# Patient Record
Sex: MALE | Race: WHITE | NOT HISPANIC OR LATINO | Employment: OTHER | URBAN - METROPOLITAN AREA
[De-identification: names, ages, dates, MRNs, and addresses within clinical notes are randomized per-mention and may not be internally consistent; named-entity substitution may affect disease eponyms.]

---

## 2017-01-03 ENCOUNTER — DOCUMENTATION ONLY (OUTPATIENT)
Dept: FAMILY MEDICINE | Facility: CLINIC | Age: 82
End: 2017-01-03

## 2017-01-04 ENCOUNTER — OFFICE VISIT (OUTPATIENT)
Dept: FAMILY MEDICINE | Facility: CLINIC | Age: 82
End: 2017-01-04
Payer: MEDICARE

## 2017-01-04 ENCOUNTER — TELEPHONE (OUTPATIENT)
Dept: FAMILY MEDICINE | Facility: CLINIC | Age: 82
End: 2017-01-04

## 2017-01-04 ENCOUNTER — LAB VISIT (OUTPATIENT)
Dept: LAB | Facility: HOSPITAL | Age: 82
End: 2017-01-04
Attending: FAMILY MEDICINE
Payer: MEDICARE

## 2017-01-04 VITALS
SYSTOLIC BLOOD PRESSURE: 139 MMHG | HEART RATE: 61 BPM | HEIGHT: 70 IN | BODY MASS INDEX: 28.31 KG/M2 | DIASTOLIC BLOOD PRESSURE: 84 MMHG | TEMPERATURE: 98 F | WEIGHT: 197.75 LBS

## 2017-01-04 DIAGNOSIS — R91.1 PULMONARY NODULE: ICD-10-CM

## 2017-01-04 DIAGNOSIS — E78.5 HYPERLIPIDEMIA, UNSPECIFIED HYPERLIPIDEMIA TYPE: ICD-10-CM

## 2017-01-04 DIAGNOSIS — J06.9 UPPER RESPIRATORY TRACT INFECTION, UNSPECIFIED TYPE: ICD-10-CM

## 2017-01-04 DIAGNOSIS — Z12.5 ENCOUNTER FOR PROSTATE CANCER SCREENING: ICD-10-CM

## 2017-01-04 DIAGNOSIS — E55.9 VITAMIN D DEFICIENCY: ICD-10-CM

## 2017-01-04 DIAGNOSIS — R07.89 ATYPICAL CHEST PAIN: ICD-10-CM

## 2017-01-04 DIAGNOSIS — R00.1 BRADYCARDIA: ICD-10-CM

## 2017-01-04 DIAGNOSIS — L71.9 ROSACEA: ICD-10-CM

## 2017-01-04 DIAGNOSIS — G47.30 SLEEP APNEA, UNSPECIFIED TYPE: Primary | ICD-10-CM

## 2017-01-04 DIAGNOSIS — N40.0 BENIGN NON-NODULAR PROSTATIC HYPERPLASIA WITHOUT LOWER URINARY TRACT SYMPTOMS: ICD-10-CM

## 2017-01-04 DIAGNOSIS — K63.5 COLON POLYPS: ICD-10-CM

## 2017-01-04 DIAGNOSIS — Z00.00 ANNUAL PHYSICAL EXAM: ICD-10-CM

## 2017-01-04 LAB
25(OH)D3+25(OH)D2 SERPL-MCNC: 29 NG/ML
COMPLEXED PSA SERPL-MCNC: 0.63 NG/ML

## 2017-01-04 PROCEDURE — 99214 OFFICE O/P EST MOD 30 MIN: CPT | Mod: S$PBB,,, | Performed by: FAMILY MEDICINE

## 2017-01-04 PROCEDURE — 82306 VITAMIN D 25 HYDROXY: CPT

## 2017-01-04 PROCEDURE — 99999 PR PBB SHADOW E&M-EST. PATIENT-LVL III: CPT | Mod: PBBFAC,,, | Performed by: FAMILY MEDICINE

## 2017-01-04 PROCEDURE — 84153 ASSAY OF PSA TOTAL: CPT

## 2017-01-04 PROCEDURE — 93010 ELECTROCARDIOGRAM REPORT: CPT | Mod: ,,, | Performed by: INTERNAL MEDICINE

## 2017-01-04 PROCEDURE — 93005 ELECTROCARDIOGRAM TRACING: CPT | Mod: PBBFAC,PO | Performed by: FAMILY MEDICINE

## 2017-01-04 PROCEDURE — 36415 COLL VENOUS BLD VENIPUNCTURE: CPT | Mod: PO

## 2017-01-04 RX ORDER — AZITHROMYCIN 250 MG/1
TABLET, FILM COATED ORAL
Qty: 6 TABLET | Refills: 0 | Status: SHIPPED | OUTPATIENT
Start: 2017-01-04 | End: 2017-04-06 | Stop reason: ALTCHOICE

## 2017-01-04 RX ORDER — DEXAMETHASONE SODIUM PHOSPHATE 4 MG/ML
4 INJECTION, SOLUTION INTRA-ARTICULAR; INTRALESIONAL; INTRAMUSCULAR; INTRAVENOUS; SOFT TISSUE
Status: COMPLETED | OUTPATIENT
Start: 2017-01-04 | End: 2017-01-04

## 2017-01-04 RX ADMIN — DEXAMETHASONE SODIUM PHOSPHATE 4 MG: 4 INJECTION, SOLUTION INTRAMUSCULAR; INTRAVENOUS at 02:01

## 2017-01-04 NOTE — PROGRESS NOTES
[facility]  Progress Note    Subjective:       Patient ID: Bryson Kellogg is a 81 y.o. male.    Chief Complaint: Establish Care    HPI81 y.o.male with current medical history of sleep apnea, pulmonary nodule, BPH, colonic polyps, rosacea, vitamin D deficiency, and atypical chest pain is here to establish care.  The patient has been evaluated for chest pain in the past.  Patient was taken to emergency room.  Patient was diagnosed with acid reflux.  Since that time patient has not had similar symptoms.  Patient would like to be established with cardiologist just in case any event would happen in the future.  Patient has recently had a colonoscopy performed by GI. Patient is also complaing of sinus congestion, rhinorrhea, and cough. Symptoms have been present for past 5 days. Patient has been taking OTC medication which have not provided symptom relief.  All other medical conditions are well-controlled with current treatment.  No further complaints at today's visit.    Review of Systems   Constitutional: Negative for activity change and unexpected weight change.   HENT: Positive for congestion, postnasal drip, rhinorrhea and sinus pressure. Negative for hearing loss and trouble swallowing.    Eyes: Negative for discharge and visual disturbance.   Respiratory: Positive for cough. Negative for chest tightness and wheezing.    Cardiovascular: Positive for chest pain. Negative for palpitations.   Gastrointestinal: Negative for blood in stool, constipation, diarrhea and vomiting.   Endocrine: Negative for polydipsia and polyuria.   Genitourinary: Negative for difficulty urinating, hematuria and urgency.   Musculoskeletal: Negative for arthralgias and joint swelling.   Neurological: Negative for weakness and headaches.   Psychiatric/Behavioral: Negative for confusion and dysphoric mood.       Objective:      Vitals:    01/04/17 1309   BP: 139/84   BP Location: Right arm   Patient Position: Sitting   Pulse: 61  "  Temp: 98 °F (36.7 °C)   TempSrc: Oral   Weight: 89.7 kg (197 lb 12 oz)   Height: 5' 10" (1.778 m)     Body mass index is 28.37 kg/(m^2).  Physical Exam   Constitutional: He is oriented to person, place, and time. He appears well-developed and well-nourished.   HENT:   Head: Normocephalic and atraumatic.   Eyes: Conjunctivae and EOM are normal. Pupils are equal, round, and reactive to light.   Neck: Normal range of motion. Neck supple. No JVD present.   Cardiovascular: Normal rate, regular rhythm, normal heart sounds and intact distal pulses.  Exam reveals no gallop and no friction rub.    No murmur heard.  Pulmonary/Chest: Effort normal and breath sounds normal. No respiratory distress. He has no wheezes.   Abdominal: Soft. Bowel sounds are normal. There is no tenderness.   Musculoskeletal: Normal range of motion.   Neurological: He is alert and oriented to person, place, and time. No cranial nerve deficit.   Skin: Skin is warm and dry.   Psychiatric: He has a normal mood and affect. His behavior is normal. Judgment and thought content normal.   Nursing note and vitals reviewed.      Assessment:       1. Sleep apnea, unspecified type    2. Pulmonary nodule    3. Benign non-nodular prostatic hyperplasia without lower urinary tract symptoms    4. Colon polyps    5. Hyperlipidemia, unspecified hyperlipidemia type    6. Rosacea    7. Encounter for prostate cancer screening    8. Vitamin D deficiency    9. Annual physical exam    10. Upper respiratory tract infection, unspecified type    11. Atypical chest pain    12. Bradycardia        Plan:       Sleep apnea, unspecified type        - Well controlled patient no longer on CPAP    Pulmonary nodule        - Follow up CT chest without contrast    Benign non-nodular prostatic hyperplasia without lower urinary tract symptoms        - Well controlled continue current medications    Colon polyps        - Will follow up Gastroenterology     Hyperlipidemia, unspecified " hyperlipidemia type        - Well controlled continue current medications    Rosacea        - Well controlled continue current medications    Encounter for prostate cancer screening  -     PSA, Screening; Future; Expected date: 1/4/17    Vitamin D deficiency  -     Vitamin D; Future; Expected date: 1/4/17    Annual physical exam  -     EKG 12-lead    Upper respiratory tract infection, unspecified type  -     dexamethasone injection 4 mg; Inject 1 mL (4 mg total) into the muscle one time.  -     azithromycin (Z-YUE) 250 MG tablet; Take 2 tablets by mouth on day 1; Take 1 tablet by mouth on days 2-5  Dispense: 6 tablet; Refill: 0    Atypical chest pain  -     Ambulatory referral to Cardiology    Bradycardia  -     Ambulatory referral to Cardiology      Return in about 6 months (around 7/4/2017).  Ric Avina MD  [facility]  1/4/2017 2:12 PM

## 2017-01-04 NOTE — TELEPHONE ENCOUNTER
Left message informing patient that a CT Scan was placed and he needs to call back to schedule it.

## 2017-01-04 NOTE — PROGRESS NOTES
Injection given; RUO;patient tolerates well; Bandaid applied; Patient asked to wait 15minutes in lobby to monitor any reaction.

## 2017-01-04 NOTE — MR AVS SNAPSHOT
[facility]  [address]  [place] [address]  Phone: [phone]  Fax: [phone]                  Bryson Kellogg   2017 1:20 PM   Office Visit    Description:  Male : 1935   Provider:  Ric Avina MD   Department:  [facility]           Reason for Visit     Establish Care           Diagnoses this Visit        Comments    Sleep apnea, unspecified type    -  Primary     Pulmonary nodule         Benign non-nodular prostatic hyperplasia without lower urinary tract symptoms         Colon polyps         Hyperlipidemia, unspecified hyperlipidemia type         Rosacea         Encounter for prostate cancer screening         Vitamin D deficiency         Annual physical exam         Upper respiratory tract infection, unspecified type         Atypical chest pain         Bradycardia                To Do List           Future Appointments        Provider Department Dept Phone    2017 2:30 PM [facility] [phone]    2017 2:00 [facility] [phone]    2017 1:00 PM Ric Avina MD [place] [facility] [phone]      Goals (5 Years of Data)     None      Follow-Up and Disposition     Return in about 6 months (around 2017).       These Medications        Disp Refills Start End    azithromycin (Z-YUE) 250 MG tablet 6 tablet 0 2017     Take 2 tablets by mouth on day 1; Take 1 tablet by mouth on days 2-5    Pharmacy: [address] Ph #: [phone]         [facility]     [facility] Care [facility] -  Assistance  Registered nurses in the [facility] Center provide clinical advisement, health education, appointment booking, and other advisory services.  Call for this free service at [phone].             Medications           Message regarding Medications     Verify the changes and/or additions to your  medication regime listed below are the same as discussed with your clinician today.  If any of these changes or additions are incorrect, please notify your healthcare provider.        START taking these NEW medications        Refills    azithromycin (Z-YUE) 250 MG tablet 0    Sig: Take 2 tablets by mouth on day 1; Take 1 tablet by mouth on days 2-5    Class: Normal      These medications were administered today        Dose Freq    dexamethasone injection 4 mg 4 mg Clinic/HOD 1 time    Sig: Inject 1 mL (4 mg total) into the muscle one time.    Class: Normal    Route: Intramuscular      STOP taking these medications     LOTEMAX 0.5 % ophthalmic suspension     B-complex with vitamin C (Z-BEC OR EQUIV) tablet Take 1 tablet by mouth once daily.           Verify that the below list of medications is an accurate representation of the medications you are currently taking.  If none reported, the list may be blank. If incorrect, please contact your healthcare provider. Carry this list with you in case of emergency.           Current Medications     ascorbic acid (VITAMIN C) 500 MG tablet Take 500 mg by mouth once daily.      aspirin 81 MG Chew Take 81 mg by mouth once daily.    atorvastatin (LIPITOR) 20 MG tablet Take 1 tablet (20 mg total) by mouth once daily.    CALCIUM CARBONATE/VITAMIN D3 (VITAMIN D-3 ORAL) Take by mouth.    cyanocobalamin (VITAMIN B-12) 250 MCG tablet Take 250 mcg by mouth once daily.      MULTIVIT-MIN/FA/LYCOPENE/LUT (CENTRUM SILVER ULTRA MEN'S ORAL) Take 1 tablet by mouth once daily.    mupirocin (BACTROBAN) 2 % ointment     PYRIDOXINE HCL (VITAMIN B-6 ORAL) Take by mouth.    triamcinolone acetonide 0.1% (KENALOG) 0.1 % cream APPLY TOPICALLY daily  AS NEEDED.    azithromycin (Z-YUE) 250 MG tablet Take 2 tablets by mouth on day 1; Take 1 tablet by mouth on days 2-5           Clinical Reference Information           Vital Signs - Last Recorded  Most recent update: 1/4/2017  1:10 PM by EN Ken LPN  "   BP Pulse Temp Ht Wt BMI    139/84 (BP Location: Right arm, Patient Position: Sitting) 61 98 °F (36.7 °C) (Oral) 5' 10" (1.778 m) 89.7 kg (197 lb 12 oz) 28.37 kg/m2      Blood Pressure          Most Recent Value    BP  139/84      Allergies as of 1/4/2017     Sulfa (Sulfonamide Antibiotics)      Immunizations Administered on Date of Encounter - 1/4/2017     None      Orders Placed During Today's Visit      Normal Orders This Visit    Ambulatory referral to Cardiology     EKG 12-lead     Future Labs/Procedures Expected by Expires    PSA, Screening  1/4/2017 3/5/2018    Vitamin D  1/4/2017 3/5/2018      Administrations This Visit     dexamethasone injection 4 mg     Admin Date Action Dose Route Administered By             01/04/2017 Given 4 mg Intramuscular EN Ken LPN                      "

## 2017-01-05 ENCOUNTER — HOSPITAL ENCOUNTER (OUTPATIENT)
Dept: RADIOLOGY | Facility: HOSPITAL | Age: 82
Discharge: HOME | End: 2017-01-05
Attending: FAMILY MEDICINE
Payer: MEDICARE

## 2017-01-05 DIAGNOSIS — R91.1 PULMONARY NODULE: ICD-10-CM

## 2017-01-05 DIAGNOSIS — E55.9 VITAMIN D DEFICIENCY: Primary | ICD-10-CM

## 2017-01-05 PROCEDURE — 71250 CT THORAX DX C-: CPT | Mod: TC

## 2017-01-05 PROCEDURE — 71250 CT THORAX DX C-: CPT | Mod: 26,,, | Performed by: RADIOLOGY

## 2017-01-05 RX ORDER — ERGOCALCIFEROL 1.25 MG/1
50000 CAPSULE ORAL
Qty: 8 CAPSULE | Refills: 0 | Status: SHIPPED | OUTPATIENT
Start: 2017-01-05 | End: 2017-04-06 | Stop reason: ALTCHOICE

## 2017-01-09 ENCOUNTER — TELEPHONE (OUTPATIENT)
Dept: FAMILY MEDICINE | Facility: CLINIC | Age: 82
End: 2017-01-09

## 2017-01-09 NOTE — TELEPHONE ENCOUNTER
----- Message from Ric Avina MD sent at 1/5/2017  2:13 PM CST -----  Please call and inform patient that he has stable pulmonary nodules.  At this time we'll continue to follow.  Patient also found small hiatal hernia.  Follow-up as scheduled.

## 2017-01-09 NOTE — TELEPHONE ENCOUNTER
----- Message from Ric Avina MD sent at 1/6/2017 11:16 AM CST -----  Please call and inform patient that chest CT  Shows benign  Pulmonary nodules  Small hiatal hernias.  Follow-up as scheduled

## 2017-01-09 NOTE — TELEPHONE ENCOUNTER
----- Message from Ric Avina MD sent at 1/5/2017  9:42 AM CST -----  Please call and inform patient that he has vitamin D deficiency.  Have called in a vitamin D 1 pill every 7 days for 2 months.  After this time he can start taking his daily vitamin D supplementation.  While on prescription vitamin D discontinue over-the-counter vitamin D supplementation.

## 2017-01-09 NOTE — TELEPHONE ENCOUNTER
Talked with wife; she has read CT report on [place] and has question about atherscoloris and CAD mentioned in report. Please review for advise

## 2017-04-04 ENCOUNTER — DOCUMENTATION ONLY (OUTPATIENT)
Dept: FAMILY MEDICINE | Facility: CLINIC | Age: 82
End: 2017-04-04

## 2017-04-04 NOTE — PROGRESS NOTES
Pre-Visit Chart Review  For Appointment Scheduled on 04/06/2017      Health Maintenance Due   Topic Date Due    TETANUS VACCINE  06[birth year]29/1953    Zoster Vaccine  06/29/1996    Influenza Vaccine  08/01/2016    Pneumococcal (65+) (2 of 2 - PPSV23) 12/12/2016

## 2017-04-06 ENCOUNTER — OFFICE VISIT (OUTPATIENT)
Dept: FAMILY MEDICINE | Facility: CLINIC | Age: 82
End: 2017-04-06
Payer: MEDICARE

## 2017-04-06 ENCOUNTER — OFFICE VISIT (OUTPATIENT)
Dept: CARDIOLOGY | Facility: CLINIC | Age: 82
End: 2017-04-06
Payer: MEDICARE

## 2017-04-06 VITALS
HEIGHT: 70 IN | WEIGHT: 199.5 LBS | HEART RATE: 62 BPM | RESPIRATION RATE: 16 BRPM | SYSTOLIC BLOOD PRESSURE: 131 MMHG | BODY MASS INDEX: 28.56 KG/M2 | OXYGEN SATURATION: 96 % | DIASTOLIC BLOOD PRESSURE: 73 MMHG

## 2017-04-06 VITALS
WEIGHT: 198.19 LBS | SYSTOLIC BLOOD PRESSURE: 126 MMHG | BODY MASS INDEX: 28.37 KG/M2 | HEART RATE: 57 BPM | HEIGHT: 70 IN | TEMPERATURE: 98 F | DIASTOLIC BLOOD PRESSURE: 79 MMHG

## 2017-04-06 DIAGNOSIS — G47.33 OBSTRUCTIVE SLEEP APNEA SYNDROME: ICD-10-CM

## 2017-04-06 DIAGNOSIS — E78.2 MIXED HYPERLIPIDEMIA: Primary | ICD-10-CM

## 2017-04-06 DIAGNOSIS — R94.31 ABNORMAL EKG: ICD-10-CM

## 2017-04-06 DIAGNOSIS — R94.31 ABNORMAL ELECTROCARDIOGRAM: ICD-10-CM

## 2017-04-06 DIAGNOSIS — Z00.00 ENCOUNTER FOR PREVENTIVE HEALTH EXAMINATION: Primary | ICD-10-CM

## 2017-04-06 PROBLEM — R00.1 BRADYCARDIA: Status: RESOLVED | Noted: 2017-01-04 | Resolved: 2017-04-06

## 2017-04-06 PROCEDURE — 99999 PR PBB SHADOW E&M-EST. PATIENT-LVL III: CPT | Mod: PBBFAC,,, | Performed by: INTERNAL MEDICINE

## 2017-04-06 PROCEDURE — G0438 PPPS, INITIAL VISIT: HCPCS | Mod: ,,, | Performed by: PHYSICIAN ASSISTANT

## 2017-04-06 PROCEDURE — 99213 OFFICE O/P EST LOW 20 MIN: CPT | Mod: PBBFAC,27,PO | Performed by: INTERNAL MEDICINE

## 2017-04-06 PROCEDURE — 99999 PR PBB SHADOW E&M-EST. PATIENT-LVL IV: CPT | Mod: PBBFAC,,, | Performed by: PHYSICIAN ASSISTANT

## 2017-04-06 PROCEDURE — 99205 OFFICE O/P NEW HI 60 MIN: CPT | Mod: S$PBB,,, | Performed by: INTERNAL MEDICINE

## 2017-04-06 RX ORDER — ASPIRIN 81 MG/1
81 TABLET ORAL DAILY
COMMUNITY
End: 2022-12-12

## 2017-04-06 RX ORDER — ATORVASTATIN CALCIUM 20 MG/1
10 TABLET, FILM COATED ORAL DAILY
Qty: 90 TABLET | Refills: 3 | Status: SHIPPED | OUTPATIENT
Start: 2017-04-06 | End: 2018-01-03 | Stop reason: DRUGHIGH

## 2017-04-06 RX ORDER — ATORVASTATIN CALCIUM 20 MG/1
10 TABLET, FILM COATED ORAL DAILY
COMMUNITY
End: 2017-04-06 | Stop reason: SDUPTHER

## 2017-04-06 NOTE — PATIENT INSTRUCTIONS
Counseling and Referral of Other Preventative  (Italic type indicates deductible and co-insurance are waived)    Patient Name: Bryson Kellogg  Today's Date: 4/6/2017      SERVICE LIMITATIONS RECOMMENDATION    Vaccines    · Pneumococcal (once after 65)    · Influenza (annually)    · Hepatitis B (if medium/high risk)    · Prevnar 13      Hepatitis B medium/high risk factors:       - End-stage renal disease       - Hemophiliacs who received Factor VII or         IX concentrates       - Clients of institutions for the mentally             retarded       - Persons who live in the same house as          a HepB carrier       - Homosexual men       - Illicit injectable drug abusers     Pneumococcal: Done, no repeat necessary     Influenza: Done, repeat in one year     Hepatitis B: N/A     Prevnar 13: Done, no repeat necessary    Prostate cancer screening (annually to age 75)     Prostate specific antigen (PSA) Shared decision making with Provider. Sometimes a co-pay may be required if the patient decides to have this test. The USPSTF no longer recommends prostate cancer screening routinely in medicine: every 1 year    Colorectal cancer screening (to age 75)    · Fecal occult blood test (annual)  · Flexible sigmoidoscopy (5y)  · Screening colonoscopy (10y)  · Barium enema   N/A    Diabetes self-management training (no USPSTF recommendations)  Requires referral by treating physician for patient with diabetes or renal disease. 10 hours of initial DSMT sessions of no less than 30 minutes each in a continuous 12-month period. 2 hours of follow-up DSMT in subsequent years.  N/A    Glaucoma screening (no USPSTF recommendation)  Diabetes mellitus, family history   , age 50 or over    American, age 65 or over  Done this year, repeat every year    Medical nutrition therapy for diabetes or renal disease (no recommended schedule)  Requires referral by treating physician for patient with diabetes or renal disease  or kidney transplant within the past 3 years.  Can be provided in same year as diabetes self-management training (DSMT), and CMS recommends medical nutrition therapy take place after DSMT. Up to 3 hours for initial year and 2 hours in subsequent years.  N/A    Cardiovascular screening blood tests (every 5 years)  · Fasting lipid panel  Order as a panel if possible  Done this year, repeat every year    Diabetes screening tests (at least every 3 years, Medicare covers annually or at 6-month intervals for prediabetic patients)  · Fasting blood sugar (FBS) or glucose tolerance test (GTT)  Patient must be diagnosed with one of the following:       - Hypertension       - Dyslipidemia       - Obesity (BMI 30kg/m2)       - Previous elevated impaired FBS or GTT       ... or any two of the following:       - Overweight (BMI 25 but <30)       - Family history of diabetes       - Age 65 or older       - History of gestational diabetes or birth of baby weighing more than 9 pounds  Done this year, repeat every year    Abdominal aortic aneurysm screening (once)  · Sonogram   Limited to patients who meet one of the following criteria:       - Men who are 65-75 years old and have smoked more than 100 cigarette in their lifetime       - Anyone with a family history of abdominal aortic aneurysm       - Anyone recommended for screening by the USPSTF  N/A    HIV screening (annually for increased risk patients)  · HIV-1 and HIV-2 by EIA, or LUÍS, rapid antibody test or oral mucosa transudate  Patients must be at increased risk for HIV infection per USPSTF guidelines or pregnant. Tests covered annually for patient at increased risk or as requested by the patient. Pregnant patients may receive up to 3 tests during pregnancy.  Risks discussed, screening is not recommended    Smoking cessation counseling (up to 8 sessions per year)  Patients must be asymptomatic of tobacco-related conditions to receive as a preventative service.  Non-smoker     Subsequent annual wellness visit  At least 12 months since last AWV  Return in one year     The following information is provided to all patients.  This information is to help you find resources for any of the problems found today that may be affecting your health:                Living healthy guide: www.[facility].[place].[facility]      Understanding Diabetes: www.diabetes.org      Eating healthy: www.cdc.gov/healthyweight      CDC home safety checklist: www.cdc.gov/steadi/patient.html      Agency on Aging: www.goea.[place].[facility]      Alcoholics anonymous (AA): www.aa.org      Physical Activity: www.nellie.nih.gov/hq0qqev      Tobacco use: www.quitwithusla.org

## 2017-04-06 NOTE — PROGRESS NOTES
[facility]    CC: Establish Care    Patient ID: Bryson Kellogg is a 81 y.o. male with a past medical history of HLD, KATHY, BPH, who presents for an initial appointment.  He was kindly referred by Dr. Avina for cardiac evaluation.  Pertinent history/events are as follows:     -Pt has no history of CAD.    -States he was diagnosed with KATHY in the past, but was told he does not need to wear CPAP by the physician who diagnosed him with KATHY.     HPI:   Mr. Kellogg states he's been doing well.  He reports no chest pain, SOB, LE edema, claudication, palpitations, TIA symptoms, or syncope.  Keeps active by going to gym exercising at least 3 days a week.  Also works in the yard.  Taking all medications as prescribed with no issues.  BP today 131/73.      Past Medical History:   Diagnosis Date    Allergy     Bradycardia 1/4/2017    Hx of colonic polyp 11/3/2016    Hyperlipidemia     Personal history of colonic polyps     Colon polyps    Renal stone 11/23/2012    Rosacea     Shingles     Sleep apnea     TIA (transient ischemic attack)      Past Surgical History:   Procedure Laterality Date    CHOLECYSTECTOMY      COLONOSCOPY N/A 11/3/2016    Procedure: COLONOSCOPY;  Surgeon: Alex Cuellar MD;  Location: [facility];  Service: Endoscopy;  Laterality: N/A;    EYE SURGERY Bilateral 2014    cataracts    EYE SURGERY Right 10/2016    tear duct surgery    JOINT REPLACEMENT Left 2013    knee replacemant      left knee     Social History     Social History    Marital status:      Spouse name: Sharyn    Number of children: N/A    Years of education: N/A     Occupational History    Not on file.     Social History Main Topics    Smoking status: Never Smoker    Smokeless tobacco: Not on file    Alcohol use No    Drug use: No    Sexual activity: Yes     Partners: Female     Other Topics Concern    Not on file     Social History Narrative    No narrative on file     Family History   Problem  "Relation Age of Onset    No Known Problems Mother     Heart disease Father     Heart disease Brother     No Known Problems Sister     No Known Problems Daughter     No Known Problems Brother        Review of patient's allergies indicates:   Allergen Reactions    Sulfa (sulfonamide antibiotics) Rash     Childhood allergy       Medication List with Changes/Refills   Current Medications    ASCORBIC ACID (VITAMIN C) 500 MG TABLET    Take 500 mg by mouth once daily.      ASPIRIN (ECOTRIN) 81 MG EC TABLET    Take 81 mg by mouth once daily.    ATORVASTATIN (LIPITOR) 20 MG TABLET    Take 0.5 tablets (10 mg total) by mouth once daily.    CALCIUM CARBONATE/VITAMIN D3 (VITAMIN D-3 ORAL)    Take by mouth.    CYANOCOBALAMIN (VITAMIN B-12) 250 MCG TABLET    Take 250 mcg by mouth once daily.      MULTIVIT-MIN/FA/LYCOPENE/LUT (CENTRUM SILVER ULTRA MEN'S ORAL)    Take 1 tablet by mouth once daily.    MUPIROCIN (BACTROBAN) 2 % OINTMENT        PYRIDOXINE HCL (VITAMIN B-6 ORAL)    Take by mouth.    TRIAMCINOLONE ACETONIDE 0.1% (KENALOG) 0.1 % CREAM    APPLY TOPICALLY daily  AS NEEDED.   Discontinued Medications    ERGOCALCIFEROL (ERGOCALCIFEROL) 50,000 UNIT CAP    Take 1 capsule (50,000 Units total) by mouth every 7 days.       Review of Systems  Constitution: Denies chills, fever, and sweats.  HENT: Denies headaches or blurry vision.  Cardiovascular: Denies chest pain or irregular heart beat.  Respiratory: Denies cough or shortness of breath.  Gastrointestinal: Denies abdominal pain, nausea, or vomiting.  Musculoskeletal: Denies muscle cramps.  Neurological: Denies dizziness or focal weakness.  Psychiatric/Behavioral: Normal mental status.  Hematologic/Lymphatic: Denies bleeding problem or easy bruising/bleeding.  Skin: Denies rash or suspicious lesions    Physical Examination  /73 (BP Location: Right arm, Patient Position: Sitting, BP Method: Automatic)  Pulse 62  Resp 16  Ht 5' 10" (1.778 m)  Wt 90.5 kg (199 lb 8.3 " oz)  SpO2 96%  BMI 28.63 kg/m2    Constitutional: No acute distress, conversant  HEENT: Sclera anicteric, Pupils equal, round and reactive to light, extraocular motions intact, Oropharynx clear  Neck: No JVD, no carotid bruits  Cardiovascular: regular rate and rhythm, no murmur, rubs or gallops, normal S1/S2  Pulmonary: Clear to auscultation bilaterally  Abdominal: Abdomen soft, nontender, nondistended, positive bowel sounds  Extremities: No lower extremity edema,   Pulses:  Carotid pulses are 2+ on the right side, and 2+ on the left side.  Radial pulses are 2+ on the right side, and 2+ on the left side.   Femoral pulses are 2+ on the right side, and 2+ on the left side.  Skin: No ecchymosis, erythema, or ulcers  Psych: Alert and oriented x 3, appropriate affect  Neuro: CNII-XII intact, no focal deficits    Labs:  Most Recent Data  CBC:   Lab Results   Component Value Date    WBC 6.17 06/14/2016    HGB 14.5 06/14/2016    HCT 42.8 06/14/2016     06/14/2016    MCV 96 06/14/2016    RDW 13.8 06/14/2016     BMP:   Lab Results   Component Value Date     06/14/2016    K 4.5 06/14/2016     06/14/2016    CO2 27 06/14/2016    BUN 13 06/14/2016     06/14/2016    CALCIUM 10.3 06/14/2016     LFTS;   Lab Results   Component Value Date    PROT 6.8 06/14/2016    ALBUMIN 3.7 06/14/2016    BILITOT 1.0 06/14/2016    AST 26 06/14/2016    ALKPHOS 102 06/14/2016    ALT 22 06/14/2016     COAGS:   Lab Results   Component Value Date    INR 1.03 11/23/2012     FLP:   Lab Results   Component Value Date    CHOL 187 06/14/2016    HDL 46 06/14/2016    LDLCALC 117.4 06/14/2016    TRIG 118 06/14/2016    CHOLHDL 24.6 06/14/2016       EKG 1/4/2017:  Sinus bradycardia  Otherwise normal ECG    Echo:  CONCLUSIONS     1 - Concentric remodeling.     2 - Normal left ventricular systolic function (EF 60-65%).     3 - Normal left ventricular diastolic function.     4 - Normal right ventricular systolic function .     5 - No  significant change from echo on 11/14/2005..     Assessment/Plan:  Bryson Kellogg is a 81 y.o. male with a past medical history of HLD, KATHY, BPH, who presents for an initial appointment. Currently with no symptoms.  Has 10 year ASCVD risk of 36%.      -Pt to keep log of blood pressure/heart rate and bring in next visit  -Continue current medications  -Echo  -Treadmill Nuclear stress test  -Follow up in 3 months    Total duration of face to face visit time 45 minutes.  Total time spent counseling greater than fifty percent of total visit time.  Counseling included discussion regarding imaging findings, diagnosis, possibilities, treatment options, risks and benefits.  The patient had many questions regarding the options and long-term effects.    Luis E Chavez MD, PhD  Interventional Cardiology

## 2017-04-06 NOTE — PATIENT INSTRUCTIONS
Pt to keep log of blood pressure/heart rate and bring in next visit  Continue current medications  Echo  Nuclear stress test  Follow up in 3 months

## 2017-04-06 NOTE — LETTER
April 6, 2017      Ric Avina MD  [address]  [place] [address]           [facility] [facility] [place] [facility] - Cardiology  [address]  [place] [address]  Phone: [phone]          Patient: Bryson Kellogg   MR Number: [id number]   YOB: 1935   Date of Visit: 4/6/2017       Dear Dr. Ric Avina:    Thank you for referring Bryson Kellogg to me for evaluation. Attached you will find relevant portions of my assessment and plan of care.    If you have questions, please do not hesitate to call me. I look forward to following Bryson Kellogg along with you.    Sincerely,    Luis E Chavez MD    Enclosure  CC:  No Recipients    If you would like to receive this communication electronically, please contact [email or web address] or [phone] to request more information on [facility] access.    For providers and/or their staff who would like to refer a patient to [facility], please contact us through our one-stop-shop provider referral line, [facility], at [phone].    If you feel you have received this communication in error or would no longer like to receive these types of communications, please e-mail [email or web address]

## 2017-04-06 NOTE — PROGRESS NOTES
"Bryson Kellogg presented for an initial Medicare AWV today. The following components were reviewed and updated:    · Medical history  · Family History  · Social history  · Allergies and Current Medications  · Health Risk Assessment  · Health Maintenance  · Care Team    **See Completed Assessments for Annual Wellness visit with in the encounter summary    The following assessments were completed:  · Depression Screening  · Cognitive function Screening  · Timed Get Up Test  · Whisper Test    Vitals:    04/06/17 1011   BP: 126/79   BP Location: Left arm   Patient Position: Sitting   BP Method: Automatic   Pulse: (!) 57   Temp: 97.6 °F (36.4 °C)   TempSrc: Oral   Weight: 89.9 kg (198 lb 3.1 oz)   Height: 5' 10" (1.778 m)     Body mass index is 28.44 kg/(m^2).   ]              Diagnoses and health risks identified today and associated recommendations/orders:  1. Encounter for preventive health examination    Provided Bryson with a 5-10 year written screening schedule and personal prevention plan. Recommendations were developed using the USPSTF age appropriate recommendations. Education, counseling, and referrals were provided as needed.  After Visit Summary printed and given to patient which includes a list of additional screenings\tests needed.    Return in about 3 months (around 7/6/2017).      ELLIE Alejandro  "

## 2017-04-06 NOTE — MR AVS SNAPSHOT
[facility]  [address]  [place] [address]  Phone: [phone]  Fax: [phone]                  Bryson Kellogg   2017 11:00 AM   Office Visit    Description:  Male : 1935   Provider:  ELLIE Hu   Department:  [facility] - [facility] Medicine           Reason for Visit     Medicare AWV Follow Up           Diagnoses this Visit        Comments    Encounter for preventive health examination    -  Primary            To Do List           Future Appointments        Provider Department Dept Phone    2017 2:00 PM Luis E Chavez MD [facility] - Cardiology [phone]    2017 1:00 PM Ric Avina MD [facility] [phone]      Goals (5 Years of Data)     None       These Medications        Disp Refills Start End    atorvastatin (LIPITOR) 20 MG tablet 90 tablet 3 2017     Take 0.5 tablets (10 mg total) by mouth once daily. - Oral    Pharmacy: [facility]  [place] - [address] [place] [address] Ph #: [phone]         [facility]     [facility] On Call Nurse Care Line -  Assistance  Unless otherwise directed by your provider, please contact [facility], our nurse care line that is available for  assistance.     Registered nurses in the [facility] provide: appointment scheduling, clinical advisement, health education, and other advisory services.  Call: [phone] (toll free)               Medications           Message regarding Medications     Verify the changes and/or additions to your medication regime listed below are the same as discussed with your clinician today.  If any of these changes or additions are incorrect, please notify your healthcare provider.        START taking these NEW medications        Refills    atorvastatin (LIPITOR) 20 MG tablet 3    Sig: Take 0.5 tablets (10 mg total) by mouth once daily.    Class: Normal    Route: Oral      STOP taking these medications     azithromycin  "(Z-YUE) 250 MG tablet Take 2 tablets by mouth on day 1; Take 1 tablet by mouth on days 2-5           Verify that the below list of medications is an accurate representation of the medications you are currently taking.  If none reported, the list may be blank. If incorrect, please contact your healthcare provider. Carry this list with you in case of emergency.           Current Medications     ascorbic acid (VITAMIN C) 500 MG tablet Take 500 mg by mouth once daily.      aspirin (ECOTRIN) 81 MG EC tablet Take 81 mg by mouth once daily.    atorvastatin (LIPITOR) 20 MG tablet Take 0.5 tablets (10 mg total) by mouth once daily.    CALCIUM CARBONATE/VITAMIN D3 (VITAMIN D-3 ORAL) Take by mouth.    cyanocobalamin (VITAMIN B-12) 250 MCG tablet Take 250 mcg by mouth once daily.      ergocalciferol (ERGOCALCIFEROL) 50,000 unit Cap Take 1 capsule (50,000 Units total) by mouth every 7 days.    MULTIVIT-MIN/FA/LYCOPENE/LUT (CENTRUM SILVER ULTRA MEN'S ORAL) Take 1 tablet by mouth once daily.    mupirocin (BACTROBAN) 2 % ointment     PYRIDOXINE HCL (VITAMIN B-6 ORAL) Take by mouth.    triamcinolone acetonide 0.1% (KENALOG) 0.1 % cream APPLY TOPICALLY daily  AS NEEDED.           Clinical Reference Information           Your Vitals Were     BP Pulse Temp Height Weight BMI    126/79 (BP Location: Left arm, Patient Position: Sitting, BP Method: Automatic) 57 97.6 °F (36.4 °C) (Oral) 5' 10" (1.778 m) 89.9 kg (198 lb 3.1 oz) 28.44 kg/m2      Blood Pressure          Most Recent Value    BP  126/79      Allergies as of 4/6/2017     Sulfa (Sulfonamide Antibiotics)      Immunizations Administered on Date of Encounter - 4/6/2017     None      Instructions      Counseling and Referral of Other Preventative  (Italic type indicates deductible and co-insurance are waived)    Patient Name: Bryson Kellogg  Today's Date: 4/6/2017      SERVICE LIMITATIONS RECOMMENDATION    Vaccines    · Pneumococcal (once after 65)    · Influenza " (annually)    · Hepatitis B (if medium/high risk)    · Prevnar 13      Hepatitis B medium/high risk factors:       - End-stage renal disease       - Hemophiliacs who received Factor VII or         IX concentrates       - Clients of institutions for the mentally             retarded       - Persons who live in the same house as          a HepB carrier       - Homosexual men       - Illicit injectable drug abusers     Pneumococcal: Done, no repeat necessary     Influenza: Done, repeat in one year     Hepatitis B: N/A     Prevnar 13: Done, no repeat necessary    Prostate cancer screening (annually to age 75)     Prostate specific antigen (PSA) Shared decision making with Provider. Sometimes a co-pay may be required if the patient decides to have this test. The USPSTF no longer recommends prostate cancer screening routinely in medicine: every 1 year    Colorectal cancer screening (to age 75)    · Fecal occult blood test (annual)  · Flexible sigmoidoscopy (5y)  · Screening colonoscopy (10y)  · Barium enema   N/A    Diabetes self-management training (no USPSTF recommendations)  Requires referral by treating physician for patient with diabetes or renal disease. 10 hours of initial DSMT sessions of no less than 30 minutes each in a continuous 12-month period. 2 hours of follow-up DSMT in subsequent years.  N/A    Glaucoma screening (no USPSTF recommendation)  Diabetes mellitus, family history   , age 50 or over    American, age 65 or over  Done this year, repeat every year    Medical nutrition therapy for diabetes or renal disease (no recommended schedule)  Requires referral by treating physician for patient with diabetes or renal disease or kidney transplant within the past 3 years.  Can be provided in same year as diabetes self-management training (DSMT), and CMS recommends medical nutrition therapy take place after DSMT. Up to 3 hours for initial year and 2 hours in subsequent years.  N/A     Cardiovascular screening blood tests (every 5 years)  · Fasting lipid panel  Order as a panel if possible  Done this year, repeat every year    Diabetes screening tests (at least every 3 years, Medicare covers annually or at 6-month intervals for prediabetic patients)  · Fasting blood sugar (FBS) or glucose tolerance test (GTT)  Patient must be diagnosed with one of the following:       - Hypertension       - Dyslipidemia       - Obesity (BMI 30kg/m2)       - Previous elevated impaired FBS or GTT       ... or any two of the following:       - Overweight (BMI 25 but <30)       - Family history of diabetes       - Age 65 or older       - History of gestational diabetes or birth of baby weighing more than 9 pounds  Done this year, repeat every year    Abdominal aortic aneurysm screening (once)  · Sonogram   Limited to patients who meet one of the following criteria:       - Men who are 65-75 years old and have smoked more than 100 cigarette in their lifetime       - Anyone with a family history of abdominal aortic aneurysm       - Anyone recommended for screening by the USPSTF  N/A    HIV screening (annually for increased risk patients)  · HIV-1 and HIV-2 by EIA, or LUÍS, rapid antibody test or oral mucosa transudate  Patients must be at increased risk for HIV infection per USPSTF guidelines or pregnant. Tests covered annually for patient at increased risk or as requested by the patient. Pregnant patients may receive up to 3 tests during pregnancy.  Risks discussed, screening is not recommended    Smoking cessation counseling (up to 8 sessions per year)  Patients must be asymptomatic of tobacco-related conditions to receive as a preventative service.  Non-smoker    Subsequent annual wellness visit  At least 12 months since last AWV  Return in one year     The following information is provided to all patients.  This information is to help you find resources for any of the problems found today that may be affecting your  health:                Living healthy guide: www.[facility].[place].[facility]      Understanding Diabetes: www.diabetes.org      Eating healthy: www.cdc.gov/healthyweight      CDC home safety checklist: www.cdc.gov/steadi/patient.html      Agency on Aging: www.goea.[place].[facility]      Alcoholics anonymous (AA): www.aa.org      Physical Activity: www.nellie.nih.gov/wg0nxtj      Tobacco use: www.quitwithusla.org          Language Assistance Services     ATTENTION: Language assistance services are available, free of charge. Please call [phone].      ATENCIÓN: Si habla español, tiene a mai disposición servicios gratuitos de asistencia lingüística. Llame al [phone].     CHÚ Ý: N?u b?n nói Ti?ng Vi?t, có các d?ch v? h? tr? ngôn ng? mi?n phí dành cho b?n. G?i s? [phone].         [facility] complies with applicable Federal civil rights laws and does not discriminate on the basis of race, color, national origin, age, disability, or sex.

## 2017-05-22 DIAGNOSIS — R94.31 NONSPECIFIC ABNORMAL ELECTROCARDIOGRAM (ECG) (EKG): ICD-10-CM

## 2017-05-22 DIAGNOSIS — R53.83 FATIGUE: ICD-10-CM

## 2017-05-22 DIAGNOSIS — I25.10 UNSPECIFIED CARDIOVASCULAR DISEASE: Primary | ICD-10-CM

## 2017-05-22 DIAGNOSIS — E78.5 HYPERLIPIDEMIA: ICD-10-CM

## 2017-05-22 DIAGNOSIS — I10 UNSPECIFIED ESSENTIAL HYPERTENSION: ICD-10-CM

## 2017-06-30 ENCOUNTER — DOCUMENTATION ONLY (OUTPATIENT)
Dept: FAMILY MEDICINE | Facility: CLINIC | Age: 82
End: 2017-06-30

## 2017-06-30 NOTE — PROGRESS NOTES
Pre-Visit Chart Review  For Appointment Scheduled on 07/05/2017    There are no preventive care reminders to display for this patient.

## 2017-07-05 ENCOUNTER — OFFICE VISIT (OUTPATIENT)
Dept: FAMILY MEDICINE | Facility: CLINIC | Age: 82
End: 2017-07-05
Payer: MEDICARE

## 2017-07-05 VITALS
WEIGHT: 200.19 LBS | HEIGHT: 70 IN | BODY MASS INDEX: 28.66 KG/M2 | HEART RATE: 64 BPM | SYSTOLIC BLOOD PRESSURE: 129 MMHG | TEMPERATURE: 98 F | DIASTOLIC BLOOD PRESSURE: 72 MMHG

## 2017-07-05 DIAGNOSIS — E78.2 MIXED HYPERLIPIDEMIA: Primary | ICD-10-CM

## 2017-07-05 DIAGNOSIS — B35.6 JOCK ITCH: ICD-10-CM

## 2017-07-05 DIAGNOSIS — L71.9 ROSACEA: ICD-10-CM

## 2017-07-05 DIAGNOSIS — R91.1 PULMONARY NODULE: ICD-10-CM

## 2017-07-05 DIAGNOSIS — N40.0 BENIGN NON-NODULAR PROSTATIC HYPERPLASIA WITHOUT LOWER URINARY TRACT SYMPTOMS: ICD-10-CM

## 2017-07-05 PROCEDURE — 1126F AMNT PAIN NOTED NONE PRSNT: CPT | Mod: ,,, | Performed by: FAMILY MEDICINE

## 2017-07-05 PROCEDURE — 1159F MED LIST DOCD IN RCRD: CPT | Mod: ,,, | Performed by: FAMILY MEDICINE

## 2017-07-05 PROCEDURE — 99214 OFFICE O/P EST MOD 30 MIN: CPT | Mod: S$PBB,,, | Performed by: FAMILY MEDICINE

## 2017-07-05 PROCEDURE — 99999 PR PBB SHADOW E&M-EST. PATIENT-LVL III: CPT | Mod: PBBFAC,,, | Performed by: FAMILY MEDICINE

## 2017-07-05 PROCEDURE — 99213 OFFICE O/P EST LOW 20 MIN: CPT | Mod: PBBFAC,PO | Performed by: FAMILY MEDICINE

## 2017-07-05 RX ORDER — FLUCONAZOLE 150 MG/1
150 TABLET ORAL DAILY
Qty: 1 TABLET | Refills: 0 | Status: SHIPPED | OUTPATIENT
Start: 2017-07-05 | End: 2017-07-06

## 2017-07-05 RX ORDER — NYSTATIN 100000 U/G
OINTMENT TOPICAL 2 TIMES DAILY
Qty: 15 G | Refills: 3 | Status: SHIPPED | OUTPATIENT
Start: 2017-07-05 | End: 2018-07-16 | Stop reason: SDUPTHER

## 2017-07-05 NOTE — PROGRESS NOTES
"[facility]  Progress Note    Subjective:       Patient ID: Bryson Kellogg is a 82 y.o. male.    Chief Complaint: jock itch     HPI82 y.o.male is here today complaining of jock itch.  Patient has had red itchy inflamed skin in groin area.  Patient has tried over-the-counter medications which have been moderately effective in controlling his symptoms.  Patient has been dealing with the rash for the past 2 months.  Patient rash today is 3 out of 10 but is still present.  All other medical conditions have been under good control.   No acute complaints at today's visit.  Review of Systems   Constitutional: Negative for chills and fever.   HENT: Negative for congestion.    Eyes: Negative for pain.   Respiratory: Negative for shortness of breath and wheezing.    Cardiovascular: Negative for chest pain, palpitations and leg swelling.   Gastrointestinal: Negative for abdominal pain, nausea and vomiting.   Genitourinary: Negative for difficulty urinating.   Musculoskeletal: Negative for arthralgias, gait problem and myalgias.   Skin: Positive for rash.   Neurological: Negative for seizures.       Objective:      Vitals:    07/05/17 1246   BP: 129/72   BP Location: Right arm   Patient Position: Sitting   BP Method: Automatic   Pulse: 64   Temp: 98.3 °F (36.8 °C)   TempSrc: Oral   Weight: 90.8 kg (200 lb 2.8 oz)   Height: 5' 10" (1.778 m)     Body mass index is 28.72 kg/m².  Physical Exam   Constitutional: He is oriented to person, place, and time. He appears well-developed and well-nourished.   HENT:   Head: Normocephalic and atraumatic.   Eyes: Conjunctivae and EOM are normal. Pupils are equal, round, and reactive to light.   Neck: Normal range of motion. Neck supple. No JVD present.   Cardiovascular: Normal rate, regular rhythm, normal heart sounds and intact distal pulses.  Exam reveals no gallop and no friction rub.    No murmur heard.  Pulmonary/Chest: Effort normal and breath sounds normal. No respiratory " distress. He has no wheezes.   Abdominal: Soft. Bowel sounds are normal. There is no tenderness.   Musculoskeletal: Normal range of motion.   Neurological: He is alert and oriented to person, place, and time. No cranial nerve deficit.   Skin: Skin is warm and dry. Rash noted.   Psychiatric: He has a normal mood and affect. His behavior is normal. Judgment and thought content normal.   Nursing note and vitals reviewed.      Assessment:       1. Mixed hyperlipidemia    2. Benign non-nodular prostatic hyperplasia without lower urinary tract symptoms    3. Rosacea    4. Pulmonary nodule    5. Jock itch        Plan:       Mixed hyperlipidemia        - Well controlled continue current medications    Benign non-nodular prostatic hyperplasia without lower urinary tract symptoms        - Well controlled continue current medications    Rosacea        -  Stable continue to monitor     Pulmonary nodule        -  will  repeat chest CT in 1-2 years     Jock itch  -     nystatin (MYCOSTATIN) ointment; Apply topically 2 (two) times daily.  Dispense: 15 g; Refill: 3  -     fluconazole (DIFLUCAN) 150 MG Tab; Take 1 tablet (150 mg total) by mouth once daily.  Dispense: 1 tablet; Refill: 0      Return in about 6 months (around 1/5/2018).  Ric Avina MD  [facility]  7/5/2017 1:15 PM

## 2017-10-11 ENCOUNTER — LAB VISIT (OUTPATIENT)
Dept: LAB | Facility: HOSPITAL | Age: 82
End: 2017-10-11
Attending: INTERNAL MEDICINE
Payer: MEDICARE

## 2017-10-11 DIAGNOSIS — E78.5 HYPERLIPIDEMIA: ICD-10-CM

## 2017-10-11 DIAGNOSIS — I25.10 CVD (CARDIOVASCULAR DISEASE): Primary | ICD-10-CM

## 2017-10-11 DIAGNOSIS — I25.10 CVD (CARDIOVASCULAR DISEASE): ICD-10-CM

## 2017-10-11 LAB
ALBUMIN SERPL BCP-MCNC: 3.6 G/DL
ALP SERPL-CCNC: 88 U/L
ALT SERPL W/O P-5'-P-CCNC: 28 U/L
ANION GAP SERPL CALC-SCNC: 6 MMOL/L
AST SERPL-CCNC: 27 U/L
BASOPHILS # BLD AUTO: 0.03 K/UL
BASOPHILS NFR BLD: 0.5 %
BILIRUB SERPL-MCNC: 1.1 MG/DL
BUN SERPL-MCNC: 16 MG/DL
CALCIUM SERPL-MCNC: 9.8 MG/DL
CHLORIDE SERPL-SCNC: 105 MMOL/L
CHOLEST SERPL-MCNC: 184 MG/DL
CHOLEST/HDLC SERPL: 3.8 {RATIO}
CO2 SERPL-SCNC: 28 MMOL/L
CREAT SERPL-MCNC: 0.9 MG/DL
DIFFERENTIAL METHOD: ABNORMAL
EOSINOPHIL # BLD AUTO: 0.2 K/UL
EOSINOPHIL NFR BLD: 3.6 %
ERYTHROCYTE [DISTWIDTH] IN BLOOD BY AUTOMATED COUNT: 13.8 %
EST. GFR  (AFRICAN AMERICAN): >60 ML/MIN/1.73 M^2
EST. GFR  (NON AFRICAN AMERICAN): >60 ML/MIN/1.73 M^2
GLUCOSE SERPL-MCNC: 95 MG/DL
HCT VFR BLD AUTO: 41.9 %
HDLC SERPL-MCNC: 49 MG/DL
HDLC SERPL: 26.6 %
HGB BLD-MCNC: 14.2 G/DL
LDLC SERPL CALC-MCNC: 106.4 MG/DL
LYMPHOCYTES # BLD AUTO: 2.2 K/UL
LYMPHOCYTES NFR BLD: 36.9 %
MCH RBC QN AUTO: 32.8 PG
MCHC RBC AUTO-ENTMCNC: 33.9 G/DL
MCV RBC AUTO: 97 FL
MONOCYTES # BLD AUTO: 0.6 K/UL
MONOCYTES NFR BLD: 9.6 %
NEUTROPHILS # BLD AUTO: 3 K/UL
NEUTROPHILS NFR BLD: 49.2 %
NONHDLC SERPL-MCNC: 135 MG/DL
PLATELET # BLD AUTO: 159 K/UL
PMV BLD AUTO: 11.8 FL
POTASSIUM SERPL-SCNC: 4 MMOL/L
PROT SERPL-MCNC: 6.8 G/DL
RBC # BLD AUTO: 4.33 M/UL
SODIUM SERPL-SCNC: 139 MMOL/L
TRIGL SERPL-MCNC: 143 MG/DL
WBC # BLD AUTO: 6.07 K/UL

## 2017-10-11 PROCEDURE — 85025 COMPLETE CBC W/AUTO DIFF WBC: CPT

## 2017-10-11 PROCEDURE — 80053 COMPREHEN METABOLIC PANEL: CPT

## 2017-10-11 PROCEDURE — 36415 COLL VENOUS BLD VENIPUNCTURE: CPT | Mod: PO

## 2017-10-11 PROCEDURE — 80061 LIPID PANEL: CPT

## 2017-10-24 RX ORDER — TRIAMCINOLONE ACETONIDE 1 MG/G
CREAM TOPICAL
Qty: 80 G | Refills: 3 | Status: SHIPPED | OUTPATIENT
Start: 2017-10-24 | End: 2021-09-10

## 2017-11-22 ENCOUNTER — LAB VISIT (OUTPATIENT)
Dept: LAB | Facility: HOSPITAL | Age: 82
End: 2017-11-22
Attending: INTERNAL MEDICINE
Payer: MEDICARE

## 2017-11-22 DIAGNOSIS — R94.31 ABNORMAL ELECTROCARDIOGRAM: ICD-10-CM

## 2017-11-22 DIAGNOSIS — E78.5 HYPERLIPIDEMIA: ICD-10-CM

## 2017-11-22 DIAGNOSIS — I25.10 CVD (CARDIOVASCULAR DISEASE): ICD-10-CM

## 2017-11-22 DIAGNOSIS — E78.5 HYPERLIPIDEMIA: Primary | ICD-10-CM

## 2017-11-22 LAB
ALBUMIN SERPL BCP-MCNC: 3.6 G/DL
ALP SERPL-CCNC: 89 U/L
ALT SERPL W/O P-5'-P-CCNC: 26 U/L
ANION GAP SERPL CALC-SCNC: 6 MMOL/L
AST SERPL-CCNC: 30 U/L
BASOPHILS # BLD AUTO: 0.03 K/UL
BASOPHILS NFR BLD: 0.6 %
BILIRUB SERPL-MCNC: 1.3 MG/DL
BUN SERPL-MCNC: 15 MG/DL
CALCIUM SERPL-MCNC: 10.1 MG/DL
CHLORIDE SERPL-SCNC: 107 MMOL/L
CHOLEST SERPL-MCNC: 116 MG/DL
CHOLEST/HDLC SERPL: 2.6 {RATIO}
CO2 SERPL-SCNC: 28 MMOL/L
CREAT SERPL-MCNC: 1 MG/DL
DIFFERENTIAL METHOD: ABNORMAL
EOSINOPHIL # BLD AUTO: 0.2 K/UL
EOSINOPHIL NFR BLD: 3 %
ERYTHROCYTE [DISTWIDTH] IN BLOOD BY AUTOMATED COUNT: 13.2 %
EST. GFR  (AFRICAN AMERICAN): >60 ML/MIN/1.73 M^2
EST. GFR  (NON AFRICAN AMERICAN): >60 ML/MIN/1.73 M^2
GLUCOSE SERPL-MCNC: 95 MG/DL
HCT VFR BLD AUTO: 40.9 %
HDLC SERPL-MCNC: 45 MG/DL
HDLC SERPL: 38.8 %
HGB BLD-MCNC: 13.8 G/DL
IMM GRANULOCYTES # BLD AUTO: 0.02 K/UL
IMM GRANULOCYTES NFR BLD AUTO: 0.4 %
LDLC SERPL CALC-MCNC: 56.4 MG/DL
LYMPHOCYTES # BLD AUTO: 1.6 K/UL
LYMPHOCYTES NFR BLD: 30 %
MCH RBC QN AUTO: 32.5 PG
MCHC RBC AUTO-ENTMCNC: 33.7 G/DL
MCV RBC AUTO: 97 FL
MONOCYTES # BLD AUTO: 0.5 K/UL
MONOCYTES NFR BLD: 10.1 %
NEUTROPHILS # BLD AUTO: 3 K/UL
NEUTROPHILS NFR BLD: 55.9 %
NONHDLC SERPL-MCNC: 71 MG/DL
NRBC BLD-RTO: 0 /100 WBC
PLATELET # BLD AUTO: 146 K/UL
PMV BLD AUTO: 12.2 FL
POTASSIUM SERPL-SCNC: 4.2 MMOL/L
PROT SERPL-MCNC: 6.8 G/DL
RBC # BLD AUTO: 4.24 M/UL
SODIUM SERPL-SCNC: 141 MMOL/L
TRIGL SERPL-MCNC: 73 MG/DL
WBC # BLD AUTO: 5.34 K/UL

## 2017-11-22 PROCEDURE — 80061 LIPID PANEL: CPT

## 2017-11-22 PROCEDURE — 36415 COLL VENOUS BLD VENIPUNCTURE: CPT | Mod: PO

## 2017-11-22 PROCEDURE — 85025 COMPLETE CBC W/AUTO DIFF WBC: CPT

## 2017-11-22 PROCEDURE — 80053 COMPREHEN METABOLIC PANEL: CPT

## 2017-12-29 ENCOUNTER — DOCUMENTATION ONLY (OUTPATIENT)
Dept: FAMILY MEDICINE | Facility: CLINIC | Age: 82
End: 2017-12-29

## 2017-12-29 NOTE — PROGRESS NOTES
Pre-Visit Chart Review  For Appointment Scheduled on 1/3/18.    Health Maintenance Due   Topic Date Due    Influenza Vaccine  08/01/2017

## 2018-01-03 ENCOUNTER — LAB VISIT (OUTPATIENT)
Dept: LAB | Facility: HOSPITAL | Age: 83
End: 2018-01-03
Attending: FAMILY MEDICINE
Payer: MEDICARE

## 2018-01-03 ENCOUNTER — OFFICE VISIT (OUTPATIENT)
Dept: FAMILY MEDICINE | Facility: CLINIC | Age: 83
End: 2018-01-03
Payer: MEDICARE

## 2018-01-03 VITALS
SYSTOLIC BLOOD PRESSURE: 115 MMHG | HEART RATE: 73 BPM | HEIGHT: 70 IN | BODY MASS INDEX: 26.92 KG/M2 | WEIGHT: 188.06 LBS | TEMPERATURE: 98 F | DIASTOLIC BLOOD PRESSURE: 71 MMHG

## 2018-01-03 DIAGNOSIS — E55.9 VITAMIN D DEFICIENCY: ICD-10-CM

## 2018-01-03 DIAGNOSIS — I49.5 SINUS BRADY-TACHY SYNDROME: ICD-10-CM

## 2018-01-03 DIAGNOSIS — R79.9 ABNORMAL BLOOD CHEMISTRY: ICD-10-CM

## 2018-01-03 DIAGNOSIS — R00.1 SINUS BRADYCARDIA: ICD-10-CM

## 2018-01-03 DIAGNOSIS — R91.1 PULMONARY NODULE: ICD-10-CM

## 2018-01-03 DIAGNOSIS — R79.9 ABNORMAL BLOOD CHEMISTRY: Primary | ICD-10-CM

## 2018-01-03 DIAGNOSIS — R94.31 ABNORMAL EKG: ICD-10-CM

## 2018-01-03 LAB
25(OH)D3+25(OH)D2 SERPL-MCNC: 29 NG/ML
ESTIMATED AVG GLUCOSE: 108 MG/DL
HBA1C MFR BLD HPLC: 5.4 %
TSH SERPL DL<=0.005 MIU/L-ACNC: 1.46 UIU/ML

## 2018-01-03 PROCEDURE — 82306 VITAMIN D 25 HYDROXY: CPT

## 2018-01-03 PROCEDURE — 83036 HEMOGLOBIN GLYCOSYLATED A1C: CPT

## 2018-01-03 PROCEDURE — 84443 ASSAY THYROID STIM HORMONE: CPT

## 2018-01-03 PROCEDURE — 99214 OFFICE O/P EST MOD 30 MIN: CPT | Mod: S$PBB,,, | Performed by: FAMILY MEDICINE

## 2018-01-03 PROCEDURE — 99999 PR PBB SHADOW E&M-EST. PATIENT-LVL III: CPT | Mod: PBBFAC,,, | Performed by: FAMILY MEDICINE

## 2018-01-03 PROCEDURE — 99213 OFFICE O/P EST LOW 20 MIN: CPT | Mod: PBBFAC,PO | Performed by: FAMILY MEDICINE

## 2018-01-03 PROCEDURE — 36415 COLL VENOUS BLD VENIPUNCTURE: CPT | Mod: PO

## 2018-01-03 RX ORDER — ATORVASTATIN CALCIUM 40 MG/1
40 TABLET, FILM COATED ORAL DAILY
COMMUNITY
Start: 2017-12-26 | End: 2019-09-19

## 2018-01-11 ENCOUNTER — TELEPHONE (OUTPATIENT)
Dept: FAMILY MEDICINE | Facility: CLINIC | Age: 83
End: 2018-01-11

## 2018-01-11 NOTE — TELEPHONE ENCOUNTER
----- Message from Yuliet Xiong sent at 1/9/2018  1:14 PM CST -----  Patient brought office medical forms on 1/4/18 for doctor to fill out. He is calling to find out the status and when he can come and pick this forms up. Please call back at [phone] to advise.

## 2018-01-11 NOTE — TELEPHONE ENCOUNTER
Spoke to patient and wife and informed that I did not see any paper work at the  of the clinic. Patient stated that he will bring by another copy. Also informed patient that he can drop them off or he could scan them and sent them to us from his patient portal. Patient informed/verbalized understanding of conversation.

## 2018-01-11 NOTE — TELEPHONE ENCOUNTER
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 Transferred to POD. Please advise. Thanks.

## 2018-01-23 NOTE — PROGRESS NOTES
"[facility]  Progress Note    Subjective:       Patient ID: Bryson Kellogg is a 82 y.o. male.    Chief Complaint: Hyperlipidemia follow up    HPI82 y.o.male is here today for hyperlipidemia follow-up.  All patient's medical condition currently stable.  Patient's any acute complaints at today's visit.  Review of Systems   Constitutional: Negative for activity change, chills, fever and unexpected weight change.   HENT: Negative for congestion, hearing loss, rhinorrhea and trouble swallowing.    Eyes: Negative for pain, discharge and visual disturbance.   Respiratory: Negative for chest tightness, shortness of breath and wheezing.    Cardiovascular: Negative for chest pain, palpitations and leg swelling.   Gastrointestinal: Negative for abdominal pain, blood in stool, constipation, diarrhea, nausea and vomiting.   Endocrine: Negative for polydipsia and polyuria.   Genitourinary: Negative for difficulty urinating, hematuria and urgency.   Musculoskeletal: Negative for arthralgias, gait problem, joint swelling, myalgias and neck pain.   Skin: Negative for rash.   Neurological: Negative for seizures, weakness and headaches.   Psychiatric/Behavioral: Negative for confusion and dysphoric mood.       Objective:      Vitals:    01/03/18 1256   BP: 115/71   BP Location: Left arm   Patient Position: Sitting   BP Method: Large (Automatic)   Pulse: 73   Temp: 98.3 °F (36.8 °C)   TempSrc: Oral   Weight: 85.3 kg (188 lb 0.8 oz)   Height: 5' 10" (1.778 m)     Body mass index is 26.98 kg/m².  Physical Exam   Constitutional: He is oriented to person, place, and time. He appears well-developed and well-nourished. No distress.   HENT:   Head: Normocephalic and atraumatic.   Cardiovascular: Normal rate, regular rhythm, normal heart sounds and intact distal pulses.  Exam reveals no gallop and no friction rub.    No murmur heard.  Pulmonary/Chest: Effort normal and breath sounds normal. No respiratory distress. He has no rales. "   Abdominal: Soft. He exhibits no distension and no mass. There is no rebound and no guarding. No hernia.   Musculoskeletal: Normal range of motion.   Neurological: He is alert and oriented to person, place, and time.   Skin: Skin is warm.   Psychiatric: He has a normal mood and affect. His behavior is normal. Judgment and thought content normal.   Vitals reviewed.      Assessment:       1. Abnormal blood chemistry    2. Vitamin D deficiency    3. Pulmonary nodule    4. Abnormal EKG    5. Sinus jayy-tachy syndrome    6. Sinus bradycardia        Plan:       Abnormal blood chemistry  -     Hemoglobin A1c; Future; Expected date: 01/03/2018    Vitamin D deficiency  -     Vitamin D; Future; Expected date: 01/03/2018    Pulmonary nodule        - Stable continue to monitor     Abnormal EKG        - Follow up with cardiology     Sinus jayy-tachy syndrome        - Follow up with cardiology     Sinus bradycardia  -     TSH; Future; Expected date: 01/03/2018      No Follow-up on file.  Ric Avina MD  [facility]  1/23/2018 7:46 AM

## 2018-01-23 NOTE — PROGRESS NOTES
Patient, Bryson Kellogg (MRN #[id number]), presented with a recent Platelet count less than 150 K/uL consistent with the definition of thrombocytopenia (ICD10 - D69.6).    Platelets   Date Value Ref Range Status   11/22/2017 146 (L) 150 - 350 K/uL Final     The patient's thrombocytopenia was monitored, evaluated, addressed and/or treated. This addendum to the medical record is made on 01/23/2018.

## 2018-03-26 ENCOUNTER — LAB VISIT (OUTPATIENT)
Dept: LAB | Facility: HOSPITAL | Age: 83
End: 2018-03-26
Attending: INTERNAL MEDICINE
Payer: MEDICARE

## 2018-03-26 DIAGNOSIS — R94.31 NONSPECIFIC ABNORMAL ELECTROCARDIOGRAM (ECG) (EKG): ICD-10-CM

## 2018-03-26 DIAGNOSIS — I10 ESSENTIAL HYPERTENSION, MALIGNANT: ICD-10-CM

## 2018-03-26 DIAGNOSIS — R53.83 FATIGUE: ICD-10-CM

## 2018-03-26 DIAGNOSIS — I25.10 CORONARY ATHEROSCLEROSIS OF NATIVE CORONARY ARTERY: Primary | ICD-10-CM

## 2018-03-26 DIAGNOSIS — E78.5 HYPERLIPEMIA: ICD-10-CM

## 2018-03-26 DIAGNOSIS — I25.10 CORONARY ATHEROSCLEROSIS OF NATIVE CORONARY ARTERY: ICD-10-CM

## 2018-03-26 LAB
ALBUMIN SERPL BCP-MCNC: 3.6 G/DL
ALP SERPL-CCNC: 89 U/L
ALT SERPL W/O P-5'-P-CCNC: 24 U/L
ANION GAP SERPL CALC-SCNC: 6 MMOL/L
AST SERPL-CCNC: 22 U/L
BASOPHILS # BLD AUTO: 0.04 K/UL
BASOPHILS NFR BLD: 0.7 %
BILIRUB SERPL-MCNC: 1.2 MG/DL
BUN SERPL-MCNC: 15 MG/DL
CALCIUM SERPL-MCNC: 10.1 MG/DL
CHLORIDE SERPL-SCNC: 107 MMOL/L
CHOLEST SERPL-MCNC: 135 MG/DL
CHOLEST/HDLC SERPL: 2.6 {RATIO}
CO2 SERPL-SCNC: 29 MMOL/L
CREAT SERPL-MCNC: 0.9 MG/DL
DIFFERENTIAL METHOD: ABNORMAL
EOSINOPHIL # BLD AUTO: 0.2 K/UL
EOSINOPHIL NFR BLD: 3.8 %
ERYTHROCYTE [DISTWIDTH] IN BLOOD BY AUTOMATED COUNT: 13.6 %
EST. GFR  (AFRICAN AMERICAN): >60 ML/MIN/1.73 M^2
EST. GFR  (NON AFRICAN AMERICAN): >60 ML/MIN/1.73 M^2
GLUCOSE SERPL-MCNC: 93 MG/DL
HCT VFR BLD AUTO: 42 %
HDLC SERPL-MCNC: 52 MG/DL
HDLC SERPL: 38.5 %
HGB BLD-MCNC: 14.2 G/DL
IMM GRANULOCYTES # BLD AUTO: 0.01 K/UL
IMM GRANULOCYTES NFR BLD AUTO: 0.2 %
LDLC SERPL CALC-MCNC: 68.4 MG/DL
LYMPHOCYTES # BLD AUTO: 2.1 K/UL
LYMPHOCYTES NFR BLD: 36.6 %
MCH RBC QN AUTO: 33.5 PG
MCHC RBC AUTO-ENTMCNC: 33.8 G/DL
MCV RBC AUTO: 99 FL
MONOCYTES # BLD AUTO: 0.5 K/UL
MONOCYTES NFR BLD: 9 %
NEUTROPHILS # BLD AUTO: 2.9 K/UL
NEUTROPHILS NFR BLD: 49.7 %
NONHDLC SERPL-MCNC: 83 MG/DL
NRBC BLD-RTO: 0 /100 WBC
PLATELET # BLD AUTO: 142 K/UL
PMV BLD AUTO: 11.8 FL
POTASSIUM SERPL-SCNC: 4.4 MMOL/L
PROT SERPL-MCNC: 6.7 G/DL
RBC # BLD AUTO: 4.24 M/UL
SODIUM SERPL-SCNC: 142 MMOL/L
TRIGL SERPL-MCNC: 73 MG/DL
WBC # BLD AUTO: 5.8 K/UL

## 2018-03-26 PROCEDURE — 80061 LIPID PANEL: CPT

## 2018-03-26 PROCEDURE — 85025 COMPLETE CBC W/AUTO DIFF WBC: CPT

## 2018-03-26 PROCEDURE — 80053 COMPREHEN METABOLIC PANEL: CPT

## 2018-03-26 PROCEDURE — 36415 COLL VENOUS BLD VENIPUNCTURE: CPT | Mod: PO

## 2018-05-23 ENCOUNTER — TELEPHONE (OUTPATIENT)
Dept: INTERNAL MEDICINE | Facility: CLINIC | Age: 83
End: 2018-05-23

## 2018-05-23 DIAGNOSIS — E55.9 VITAMIN D DEFICIENCY: Primary | ICD-10-CM

## 2018-05-23 DIAGNOSIS — E78.2 MIXED HYPERLIPIDEMIA: ICD-10-CM

## 2018-05-23 NOTE — TELEPHONE ENCOUNTER
----- Message from Sonido Perez sent at 5/23/2018 10:55 AM CDT -----  Contact: Patient [phone]  Type: Orders Request    What orders/ testing are being requested? EPP    Is there a future appointment scheduled for the patient with PCP?: Yes    When?: 8/25/18

## 2018-07-15 ENCOUNTER — PATIENT MESSAGE (OUTPATIENT)
Dept: INTERNAL MEDICINE | Facility: CLINIC | Age: 83
End: 2018-07-15

## 2018-07-15 DIAGNOSIS — B35.6 JOCK ITCH: ICD-10-CM

## 2018-07-17 RX ORDER — NYSTATIN 100000 U/G
OINTMENT TOPICAL 2 TIMES DAILY
Qty: 15 G | Refills: 3 | Status: SHIPPED | OUTPATIENT
Start: 2018-07-17 | End: 2018-08-25 | Stop reason: SDUPTHER

## 2018-07-24 ENCOUNTER — PES CALL (OUTPATIENT)
Dept: ADMINISTRATIVE | Facility: CLINIC | Age: 83
End: 2018-07-24

## 2018-08-01 ENCOUNTER — LAB VISIT (OUTPATIENT)
Dept: LAB | Facility: HOSPITAL | Age: 83
End: 2018-08-01
Attending: INTERNAL MEDICINE
Payer: MEDICARE

## 2018-08-01 DIAGNOSIS — Z00.00 ROUTINE MEDICAL EXAM: ICD-10-CM

## 2018-08-01 DIAGNOSIS — I10 HYPERTENSION, ESSENTIAL: ICD-10-CM

## 2018-08-01 DIAGNOSIS — R94.31 ABNORMAL ELECTROCARDIOGRAM: ICD-10-CM

## 2018-08-01 DIAGNOSIS — I25.10 CVD (CARDIOVASCULAR DISEASE): ICD-10-CM

## 2018-08-01 DIAGNOSIS — R53.83 FATIGUE: ICD-10-CM

## 2018-08-01 DIAGNOSIS — E78.5 HYPERLIPIDEMIA: Primary | ICD-10-CM

## 2018-08-01 LAB
ALBUMIN SERPL BCP-MCNC: 3.8 G/DL
ALP SERPL-CCNC: 93 U/L
ALT SERPL W/O P-5'-P-CCNC: 30 U/L
ANION GAP SERPL CALC-SCNC: 7 MMOL/L
AST SERPL-CCNC: 28 U/L
BASOPHILS # BLD AUTO: 0.04 K/UL
BASOPHILS NFR BLD: 0.7 %
BILIRUB SERPL-MCNC: 1.3 MG/DL
BUN SERPL-MCNC: 14 MG/DL
CALCIUM SERPL-MCNC: 10.4 MG/DL
CHLORIDE SERPL-SCNC: 106 MMOL/L
CHOLEST SERPL-MCNC: 162 MG/DL
CHOLEST/HDLC SERPL: 3.2 {RATIO}
CO2 SERPL-SCNC: 28 MMOL/L
CREAT SERPL-MCNC: 0.9 MG/DL
DIFFERENTIAL METHOD: ABNORMAL
EOSINOPHIL # BLD AUTO: 0.3 K/UL
EOSINOPHIL NFR BLD: 4.6 %
ERYTHROCYTE [DISTWIDTH] IN BLOOD BY AUTOMATED COUNT: 13.4 %
EST. GFR  (AFRICAN AMERICAN): >60 ML/MIN/1.73 M^2
EST. GFR  (NON AFRICAN AMERICAN): >60 ML/MIN/1.73 M^2
GLUCOSE SERPL-MCNC: 105 MG/DL
HCT VFR BLD AUTO: 42.7 %
HDLC SERPL-MCNC: 50 MG/DL
HDLC SERPL: 30.9 %
HGB BLD-MCNC: 14.1 G/DL
IMM GRANULOCYTES # BLD AUTO: 0.02 K/UL
IMM GRANULOCYTES NFR BLD AUTO: 0.4 %
LDLC SERPL CALC-MCNC: 91.8 MG/DL
LYMPHOCYTES # BLD AUTO: 2 K/UL
LYMPHOCYTES NFR BLD: 36 %
MCH RBC QN AUTO: 33.2 PG
MCHC RBC AUTO-ENTMCNC: 33 G/DL
MCV RBC AUTO: 101 FL
MONOCYTES # BLD AUTO: 0.5 K/UL
MONOCYTES NFR BLD: 9.4 %
NEUTROPHILS # BLD AUTO: 2.7 K/UL
NEUTROPHILS NFR BLD: 48.9 %
NONHDLC SERPL-MCNC: 112 MG/DL
NRBC BLD-RTO: 0 /100 WBC
PLATELET # BLD AUTO: 151 K/UL
PMV BLD AUTO: 11.5 FL
POTASSIUM SERPL-SCNC: 4.3 MMOL/L
PROT SERPL-MCNC: 6.8 G/DL
RBC # BLD AUTO: 4.25 M/UL
SODIUM SERPL-SCNC: 141 MMOL/L
TRIGL SERPL-MCNC: 101 MG/DL
WBC # BLD AUTO: 5.61 K/UL

## 2018-08-01 PROCEDURE — 85025 COMPLETE CBC W/AUTO DIFF WBC: CPT

## 2018-08-01 PROCEDURE — 36415 COLL VENOUS BLD VENIPUNCTURE: CPT | Mod: PO

## 2018-08-01 PROCEDURE — 80053 COMPREHEN METABOLIC PANEL: CPT

## 2018-08-01 PROCEDURE — 80061 LIPID PANEL: CPT

## 2018-08-20 ENCOUNTER — LAB VISIT (OUTPATIENT)
Dept: LAB | Facility: HOSPITAL | Age: 83
End: 2018-08-20
Attending: INTERNAL MEDICINE
Payer: MEDICARE

## 2018-08-20 DIAGNOSIS — E55.9 VITAMIN D DEFICIENCY: ICD-10-CM

## 2018-08-20 DIAGNOSIS — E78.5 HYPERLIPIDEMIA: ICD-10-CM

## 2018-08-20 LAB
25(OH)D3+25(OH)D2 SERPL-MCNC: 32 NG/ML
ALBUMIN SERPL BCP-MCNC: 3.6 G/DL
ALP SERPL-CCNC: 97 U/L
ALT SERPL W/O P-5'-P-CCNC: 27 U/L
ANION GAP SERPL CALC-SCNC: 5 MMOL/L
AST SERPL-CCNC: 24 U/L
BILIRUB SERPL-MCNC: 0.9 MG/DL
BUN SERPL-MCNC: 14 MG/DL
CALCIUM SERPL-MCNC: 9.9 MG/DL
CHLORIDE SERPL-SCNC: 108 MMOL/L
CHOLEST SERPL-MCNC: 137 MG/DL
CHOLEST/HDLC SERPL: 2.9 {RATIO}
CO2 SERPL-SCNC: 28 MMOL/L
CREAT SERPL-MCNC: 1 MG/DL
EST. GFR  (AFRICAN AMERICAN): >60 ML/MIN/1.73 M^2
EST. GFR  (NON AFRICAN AMERICAN): >60 ML/MIN/1.73 M^2
GLUCOSE SERPL-MCNC: 103 MG/DL
HDLC SERPL-MCNC: 48 MG/DL
HDLC SERPL: 35 %
LDLC SERPL CALC-MCNC: 76.4 MG/DL
NONHDLC SERPL-MCNC: 89 MG/DL
POTASSIUM SERPL-SCNC: 4.2 MMOL/L
PROT SERPL-MCNC: 6.5 G/DL
SODIUM SERPL-SCNC: 141 MMOL/L
TRIGL SERPL-MCNC: 63 MG/DL

## 2018-08-20 PROCEDURE — 82306 VITAMIN D 25 HYDROXY: CPT

## 2018-08-20 PROCEDURE — 36415 COLL VENOUS BLD VENIPUNCTURE: CPT | Mod: PO

## 2018-08-20 PROCEDURE — 80061 LIPID PANEL: CPT

## 2018-08-20 PROCEDURE — 80053 COMPREHEN METABOLIC PANEL: CPT

## 2018-08-25 ENCOUNTER — OFFICE VISIT (OUTPATIENT)
Dept: INTERNAL MEDICINE | Facility: CLINIC | Age: 83
End: 2018-08-25
Payer: MEDICARE

## 2018-08-25 VITALS
SYSTOLIC BLOOD PRESSURE: 134 MMHG | BODY MASS INDEX: 27.13 KG/M2 | HEIGHT: 71 IN | DIASTOLIC BLOOD PRESSURE: 80 MMHG | WEIGHT: 193.81 LBS | HEART RATE: 60 BPM | OXYGEN SATURATION: 98 %

## 2018-08-25 DIAGNOSIS — E78.2 MIXED HYPERLIPIDEMIA: ICD-10-CM

## 2018-08-25 DIAGNOSIS — E55.9 VITAMIN D DEFICIENCY: ICD-10-CM

## 2018-08-25 DIAGNOSIS — R91.1 PULMONARY NODULE: ICD-10-CM

## 2018-08-25 DIAGNOSIS — G47.33 OBSTRUCTIVE SLEEP APNEA SYNDROME: ICD-10-CM

## 2018-08-25 DIAGNOSIS — Z12.5 SCREENING PSA (PROSTATE SPECIFIC ANTIGEN): ICD-10-CM

## 2018-08-25 DIAGNOSIS — B35.6 JOCK ITCH: Primary | ICD-10-CM

## 2018-08-25 PROCEDURE — 99214 OFFICE O/P EST MOD 30 MIN: CPT | Mod: S$PBB,,, | Performed by: INTERNAL MEDICINE

## 2018-08-25 PROCEDURE — 99999 PR PBB SHADOW E&M-EST. PATIENT-LVL III: CPT | Mod: PBBFAC,,, | Performed by: INTERNAL MEDICINE

## 2018-08-25 PROCEDURE — 99213 OFFICE O/P EST LOW 20 MIN: CPT | Mod: PBBFAC | Performed by: INTERNAL MEDICINE

## 2018-08-25 RX ORDER — NYSTATIN 100000 U/G
OINTMENT TOPICAL 2 TIMES DAILY
Qty: 15 G | Refills: 3 | Status: SHIPPED | OUTPATIENT
Start: 2018-08-25 | End: 2021-09-10

## 2018-08-25 NOTE — PROGRESS NOTES
"He is a 83-year-old gentleman coming in today to follow up ongoing medical   problems.      1. He had a history of "TIA"? back in 1999. He had tingling in his right arm and hand.   He is not currently having any trouble. He is on aspirin. No recent problem.     2. He has a history of colon polyps, which his last colonoscopy was   10/2013. He had one polyp and is due for a new c-scope in 10/2016. He is not having any current bowel complaints, no abdominal pain.  Next c-scope should be discussed in 2021.  He does take miralax and bowels move well.      3. Hyperlipidemia. He is on Lipitor 40mg . Recent cholesterol shows total   cholesterol 137, HDL 40, triglycerides 118, and LDL 76.4, which is stable  from last time. He is tolerating the Lipitor 10 mg well.     4. He has a BPH, which he says has been dong well. He has 1 episode of nacturia  Around 2-3 am. . NO hesitancy or urgency.     5. KATHY: results form 4/09 study. SEVERE obstructive sleep apnea syndrome with 289 respiratory obstructions/RDI 47 with snoring moderate intermittent and hypersomnia with CPAP. He tried Bipap for about a year. He was seeing a Sleep MD in [facility], but could not tolerated CPAP or BiPAP. HE has been exercising and sleeping well. NO day time fatigue. No witnessed sleep apnea or snoring.     6. Pulmonary nodules. He had a CT scan of his lung 2 times this year to review the pulmonary nodules the last one in 4/2016 was unremarkable. no weight loss or resp problems reported. He has never smoked.     SOCIAL HISTORY: He is retired from [employer]. He is . No   alcohol use. No drug use. Lives in [place]. Lives with wife and they are active and travel a lot.  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      REVIEW OF SYSTEMS:   Gen - no fatigue . Eyes - no eye pain or visual changes  ENT - no hoarseness or sore throat  CV - no CP or SOB  Pulm - no cough or wheezing  GI - no N/V/D   no dysuria or incontinence  MS - no joint pain or muscle " "pain  Skin - no rash, or c/o of skin lesions  Neuro - no HA, dizziness  Heme - no abnormal bleeding or bruising  Endo - no polydipsia, or temperature changes  Psych - no anxiety or depression    PHYSICAL EXAMINATION: He is a well-appearing gentleman in no acute   Distress. Walking without any difficulty or without assistance device.   /80 (BP Location: Right arm, Patient Position: Sitting, BP Method: Large (Manual))   Pulse 60   Ht 5' 11" (1.803 m)   Wt 87.9 kg (193 lb 12.6 oz)   SpO2 98%   BMI 27.03 kg/m²  . Ear canals are open. TMs are   clear. Oropharynx is clear. Pupils equal, round, and reactive to light   and accommodation. He is wearing glasses.   NECK: Supple. Thyroid is not enlarged. No carotid bruits. He has no   cervical, axillary, or inguinal lymphadenopathy detected.   CHEST: Clear without wheeze.   CARDIOVASCULAR: S1, S2, regular rate and rhythm without murmur, gallop,   or rub.   ABDOMEN: Soft, nontender. No hepatosplenomegaly. No guarding or rebound   tenderness. Abdominal aorta is not enlarged.   He has normal biceps, triceps, patellar deep tendon reflexes. Normal   muscle strength, upper and lower extremities.    :Her has no supicous sking lesions 1. History of colon polyps. I will schedule c-scope in 2021 Will put order in.   2. History of hyperlipidemia with history of TIA-- I am unsure about the TIA diagnosis. . LDL is doing   Better on the higher dose of lipitor. . He had a brother with death from MI at 51- so I would continue the Lipitor. And 81 Asprin a day.   3. History of rosacea. stable   4 pulmonary nodules-- ct chest reviewed    5. Sleep apnea- off CPAP and BIPAP-- does not want to "fool  with it any more"- says he is sleeping well.        he has had shingles vaccine.& Pneumonia vaccine.Today we discussed the shingrix vaccine and flu shot. Wants PSA next itme           tdap due - we don't have this available today   Answers for HPI/ROS submitted by the patient on 8/19/2018 "   activity change: No  unexpected weight change: No  neck pain: No  hearing loss: No  rhinorrhea: Yes  trouble swallowing: No  eye discharge: No  visual disturbance: No  chest tightness: No  wheezing: No  chest pain: No  palpitations: No  blood in stool: No  constipation: Yes  vomiting: No  diarrhea: No  polydipsia: No  polyuria: No  difficulty urinating: No  urgency: No  hematuria: No  joint swelling: No  arthralgias: No  headaches: No  weakness: No  confusion: No  dysphoric mood: No

## 2019-01-16 ENCOUNTER — PES CALL (OUTPATIENT)
Dept: ADMINISTRATIVE | Facility: CLINIC | Age: 84
End: 2019-01-16

## 2019-01-16 ENCOUNTER — TELEPHONE (OUTPATIENT)
Dept: NEUROLOGY | Facility: CLINIC | Age: 84
End: 2019-01-16

## 2019-02-19 ENCOUNTER — LAB VISIT (OUTPATIENT)
Dept: LAB | Facility: HOSPITAL | Age: 84
End: 2019-02-19
Attending: INTERNAL MEDICINE
Payer: MEDICARE

## 2019-02-19 DIAGNOSIS — E78.2 MIXED HYPERLIPIDEMIA: ICD-10-CM

## 2019-02-19 DIAGNOSIS — G47.33 OBSTRUCTIVE SLEEP APNEA SYNDROME: ICD-10-CM

## 2019-02-19 DIAGNOSIS — R91.1 PULMONARY NODULE: ICD-10-CM

## 2019-02-19 DIAGNOSIS — Z12.5 SCREENING PSA (PROSTATE SPECIFIC ANTIGEN): ICD-10-CM

## 2019-02-19 LAB
ALBUMIN SERPL BCP-MCNC: 3.9 G/DL
ALP SERPL-CCNC: 93 U/L
ALT SERPL W/O P-5'-P-CCNC: 29 U/L
ANION GAP SERPL CALC-SCNC: 6 MMOL/L
AST SERPL-CCNC: 32 U/L
BILIRUB SERPL-MCNC: 1.2 MG/DL
BUN SERPL-MCNC: 13 MG/DL
CALCIUM SERPL-MCNC: 10.5 MG/DL
CHLORIDE SERPL-SCNC: 107 MMOL/L
CO2 SERPL-SCNC: 29 MMOL/L
COMPLEXED PSA SERPL-MCNC: 0.49 NG/ML
CREAT SERPL-MCNC: 1 MG/DL
EST. GFR  (AFRICAN AMERICAN): >60 ML/MIN/1.73 M^2
EST. GFR  (NON AFRICAN AMERICAN): >60 ML/MIN/1.73 M^2
GLUCOSE SERPL-MCNC: 100 MG/DL
POTASSIUM SERPL-SCNC: 4.3 MMOL/L
PROT SERPL-MCNC: 7 G/DL
SODIUM SERPL-SCNC: 142 MMOL/L

## 2019-02-19 PROCEDURE — 80053 COMPREHEN METABOLIC PANEL: CPT

## 2019-02-19 PROCEDURE — 36415 COLL VENOUS BLD VENIPUNCTURE: CPT | Mod: PO

## 2019-02-19 PROCEDURE — 84153 ASSAY OF PSA TOTAL: CPT

## 2019-02-26 ENCOUNTER — OFFICE VISIT (OUTPATIENT)
Dept: INTERNAL MEDICINE | Facility: CLINIC | Age: 84
End: 2019-02-26
Payer: MEDICARE

## 2019-02-26 VITALS
WEIGHT: 185.19 LBS | OXYGEN SATURATION: 98 % | HEART RATE: 57 BPM | DIASTOLIC BLOOD PRESSURE: 82 MMHG | HEIGHT: 70 IN | SYSTOLIC BLOOD PRESSURE: 130 MMHG | BODY MASS INDEX: 26.51 KG/M2

## 2019-02-26 DIAGNOSIS — G47.33 OBSTRUCTIVE SLEEP APNEA SYNDROME: ICD-10-CM

## 2019-02-26 DIAGNOSIS — M17.9 OSTEOARTHRITIS OF KNEE, UNSPECIFIED LATERALITY, UNSPECIFIED OSTEOARTHRITIS TYPE: ICD-10-CM

## 2019-02-26 DIAGNOSIS — E55.9 VITAMIN D DEFICIENCY: ICD-10-CM

## 2019-02-26 DIAGNOSIS — E78.2 MIXED HYPERLIPIDEMIA: Primary | ICD-10-CM

## 2019-02-26 PROCEDURE — 99214 PR OFFICE/OUTPT VISIT, EST, LEVL IV, 30-39 MIN: ICD-10-PCS | Mod: S$PBB,,, | Performed by: INTERNAL MEDICINE

## 2019-02-26 PROCEDURE — 99214 OFFICE O/P EST MOD 30 MIN: CPT | Mod: S$PBB,,, | Performed by: INTERNAL MEDICINE

## 2019-02-26 PROCEDURE — 99999 PR PBB SHADOW E&M-EST. PATIENT-LVL III: CPT | Mod: PBBFAC,,, | Performed by: INTERNAL MEDICINE

## 2019-02-26 PROCEDURE — 99213 OFFICE O/P EST LOW 20 MIN: CPT | Mod: PBBFAC | Performed by: INTERNAL MEDICINE

## 2019-02-26 PROCEDURE — 99999 PR PBB SHADOW E&M-EST. PATIENT-LVL III: ICD-10-PCS | Mod: PBBFAC,,, | Performed by: INTERNAL MEDICINE

## 2019-02-26 NOTE — PROGRESS NOTES
[facility]  CLINIC NOTE    Patient Name: Bryson Kellogg  YOB: 1935    PRESENTING HISTORY     History of Present Illness:  Mr. Bryson Kellogg is a 83 y.o. male here for his annual physical.    1. Hx of TIA: Patient unsure of TIA hx, currently on Lipitor 40 mg and ASA 81 mg. He denies any recent LOC, dizziness or sensory deficits.     2. HLD: He is on Lipitor 40mg . Recent cholesterol shows total cholesterol 137, HDL 48, triglycerides 63, and LDL 76.4, which is stablefrom last time. He is tolerating the Lipitor 40 mg well.     3. KATHY: From 4/09 study. SEVERE obstructive sleep apnea syndrome with 289 respiratory obstructions/RDI 47 with snoring moderate intermittent and hypersomnia with CPAP. He tried Bipap for about a year but did not tolerate it. He denies any difficulties sleeping, day time fatigue or hypersomnolence.    4.BPH:  Patient with BPH, denies frequency, dysuria and urgency. He wakes up 1-2 times per night to urinate.    5. Patient reports flying a couple of times per week. He remains active, he trying to lose weight by cutting down 500 calories per day and walking 10K steps per day.    Review of Systems:  Constitutional: no fever or chills  Eyes: no visual changes  ENT: no nasal congestion or sore throat  Respiratory: no cough or shortness of breath  Cardiovascular: no chest pain or palpitations  Gastrointestinal: no nausea or vomiting, no abdominal pain or change in bowel habits  Genitourinary: no hematuria or dysuria  Musculoskeletal: no arthralgias or myalgias  Neurological: no seizures or tremors  Skin: No rashes    Answers for HPI/ROS submitted by the patient on 2/24/2019   activity change: No  unexpected weight change: No  neck pain: No  hearing loss: No  rhinorrhea: No  trouble swallowing: No  eye discharge: No  visual disturbance: No  chest tightness: No  wheezing: No  chest pain: No  palpitations: No  blood in stool: No  constipation: No  vomiting:  No  diarrhea: No  polydipsia: No  polyuria: No  difficulty urinating: No  urgency: No  hematuria: No  joint swelling: No  arthralgias: No  headaches: No  weakness: No  confusion: No  dysphoric mood: No    PAST HISTORY:     Past Medical History:   Diagnosis Date    Allergy     Bradycardia 1/4/2017    Hx of colonic polyp 11/3/2016    Hyperlipidemia     Personal history of colonic polyps     Colon polyps    Renal stone 11/23/2012    Rosacea     Shingles     Sleep apnea     TIA (transient ischemic attack)        Past Surgical History:   Procedure Laterality Date    CHOLECYSTECTOMY      COLONOSCOPY N/A 11/3/2016    [facility]    COLONOSCOPY N/A 10/30/2013    [facility] (4TH FLR)    CYSTO, RETROGRADE PYELOGRAM,BALLOON DILATION, STENT PLACEMENT Right 11/24/2012    [facility] OR    CYSTOSCOPY Right 11/24/2012    [facility]    EYE SURGERY Bilateral 2014    cataracts    EYE SURGERY Right 10/2016    tear duct surgery    JOINT REPLACEMENT Left 2013    knee replacemant      left knee    URETEROSCOPY Right 11/24/2012    [facility]       Family History   Problem Relation Age of Onset    No Known Problems Mother     Heart disease Father     Heart disease Brother     No Known Problems Sister     No Known Problems Daughter     No Known Problems Brother        Social History     Socioeconomic History    Marital status:      Spouse name: Sharyn    Number of children: None    Years of education: None    Highest education level: None   Social Needs    Financial resource strain: None    Food insecurity - worry: None    Food insecurity - inability: None    Transportation needs - medical: None    Transportation needs - non-medical: None   Occupational History    None   Tobacco Use    Smoking status: Never Smoker    Smokeless  "tobacco: Never Used   Substance and Sexual Activity    Alcohol use: No    Drug use: No    Sexual activity: Yes     Partners: Female   Other Topics Concern    None   Social History Narrative    None       MEDICATIONS & ALLERGIES:     Current Outpatient Medications on File Prior to Visit   Medication Sig    ascorbic acid (VITAMIN C) 500 MG tablet Take 500 mg by mouth once daily.      aspirin (ECOTRIN) 81 MG EC tablet Take 81 mg by mouth once daily.    atorvastatin (LIPITOR) 40 MG tablet Take 40 mg by mouth once daily.    CALCIUM CARBONATE/VITAMIN D3 (VITAMIN D-3 ORAL) Take by mouth.    cyanocobalamin (VITAMIN B-12) 250 MCG tablet Take 250 mcg by mouth once daily.      MULTIVIT-MIN/FA/LYCOPENE/LUT (CENTRUM SILVER ULTRA MEN'S ORAL) Take 1 tablet by mouth once daily.    mupirocin (BACTROBAN) 2 % ointment     nystatin (MYCOSTATIN) ointment Apply topically 2 (two) times daily.    PYRIDOXINE HCL (VITAMIN B-6 ORAL) Take by mouth.    triamcinolone acetonide 0.1% (KENALOG) 0.1 % cream APPLY TOPICALLY DAILY AS NEEDED.     No current facility-administered medications on file prior to visit.        Review of patient's allergies indicates:   Allergen Reactions    Sulfa (sulfonamide antibiotics) Rash     Childhood allergy       OBJECTIVE:   Vital Signs:  Vitals:    02/26/19 0903   BP: 130/82   Pulse: (!) 57   SpO2: 98%   Weight: 84 kg (185 lb 3 oz)   Height: 5' 10" (1.778 m)       No results found for this or any previous visit (from the past 24 hour(s)).      Physical Exam:   General:  Well developed, well nourished, no acute distress  HEENT:  Normocephalic, atraumatic, PERRL, EOMI, clear sclera, throat clear without erythema or exudates  Neck: No carotid bruits. No thyromegaly.   CVS:  RRR, S1 and S2 normal, no murmurs, rubs, gallops  Resp:  Lungs clear to auscultation, no wheezes, rales, rhonchi, cough  GI:  Abdomen soft, non-tender, non-distended, normoactive bowel sounds, no masses  MSK:  No muscle atrophy, " cyanosis, peripheral edema, full range of motion  Skin:  No rashes, ulcers, erythema  Neuro:  CNII-XII grossly intact. No gross motor/sensory deficits  Psych:  Alert and oriented to person, place, and time    ASSESSMENT & PLAN:     Bryson was seen today for annual exam.    Diagnoses and all orders for this visit:    Mixed hyperlipidemia        -     Continue Lipitor 40mg, continue ASA 81mg   -     Lipid panel; Future  -     Comprehensive metabolic panel; Future    Osteoarthritis of knee, unspecified laterality, unspecified osteoarthritis type       -    Continue daily exercise       -    Continue to monitor    Obstructive sleep apnea syndrome       -    Stable, continue to monitor    Vitamin D deficiency       - Currently wnl, continue to monitor    Patient is up to date on all health maintaince and vaccines.   Advised to avoid situations that predispose patient to falls, such as using ladders.      Patient seen and examined with Dr. Nory Garcia, DO  Internal Medicine PGY-1

## 2019-02-26 NOTE — PROGRESS NOTES
"I have personally taken the history and examined this patient and agree with the resident's note as stated above with the following thoughts:  /82 (BP Location: Left arm, Patient Position: Sitting, BP Method: Large (Manual))   Pulse (!) 57   Ht 5' 10" (1.778 m)   Wt 84 kg (185 lb 3 oz)   SpO2 98%   BMI 26.57 kg/m²     Very active at home- still flying couple times a week.  Using Fitbit that daughter gave him for Christmas.  Trying to meet goals.    Discussed importance of low fat diet and exercise  He has lost 8 # since Christmas with more exercise     Answers for HPI/ROS submitted by the patient on 2/24/2019   activity change: No  unexpected weight change: No  neck pain: No  hearing loss: No  rhinorrhea: No  trouble swallowing: No  eye discharge: No  visual disturbance: No  chest tightness: No  wheezing: No  chest pain: No  palpitations: No  blood in stool: No  constipation: No  vomiting: No  diarrhea: No  polydipsia: No  polyuria: No  difficulty urinating: No  urgency: No  hematuria: No  joint swelling: No  arthralgias: No  headaches: No  weakness: No  confusion: No  dysphoric mood: No    "

## 2019-08-06 ENCOUNTER — TELEPHONE (OUTPATIENT)
Dept: INTERNAL MEDICINE | Facility: CLINIC | Age: 84
End: 2019-08-06

## 2019-08-06 NOTE — TELEPHONE ENCOUNTER
----- Message from Amadeo Silva sent at 8/6/2019 12:56 PM CDT -----  Doctor appointment and lab have been scheduled.  Please link lab orders to the lab appointment.  Date of doctor appointment:  9/19  Date of lab appointment:  9/12  Physical or EP: Physical  Comments:

## 2019-09-09 ENCOUNTER — LAB VISIT (OUTPATIENT)
Dept: LAB | Facility: HOSPITAL | Age: 84
End: 2019-09-09
Attending: INTERNAL MEDICINE
Payer: MEDICARE

## 2019-09-09 ENCOUNTER — TELEPHONE (OUTPATIENT)
Dept: INTERNAL MEDICINE | Facility: CLINIC | Age: 84
End: 2019-09-09

## 2019-09-09 DIAGNOSIS — E78.2 MIXED HYPERLIPIDEMIA: ICD-10-CM

## 2019-09-09 LAB
ALBUMIN SERPL BCP-MCNC: 3.8 G/DL (ref 3.5–5.2)
ALP SERPL-CCNC: 90 U/L (ref 55–135)
ALT SERPL W/O P-5'-P-CCNC: 20 U/L (ref 10–44)
ANION GAP SERPL CALC-SCNC: 7 MMOL/L (ref 8–16)
AST SERPL-CCNC: 23 U/L (ref 10–40)
BILIRUB SERPL-MCNC: 1 MG/DL (ref 0.1–1)
BUN SERPL-MCNC: 17 MG/DL (ref 8–23)
CALCIUM SERPL-MCNC: 9.8 MG/DL (ref 8.7–10.5)
CHLORIDE SERPL-SCNC: 106 MMOL/L (ref 95–110)
CHOLEST SERPL-MCNC: 140 MG/DL (ref 120–199)
CHOLEST/HDLC SERPL: 2.6 {RATIO} (ref 2–5)
CO2 SERPL-SCNC: 29 MMOL/L (ref 23–29)
CREAT SERPL-MCNC: 0.8 MG/DL (ref 0.5–1.4)
EST. GFR  (AFRICAN AMERICAN): >60 ML/MIN/1.73 M^2
EST. GFR  (NON AFRICAN AMERICAN): >60 ML/MIN/1.73 M^2
GLUCOSE SERPL-MCNC: 92 MG/DL (ref 70–110)
HDLC SERPL-MCNC: 53 MG/DL (ref 40–75)
HDLC SERPL: 37.9 % (ref 20–50)
LDLC SERPL CALC-MCNC: 71.4 MG/DL (ref 63–159)
NONHDLC SERPL-MCNC: 87 MG/DL
POTASSIUM SERPL-SCNC: 3.9 MMOL/L (ref 3.5–5.1)
PROT SERPL-MCNC: 6.6 G/DL (ref 6–8.4)
SODIUM SERPL-SCNC: 142 MMOL/L (ref 136–145)
TRIGL SERPL-MCNC: 78 MG/DL (ref 30–150)

## 2019-09-09 PROCEDURE — 80053 COMPREHEN METABOLIC PANEL: CPT | Mod: 91

## 2019-09-09 PROCEDURE — 36415 COLL VENOUS BLD VENIPUNCTURE: CPT | Mod: PO

## 2019-09-09 PROCEDURE — 80061 LIPID PANEL: CPT | Mod: 91

## 2019-09-09 NOTE — TELEPHONE ENCOUNTER
----- Message from Emily Asher sent at 9/9/2019 10:36 AM CDT -----  Contact: [phone]/self  Patient is requesting orders for PSA be added to his lab work sent to [facility]. Thanks

## 2019-09-09 NOTE — TELEPHONE ENCOUNTER
He already had one this year-- insurance won't pay for another one -- THe one in Feb 2019 was daisy

## 2019-09-19 ENCOUNTER — IMMUNIZATION (OUTPATIENT)
Dept: INTERNAL MEDICINE | Facility: CLINIC | Age: 84
End: 2019-09-19
Payer: MEDICARE

## 2019-09-19 ENCOUNTER — OFFICE VISIT (OUTPATIENT)
Dept: INTERNAL MEDICINE | Facility: CLINIC | Age: 84
End: 2019-09-19
Payer: MEDICARE

## 2019-09-19 VITALS
BODY MASS INDEX: 26.33 KG/M2 | WEIGHT: 183.88 LBS | HEART RATE: 55 BPM | SYSTOLIC BLOOD PRESSURE: 138 MMHG | DIASTOLIC BLOOD PRESSURE: 76 MMHG | OXYGEN SATURATION: 99 % | HEIGHT: 70 IN

## 2019-09-19 DIAGNOSIS — R91.1 PULMONARY NODULE: ICD-10-CM

## 2019-09-19 DIAGNOSIS — E55.9 VITAMIN D DEFICIENCY: ICD-10-CM

## 2019-09-19 DIAGNOSIS — E78.2 MIXED HYPERLIPIDEMIA: Primary | ICD-10-CM

## 2019-09-19 PROCEDURE — 99999 PR PBB SHADOW E&M-EST. PATIENT-LVL IV: ICD-10-PCS | Mod: PBBFAC,,, | Performed by: INTERNAL MEDICINE

## 2019-09-19 PROCEDURE — 99214 OFFICE O/P EST MOD 30 MIN: CPT | Mod: PBBFAC,25 | Performed by: INTERNAL MEDICINE

## 2019-09-19 PROCEDURE — 90662 IIV NO PRSV INCREASED AG IM: CPT | Mod: PBBFAC

## 2019-09-19 PROCEDURE — 99214 OFFICE O/P EST MOD 30 MIN: CPT | Mod: S$PBB,,, | Performed by: INTERNAL MEDICINE

## 2019-09-19 PROCEDURE — 99999 PR PBB SHADOW E&M-EST. PATIENT-LVL IV: CPT | Mod: PBBFAC,,, | Performed by: INTERNAL MEDICINE

## 2019-09-19 PROCEDURE — 99214 PR OFFICE/OUTPT VISIT, EST, LEVL IV, 30-39 MIN: ICD-10-PCS | Mod: S$PBB,,, | Performed by: INTERNAL MEDICINE

## 2019-09-19 RX ORDER — ATORVASTATIN CALCIUM 20 MG/1
20 TABLET, FILM COATED ORAL DAILY
Qty: 90 TABLET | Refills: 3 | Status: SHIPPED | OUTPATIENT
Start: 2019-09-19 | End: 2020-11-24

## 2019-09-19 NOTE — PROGRESS NOTES
"I have personally taken the history and examined this patient and agree with the resident's note as stated above with the following thoughts:  /76 (BP Location: Left arm, Patient Position: Sitting, BP Method: Large (Manual))   Pulse (!) 55   Ht 5' 10" (1.778 m)   Wt 83.4 kg (183 lb 13.8 oz)   SpO2 99%   BMI 26.38 kg/m²     Discussed getting vaccines up to date.  Discussed importance of low fat diet and exercise     "

## 2019-09-19 NOTE — PROGRESS NOTES
Subjective       History of Present Illness:  Mr. Bryson Kellogg is a 83 y.o. male here for his annual physical.     1. Hx of TIA: Patient unsure of TIA hx, currently on Lipitor 20 mg (was on 40mg, his cardiologist told him to take 20) and ASA 81 mg. He denies any recent LOC, dizziness or sensory deficits.      2. HLD: He is on Lipitor 20mg . Recent cholesterol shows total cholesterol 140, HDL 53, triglycerides 78, and LDL 71, which is stable from last time. He is tolerating the Lipitor 20 mg well.      3. KATHY: From 4/09 study. SEVERE obstructive sleep apnea syndrome with 289 respiratory obstructions/RDI 47 with snoring moderate intermittent and hypersomnia with CPAP. He tried Bipap for about a year but did not tolerate it. He denies any difficulties sleeping, day time fatigue or hypersomnolence. He reports 20 lb weight loss since his sleep study     4.BPH:  Patient with BPH, denies frequency, dysuria and urgency. He wakes up 1-2 times per night to urinate.     5. Patient reports flying a couple of times per week. He states he has a healthy diet and works out 5-6 days per week     He reports occasional cough with clear phlegm (once every couple weeks). He also states morning congestion that improves after blowing his nose      Review of Systems   Constitutional: Negative for fever and weight loss.   HENT: Positive for congestion. Negative for sore throat.    Eyes: Negative for blurred vision and pain.   Respiratory: Positive for cough. Negative for shortness of breath.    Cardiovascular: Negative for chest pain, orthopnea and leg swelling.   Gastrointestinal: Negative for abdominal pain, constipation, diarrhea, nausea and vomiting.   Genitourinary: Negative for dysuria and hematuria.   Neurological: Negative for weakness and headaches.       PAST HISTORY:     Past Medical History:   Diagnosis Date    Allergy     Bradycardia 1/4/2017    Hx of colonic polyp 11/3/2016    Hyperlipidemia     Personal history of  colonic polyps     Colon polyps    Renal stone 11/23/2012    Rosacea     Shingles     Sleep apnea     TIA (transient ischemic attack)        Past Surgical History:   Procedure Laterality Date    CHOLECYSTECTOMY      COLONOSCOPY N/A 11/3/2016    [facility] END[facility]    COLONOSCOPY N/A 10/30/2013    [facility] ([facility]TH F[facility])    CYSTO, RETROGRADE PYELOGRAM,BALLOON DILATION, STENT PLACEMENT Right 11/24/2012    [facility] OR    CYSTOSCOPY Right 11/24/2012    [facility] OR    EYE SURGERY Bilateral 2014    cataracts    EYE SURGERY Right 10/2016    tear duct surgery    JOINT REPLACEMENT Left 2013    knee replacemant      left knee    URETEROSCOPY Right 11/24/2012    Per[facility] by [facility] at [facility] OR       MEDICATIONS & ALLERGIES:     Current Outpatient Medications on File Prior to Visit   Medication Sig    ascorbic acid (VITAMIN C) 500 MG tablet Take 500 mg by mouth once daily.      aspirin (ECOTRIN) 81 MG EC tablet Take 81 mg by mouth once daily.    atorvastatin (LIPITOR) 40 MG tablet Take 40 mg by mouth once daily.    CALCIUM CARBONATE/VITAMIN D3 (VITAMIN D-3 ORAL) Take by mouth.    cyanocobalamin (VITAMIN B-12) 250 MCG tablet Take 250 mcg by mouth once daily.      MULTIVIT-MIN/FA/LYCOPENE/LUT (CENTRUM SILVER ULTRA MEN'S ORAL) Take 1 tablet by mouth once daily.    mupirocin (BACTROBAN) 2 % ointment     nystatin (MYCOSTATIN) ointment Apply topically 2 (two) times daily.    PYRIDOXINE HCL (VITAMIN B-6 ORAL) Take by mouth.    triamcinolone acetonide 0.1% (KENALOG) 0.1 % cream APPLY TOPICALLY DAILY AS NEEDED.     No current facility-administered medications on file prior to visit.        Review of patient's allergies indicates:   Allergen Reactions    Sulfa (sulfonamide antibiotics) Rash     Childhood allergy       OBJECTIVE:     Vital Signs:  Vitals:    09/19/19 1107   BP:  "138/76   BP Location: Left arm   Patient Position: Sitting   BP Method: Large (Manual)   Pulse: (!) 55   SpO2: 99%   Weight: 83.4 kg (183 lb 13.8 oz)   Height: 5' 10" (1.778 m)       Body mass index is 26.38 kg/m².     Physical Exam   Constitutional: He is oriented to person, place, and time and well-developed, well-nourished, and in no distress.   HENT:   Head: Normocephalic and atraumatic.   Eyes: Conjunctivae are normal.   Neck: Normal range of motion.   Cardiovascular: Normal rate, regular rhythm and normal heart sounds.   Pulmonary/Chest: Effort normal and breath sounds normal. No respiratory distress. He has no wheezes.   Abdominal: Soft. He exhibits no distension. There is no tenderness.   Musculoskeletal: Normal range of motion. He exhibits no edema.   Neurological: He is alert and oriented to person, place, and time.   Psychiatric: Affect normal.       Laboratory  Lab Results   Component Value Date    WBC 5.61 08/01/2018    HGB 14.1 08/01/2018    HCT 42.7 08/01/2018     (H) 08/01/2018     08/01/2018     BMP  Lab Results   Component Value Date     09/09/2019     09/09/2019    K 3.9 09/09/2019    K 4.0 09/09/2019     09/09/2019     09/09/2019    CO2 29 09/09/2019    CO2 29 09/09/2019    BUN 17 09/09/2019    BUN 17 09/09/2019    CREATININE 0.8 09/09/2019    CREATININE 0.8 09/09/2019    CALCIUM 9.8 09/09/2019    CALCIUM 9.9 09/09/2019    ANIONGAP 7 (L) 09/09/2019    ANIONGAP 7 (L) 09/09/2019    ESTGFRAFRICA >60.0 09/09/2019    ESTGFRAFRICA >60.0 09/09/2019    EGFRNONAA >60.0 09/09/2019    EGFRNONAA >60.0 09/09/2019     Lab Results   Component Value Date    INR 1.03 11/23/2012     Lab Results   Component Value Date    HGBA1C 5.4 01/03/2018     No results for input(s): POCTGLUCOSE in the last 72 hours.        There are no preventive care reminders to display for this patient.      ASSESSMENT & PLAN:   Mixed hyperlipidemia        -     Continue Lipitor 20mg, continue ASA 81mg "   -     Lipid panel; Future  -     Comprehensive metabolic panel; Future     Osteoarthritis of knee, unspecified laterality, unspecified osteoarthritis type       -    Continue daily exercise       -    Continue to monitor     Obstructive sleep apnea syndrome       -    Stable, continue to monitor     Vitamin D deficiency       - Currently wnl, continue to monitor    Pulmonary nodule   - stable from 1/2017 chest CT. No need to follow up     RTC in 6 months    Discussed with Dr. Cueto  - staff attestation to follow      Chu Hauser MD  Internal Medicine PGY1  [facility]  [address] [place] [address] [id number] [phone]

## 2019-09-19 NOTE — PATIENT INSTRUCTIONS
Please follow up in 6 months for labs and check up. Shingles and flu shot today. No need for repeat chest CT, lung nodules stable

## 2020-05-29 ENCOUNTER — TELEPHONE (OUTPATIENT)
Dept: INTERNAL MEDICINE | Facility: CLINIC | Age: 85
End: 2020-05-29

## 2020-05-29 NOTE — TELEPHONE ENCOUNTER
----- Message from Naina Valente sent at 5/29/2020  2:33 PM CDT -----  Patient Requesting Sooner Appointment.     Reason for sooner appt.:--6 month f/u--    When is the first available appointment?--07/10/20--    Communication Preference:--Sharyn--[phone]--    Additional Information:Pt calling to see if she can be fit in sooner then date listed above. Please call to advise.

## 2020-06-01 ENCOUNTER — TELEPHONE (OUTPATIENT)
Dept: INTERNAL MEDICINE | Facility: CLINIC | Age: 85
End: 2020-06-01

## 2020-06-02 ENCOUNTER — TELEPHONE (OUTPATIENT)
Dept: INTERNAL MEDICINE | Facility: CLINIC | Age: 85
End: 2020-06-02

## 2020-06-02 NOTE — TELEPHONE ENCOUNTER
----- Message from Leonardo Hope sent at 6/2/2020 11:28 AM CDT -----  Contact: Spouse [phone]  Type: Orders Request    What orders/ testing are being requested? Colonoscopy    Is there a future appointment scheduled for the patient with PCP? Yes     When? 6/29/20    Comments: Spouse calling requesting to have Colonoscopy done asap, has concerns, call to discuss, would like to go to a location in [facility]. Spouse [phone]    Please call an advise  Thank you

## 2020-06-12 ENCOUNTER — PATIENT OUTREACH (OUTPATIENT)
Dept: ADMINISTRATIVE | Facility: HOSPITAL | Age: 85
End: 2020-06-12

## 2020-06-12 NOTE — PROGRESS NOTES
Health Maintenance Due   Topic Date Due    Shingles Vaccine (2 of 3) 10/09/2009     Chart review completed.

## 2020-06-22 ENCOUNTER — LAB VISIT (OUTPATIENT)
Dept: LAB | Facility: HOSPITAL | Age: 85
End: 2020-06-22
Attending: INTERNAL MEDICINE
Payer: MEDICARE

## 2020-06-22 DIAGNOSIS — E78.2 MIXED HYPERLIPIDEMIA: ICD-10-CM

## 2020-06-22 LAB
ALBUMIN SERPL BCP-MCNC: 3.6 G/DL (ref 3.5–5.2)
ALP SERPL-CCNC: 114 U/L (ref 55–135)
ALT SERPL W/O P-5'-P-CCNC: 18 U/L (ref 10–44)
ANION GAP SERPL CALC-SCNC: 6 MMOL/L (ref 8–16)
AST SERPL-CCNC: 23 U/L (ref 10–40)
BILIRUB SERPL-MCNC: 0.4 MG/DL (ref 0.1–1)
BUN SERPL-MCNC: 14 MG/DL (ref 8–23)
CALCIUM SERPL-MCNC: 9.9 MG/DL (ref 8.7–10.5)
CHLORIDE SERPL-SCNC: 108 MMOL/L (ref 95–110)
CHOLEST SERPL-MCNC: 137 MG/DL (ref 120–199)
CHOLEST/HDLC SERPL: 2.7 {RATIO} (ref 2–5)
CO2 SERPL-SCNC: 28 MMOL/L (ref 23–29)
CREAT SERPL-MCNC: 0.9 MG/DL (ref 0.5–1.4)
EST. GFR  (AFRICAN AMERICAN): >60 ML/MIN/1.73 M^2
EST. GFR  (NON AFRICAN AMERICAN): >60 ML/MIN/1.73 M^2
GLUCOSE SERPL-MCNC: 101 MG/DL (ref 70–110)
HDLC SERPL-MCNC: 50 MG/DL (ref 40–75)
HDLC SERPL: 36.5 % (ref 20–50)
LDLC SERPL CALC-MCNC: 77 MG/DL (ref 63–159)
NONHDLC SERPL-MCNC: 87 MG/DL
POTASSIUM SERPL-SCNC: 4.5 MMOL/L (ref 3.5–5.1)
PROT SERPL-MCNC: 6.8 G/DL (ref 6–8.4)
SODIUM SERPL-SCNC: 142 MMOL/L (ref 136–145)
TRIGL SERPL-MCNC: 50 MG/DL (ref 30–150)

## 2020-06-22 PROCEDURE — 80061 LIPID PANEL: CPT

## 2020-06-22 PROCEDURE — 36415 COLL VENOUS BLD VENIPUNCTURE: CPT | Mod: PO

## 2020-06-22 PROCEDURE — 80053 COMPREHEN METABOLIC PANEL: CPT

## 2020-06-29 ENCOUNTER — OFFICE VISIT (OUTPATIENT)
Dept: INTERNAL MEDICINE | Facility: CLINIC | Age: 85
End: 2020-06-29
Payer: MEDICARE

## 2020-06-29 VITALS
HEART RATE: 65 BPM | SYSTOLIC BLOOD PRESSURE: 118 MMHG | HEIGHT: 70 IN | OXYGEN SATURATION: 98 % | DIASTOLIC BLOOD PRESSURE: 70 MMHG | BODY MASS INDEX: 25.06 KG/M2 | WEIGHT: 175.06 LBS

## 2020-06-29 DIAGNOSIS — E78.2 MIXED HYPERLIPIDEMIA: ICD-10-CM

## 2020-06-29 DIAGNOSIS — N40.0 BENIGN PROSTATIC HYPERPLASIA WITHOUT LOWER URINARY TRACT SYMPTOMS: ICD-10-CM

## 2020-06-29 DIAGNOSIS — G47.33 OBSTRUCTIVE SLEEP APNEA SYNDROME: Primary | ICD-10-CM

## 2020-06-29 DIAGNOSIS — R91.1 PULMONARY NODULE: ICD-10-CM

## 2020-06-29 PROCEDURE — 99214 PR OFFICE/OUTPT VISIT, EST, LEVL IV, 30-39 MIN: ICD-10-PCS | Mod: S$PBB,,, | Performed by: INTERNAL MEDICINE

## 2020-06-29 PROCEDURE — 99999 PR PBB SHADOW E&M-EST. PATIENT-LVL III: ICD-10-PCS | Mod: PBBFAC,,, | Performed by: INTERNAL MEDICINE

## 2020-06-29 PROCEDURE — 99213 OFFICE O/P EST LOW 20 MIN: CPT | Mod: PBBFAC | Performed by: INTERNAL MEDICINE

## 2020-06-29 PROCEDURE — 99214 OFFICE O/P EST MOD 30 MIN: CPT | Mod: S$PBB,,, | Performed by: INTERNAL MEDICINE

## 2020-06-29 PROCEDURE — 99999 PR PBB SHADOW E&M-EST. PATIENT-LVL III: CPT | Mod: PBBFAC,,, | Performed by: INTERNAL MEDICINE

## 2020-06-29 NOTE — PROGRESS NOTES
"  He is a 85 year-old gentleman coming in today to follow up ongoing medical   problems.      1. He had a history of "TIA"? back in 1999. He had tingling in his right arm and hand.   He is not currently having any trouble. He is on aspirin. No recent problem.     2. He has a history of colon polyps, which his last colonoscopy was   11/2016. . He had one polyp  And it was hyperplastic and he does not need to follow up.  He has been  Using miralax and it has been helping.        3. Hyperlipidemia. He is on Lipitor 40mg . Recent cholesterol shows total   cholesterol 137, HDL 50, triglycerides 50, and LDL 77, which is stable  from last time. He is tolerating the Lipitor 10 mg well.     4. He has a BPH, which he says has been dong well. He has 1 episode of nacturia  Around 2-3 am. . NO hesitancy or urgency.     5. KATHY: results from 4/09 study. SEVERE obstructive sleep apnea syndrome with 289 respiratory obstructions/RDI 47 with snoring moderate intermittent and hypersomnia with CPAP. He tried Bipap for about a year. He was seeing a Sleep MD in [facility], but could not tolerated CPAP or BiPAP. HE has been exercising and sleeping well. NO day time fatigue. No witnessed sleep apnea or snoring.     6. Pulmonary nodules. He had a CT scan of his lung 2 times this year to review the pulmonary nodules the last one in 1/2017 was unremarkable. no weight loss or resp problems reported. He has never smoked.     SOCIAL HISTORY: He is retired from [employer]. He is . No   alcohol use. No drug use. Lives in [place]. Lives with wife and they are active and travel a lot.  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      REVIEW OF SYSTEMS:   Gen - no fatigue .He has lost 18 #  Since last visit.  He is still walking 3-5 miles a day.  He has been eating out  Less with the pandemic.     Eyes - no eye pain or visual changes  ENT - no hoarseness or sore throat  CV - no CP or SOB  Pulm - no cough or wheezing  GI - no N/V/D   no dysuria or " "incontinence  MS - no joint pain or muscle pain  Skin - no rash, or c/o of skin lesions  Neuro - no HA, dizziness  Heme - no abnormal bleeding or bruising  Endo - no polydipsia, or temperature changes  Psych - no anxiety or depression    PHYSICAL EXAMINATION: He is a well-appearing gentleman in no acute   Distress. Walking without any difficulty or without assistance device.       /70 (BP Location: Left arm, Patient Position: Sitting, BP Method: Medium (Manual))   Pulse 65   Ht 5' 10" (1.778 m)   Wt 79.4 kg (175 lb 0.7 oz)   SpO2 98%   BMI 25.12 kg/m²     . Ear canals are open. TMs are   clear. Oropharynx is clear. Pupils equal, round, and reactive to light   and accommodation. He is wearing glasses.   NECK: Supple. Thyroid is not enlarged. No carotid bruits. He has no   cervical, axillary, or inguinal lymphadenopathy detected.   CHEST: Clear without wheeze.   CARDIOVASCULAR: S1, S2, regular rate and rhythm without murmur, gallop,   or rub.   ABDOMEN: Soft, nontender. No hepatosplenomegaly. No guarding or rebound   tenderness. Abdominal aorta is not enlarged.   He has normal biceps, triceps, patellar deep tendon reflexes. Normal   muscle strength, upper and lower extremities.    :Her has no supicous sking lesions      1. History of colon polyps.- last note said he did not need a follow up c-scope-- we will discuss next year.  2. History of hyperlipidemia with history of TIA-- I am unsure about the TIA diagnosis. . LDL is doing   Better on the higher dose of lipitor. . He had a brother with death from MI at 51- so I would continue the Lipitor. And 81 Asprin a day.   3. History of rosacea. stable   4 pulmonary nodules-- ct chest reviewed  -- no need for follow up.   5. Sleep apnea- off CPAP and BIPAP-- does not want to "fool  with it any more"- says he is sleeping well.  Less snoring since weight loss.        he has had shingles vaccine.& Pneumonia vaccine.Today we discussed the shingrix vaccine and flu " shot. Wants PSA next itme

## 2020-08-12 PROBLEM — C82.61: Status: ACTIVE | Noted: 2020-08-12

## 2020-08-12 NOTE — PROGRESS NOTES
[facility] Hematolgy/Oncology  History & Physical    Subjective:      Patient ID:   NAME: Bryson Kellogg : 1935     85 y.o. male    Referring Doc: Osiris Lopez  Other Physicians: Kirk Cueto ([facility]-PCP); Niecy ([facility])        Chief Complaint: cutaneous follicular lymphoma      HPI:  85 y.o. 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 He is here with his wife. He first noticed the spot on his left side about 3 months ago. It slowly enlarged over that time. He is a primary patient of Dr Cueto. He had back surgery about 3-4 months ago with Dr Pemberton in [facility] with laminectomy and he sees them again next week for follow-up. No weight loss or unexplained fevers. Breathing ok, no CP, SOB, HA's or N/V. No night sweats. He is retired [employer] .               ROS:   GEN: normal without any fever, night sweats or weight loss  HEENT: normal with no HA's, sore throat, stiff neck, changes in vision  CV: normal with no CP, SOB, PND, LEMOS or orthopnea  PULM: normal with no SOB, cough, hemoptysis, sputum or pleuritic pain  GI: normal with no abdominal pain, nausea, vomiting, constipation, diarrhea, melanotic stools, BRBPR, or hematemesis  : normal with no hematuria, dysuria  BREAST: normal with no mass, discharge, pain  SKIN: normal with no rash, erythema, bruising, or swelling       Past Medical/Surgical History:  Past Medical History:   Diagnosis Date    Allergy     Bradycardia 2017    Cutaneous follicle center lymphoma involving lymph nodes of head 2020    Hx of colonic polyp 11/3/2016    Hyperlipidemia     Personal history of colonic polyps     Colon polyps    Renal stone 2012    Rosacea     Shingles     Sleep apnea     TIA (transient ischemic attack)      Past Surgical History:    Procedure Laterality Date    CHOLECYSTECTOMY      COLONOSCOPY N/A 11/3/2016    Procedure: COLONOSCOPY;  Surgeon: Alex Cuellar MD;  Location: [facility];  Service: Endoscopy;  Laterality: N/A;    EYE SURGERY Bilateral 2014    cataracts    EYE SURGERY Right 10/2016    tear duct surgery    JOINT REPLACEMENT Left 2013    knee replacemant      left knee         Allergies:  Review of patient's allergies indicates:   Allergen Reactions    Sulfa (sulfonamide antibiotics) Rash     Childhood allergy       Social/Family History:  Social History     Socioeconomic History    Marital status:      Spouse name: Sharyn    Number of children: Not on file    Years of education: Not on file    Highest education level: Not on file   Occupational History    Not on file   Social Needs    Financial resource strain: Not on file    Food insecurity     Worry: Not on file     Inability: Not on file    Transportation needs     Medical: Not on file     Non-medical: Not on file   Tobacco Use    Smoking status: Never Smoker    Smokeless tobacco: Never Used   Substance and Sexual Activity    Alcohol use: No    Drug use: No    Sexual activity: Yes     Partners: Female   Lifestyle    Physical activity     Days per week: Not on file     Minutes per session: Not on file    Stress: Not on file   Relationships    Social connections     Talks on phone: Not on file     Gets together: Not on file     Attends Yazdanism service: Not on file     Active member of club or organization: Not on file     Attends meetings of clubs or organizations: Not on file     Relationship status: Not on file   Other Topics Concern    Not on file   Social History Narrative    Not on file     Family History   Problem Relation Age of Onset    No Known Problems Mother     Heart disease Father     Heart disease Brother     No Known Problems Sister     No Known Problems Daughter     No Known Problems Brother          Medications:    Current  Outpatient Medications:     ascorbic acid (VITAMIN C) 500 MG tablet, Take 500 mg by mouth once daily.  , Disp: , Rfl:     aspirin (ECOTRIN) 81 MG EC tablet, Take 81 mg by mouth once daily., Disp: , Rfl:     atorvastatin (LIPITOR) 20 MG tablet, Take 1 tablet (20 mg total) by mouth once daily., Disp: 90 tablet, Rfl: 3    cyanocobalamin (VITAMIN B-12) 250 MCG tablet, Take 250 mcg by mouth once daily.  , Disp: , Rfl:     MULTIVIT-MIN/FA/LYCOPENE/LUT (CENTRUM SILVER ULTRA MEN'S ORAL), Take 1 tablet by mouth once daily., Disp: , Rfl:     nystatin (MYCOSTATIN) ointment, Apply topically 2 (two) times daily., Disp: 15 g, Rfl: 3    PYRIDOXINE HCL (VITAMIN B-6 ORAL), Take by mouth., Disp: , Rfl:     triamcinolone acetonide 0.1% (KENALOG) 0.1 % cream, APPLY TOPICALLY DAILY AS NEEDED., Disp: 80 g, Rfl: 3      Pathology:  Cancer Staging  No matching staging information was found for the patient.      Left periauricular excisional biopsy 7/28/2020:  Follicular B-cell lymphoma - possibly a primary cutaneous follicular center lymphoma - CD20+    Objective:   Vitals:  Blood pressure (!) 151/62, pulse (!) 56, temperature 97.8 °F (36.6 °C), resp. rate 18, weight 79.6 kg (175 lb 6.4 oz).    Physical Examination:   GEN: no apparent distress, comfortable; AAOx3  HEAD: atraumatic and normocephalic; healing scar on left anterior auricular region  EYES: no pallor, no icterus, PERRLA  ENT: OMM, no pharyngeal erythema, external ears WNL; no nasal discharge; no thrush  NECK: no masses, thyroid normal, trachea midline, no LAD/LN's, supple  CV: RRR with no murmur; normal pulse; normal S1 and S2; no pedal edema  CHEST: Normal respiratory effort; CTAB; normal breath sounds; no wheeze or crackles  ABDOM: nontender and nondistended; soft; normal bowel sounds; no rebound/guarding  MUSC/Skeletal: ROM normal; no crepitus; joints normal; no deformities or arthropathy  EXTREM: no clubbing, cyanosis, inflammation or swelling  SKIN: no rashes,  lesions, ulcers, petechiae or subcutaneous nodules  : no mcgill  NEURO: grossly intact; motor/sensory WNL; AAOx3; no tremors  PSYCH: normal mood, affect and behavior  LYMPH: normal cervical, supraclavicular, axillary and groin LN's      Labs:   Lab Results   Component Value Date    WBC 5.61 08/01/2018    HGB 14.1 08/01/2018    HCT 42.7 08/01/2018     (H) 08/01/2018     08/01/2018    CMP  Sodium   Date Value Ref Range Status   06/22/2020 142 136 - 145 mmol/L Final     Potassium   Date Value Ref Range Status   06/22/2020 4.5 3.5 - 5.1 mmol/L Final     Chloride   Date Value Ref Range Status   06/22/2020 108 95 - 110 mmol/L Final     CO2   Date Value Ref Range Status   06/22/2020 28 23 - 29 mmol/L Final     Glucose   Date Value Ref Range Status   06/22/2020 101 70 - 110 mg/dL Final     BUN, Bld   Date Value Ref Range Status   06/22/2020 14 8 - 23 mg/dL Final     Creatinine   Date Value Ref Range Status   06/22/2020 0.9 0.5 - 1.4 mg/dL Final   11/23/2012 1.2 0.5 - 1.4 mg/dL Final     Calcium   Date Value Ref Range Status   06/22/2020 9.9 8.7 - 10.5 mg/dL Final   11/23/2012 9.4 8.7 - 10.5 mg/dL Final     Total Protein   Date Value Ref Range Status   06/22/2020 6.8 6.0 - 8.4 g/dL Final     Albumin   Date Value Ref Range Status   06/22/2020 3.6 3.5 - 5.2 g/dL Final     Total Bilirubin   Date Value Ref Range Status   06/22/2020 0.4 0.1 - 1.0 mg/dL Final     Comment:     For infants and newborns, interpretation of results should be based  on gestational age, weight and in agreement with clinical  observations.  Premature Infant recommended reference ranges:  Up to 24 hours.............<8.0 mg/dL  Up to 48 hours............<12.0 mg/dL  3-5 days..................<15.0 mg/dL  6-29 days.................<15.0 mg/dL       Alkaline Phosphatase   Date Value Ref Range Status   06/22/2020 114 55 - 135 U/L Final   11/23/2012 85 55 - 135 U/L Final     AST   Date Value Ref Range Status   06/22/2020 23 10 - 40 U/L Final    11/23/2012 22 10 - 40 U/L Final     ALT   Date Value Ref Range Status   06/22/2020 18 10 - 44 U/L Final     Anion Gap   Date Value Ref Range Status   06/22/2020 6 (L) 8 - 16 mmol/L Final   11/23/2012 10 5 - 15 meq/L Final     eGFR if    Date Value Ref Range Status   06/22/2020 >60.0 >60 mL/min/1.73 m^2 Final     eGFR if non    Date Value Ref Range Status   06/22/2020 >60.0 >60 mL/min/1.73 m^2 Final     Comment:     Calculation used to obtain the estimated glomerular filtration  rate (eGFR) is the CKD-EPI equation.            Radiology/Diagnostic Studies:          All lab results and imaging results have been reviewed and discussed with the patient    Assessment:   (1) 85 y.o. 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   - excisional biopsy was performed on 7/28/2020  - CD 20 +l BCL-6 stain is positive; CD30 was negative  - dicussed pathology and went over the latest NCCN guidelines (version 2.2020)  - refer to rad/onc and schedule PET    (2) Hypercholesterolemia    (3) Hx/of TIA in 1999    (4) BPH    (5) KATHY    (6) Pulmonary nodules - no tobacco history; monitored via CT scans with last one in Jan 2017    (7) Hx/of colon polyps    (8) Rosacea    (9) Arthritis and DJD - knee    VISIT DIAGNOSES:              Multiple lung nodules  -     NM PET CT Whole Body; Future; Expected date: 08/13/2020    Cutaneous follicle center lymphoma involving lymph nodes of head  -     Ambulatory referral/consult to Radiation Oncology; Future; Expected date: 08/20/2020  -     NM PET CT Whole Body; Future; Expected date: 08/13/2020            Plan:     PLAN:  1. Schedule total body PET  2. Refer to rad/onc  3. F/u with dermatology on regular basis as he is more likely to develop new spots of this as he ages  4.  "RTC in  3-4 weeks   Fax note to Nory Hayes; Niecy    Pathology Discussion:    I reviewed and discussed the pathology report(s) and radiograph reports (if available) in as simple to understand and/or laymen's terms to the best of my ability. I had an indepth conversation with the patient and went over the patient's individual diagnosis based on the information that was currently available. I discussed the TNM staging process with regard to the patient's particular cancer type, and the calculated stage based on the currently available TNM data and literature. I discussed the available prognostic data with regard to the current staging information and how it relates to the prognosis of their particular neoplastic process.        NCCN Guidelines:    I discussed the available treatment option(s) in accordance with the latest literature from the NCCN Clinical Practice Guidelines for the patient's particular type of cancer disorder. The NCCN Guidelines provide a "document evidence-based (and) consensus-driven management" of the care of oncology patients. The treatment recommendations were made not only in accordance to the NCCN guidelines, but also factored in to account the patient's overall age, condition, performance status and their medical co-morbidities. I went over the risks and benefits of the the treatment options (if any could be made) with regard to their particular cancer type, their cancer stage, their age, and their co-morbidities.     COVID-19 Discussion:    I had long discussion with patient and any applicable family about the COVID-19 coronavirus epidemic and the recommended precautions with regard to cancer and/or hematology patients. I have re-iterated the CDC recommendations for adequate hand washing, use of hand -like products, and coughing into elbow, etc. In addition, especially for our patients who are on chemotherapy and/or our otherwise immunocompromised patients, I have " recommended avoidance of crowds, including movie theaters, restaurants, churches, etc. I have recommended avoidance of any sick or symptomatic family members and/or friends. I have also recommended avoidance of any raw and unwashed food products, and general avoidance of food items that have not been prepared by themselves. The patient has been asked to call us immediately with any symptom developments, issues, questions or other general concerns.       I have explained and the patient understands all of  the current recommendation(s). I have answered all of their questions to the best of my ability and to their complete satisfaction.             Thank you for allowing me to participate in this patient's care. Please call with any questions or concerns.    Electronically signed Mehdi Alvarez MD

## 2020-08-13 ENCOUNTER — OFFICE VISIT (OUTPATIENT)
Dept: HEMATOLOGY/ONCOLOGY | Facility: CLINIC | Age: 85
End: 2020-08-13
Payer: MEDICARE

## 2020-08-13 VITALS
TEMPERATURE: 98 F | RESPIRATION RATE: 18 BRPM | BODY MASS INDEX: 25.17 KG/M2 | WEIGHT: 175.38 LBS | HEART RATE: 56 BPM | SYSTOLIC BLOOD PRESSURE: 151 MMHG | DIASTOLIC BLOOD PRESSURE: 62 MMHG

## 2020-08-13 DIAGNOSIS — C82.61 CUTANEOUS FOLLICLE CENTER LYMPHOMA INVOLVING LYMPH NODES OF HEAD: ICD-10-CM

## 2020-08-13 DIAGNOSIS — R91.8 MULTIPLE LUNG NODULES: Primary | ICD-10-CM

## 2020-08-13 PROCEDURE — 99204 OFFICE O/P NEW MOD 45 MIN: CPT | Mod: S$GLB,,, | Performed by: INTERNAL MEDICINE

## 2020-08-13 PROCEDURE — 99204 PR OFFICE/OUTPT VISIT, NEW, LEVL IV, 45-59 MIN: ICD-10-PCS | Mod: S$GLB,,, | Performed by: INTERNAL MEDICINE

## 2020-08-18 ENCOUNTER — TELEPHONE (OUTPATIENT)
Dept: RADIATION ONCOLOGY | Facility: CLINIC | Age: 85
End: 2020-08-18

## 2020-08-18 ENCOUNTER — SOCIAL WORK (OUTPATIENT)
Dept: HEMATOLOGY/ONCOLOGY | Facility: CLINIC | Age: 85
End: 2020-08-18

## 2020-08-18 ENCOUNTER — OFFICE VISIT (OUTPATIENT)
Dept: RADIATION ONCOLOGY | Facility: CLINIC | Age: 85
End: 2020-08-18
Payer: MEDICARE

## 2020-08-18 VITALS
HEART RATE: 56 BPM | DIASTOLIC BLOOD PRESSURE: 71 MMHG | SYSTOLIC BLOOD PRESSURE: 123 MMHG | WEIGHT: 174.38 LBS | OXYGEN SATURATION: 98 % | BODY MASS INDEX: 25.02 KG/M2 | TEMPERATURE: 98 F

## 2020-08-18 DIAGNOSIS — C82.61 CUTANEOUS FOLLICLE CENTER LYMPHOMA INVOLVING LYMPH NODES OF HEAD: ICD-10-CM

## 2020-08-18 DIAGNOSIS — Z03.818 ENCOUNTER FOR OBSERVATION FOR SUSPECTED EXPOSURE TO OTHER BIOLOGICAL AGENTS RULED OUT: Primary | ICD-10-CM

## 2020-08-18 PROCEDURE — 99204 PR OFFICE/OUTPT VISIT, NEW, LEVL IV, 45-59 MIN: ICD-10-PCS | Mod: S$GLB,,, | Performed by: RADIOLOGY

## 2020-08-18 PROCEDURE — 99204 OFFICE O/P NEW MOD 45 MIN: CPT | Mod: S$GLB,,, | Performed by: RADIOLOGY

## 2020-08-18 RX ORDER — ASPIRIN 325 MG
50 TABLET, DELAYED RELEASE (ENTERIC COATED) ORAL DAILY
COMMUNITY
End: 2022-12-12

## 2020-08-18 NOTE — PROGRESS NOTES
Bryson Kellogg  [id number]  1935 8/18/2020  Mehdi Alvarez Md  [address] [facility]  [address]  [place] [address]    REASON FOR CONSULTATION: Primary cutaneous follicle center lymphoma of the left preauricular space, [id number]    TREATMENT GOAL: primary    HISTORY OF PRESENT ILLNESS:   85M presented with enlarging lesion of the left preauricular space x 3mos.  Sun exposure history includes being an .  Dr. 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  BCL2 and BCL6 staining patterns are confirmatory.  Patient was referred to Dr. Alvarez and is scheduled for PET-CT scan 8/26. He is referred for radiation oncology opinion.     Today patient denies fever, chills, chest pain, shortness of breath, cough or hemoptysis.  He denies appetite, energy or weight loss.  He denies new lumps or bumps.  He denies night sweats.    Review of systems otherwise negative unless indicated in HPI.    Past Medical History:   Diagnosis Date    Allergy     Bradycardia 1/4/2017    Cutaneous follicle center lymphoma involving lymph nodes of head 8/12/2020    Hx of colonic polyp 11/3/2016    Hyperlipidemia     Personal history of colonic polyps     Colon polyps    Renal stone 11/23/2012    Rosacea     Shingles     Sleep apnea     TIA (transient ischemic attack)      Past Surgical History:   Procedure Laterality Date    CHOLECYSTECTOMY      COLONOSCOPY N/A 11/3/2016    Procedure: COLONOSCOPY;  Surgeon: Alex Cuellar MD;  Location: [facility];  Service: Endoscopy;  Laterality: N/A;    EYE SURGERY Bilateral 2014    cataracts    EYE SURGERY Right 10/2016    tear duct surgery    JOINT REPLACEMENT Left 2013    knee replacemant      left knee     Social History     Socioeconomic History    Marital status:      Spouse name: Sharyn    Number of children: Not on file    Years of education: Not on file    Highest education  level: Not on file   Occupational History    Not on file   Social Needs    Financial resource strain: Not on file    Food insecurity     Worry: Not on file     Inability: Not on file    Transportation needs     Medical: Not on file     Non-medical: Not on file   Tobacco Use    Smoking status: Never Smoker    Smokeless tobacco: Never Used   Substance and Sexual Activity    Alcohol use: No    Drug use: No    Sexual activity: Yes     Partners: Female   Lifestyle    Physical activity     Days per week: Not on file     Minutes per session: Not on file    Stress: Not on file   Relationships    Social connections     Talks on phone: Not on file     Gets together: Not on file     Attends Protestant service: Not on file     Active member of club or organization: Not on file     Attends meetings of clubs or organizations: Not on file     Relationship status: Not on file   Other Topics Concern    Not on file   Social History Narrative    Not on file     Family History   Problem Relation Age of Onset    No Known Problems Mother     Heart disease Father     Heart disease Brother     No Known Problems Sister     No Known Problems Daughter     No Known Problems Brother        PRIOR HISTORY OF CHEMOTHERAPY OR RADIOTHERAPY: Please see HPI for patients prior oncologic history.    Medication List with Changes/Refills   Current Medications    ASCORBIC ACID (VITAMIN C) 500 MG TABLET    Take 500 mg by mouth once daily.      ASPIRIN (ECOTRIN) 81 MG EC TABLET    Take 81 mg by mouth once daily.    ATORVASTATIN (LIPITOR) 20 MG TABLET    Take 1 tablet (20 mg total) by mouth once daily.    CO-ENZYME Q-10 50 MG CAPSULE    Take 50 mg by mouth once daily.    CYANOCOBALAMIN (VITAMIN B-12) 250 MCG TABLET    Take 250 mcg by mouth once daily.      MULTIVIT-MIN/FA/LYCOPENE/LUT (CENTRUM SILVER ULTRA MEN'S ORAL)    Take 1 tablet by mouth once daily.    NYSTATIN (MYCOSTATIN) OINTMENT    Apply topically 2 (two) times daily.     PYRIDOXINE HCL (VITAMIN B-6 ORAL)    Take by mouth.    TRIAMCINOLONE ACETONIDE 0.1% (KENALOG) 0.1 % CREAM    APPLY TOPICALLY DAILY AS NEEDED.     Review of patient's allergies indicates:   Allergen Reactions    Sulfa (sulfonamide antibiotics) Rash     Childhood allergy       QUALITY OF LIFE: 100%- Normal, No Complaints, No Evidence of Disease    Vitals:    08/18/20 0813   BP: 123/71   Pulse: (!) 56   Temp: 97.7 °F (36.5 °C)   SpO2: 98%   Weight: 79.1 kg (174 lb 6.4 oz)   PainSc: 0-No pain     Body mass index is 25.02 kg/m².    PHYSICAL EXAM:   GENERAL: alert; in no apparent distress.   HEAD: normocephalic, atraumatic.  EYES: pupils are equal, round, reactive to light and accommodation. Sclera anicteric. Conjunctiva not injected.   NECK: no cervical motion rigidity; supple with no masses.  CHEST: Patient is speaking comfortably on room air with normal work of breathing without using accessory muscles of respiration.  ABDOMEN: soft, nontender, nondistended.   MUSCULOSKELETAL: no tenderness to palpation along the spine or scapulae. Normal range of motion.  NEUROLOGIC: cranial nerves II-XII intact bilaterally. Strength 5/5 in bilateral upper and lower extremities. No sensory deficits appreciated. Normal gait.  LYMPHATIC: no cervical LAD appreciated bilaterally.   EXTREMITIES: no clubbing, cyanosis, edema.  SKIN:  Well-healed 1.5 cm incision at left preauricular space without ulceration, cellulitis, fluctuance, nodularity.    REVIEW OF IMAGING/PATHOLOGY/LABS: Please see HPI. All images reviewed personally by dictating physician.     ASSESSMENT: 85 y.o. 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  PLAN:  Bryson Kellogg presents with a history of significant sun exposure a potentially solar radiation exposure as a now retired  with biopsy-proven primary cutaneous follicle center lymphoma of the left preauricular space.  He has a PET-CT pending to ensure this  is a solitary site of disease and presuming this, I recommend definitive radiotherapy with expected excellent local control at 36 Gy in 20 fractions using superficial electrons with customized cutout to shape the beam and bolus to bring the dose to the skin surface.  I explained that should PET-CT scan demonstrate other sites of disease that radiotherapy will be reserved for palliative/LC fashion and he may be recommended to undergo systemic therapy.  He and his wife expressed understanding.    Today I described in detail the process of superficial radiation treatment planning and delivery with weekly physician visits to monitor for adverse effects such as skin changes and fatigue.  I do expect in-field alopecia which I explained to he and his wife today.  At the end of our discussion the patient would like to proceed.  We will tentatively plan for clinical simulation after completion of PET-CT.    I carefully explained the process of simulation and treatment delivery with weekly physician visits.     We discussed the risks and benefits of the above treatment and have gone over in detail the acute and late toxicities of radiation therapy to the left preauricular space. The patient expressed  understanding and has signed a consent form which is included in the patients chart.     The patient has our contact information and understands that they are free to contact us at any time with questions or concerns regarding radiation therapy.    DISPOSITION: Clinical SIM after PET/CT    I have personally seen and evaluated this patient. Greater than 50% of this time was spent discussing coordination of care and/or counseling.    COVID-19 precautions discussed. Cancer Center policy for COVID-19 testing described.  Patient will be required to wear a mask when in the Cancer Center.    PHYSICIAN: Romero Lieberman Jr, MD    Thank you for the opportunity to meet and consult with Bryson Kellogg.   Please feel free to contact me to  discuss the above recommendation further.

## 2020-08-18 NOTE — PROGRESS NOTES
Met with patient and his wife to complete new patient orientation and the NCCN Distress Screening; patient indicated a rating of 0.  Patient denied needing psychosocial support at this time.  I provided contact information in the event they need supportive services in the future.

## 2020-08-24 ENCOUNTER — HOSPITAL ENCOUNTER (OUTPATIENT)
Dept: RADIOLOGY | Facility: HOSPITAL | Age: 85
Discharge: HOME | End: 2020-08-24
Attending: INTERNAL MEDICINE
Payer: MEDICARE

## 2020-08-24 VITALS — HEIGHT: 70 IN | WEIGHT: 175 LBS | BODY MASS INDEX: 25.05 KG/M2

## 2020-08-24 DIAGNOSIS — R91.8 MULTIPLE LUNG NODULES: ICD-10-CM

## 2020-08-24 DIAGNOSIS — C82.61 CUTANEOUS FOLLICLE CENTER LYMPHOMA INVOLVING LYMPH NODES OF HEAD: ICD-10-CM

## 2020-08-24 LAB — GLUCOSE SERPL-MCNC: 88 MG/DL (ref 70–110)

## 2020-08-24 PROCEDURE — 78816 PET IMAGE W/CT FULL BODY: CPT | Mod: TC,PO,PI

## 2020-08-25 ENCOUNTER — TREATMENT (OUTPATIENT)
Dept: RADIATION ONCOLOGY | Facility: CLINIC | Age: 85
End: 2020-08-25
Payer: MEDICARE

## 2020-08-25 PROCEDURE — 77290 THER RAD SIMULAJ FIELD CPLX: CPT | Mod: S$GLB,,, | Performed by: RADIOLOGY

## 2020-08-25 PROCEDURE — 77290 PR  SET RADN THERAPY FIELD COMPLEX: ICD-10-PCS | Mod: S$GLB,,, | Performed by: RADIOLOGY

## 2020-08-25 PROCEDURE — 77263 THER RADIOLOGY TX PLNG CPLX: CPT | Mod: S$GLB,,, | Performed by: RADIOLOGY

## 2020-08-25 PROCEDURE — 77263 PR  RADIATION THERAPY PLAN COMPLEX: ICD-10-PCS | Mod: S$GLB,,, | Performed by: RADIOLOGY

## 2020-08-26 PROCEDURE — 77334 PR  RADN TREATMENT AID(S) COMPLX: ICD-10-PCS | Mod: S$GLB,,, | Performed by: RADIOLOGY

## 2020-08-26 PROCEDURE — 77334 RADIATION TREATMENT AID(S): CPT | Mod: S$GLB,,, | Performed by: RADIOLOGY

## 2020-08-26 PROCEDURE — 77300 RADIATION THERAPY DOSE PLAN: CPT | Mod: S$GLB,,, | Performed by: RADIOLOGY

## 2020-08-26 PROCEDURE — 77300 PR RADIATION THERAPY,DOSIMETRY PLAN: ICD-10-PCS | Mod: S$GLB,,, | Performed by: RADIOLOGY

## 2020-08-31 PROCEDURE — 77427 PR CHG RADIATION,MANGEMENT,5 TX'S: ICD-10-PCS | Mod: S$GLB,,, | Performed by: RADIOLOGY

## 2020-08-31 PROCEDURE — 77427 RADIATION TX MANAGEMENT X5: CPT | Mod: S$GLB,,, | Performed by: RADIOLOGY

## 2020-09-02 ENCOUNTER — TREATMENT (OUTPATIENT)
Dept: RADIATION ONCOLOGY | Facility: CLINIC | Age: 85
End: 2020-09-02
Payer: MEDICARE

## 2020-09-02 PROCEDURE — G6012 PR RADN TX DELIVERY, 6-10 MEV, >= 3 TX AREAS: ICD-10-PCS | Mod: S$GLB,,, | Performed by: RADIOLOGY

## 2020-09-02 PROCEDURE — G6012 RADIATION TREATMENT DELIVERY: HCPCS | Mod: S$GLB,,, | Performed by: RADIOLOGY

## 2020-09-03 ENCOUNTER — TREATMENT (OUTPATIENT)
Dept: RADIATION ONCOLOGY | Facility: CLINIC | Age: 85
End: 2020-09-03
Payer: MEDICARE

## 2020-09-03 PROCEDURE — G6012 RADIATION TREATMENT DELIVERY: HCPCS | Mod: S$GLB,,, | Performed by: RADIOLOGY

## 2020-09-03 PROCEDURE — G6012 PR RADN TX DELIVERY, 6-10 MEV, >= 3 TX AREAS: ICD-10-PCS | Mod: S$GLB,,, | Performed by: RADIOLOGY

## 2020-09-06 NOTE — PROGRESS NOTES
[facility] Hematology/Oncology  PROGRESS NOTE - 2nd Follow-up Visit      Subjective:       Patient ID:   NAME: Bryson Kellogg : 1935     85 y.o. male    Referring Doc: Osiris Lopez  Other Physicians: Kirk Cueto ([facility]-PCP); Niecy ([facility]); Luisana    Chief Complaint:  cutaneous follicular lymphoma f/u    History of Present Illness:     Patient returns today for a 2nd regularly scheduled follow-up visit.  The patient is here today to go over the results of the recently ordered labs, tests and studies. He is here with his wife. He is doing ok with no new issues. No new issues. Breathing ok. No CP, SOB, HA's or N/V. He saw Dr Lieberman with rad/onc and they have started him on XRT with 22 total. He is in his 2nd week of XRT. He had PEt scan on 2020 with no evidence of a systemic lymphoma process.      Discussed covid19 precautions.             ROS:   GEN: normal without any fever, night sweats or weight loss  HEENT: normal with no HA's, sore throat, stiff neck, changes in vision  CV: normal with no CP, SOB, PND, LEMOS or orthopnea  PULM: normal with no SOB, cough, hemoptysis, sputum or pleuritic pain  GI: normal with no abdominal pain, nausea, vomiting, constipation, diarrhea, melanotic stools, BRBPR, or hematemesis  : normal with no hematuria, dysuria  BREAST: normal with no mass, discharge, pain  SKIN: normal with no rash, erythema, bruising, or swelling    Pain Scale:  0    Allergies:  Review of patient's allergies indicates:   Allergen Reactions    Sulfa (sulfonamide antibiotics) Rash     Childhood allergy       Medications:    Current Outpatient Medications:     ascorbic acid (VITAMIN C) 500 MG tablet, Take 500 mg by mouth once daily.  , Disp: , Rfl:     aspirin (ECOTRIN) 81 MG EC tablet, Take 81 mg by mouth once daily., Disp: , Rfl:     atorvastatin (LIPITOR) 20 MG tablet, Take 1 tablet (20 mg total) by mouth once daily., Disp: 90 tablet, Rfl: 3    co-enzyme Q-10 50 mg capsule, Take 50 mg by  mouth once daily., Disp: , Rfl:     cyanocobalamin (VITAMIN B-12) 250 MCG tablet, Take 250 mcg by mouth once daily.  , Disp: , Rfl:     MULTIVIT-MIN/FA/LYCOPENE/LUT (CENTRUM SILVER ULTRA MEN'S ORAL), Take 1 tablet by mouth once daily., Disp: , Rfl:     nystatin (MYCOSTATIN) ointment, Apply topically 2 (two) times daily., Disp: 15 g, Rfl: 3    PYRIDOXINE HCL (VITAMIN B-6 ORAL), Take by mouth., Disp: , Rfl:     triamcinolone acetonide 0.1% (KENALOG) 0.1 % cream, APPLY TOPICALLY DAILY AS NEEDED., Disp: 80 g, Rfl: 3    PMHx/PSHx Updates:  See patient's last visit with me on 8/13/2020.  See H&P on 8/13/2020        Pathology:  Cancer Staging  No matching staging information was found for the patient.          Objective:     Vitals:  Blood pressure (!) 143/81, pulse (!) 51, temperature 97.3 °F (36.3 °C), resp. rate 18, weight 78.3 kg (172 lb 9.6 oz).    Physical Examination:   GEN: no apparent distress, comfortable; AAOx3  HEAD: atraumatic and normocephalic; healing scar on left anterior auricular region  EYES: no pallor, no icterus, PERRLA  ENT: OMM, no pharyngeal erythema, external ears WNL; no nasal discharge; no thrush  NECK: no masses, thyroid normal, trachea midline, no LAD/LN's, supple  CV: RRR with no murmur; normal pulse; normal S1 and S2; no pedal edema  CHEST: Normal respiratory effort; CTAB; normal breath sounds; no wheeze or crackles  ABDOM: nontender and nondistended; soft; normal bowel sounds; no rebound/guarding  MUSC/Skeletal: ROM normal; no crepitus; joints normal; no deformities or arthropathy  EXTREM: no clubbing, cyanosis, inflammation or swelling  SKIN: no rashes, lesions, ulcers, petechiae or subcutaneous nodules  : no mcgill  NEURO: grossly intact; motor/sensory WNL; AAOx3; no tremors  PSYCH: normal mood, affect and behavior  LYMPH: normal cervical, supraclavicular, axillary and groin LN's            Labs:   CMP  Sodium   Date Value Ref Range Status   06/22/2020 142 136 - 145 mmol/L Final      Potassium   Date Value Ref Range Status   06/22/2020 4.5 3.5 - 5.1 mmol/L Final     Chloride   Date Value Ref Range Status   06/22/2020 108 95 - 110 mmol/L Final     CO2   Date Value Ref Range Status   06/22/2020 28 23 - 29 mmol/L Final     Glucose   Date Value Ref Range Status   06/22/2020 101 70 - 110 mg/dL Final     BUN, Bld   Date Value Ref Range Status   06/22/2020 14 8 - 23 mg/dL Final     Creatinine   Date Value Ref Range Status   06/22/2020 0.9 0.5 - 1.4 mg/dL Final   11/23/2012 1.2 0.5 - 1.4 mg/dL Final     Calcium   Date Value Ref Range Status   06/22/2020 9.9 8.7 - 10.5 mg/dL Final   11/23/2012 9.4 8.7 - 10.5 mg/dL Final     Total Protein   Date Value Ref Range Status   06/22/2020 6.8 6.0 - 8.4 g/dL Final     Albumin   Date Value Ref Range Status   06/22/2020 3.6 3.5 - 5.2 g/dL Final     Total Bilirubin   Date Value Ref Range Status   06/22/2020 0.4 0.1 - 1.0 mg/dL Final     Comment:     For infants and newborns, interpretation of results should be based  on gestational age, weight and in agreement with clinical  observations.  Premature Infant recommended reference ranges:  Up to 24 hours.............<8.0 mg/dL  Up to 48 hours............<12.0 mg/dL  3-5 days..................<15.0 mg/dL  6-29 days.................<15.0 mg/dL       Alkaline Phosphatase   Date Value Ref Range Status   06/22/2020 114 55 - 135 U/L Final   11/23/2012 85 55 - 135 U/L Final     AST   Date Value Ref Range Status   06/22/2020 23 10 - 40 U/L Final   11/23/2012 22 10 - 40 U/L Final     ALT   Date Value Ref Range Status   06/22/2020 18 10 - 44 U/L Final     Anion Gap   Date Value Ref Range Status   06/22/2020 6 (L) 8 - 16 mmol/L Final   11/23/2012 10 5 - 15 meq/L Final     eGFR if    Date Value Ref Range Status   06/22/2020 >60.0 >60 mL/min/1.73 m^2 Final     eGFR if non    Date Value Ref Range Status   06/22/2020 >60.0 >60 mL/min/1.73 m^2 Final     Comment:     Calculation used to obtain the  estimated glomerular filtration  rate (eGFR) is the CKD-EPI equation.                  Radiology/Diagnostic Studies:    Nm Pet Ct Whole Body    Result Date: 8/24/2020  EXAMINATION: NM PET CT WHOLE BODY CLINICAL HISTORY: Cutaneous follicle center lymphoma, lymph nodes of head, face, and neck, initial staging, lung nodules, C 82.61, R 91.8. TECHNIQUE: Following the injection of 12.8 mCi of F-18 labeled FDG into a left antecubital vein, PET CT was performed from the vertex of the skull through the feet with an integrated full ring PET CT scanner with image fusion. The patient's serum glucose at the time of the exam was 88 mg/dL. COMPARISON: Multiple prior exams including chest CT of 01/05/2017. FINDINGS: There is no abnormal focal FDG hypermetabolism in the head, neck, chest, abdomen, pelvis, or the skeletal system to suggest active lymphoproliferative disease.  There is osseous FDG hypermetabolism associated with cervical and lumbar degenerative facet arthropathy. CT images of the brain show scattered areas of nonspecific white matter hypoattenuation, with no intra-axial mass or abnormal extra-axial fluid.  There is generalized prominence of the cortical sulci and ventricles.  No suspicious sclerotic or lytic osseous abnormalities of the calvarium. CT images of the chest show no new suspicious pulmonary nodules or masses, with stable 5 mm oval noncalcified nodule in the left lower lobe image 153, with stable oval 5 mm nodular opacity in the right lower lobe along the major fissure image 128.  There is stable apical fibronodular scarring, with no pleural or pericardial effusion.  No new suspicious pulmonary nodules or masses.  There are aortic and coronary arterial vascular calcifications. CT images of the abdomen and pelvis show cholecystectomy clips, few splenic granulomatous calcifications, nonspecific peripancreatic calcifications, hypoattenuating bilateral renal lesions suggestive of cysts, and scattered  aortoiliac vascular calcifications, with no ascites. CT images of the lower extremities show no enlarged lymph nodes or soft tissue masses, with diffuse arterial vascular calcifications.  There is advanced arthropathy of the left 1st metatarsophalangeal joint.  There is intervertebral disc space narrowing and facet arthropathy in the spine, with bilateral glenohumeral arthropathic changes.  No suspicious sclerotic or lytic osseous abnormalities by CT.     1. No evidence of active lymphoproliferative disease. 2. Stable subcentimeter noncalcified pulmonary nodules dating back to 01/05/2017, compatible with benign etiology. 3. Additional observations as above. Electronically signed by: Balta Ta MD Date:    08/24/2020 Time:    13:22      I have reviewed all available lab results and radiology reports.    Assessment/Plan:   (1) 85 y.o. 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   - excisional biopsy was performed on 7/28/2020  - CD 20 +l BCL-6 stain is positive; CD30 was negative  - dicussed pathology and went over the latest NCCN guidelines (version 2.2020)  - refer to rad/onc and schedule PET    9/8/2020:  - He saw Dr Lieberman with rad/onc and they have started him on XRT with 22 total. He is in his 2nd week of XRT.   - He had PEt scan on 8/24/2020 with no evidence of a systemic lymphoma process.      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 Continue until completion with the XRT  2. rescan with PEt again in 6  "months  3. will need regular dermatologic surveilance  4. discussed consideration for [facility] evaluation at some point in future if ever needed  5. Check up to date labs  6. F/u with derm, PCP  RTC in 4-6 weeks  Fax note to  Nory Lopez; Luisana Pemberton    Discussion:     Pathology Discussion:     I reviewed and discussed the pathology report(s) and radiograph reports (if available) in as simple to understand and/or laymen's terms to the best of my ability. I had an indepth conversation with the patient and went over the patient's individual diagnosis based on the information that was currently available. I discussed the TNM staging process with regard to the patient's particular cancer type, and the calculated stage based on the currently available TNM data and literature. I discussed the available prognostic data with regard to the current staging information and how it relates to the prognosis of their particular neoplastic process.          NCCN Guidelines:     I discussed the available treatment option(s) in accordance with the latest literature from the NCCN Clinical Practice Guidelines for the patient's particular type of cancer disorder. The NCCN Guidelines provide a "document evidence-based (and) consensus-driven management" of the care of oncology patients. The treatment recommendations were made not only in accordance to the NCCN guidelines, but also factored in to account the patient's overall age, condition, performance status and their medical co-morbidities. I went over the risks and benefits of the the treatment options (if any could be made) with regard to their particular cancer type, their cancer stage, their age, and their co-morbidities.      COVID-19 Discussion:     I had long discussion with patient and any applicable family about the COVID-19 coronavirus epidemic and the recommended precautions with regard to cancer and/or hematology patients. I have re-iterated the CDC recommendations " for adequate hand washing, use of hand -like products, and coughing into elbow, etc. In addition, especially for our patients who are on chemotherapy and/or our otherwise immunocompromised patients, I have recommended avoidance of crowds, including movie theaters, restaurants, churches, etc. I have recommended avoidance of any sick or symptomatic family members and/or friends. I have also recommended avoidance of any raw and unwashed food products, and general avoidance of food items that have not been prepared by themselves. The patient has been asked to call us immediately with any symptom developments, issues, questions or other general concerns.         I spent over 25 mins of time with the patient. Reviewed results of the recently ordered labs, tests and studies; made directives with regards to the results. Over half of this time was spent couseling and coordinating care.    I have explained all of the above in detail and the patient understands all of the current recommendation(s). I have answered all of their questions to the best of my ability and to their complete satisfaction.   The patient is to continue with the current management plan.            Electronically signed by Mehdi Alvarez MD          Answers for HPI/ROS submitted by the patient on 9/5/2020   appetite change : No  unexpected weight change: No  mouth sores: No  visual disturbance: No  cough: No  shortness of breath: No  chest pain: No  abdominal pain: No  diarrhea: No  frequency: No  back pain: No  rash: No  headaches: No  adenopathy: No  nervous/ anxious: No

## 2020-09-08 ENCOUNTER — OFFICE VISIT (OUTPATIENT)
Dept: HEMATOLOGY/ONCOLOGY | Facility: CLINIC | Age: 85
End: 2020-09-08
Payer: MEDICARE

## 2020-09-08 VITALS
HEART RATE: 51 BPM | BODY MASS INDEX: 24.77 KG/M2 | WEIGHT: 172.63 LBS | DIASTOLIC BLOOD PRESSURE: 81 MMHG | SYSTOLIC BLOOD PRESSURE: 143 MMHG | RESPIRATION RATE: 18 BRPM | TEMPERATURE: 97 F

## 2020-09-08 DIAGNOSIS — C82.61 CUTANEOUS FOLLICLE CENTER LYMPHOMA INVOLVING LYMPH NODES OF HEAD: Primary | ICD-10-CM

## 2020-09-08 PROCEDURE — 99215 PR OFFICE/OUTPT VISIT, EST, LEVL V, 40-54 MIN: ICD-10-PCS | Mod: S$GLB,,, | Performed by: INTERNAL MEDICINE

## 2020-09-08 PROCEDURE — 99215 OFFICE O/P EST HI 40 MIN: CPT | Mod: S$GLB,,, | Performed by: INTERNAL MEDICINE

## 2020-09-16 ENCOUNTER — TREATMENT (OUTPATIENT)
Dept: RADIATION ONCOLOGY | Facility: CLINIC | Age: 85
End: 2020-09-16
Payer: MEDICARE

## 2020-09-16 PROCEDURE — G6012 RADIATION TREATMENT DELIVERY: HCPCS | Mod: S$GLB,,, | Performed by: RADIOLOGY

## 2020-09-16 PROCEDURE — 77427 PR CHG RADIATION,MANGEMENT,5 TX'S: ICD-10-PCS | Mod: S$GLB,,, | Performed by: RADIOLOGY

## 2020-09-16 PROCEDURE — 77427 RADIATION TX MANAGEMENT X5: CPT | Mod: S$GLB,,, | Performed by: RADIOLOGY

## 2020-09-16 PROCEDURE — G6012 PR RADN TX DELIVERY, 6-10 MEV, >= 3 TX AREAS: ICD-10-PCS | Mod: S$GLB,,, | Performed by: RADIOLOGY

## 2020-09-23 ENCOUNTER — TREATMENT (OUTPATIENT)
Dept: RADIATION ONCOLOGY | Facility: CLINIC | Age: 85
End: 2020-09-23
Payer: MEDICARE

## 2020-09-23 ENCOUNTER — DOCUMENTATION ONLY (OUTPATIENT)
Dept: RADIATION ONCOLOGY | Facility: CLINIC | Age: 85
End: 2020-09-23

## 2020-09-23 PROCEDURE — 77427 PR CHG RADIATION,MANGEMENT,5 TX'S: ICD-10-PCS | Mod: S$GLB,,, | Performed by: RADIOLOGY

## 2020-09-23 PROCEDURE — G6012 RADIATION TREATMENT DELIVERY: HCPCS | Mod: S$GLB,,, | Performed by: RADIOLOGY

## 2020-09-23 PROCEDURE — 77427 RADIATION TX MANAGEMENT X5: CPT | Mod: S$GLB,,, | Performed by: RADIOLOGY

## 2020-09-23 PROCEDURE — G6012 PR RADN TX DELIVERY, 6-10 MEV, >= 3 TX AREAS: ICD-10-PCS | Mod: S$GLB,,, | Performed by: RADIOLOGY

## 2020-09-28 ENCOUNTER — TREATMENT (OUTPATIENT)
Dept: RADIATION ONCOLOGY | Facility: CLINIC | Age: 85
End: 2020-09-28
Payer: MEDICARE

## 2020-09-28 PROCEDURE — G6012 RADIATION TREATMENT DELIVERY: HCPCS | Mod: S$GLB,,, | Performed by: RADIOLOGY

## 2020-09-28 PROCEDURE — G6012 PR RADN TX DELIVERY, 6-10 MEV, >= 3 TX AREAS: ICD-10-PCS | Mod: S$GLB,,, | Performed by: RADIOLOGY

## 2020-09-30 ENCOUNTER — PATIENT MESSAGE (OUTPATIENT)
Dept: HEMATOLOGY/ONCOLOGY | Facility: CLINIC | Age: 85
End: 2020-09-30

## 2020-10-01 ENCOUNTER — PATIENT MESSAGE (OUTPATIENT)
Dept: HEMATOLOGY/ONCOLOGY | Facility: CLINIC | Age: 85
End: 2020-10-01

## 2020-10-09 ENCOUNTER — LAB VISIT (OUTPATIENT)
Dept: LAB | Facility: HOSPITAL | Age: 85
End: 2020-10-09
Attending: INTERNAL MEDICINE
Payer: MEDICARE

## 2020-10-09 ENCOUNTER — TELEPHONE (OUTPATIENT)
Dept: HEMATOLOGY/ONCOLOGY | Facility: CLINIC | Age: 85
End: 2020-10-09

## 2020-10-09 DIAGNOSIS — C82.61 CUTANEOUS FOLLICLE CENTER LYMPHOMA INVOLVING LYMPH NODES OF HEAD: ICD-10-CM

## 2020-10-09 LAB
ALBUMIN SERPL BCP-MCNC: 3.8 G/DL (ref 3.5–5.2)
ALP SERPL-CCNC: 96 U/L (ref 55–135)
ALT SERPL W/O P-5'-P-CCNC: 18 U/L (ref 10–44)
ANION GAP SERPL CALC-SCNC: 8 MMOL/L (ref 8–16)
AST SERPL-CCNC: 23 U/L (ref 10–40)
BASOPHILS # BLD AUTO: 0.04 K/UL (ref 0–0.2)
BASOPHILS NFR BLD: 0.8 % (ref 0–1.9)
BILIRUB SERPL-MCNC: 0.8 MG/DL (ref 0.1–1)
BUN SERPL-MCNC: 16 MG/DL (ref 8–23)
CALCIUM SERPL-MCNC: 9.6 MG/DL (ref 8.7–10.5)
CHLORIDE SERPL-SCNC: 105 MMOL/L (ref 95–110)
CO2 SERPL-SCNC: 26 MMOL/L (ref 23–29)
CREAT SERPL-MCNC: 0.9 MG/DL (ref 0.5–1.4)
DIFFERENTIAL METHOD: ABNORMAL
EOSINOPHIL # BLD AUTO: 0.2 K/UL (ref 0–0.5)
EOSINOPHIL NFR BLD: 3.4 % (ref 0–8)
ERYTHROCYTE [DISTWIDTH] IN BLOOD BY AUTOMATED COUNT: 14.2 % (ref 11.5–14.5)
EST. GFR  (AFRICAN AMERICAN): >60 ML/MIN/1.73 M^2
EST. GFR  (NON AFRICAN AMERICAN): >60 ML/MIN/1.73 M^2
GLUCOSE SERPL-MCNC: 73 MG/DL (ref 70–110)
HCT VFR BLD AUTO: 38.5 % (ref 40–54)
HGB BLD-MCNC: 12.8 G/DL (ref 14–18)
IMM GRANULOCYTES # BLD AUTO: 0.01 K/UL (ref 0–0.04)
IMM GRANULOCYTES NFR BLD AUTO: 0.2 % (ref 0–0.5)
LYMPHOCYTES # BLD AUTO: 1.9 K/UL (ref 1–4.8)
LYMPHOCYTES NFR BLD: 36.2 % (ref 18–48)
MCH RBC QN AUTO: 32.4 PG (ref 27–31)
MCHC RBC AUTO-ENTMCNC: 33.2 G/DL (ref 32–36)
MCV RBC AUTO: 98 FL (ref 82–98)
MONOCYTES # BLD AUTO: 0.6 K/UL (ref 0.3–1)
MONOCYTES NFR BLD: 12.1 % (ref 4–15)
NEUTROPHILS # BLD AUTO: 2.5 K/UL (ref 1.8–7.7)
NEUTROPHILS NFR BLD: 47.3 % (ref 38–73)
NRBC BLD-RTO: 0 /100 WBC
PLATELET # BLD AUTO: 154 K/UL (ref 150–350)
PMV BLD AUTO: 11.5 FL (ref 9.2–12.9)
POTASSIUM SERPL-SCNC: 4 MMOL/L (ref 3.5–5.1)
PROT SERPL-MCNC: 6.7 G/DL (ref 6–8.4)
RBC # BLD AUTO: 3.95 M/UL (ref 4.6–6.2)
SODIUM SERPL-SCNC: 139 MMOL/L (ref 136–145)
WBC # BLD AUTO: 5.28 K/UL (ref 3.9–12.7)

## 2020-10-09 PROCEDURE — 80053 COMPREHEN METABOLIC PANEL: CPT

## 2020-10-09 PROCEDURE — 85025 COMPLETE CBC W/AUTO DIFF WBC: CPT

## 2020-10-09 PROCEDURE — 36415 COLL VENOUS BLD VENIPUNCTURE: CPT | Mod: PO

## 2020-10-09 NOTE — PROGRESS NOTES
[facility] Hematology/Oncology  PROGRESS NOTE -   Follow-up Visit      Subjective:       Patient ID:   NAME: Bryson Kellogg : 1935     85 y.o. male    Referring Doc: Osiris Lopez  Other Physicians: Kirk Cueto ([facility]-PCP); Niecy ([facility] hospital); Luisana    Chief Complaint:  cutaneous follicular lymphoma f/u    History of Present Illness:     Patient returns today for a regularly scheduled follow-up visit.  The patient is here today to go over the results of the recently ordered labs, tests and studies. He is here with his wife. He is doing ok with no new issues. No new issues. Breathing ok. No CP, SOB, HA's or N/V.     He previously saw Dr Lieberman with rad/onc and they completed XRT with 22 total on 2020.  He had PEt scan on 2020 with no evidence of a systemic lymphoma process.      Discussed covid19 precautions.             ROS:   GEN: normal without any fever, night sweats or weight loss  HEENT: normal with no HA's, sore throat, stiff neck, changes in vision  CV: normal with no CP, SOB, PND, LEMOS or orthopnea  PULM: normal with no SOB, cough, hemoptysis, sputum or pleuritic pain  GI: normal with no abdominal pain, nausea, vomiting, constipation, diarrhea, melanotic stools, BRBPR, or hematemesis  : normal with no hematuria, dysuria  BREAST: normal with no mass, discharge, pain  SKIN: normal with no rash, erythema, bruising, or swelling    Pain Scale:  0    Allergies:  Review of patient's allergies indicates:   Allergen Reactions    Sulfa (sulfonamide antibiotics) Rash     Childhood allergy       Medications:    Current Outpatient Medications:     ascorbic acid (VITAMIN C) 500 MG tablet, Take 500 mg by mouth once daily.  , Disp: , Rfl:     aspirin (ECOTRIN) 81 MG EC tablet, Take 81 mg by mouth once daily., Disp: , Rfl:     atorvastatin (LIPITOR) 20 MG tablet, Take 1 tablet (20 mg total) by mouth once daily. (Patient taking differently: Take 10 mg by mouth once daily. ), Disp: 90 tablet, Rfl:  3    co-enzyme Q-10 50 mg capsule, Take 50 mg by mouth once daily., Disp: , Rfl:     cyanocobalamin (VITAMIN B-12) 250 MCG tablet, Take 250 mcg by mouth once daily.  , Disp: , Rfl:     MULTIVIT-MIN/FA/LYCOPENE/LUT (CENTRUM SILVER ULTRA MEN'S ORAL), Take 1 tablet by mouth once daily., Disp: , Rfl:     nystatin (MYCOSTATIN) ointment, Apply topically 2 (two) times daily., Disp: 15 g, Rfl: 3    PYRIDOXINE HCL (VITAMIN B-6 ORAL), Take by mouth., Disp: , Rfl:     triamcinolone acetonide 0.1% (KENALOG) 0.1 % cream, APPLY TOPICALLY DAILY AS NEEDED., Disp: 80 g, Rfl: 3    PMHx/PSHx Updates:  See patient's last visit with me on 9/8/2020.  See H&P on 8/13/2020        Pathology:  Cancer Staging  No matching staging information was found for the patient.          Objective:     Vitals:  Blood pressure 123/78, pulse 61, temperature 98.1 °F (36.7 °C), resp. rate 19, weight 79.4 kg (175 lb 1.6 oz).    Physical Examination:   GEN: no apparent distress, comfortable; AAOx3  HEAD: atraumatic and normocephalic; healing scar on left anterior auricular region  EYES: no pallor, no icterus, PERRLA  ENT: OMM, no pharyngeal erythema, external ears WNL; no nasal discharge; no thrush  NECK: no masses, thyroid normal, trachea midline, no LAD/LN's, supple  CV: RRR with no murmur; normal pulse; normal S1 and S2; no pedal edema  CHEST: Normal respiratory effort; CTAB; normal breath sounds; no wheeze or crackles  ABDOM: nontender and nondistended; soft; normal bowel sounds; no rebound/guarding  MUSC/Skeletal: ROM normal; no crepitus; joints normal; no deformities or arthropathy  EXTREM: no clubbing, cyanosis, inflammation or swelling  SKIN: no rashes, lesions, ulcers, petechiae or subcutaneous nodules  : no mcgill  NEURO: grossly intact; motor/sensory WNL; AAOx3; no tremors  PSYCH: normal mood, affect and behavior  LYMPH: normal cervical, supraclavicular, axillary and groin LN's            Labs:   CMP  Sodium   Date Value Ref Range Status    10/09/2020 139 136 - 145 mmol/L Final     Potassium   Date Value Ref Range Status   10/09/2020 4.0 3.5 - 5.1 mmol/L Final     Chloride   Date Value Ref Range Status   10/09/2020 105 95 - 110 mmol/L Final     CO2   Date Value Ref Range Status   10/09/2020 26 23 - 29 mmol/L Final     Glucose   Date Value Ref Range Status   10/09/2020 73 70 - 110 mg/dL Final     BUN, Bld   Date Value Ref Range Status   10/09/2020 16 8 - 23 mg/dL Final     Creatinine   Date Value Ref Range Status   10/09/2020 0.9 0.5 - 1.4 mg/dL Final   11/23/2012 1.2 0.5 - 1.4 mg/dL Final     Calcium   Date Value Ref Range Status   10/09/2020 9.6 8.7 - 10.5 mg/dL Final   11/23/2012 9.4 8.7 - 10.5 mg/dL Final     Total Protein   Date Value Ref Range Status   10/09/2020 6.7 6.0 - 8.4 g/dL Final     Albumin   Date Value Ref Range Status   10/09/2020 3.8 3.5 - 5.2 g/dL Final     Total Bilirubin   Date Value Ref Range Status   10/09/2020 0.8 0.1 - 1.0 mg/dL Final     Comment:     For infants and newborns, interpretation of results should be based  on gestational age, weight and in agreement with clinical  observations.  Premature Infant recommended reference ranges:  Up to 24 hours.............<8.0 mg/dL  Up to 48 hours............<12.0 mg/dL  3-5 days..................<15.0 mg/dL  6-29 days.................<15.0 mg/dL       Alkaline Phosphatase   Date Value Ref Range Status   10/09/2020 96 55 - 135 U/L Final   11/23/2012 85 55 - 135 U/L Final     AST   Date Value Ref Range Status   10/09/2020 23 10 - 40 U/L Final   11/23/2012 22 10 - 40 U/L Final     ALT   Date Value Ref Range Status   10/09/2020 18 10 - 44 U/L Final     Anion Gap   Date Value Ref Range Status   10/09/2020 8 8 - 16 mmol/L Final   11/23/2012 10 5 - 15 meq/L Final     eGFR if    Date Value Ref Range Status   10/09/2020 >60.0 >60 mL/min/1.73 m^2 Final     eGFR if non    Date Value Ref Range Status   10/09/2020 >60.0 >60 mL/min/1.73 m^2 Final     Comment:      Calculation used to obtain the estimated glomerular filtration  rate (eGFR) is the CKD-EPI equation.                  Radiology/Diagnostic Studies:    Nm Pet Ct Whole Body    Result Date: 8/24/2020  EXAMINATION: NM PET CT WHOLE BODY CLINICAL HISTORY: Cutaneous follicle center lymphoma, lymph nodes of head, face, and neck, initial staging, lung nodules, C 82.61, R 91.8. TECHNIQUE: Following the injection of 12.8 mCi of F-18 labeled FDG into a left antecubital vein, PET CT was performed from the vertex of the skull through the feet with an integrated full ring PET CT scanner with image fusion. The patient's serum glucose at the time of the exam was 88 mg/dL. COMPARISON: Multiple prior exams including chest CT of 01/05/2017. FINDINGS: There is no abnormal focal FDG hypermetabolism in the head, neck, chest, abdomen, pelvis, or the skeletal system to suggest active lymphoproliferative disease.  There is osseous FDG hypermetabolism associated with cervical and lumbar degenerative facet arthropathy. CT images of the brain show scattered areas of nonspecific white matter hypoattenuation, with no intra-axial mass or abnormal extra-axial fluid.  There is generalized prominence of the cortical sulci and ventricles.  No suspicious sclerotic or lytic osseous abnormalities of the calvarium. CT images of the chest show no new suspicious pulmonary nodules or masses, with stable 5 mm oval noncalcified nodule in the left lower lobe image 153, with stable oval 5 mm nodular opacity in the right lower lobe along the major fissure image 128.  There is stable apical fibronodular scarring, with no pleural or pericardial effusion.  No new suspicious pulmonary nodules or masses.  There are aortic and coronary arterial vascular calcifications. CT images of the abdomen and pelvis show cholecystectomy clips, few splenic granulomatous calcifications, nonspecific peripancreatic calcifications, hypoattenuating bilateral renal lesions suggestive of  cysts, and scattered aortoiliac vascular calcifications, with no ascites. CT images of the lower extremities show no enlarged lymph nodes or soft tissue masses, with diffuse arterial vascular calcifications.  There is advanced arthropathy of the left 1st metatarsophalangeal joint.  There is intervertebral disc space narrowing and facet arthropathy in the spine, with bilateral glenohumeral arthropathic changes.  No suspicious sclerotic or lytic osseous abnormalities by CT.     1. No evidence of active lymphoproliferative disease. 2. Stable subcentimeter noncalcified pulmonary nodules dating back to 01/05/2017, compatible with benign etiology. 3. Additional observations as above. Electronically signed by: Balta Ta MD Date:    08/24/2020 Time:    13:22      I have reviewed all available lab results and radiology reports.    Assessment/Plan:   (1) 85 y.o. 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   - excisional biopsy was performed on 7/28/2020  - CD 20 +l BCL-6 stain is positive; CD30 was negative  - dicussed pathology and went over the latest NCCN guidelines (version 2.2020)  - refer to rad/onc and schedule PET    9/8/2020:  - He saw Dr Lieberman with rad/onc and they have started him on XRT with 22 total. He is in his 2nd week of XRT.   - He had PEt scan on 8/24/2020 with no evidence of a systemic lymphoma process.     10/12/2020:  - He previously saw Dr Lieberman with rad/onc and they completed XRT with 22 total on 9/29/2020.    - He had PEt scan on 8/24/2020 with no evidence of a systemic lymphoma process.   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 Follow-up with rad/onc post-XRT  2. rescan with PEt again in 6 months from last (Feb 2021)  3. will need regular dermatologic surveilance  4. discussed consideration for [facility] evaluation at some point in future if ever needed  5. Check up to date labs every 3-6 months  6. F/u with derm, PCP  RTC in 3 months  Fax note to  Nory Lopez; Luisana Pemberton    Discussion:     Pathology Discussion:     I reviewed and discussed the pathology report(s) and radiograph reports (if available) in as simple to understand and/or laymen's terms to the best of my ability. I had an indepth conversation with the patient and went over the patient's individual diagnosis based on the information that was currently available. I discussed the TNM staging process with regard to the patient's particular cancer type, and the calculated stage based on the currently available TNM data and literature. I discussed the available prognostic data with regard to the current staging information and how it relates to the prognosis of their particular neoplastic process.          NCCN Guidelines:     I discussed the available treatment option(s) in accordance with the latest literature from the NCCN Clinical Practice Guidelines for the patient's particular type of cancer disorder. The NCCN Guidelines provide a "document evidence-based (and) consensus-driven management" of the care of oncology patients. The treatment recommendations were made not only in accordance to the NCCN guidelines, but also factored in to account the patient's overall age, condition, performance status and their medical co-morbidities. I went over the risks and " benefits of the the treatment options (if any could be made) with regard to their particular cancer type, their cancer stage, their age, and their co-morbidities.      COVID-19 Discussion:     I had long discussion with patient and any applicable family about the COVID-19 coronavirus epidemic and the recommended precautions with regard to cancer and/or hematology patients. I have re-iterated the CDC recommendations for adequate hand washing, use of hand -like products, and coughing into elbow, etc. In addition, especially for our patients who are on chemotherapy and/or our otherwise immunocompromised patients, I have recommended avoidance of crowds, including movie theaters, restaurants, churches, etc. I have recommended avoidance of any sick or symptomatic family members and/or friends. I have also recommended avoidance of any raw and unwashed food products, and general avoidance of food items that have not been prepared by themselves. The patient has been asked to call us immediately with any symptom developments, issues, questions or other general concerns.         I spent over 25 mins of time with the patient. Reviewed results of the recently ordered labs, tests and studies; made directives with regards to the results. Over half of this time was spent couseling and coordinating care.    I have explained all of the above in detail and the patient understands all of the current recommendation(s). I have answered all of their questions to the best of my ability and to their complete satisfaction.   The patient is to continue with the current management plan.            Electronically signed by Mehdi Alvarez MD               Answers for HPI/ROS submitted by the patient on 10/10/2020   appetite change : No  unexpected weight change: No  mouth sores: No  visual disturbance: No  cough: No  shortness of breath: No  chest pain: No  abdominal pain: No  diarrhea: No  frequency: No  back pain: No  rash:  No  headaches: No  adenopathy: No  nervous/ anxious: No

## 2020-10-12 ENCOUNTER — OFFICE VISIT (OUTPATIENT)
Dept: HEMATOLOGY/ONCOLOGY | Facility: CLINIC | Age: 85
End: 2020-10-12
Payer: MEDICARE

## 2020-10-12 VITALS
RESPIRATION RATE: 19 BRPM | TEMPERATURE: 98 F | SYSTOLIC BLOOD PRESSURE: 123 MMHG | HEART RATE: 61 BPM | WEIGHT: 175.13 LBS | DIASTOLIC BLOOD PRESSURE: 78 MMHG | BODY MASS INDEX: 25.12 KG/M2

## 2020-10-12 DIAGNOSIS — C82.61 CUTANEOUS FOLLICLE CENTER LYMPHOMA INVOLVING LYMPH NODES OF HEAD: Primary | ICD-10-CM

## 2020-10-12 PROCEDURE — 99214 OFFICE O/P EST MOD 30 MIN: CPT | Mod: S$GLB,,, | Performed by: INTERNAL MEDICINE

## 2020-10-12 PROCEDURE — 99214 PR OFFICE/OUTPT VISIT, EST, LEVL IV, 30-39 MIN: ICD-10-PCS | Mod: S$GLB,,, | Performed by: INTERNAL MEDICINE

## 2020-10-19 ENCOUNTER — OFFICE VISIT (OUTPATIENT)
Dept: RADIATION ONCOLOGY | Facility: CLINIC | Age: 85
End: 2020-10-19
Payer: MEDICARE

## 2020-10-19 DIAGNOSIS — C82.61 CUTANEOUS FOLLICLE CENTER LYMPHOMA INVOLVING LYMPH NODES OF HEAD: Primary | ICD-10-CM

## 2020-10-19 NOTE — PROGRESS NOTES
Bryson Kellogg  [id number]  1935  10/19/2020  No referring provider defined for this encounter.    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    The patient location is:  home  Visit type: Virtual visit with audio ONLY  The reason for the audio only service rather than synchronous audio and video virtual visit was related to technical difficulties or patient preference/necessity.    HISTORY OF PRESENT ILLNESS:   85M presented with enlarging lesion of the left preauricular space x 3mos.  Sun exposure history includes being an .  Dr. 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  BCL2 and BCL6 staining patterns are confirmatory.  Patient was referred to Dr. Alvarez and PET-CT scan was (-). He is referred for radiation oncology opinion.    Patient ultimately completed definitive radiotherapy, 36 Gy in 20 fractions ending August 29, 2020.  Treatment was very well tolerated with minimal radiation dermatitis.    INTERVAL HISTORY:   Today patient is without complaints.  He denies fever, chills, appetite or energy changes, weight loss.  He has never exhibited B symptoms.  He continues to follow with Dermatology and with Dr. Alvarez and has a PET-CT scan in February of 2021.    Review of systems otherwise negative unless indicated in HPI/interval history.    Past Medical History:   Diagnosis Date    Allergy     Bradycardia 1/4/2017    Cutaneous follicle center lymphoma involving lymph nodes of head 8/12/2020    Hx of colonic polyp 11/3/2016    Hyperlipidemia     Personal history of colonic polyps     Colon polyps    Renal stone 11/23/2012    Rosacea     Shingles     Sleep apnea     TIA (transient ischemic attack)      Past Surgical History:   Procedure Laterality Date    CHOLECYSTECTOMY      COLONOSCOPY N/A 11/3/2016     Procedure: COLONOSCOPY;  Surgeon: Alex Cuellar MD;  Location: [facility];  Service: Endoscopy;  Laterality: N/A;    EYE SURGERY Bilateral 2014    cataracts    EYE SURGERY Right 10/2016    tear duct surgery    JOINT REPLACEMENT Left 2013    knee replacemant      left knee     Social History     Socioeconomic History    Marital status:      Spouse name: Sharyn    Number of children: Not on file    Years of education: Not on file    Highest education level: Not on file   Occupational History    Not on file   Social Needs    Financial resource strain: Not on file    Food insecurity     Worry: Not on file     Inability: Not on file    Transportation needs     Medical: Not on file     Non-medical: Not on file   Tobacco Use    Smoking status: Never Smoker    Smokeless tobacco: Never Used   Substance and Sexual Activity    Alcohol use: No    Drug use: No    Sexual activity: Yes     Partners: Female   Lifestyle    Physical activity     Days per week: Not on file     Minutes per session: Not on file    Stress: Not on file   Relationships    Social connections     Talks on phone: Not on file     Gets together: Not on file     Attends Yazidism service: Not on file     Active member of club or organization: Not on file     Attends meetings of clubs or organizations: Not on file     Relationship status: Not on file   Other Topics Concern    Not on file   Social History Narrative    Not on file     Family History   Problem Relation Age of Onset    No Known Problems Mother     Heart disease Father     Heart disease Brother     No Known Problems Sister     No Known Problems Daughter     No Known Problems Brother      Medication List with Changes/Refills   Current Medications    ASCORBIC ACID (VITAMIN C) 500 MG TABLET    Take 500 mg by mouth once daily.      ASPIRIN (ECOTRIN) 81 MG EC TABLET    Take 81 mg by mouth once daily.    ATORVASTATIN (LIPITOR) 20 MG TABLET    Take 1 tablet (20 mg  total) by mouth once daily.    CO-ENZYME Q-10 50 MG CAPSULE    Take 50 mg by mouth once daily.    CYANOCOBALAMIN (VITAMIN B-12) 250 MCG TABLET    Take 250 mcg by mouth once daily.      MULTIVIT-MIN/FA/LYCOPENE/LUT (CENTRUM SILVER ULTRA MEN'S ORAL)    Take 1 tablet by mouth once daily.    NYSTATIN (MYCOSTATIN) OINTMENT    Apply topically 2 (two) times daily.    PYRIDOXINE HCL (VITAMIN B-6 ORAL)    Take by mouth.    TRIAMCINOLONE ACETONIDE 0.1% (KENALOG) 0.1 % CREAM    APPLY TOPICALLY DAILY AS NEEDED.     Review of patient's allergies indicates:   Allergen Reactions    Sulfa (sulfonamide antibiotics) Rash     Childhood allergy       QUALITY OF LIFE: Unknown    There were no vitals filed for this visit.  There is no height or weight on file to calculate BMI.    PHYSICAL EXAM:   (telemed visit)    ANCILLARY DATA: none    ASSESSMENT: 85 y.o. 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  PLAN:   Bryson Kellogg did very well with treatment and today reports no ill sequelae.  He has no signs or symptoms of progressive lymphoma.  I have asked him return to clinic in 3 months for clinical exam and proceed with PET-CT scan as directed per Dr. Alvarez.  He will contact us should he have any questions or concerns.  Recommend continue with dermatologic follow-up.    All questions answered and contact information provided. Patient understands free to call us anytime with any questions or concerns regarding radiation therapy.    I have personally seen and evaluated this patient. Greater than 50% of this time was spent discussing coordination of care and/or counseling.    Total time spent with patient: 5min    Each patient to whom he or she provides medical services by telemedicine is:  (1) informed of the relationship between the physician and patient and the respective role of any other health  care provider with respect to management of the patient; and (2) notified that he or she may decline to receive medical services by telemedicine and may withdraw from such care at any time. Patient verbally consented to receive this service via voice-only telephone call.    This service was not originating from a related E/M service provided within the previous 7 days nor will  to an E/M service or procedure within the next 24 hours or my soonest available appointment.  Prevailing standard of care was able to be met in this audio-only visit.      COVID-19 precautions discussed.    PHYSICIAN: Romero Lieberman Jr, MD

## 2020-12-07 ENCOUNTER — LAB VISIT (OUTPATIENT)
Dept: LAB | Facility: HOSPITAL | Age: 85
End: 2020-12-07
Attending: INTERNAL MEDICINE
Payer: MEDICARE

## 2020-12-07 DIAGNOSIS — E78.2 MIXED HYPERLIPIDEMIA: ICD-10-CM

## 2020-12-07 LAB
ALBUMIN SERPL BCP-MCNC: 3.7 G/DL (ref 3.5–5.2)
ALP SERPL-CCNC: 101 U/L (ref 55–135)
ALT SERPL W/O P-5'-P-CCNC: 21 U/L (ref 10–44)
ANION GAP SERPL CALC-SCNC: 9 MMOL/L (ref 8–16)
AST SERPL-CCNC: 24 U/L (ref 10–40)
BILIRUB SERPL-MCNC: 0.8 MG/DL (ref 0.1–1)
BUN SERPL-MCNC: 13 MG/DL (ref 8–23)
CALCIUM SERPL-MCNC: 10.2 MG/DL (ref 8.7–10.5)
CHLORIDE SERPL-SCNC: 107 MMOL/L (ref 95–110)
CHOLEST SERPL-MCNC: 144 MG/DL (ref 120–199)
CHOLEST/HDLC SERPL: 3 {RATIO} (ref 2–5)
CO2 SERPL-SCNC: 25 MMOL/L (ref 23–29)
CREAT SERPL-MCNC: 1 MG/DL (ref 0.5–1.4)
EST. GFR  (AFRICAN AMERICAN): >60 ML/MIN/1.73 M^2
EST. GFR  (NON AFRICAN AMERICAN): >60 ML/MIN/1.73 M^2
GLUCOSE SERPL-MCNC: 95 MG/DL (ref 70–110)
HDLC SERPL-MCNC: 48 MG/DL (ref 40–75)
HDLC SERPL: 33.3 % (ref 20–50)
LDLC SERPL CALC-MCNC: 75.6 MG/DL (ref 63–159)
NONHDLC SERPL-MCNC: 96 MG/DL
POTASSIUM SERPL-SCNC: 5 MMOL/L (ref 3.5–5.1)
PROT SERPL-MCNC: 6.8 G/DL (ref 6–8.4)
SODIUM SERPL-SCNC: 141 MMOL/L (ref 136–145)
TRIGL SERPL-MCNC: 102 MG/DL (ref 30–150)

## 2020-12-07 PROCEDURE — 80061 LIPID PANEL: CPT

## 2020-12-07 PROCEDURE — 80053 COMPREHEN METABOLIC PANEL: CPT

## 2020-12-07 PROCEDURE — 36415 COLL VENOUS BLD VENIPUNCTURE: CPT | Mod: PO

## 2020-12-14 ENCOUNTER — OFFICE VISIT (OUTPATIENT)
Dept: INTERNAL MEDICINE | Facility: CLINIC | Age: 85
End: 2020-12-14
Payer: MEDICARE

## 2020-12-14 VITALS
SYSTOLIC BLOOD PRESSURE: 128 MMHG | HEIGHT: 69 IN | BODY MASS INDEX: 26.71 KG/M2 | HEART RATE: 70 BPM | WEIGHT: 180.31 LBS | DIASTOLIC BLOOD PRESSURE: 76 MMHG | OXYGEN SATURATION: 98 %

## 2020-12-14 DIAGNOSIS — R91.1 PULMONARY NODULE: ICD-10-CM

## 2020-12-14 DIAGNOSIS — Z12.5 SCREENING PSA (PROSTATE SPECIFIC ANTIGEN): ICD-10-CM

## 2020-12-14 DIAGNOSIS — E78.2 MIXED HYPERLIPIDEMIA: Primary | ICD-10-CM

## 2020-12-14 DIAGNOSIS — C82.61 CUTANEOUS FOLLICLE CENTER LYMPHOMA INVOLVING LYMPH NODES OF HEAD: ICD-10-CM

## 2020-12-14 DIAGNOSIS — N40.0 BENIGN PROSTATIC HYPERPLASIA WITHOUT LOWER URINARY TRACT SYMPTOMS: ICD-10-CM

## 2020-12-14 DIAGNOSIS — G47.33 OBSTRUCTIVE SLEEP APNEA SYNDROME: ICD-10-CM

## 2020-12-14 PROCEDURE — 99214 OFFICE O/P EST MOD 30 MIN: CPT | Mod: S$PBB,,, | Performed by: INTERNAL MEDICINE

## 2020-12-14 PROCEDURE — 99214 PR OFFICE/OUTPT VISIT, EST, LEVL IV, 30-39 MIN: ICD-10-PCS | Mod: S$PBB,,, | Performed by: INTERNAL MEDICINE

## 2020-12-14 PROCEDURE — 99999 PR PBB SHADOW E&M-EST. PATIENT-LVL III: CPT | Mod: PBBFAC,,, | Performed by: INTERNAL MEDICINE

## 2020-12-14 PROCEDURE — 99213 OFFICE O/P EST LOW 20 MIN: CPT | Mod: PBBFAC | Performed by: INTERNAL MEDICINE

## 2020-12-14 PROCEDURE — 99999 PR PBB SHADOW E&M-EST. PATIENT-LVL III: ICD-10-PCS | Mod: PBBFAC,,, | Performed by: INTERNAL MEDICINE

## 2020-12-14 NOTE — PROGRESS NOTES
He is a 85 year-old gentleman coming in today to follow up ongoing medical   problems.      1. He had  Lumbar stenosis.  It was causing pain down his right leg.  He did PT and had injections.  Then he had a operation in June in [place], [place].  Surgery was successful and he has not back pain anymore.  He did noth need any PT afterwards.  He walks about 5 times a day.        2. He has a history of colon polyps, which his last colonoscopy was   11/2016. . He had one polyp  And it was hyperplastic and he does not need to follow up.  He has been  Using miralax and it has been helping.          3. Hyperlipidemia. He is on Lipitor 40mg . Recent cholesterol shows total   cholesterol 144, HDL 48, triglycerides 102, and LDL 76, which is stable  from last time. He is tolerating the Lipitor 10 mg well.     4. He has a BPH, which he says has been dong well. He has 1 episode of nacturia  Around 2-3 am. . NO hesitancy or urgency.     5. 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   :Dr. 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  BCL2 and BCL6 staining patterns are confirmatory He previously saw Dr Lieberman with rad/onc and they completed XRT with 22 total on 9/29/2020.  He had PET scan on 8/24/2020 with no evidence of a systemic lymphoma process. The area was on the Left side of face.        6. KATHY: results from 4/09 study. SEVERE obstructive sleep apnea syndrome with 289 respiratory obstructions/RDI 47 with snoring moderate intermittent and hypersomnia with CPAP. He tried Bipap for about a year. He was seeing a Sleep MD in [facility], but could not tolerated CPAP or BiPAP. HE has been exercising and sleeping well. NO day time fatigue. No witnessed sleep apnea or snoring.     7.  Pulmonary nodules. He had a CT scan of his lung 2 times this year to review the pulmonary nodules the last one in 1/2017 was unremarkable. no weight loss or resp  "problems reported. He has never smoked.     SOCIAL HISTORY: He is retired from [employer]. He is . No   alcohol use. No drug use. Lives in [place]. Lives with wife and they are active and travel a lot.  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      REVIEW OF SYSTEMS:   Gen - no fatigue weight  is stable since last visit.  He is still walking 3-5 miles a day.  He has been eating out  Less with the pandemic.     Eyes - no eye pain or visual changes  ENT - no hoarseness or sore throat  CV - no CP or SOB  Pulm - no cough or wheezing  GI - no N/V/D   no dysuria or incontinence  MS - no joint pain or muscle pain  Skin - no rash, or c/o of skin lesions  Neuro - no HA, dizziness  Heme - no abnormal bleeding or bruising  Endo - no polydipsia, or temperature changes  Psych - no anxiety or depression    PHYSICAL EXAMINATION: He is a well-appearing gentleman in no acute   Distress. Walking without any difficulty or without assistance device.       /76 (BP Location: Left arm, Patient Position: Sitting, BP Method: Large (Manual))   Pulse 70   Ht 5' 9" (1.753 m)   Wt 81.8 kg (180 lb 5.4 oz)   SpO2 98%   BMI 26.63 kg/m²        . Ear canals are open. TMs are   clear. Oropharynx is clear. Pupils equal, round, and reactive to light   and accommodation. He is wearing glasses.   NECK: Supple. Thyroid is not enlarged. No carotid bruits. He has no   cervical, axillary, or inguinal lymphadenopathy detected.   CHEST: Clear without wheeze.   CARDIOVASCULAR: S1, S2, regular rate and rhythm without murmur, gallop,   or rub.   ABDOMEN: Soft, nontender. No hepatosplenomegaly. No guarding or rebound   tenderness. Abdominal aorta is not enlarged.   He has normal biceps, triceps, patellar deep tendon reflexes. Normal   muscle strength, upper and lower extremities.    :Her has no supicous sking lesions        1. History of colon polyps.- last note said he did not need a follow up c-scope-- we will discuss next year.  2. History " "of hyperlipidemia with history of TIA-- I am unsure about the TIA diagnosis. . LDL is doing   Better on the higher dose of lipitor. . He had a brother with death from MI at 51- so I would continue the Lipitor. And 81 Asprin a day.   3. History of rosacea. stable   4 pulmonary nodules-- ct chest reviewed  -- no need for follow up. -- Reviewed his recent PET scan   5. Sleep apnea- off CPAP and BIPAP-- does not want to "fool  with it any more"- says he is sleeping well.  Less snoring since weight loss.    6. cutaneous follicular lymphoma-- following with Hem       "

## 2021-01-20 ENCOUNTER — OFFICE VISIT (OUTPATIENT)
Dept: RADIATION ONCOLOGY | Facility: CLINIC | Age: 86
End: 2021-01-20
Payer: MEDICARE

## 2021-01-20 VITALS
SYSTOLIC BLOOD PRESSURE: 111 MMHG | OXYGEN SATURATION: 98 % | WEIGHT: 178.5 LBS | TEMPERATURE: 98 F | BODY MASS INDEX: 26.36 KG/M2 | DIASTOLIC BLOOD PRESSURE: 67 MMHG | HEART RATE: 62 BPM

## 2021-01-20 DIAGNOSIS — C82.61 CUTANEOUS FOLLICLE CENTER LYMPHOMA INVOLVING LYMPH NODES OF HEAD: Primary | ICD-10-CM

## 2021-01-20 PROCEDURE — 99212 PR OFFICE/OUTPT VISIT, EST, LEVL II, 10-19 MIN: ICD-10-PCS | Mod: S$GLB,,, | Performed by: RADIOLOGY

## 2021-01-20 PROCEDURE — 99212 OFFICE O/P EST SF 10 MIN: CPT | Mod: S$GLB,,, | Performed by: RADIOLOGY

## 2021-01-28 ENCOUNTER — PATIENT MESSAGE (OUTPATIENT)
Dept: INTERNAL MEDICINE | Facility: CLINIC | Age: 86
End: 2021-01-28

## 2021-01-28 ENCOUNTER — PATIENT MESSAGE (OUTPATIENT)
Dept: CARDIOLOGY | Facility: CLINIC | Age: 86
End: 2021-01-28

## 2021-01-31 ENCOUNTER — HOSPITAL ENCOUNTER (EMERGENCY)
Facility: HOSPITAL | Age: 86
Discharge: HOME | End: 2021-01-31
Attending: EMERGENCY MEDICINE
Payer: MEDICARE

## 2021-01-31 VITALS
HEIGHT: 69 IN | SYSTOLIC BLOOD PRESSURE: 138 MMHG | HEART RATE: 77 BPM | OXYGEN SATURATION: 99 % | DIASTOLIC BLOOD PRESSURE: 72 MMHG | RESPIRATION RATE: 35 BRPM | TEMPERATURE: 99 F | BODY MASS INDEX: 25.18 KG/M2 | WEIGHT: 170 LBS

## 2021-01-31 DIAGNOSIS — R42 VERTIGO: Primary | ICD-10-CM

## 2021-01-31 LAB
ALBUMIN SERPL BCP-MCNC: 3.8 G/DL (ref 3.5–5.2)
ALP SERPL-CCNC: 95 U/L (ref 55–135)
ALT SERPL W/O P-5'-P-CCNC: 21 U/L (ref 10–44)
ANION GAP SERPL CALC-SCNC: 8 MMOL/L (ref 8–16)
AST SERPL-CCNC: 26 U/L (ref 10–40)
BASOPHILS # BLD AUTO: 0.03 K/UL (ref 0–0.2)
BASOPHILS NFR BLD: 0.5 % (ref 0–1.9)
BILIRUB SERPL-MCNC: 1.1 MG/DL (ref 0.1–1)
BNP SERPL-MCNC: 213 PG/ML (ref 0–99)
BUN SERPL-MCNC: 16 MG/DL (ref 8–23)
CALCIUM SERPL-MCNC: 9.6 MG/DL (ref 8.7–10.5)
CHLORIDE SERPL-SCNC: 104 MMOL/L (ref 95–110)
CO2 SERPL-SCNC: 26 MMOL/L (ref 23–29)
CREAT SERPL-MCNC: 0.9 MG/DL (ref 0.5–1.4)
DIFFERENTIAL METHOD: ABNORMAL
EOSINOPHIL # BLD AUTO: 0.3 K/UL (ref 0–0.5)
EOSINOPHIL NFR BLD: 4.6 % (ref 0–8)
ERYTHROCYTE [DISTWIDTH] IN BLOOD BY AUTOMATED COUNT: 13.2 % (ref 11.5–14.5)
EST. GFR  (AFRICAN AMERICAN): >60 ML/MIN/1.73 M^2
EST. GFR  (NON AFRICAN AMERICAN): >60 ML/MIN/1.73 M^2
GLUCOSE SERPL-MCNC: 166 MG/DL (ref 70–110)
HCT VFR BLD AUTO: 39.4 % (ref 40–54)
HGB BLD-MCNC: 13.3 G/DL (ref 14–18)
IMM GRANULOCYTES # BLD AUTO: 0.01 K/UL (ref 0–0.04)
IMM GRANULOCYTES NFR BLD AUTO: 0.2 % (ref 0–0.5)
INR PPP: 1.1
LYMPHOCYTES # BLD AUTO: 2 K/UL (ref 1–4.8)
LYMPHOCYTES NFR BLD: 35.5 % (ref 18–48)
MAGNESIUM SERPL-MCNC: 2 MG/DL (ref 1.6–2.6)
MCH RBC QN AUTO: 33.6 PG (ref 27–31)
MCHC RBC AUTO-ENTMCNC: 33.8 G/DL (ref 32–36)
MCV RBC AUTO: 100 FL (ref 82–98)
MONOCYTES # BLD AUTO: 0.4 K/UL (ref 0.3–1)
MONOCYTES NFR BLD: 7.6 % (ref 4–15)
NEUTROPHILS # BLD AUTO: 2.9 K/UL (ref 1.8–7.7)
NEUTROPHILS NFR BLD: 51.6 % (ref 38–73)
NRBC BLD-RTO: 0 /100 WBC
PLATELET # BLD AUTO: 153 K/UL (ref 150–350)
PMV BLD AUTO: 11.2 FL (ref 9.2–12.9)
POTASSIUM SERPL-SCNC: 3.3 MMOL/L (ref 3.5–5.1)
PROT SERPL-MCNC: 6.6 G/DL (ref 6–8.4)
PROTHROMBIN TIME: 14.1 SEC (ref 10.6–14.8)
RBC # BLD AUTO: 3.96 M/UL (ref 4.6–6.2)
SARS-COV-2 RDRP RESP QL NAA+PROBE: NEGATIVE
SODIUM SERPL-SCNC: 138 MMOL/L (ref 136–145)
TROPONIN I SERPL DL<=0.01 NG/ML-MCNC: <0.03 NG/ML
WBC # BLD AUTO: 5.63 K/UL (ref 3.9–12.7)

## 2021-01-31 PROCEDURE — 84484 ASSAY OF TROPONIN QUANT: CPT

## 2021-01-31 PROCEDURE — 83735 ASSAY OF MAGNESIUM: CPT

## 2021-01-31 PROCEDURE — 25000003 PHARM REV CODE 250: Performed by: EMERGENCY MEDICINE

## 2021-01-31 PROCEDURE — 99285 EMERGENCY DEPT VISIT HI MDM: CPT | Mod: 25

## 2021-01-31 PROCEDURE — 85025 COMPLETE CBC W/AUTO DIFF WBC: CPT

## 2021-01-31 PROCEDURE — 85610 PROTHROMBIN TIME: CPT

## 2021-01-31 PROCEDURE — 93010 EKG 12-LEAD: ICD-10-PCS | Mod: ,,, | Performed by: INTERNAL MEDICINE

## 2021-01-31 PROCEDURE — 93010 ELECTROCARDIOGRAM REPORT: CPT | Mod: ,,, | Performed by: INTERNAL MEDICINE

## 2021-01-31 PROCEDURE — U0002 COVID-19 LAB TEST NON-CDC: HCPCS

## 2021-01-31 PROCEDURE — 80053 COMPREHEN METABOLIC PANEL: CPT

## 2021-01-31 PROCEDURE — 93005 ELECTROCARDIOGRAM TRACING: CPT | Performed by: INTERNAL MEDICINE

## 2021-01-31 PROCEDURE — 83880 ASSAY OF NATRIURETIC PEPTIDE: CPT

## 2021-01-31 RX ORDER — CEPHALEXIN 500 MG/1
500 CAPSULE ORAL 4 TIMES DAILY
COMMUNITY
End: 2021-05-20

## 2021-01-31 RX ORDER — POTASSIUM CHLORIDE 20 MEQ/1
40 TABLET, EXTENDED RELEASE ORAL ONCE
Status: COMPLETED | OUTPATIENT
Start: 2021-01-31 | End: 2021-01-31

## 2021-01-31 RX ORDER — MECLIZINE HCL 12.5 MG 12.5 MG/1
12.5 TABLET ORAL 3 TIMES DAILY PRN
COMMUNITY
End: 2021-09-10

## 2021-01-31 RX ADMIN — POTASSIUM CHLORIDE 40 MEQ: 20 TABLET, EXTENDED RELEASE ORAL at 05:01

## 2021-02-09 ENCOUNTER — OFFICE VISIT (OUTPATIENT)
Dept: CARDIOLOGY | Facility: CLINIC | Age: 86
End: 2021-02-09
Payer: MEDICARE

## 2021-02-09 ENCOUNTER — PATIENT MESSAGE (OUTPATIENT)
Dept: CARDIOLOGY | Facility: CLINIC | Age: 86
End: 2021-02-09

## 2021-02-09 VITALS
OXYGEN SATURATION: 98 % | BODY MASS INDEX: 26.07 KG/M2 | DIASTOLIC BLOOD PRESSURE: 76 MMHG | HEART RATE: 70 BPM | HEIGHT: 69 IN | SYSTOLIC BLOOD PRESSURE: 120 MMHG | WEIGHT: 176 LBS | RESPIRATION RATE: 18 BRPM

## 2021-02-09 DIAGNOSIS — E78.2 MIXED HYPERLIPIDEMIA: Primary | ICD-10-CM

## 2021-02-09 PROCEDURE — 99499 UNLISTED E&M SERVICE: CPT | Mod: S$GLB,,, | Performed by: INTERNAL MEDICINE

## 2021-02-09 PROCEDURE — 99499 NO LOS: ICD-10-PCS | Mod: S$GLB,,, | Performed by: INTERNAL MEDICINE

## 2021-02-19 ENCOUNTER — HOSPITAL ENCOUNTER (OUTPATIENT)
Dept: RADIOLOGY | Facility: HOSPITAL | Age: 86
Discharge: HOME | End: 2021-02-19
Attending: INTERNAL MEDICINE
Payer: MEDICARE

## 2021-02-19 VITALS — BODY MASS INDEX: 25.18 KG/M2 | HEIGHT: 69 IN | WEIGHT: 170 LBS

## 2021-02-19 DIAGNOSIS — C82.61 CUTANEOUS FOLLICLE CENTER LYMPHOMA INVOLVING LYMPH NODES OF HEAD: ICD-10-CM

## 2021-02-19 LAB — GLUCOSE SERPL-MCNC: 88 MG/DL (ref 70–110)

## 2021-02-19 PROCEDURE — 78816 PET IMAGE W/CT FULL BODY: CPT | Mod: TC,PO,PS

## 2021-02-19 PROCEDURE — 82962 GLUCOSE BLOOD TEST: CPT | Mod: PO

## 2021-02-25 ENCOUNTER — TELEPHONE (OUTPATIENT)
Dept: PAIN MEDICINE | Facility: CLINIC | Age: 86
End: 2021-02-25

## 2021-02-28 ENCOUNTER — PATIENT OUTREACH (OUTPATIENT)
Dept: ADMINISTRATIVE | Facility: OTHER | Age: 86
End: 2021-02-28

## 2021-03-01 ENCOUNTER — OFFICE VISIT (OUTPATIENT)
Dept: HEMATOLOGY/ONCOLOGY | Facility: CLINIC | Age: 86
End: 2021-03-01
Payer: MEDICARE

## 2021-03-01 ENCOUNTER — OFFICE VISIT (OUTPATIENT)
Dept: PAIN MEDICINE | Facility: CLINIC | Age: 86
End: 2021-03-01
Payer: MEDICARE

## 2021-03-01 VITALS
DIASTOLIC BLOOD PRESSURE: 85 MMHG | WEIGHT: 177 LBS | HEART RATE: 58 BPM | BODY MASS INDEX: 26.22 KG/M2 | SYSTOLIC BLOOD PRESSURE: 143 MMHG | HEIGHT: 69 IN

## 2021-03-01 VITALS
RESPIRATION RATE: 18 BRPM | HEART RATE: 58 BPM | BODY MASS INDEX: 26.31 KG/M2 | HEIGHT: 69 IN | WEIGHT: 177.63 LBS | TEMPERATURE: 98 F | DIASTOLIC BLOOD PRESSURE: 83 MMHG | SYSTOLIC BLOOD PRESSURE: 138 MMHG

## 2021-03-01 DIAGNOSIS — M47.896 OTHER SPONDYLOSIS, LUMBAR REGION: ICD-10-CM

## 2021-03-01 DIAGNOSIS — D64.9 ANEMIA, UNSPECIFIED TYPE: ICD-10-CM

## 2021-03-01 DIAGNOSIS — M51.36 DDD (DEGENERATIVE DISC DISEASE), LUMBAR: ICD-10-CM

## 2021-03-01 DIAGNOSIS — C82.61 CUTANEOUS FOLLICLE CENTER LYMPHOMA INVOLVING LYMPH NODES OF HEAD: Primary | ICD-10-CM

## 2021-03-01 DIAGNOSIS — M46.1 SACROILIITIS: Primary | ICD-10-CM

## 2021-03-01 DIAGNOSIS — D53.9 NUTRITIONAL ANEMIA, UNSPECIFIED: ICD-10-CM

## 2021-03-01 DIAGNOSIS — M53.3 SACROILIAC JOINT PAIN: Primary | ICD-10-CM

## 2021-03-01 PROCEDURE — 99214 OFFICE O/P EST MOD 30 MIN: CPT | Mod: S$GLB,,, | Performed by: INTERNAL MEDICINE

## 2021-03-01 PROCEDURE — 99204 PR OFFICE/OUTPT VISIT, NEW, LEVL IV, 45-59 MIN: ICD-10-PCS | Mod: S$PBB,,, | Performed by: ANESTHESIOLOGY

## 2021-03-01 PROCEDURE — 99214 PR OFFICE/OUTPT VISIT, EST, LEVL IV, 30-39 MIN: ICD-10-PCS | Mod: S$GLB,,, | Performed by: INTERNAL MEDICINE

## 2021-03-01 PROCEDURE — 99204 OFFICE O/P NEW MOD 45 MIN: CPT | Mod: S$PBB,,, | Performed by: ANESTHESIOLOGY

## 2021-03-01 PROCEDURE — 99999 PR PBB SHADOW E&M-EST. PATIENT-LVL III: ICD-10-PCS | Mod: PBBFAC,,, | Performed by: ANESTHESIOLOGY

## 2021-03-01 PROCEDURE — 99999 PR PBB SHADOW E&M-EST. PATIENT-LVL III: CPT | Mod: PBBFAC,,, | Performed by: ANESTHESIOLOGY

## 2021-03-01 PROCEDURE — 99213 OFFICE O/P EST LOW 20 MIN: CPT | Mod: PBBFAC,PN | Performed by: ANESTHESIOLOGY

## 2021-03-01 RX ORDER — TRAMADOL HYDROCHLORIDE 50 MG/1
50 TABLET ORAL EVERY 6 HOURS PRN
Qty: 28 TABLET | Refills: 1 | Status: SHIPPED | OUTPATIENT
Start: 2021-03-01 | End: 2021-06-21

## 2021-03-02 ENCOUNTER — LAB VISIT (OUTPATIENT)
Dept: PRIMARY CARE CLINIC | Facility: CLINIC | Age: 86
End: 2021-03-02
Payer: MEDICARE

## 2021-03-02 DIAGNOSIS — Z01.818 PRE-OP TESTING: ICD-10-CM

## 2021-03-02 PROCEDURE — U0003 INFECTIOUS AGENT DETECTION BY NUCLEIC ACID (DNA OR RNA); SEVERE ACUTE RESPIRATORY SYNDROME CORONAVIRUS 2 (SARS-COV-2) (CORONAVIRUS DISEASE [COVID-19]), AMPLIFIED PROBE TECHNIQUE, MAKING USE OF HIGH THROUGHPUT TECHNOLOGIES AS DESCRIBED BY CMS-2020-01-R: HCPCS

## 2021-03-02 PROCEDURE — U0005 INFEC AGEN DETEC AMPLI PROBE: HCPCS

## 2021-03-03 LAB — SARS-COV-2 RNA RESP QL NAA+PROBE: NOT DETECTED

## 2021-03-05 ENCOUNTER — HOSPITAL ENCOUNTER (OUTPATIENT)
Facility: AMBULARY SURGERY CENTER | Age: 86
Discharge: HOME | End: 2021-03-05
Attending: ANESTHESIOLOGY | Admitting: ANESTHESIOLOGY
Payer: MEDICARE

## 2021-03-05 DIAGNOSIS — M53.3 SACROILIAC JOINT PAIN: ICD-10-CM

## 2021-03-05 DIAGNOSIS — M53.3 SACROILIAC JOINT DYSFUNCTION: Primary | ICD-10-CM

## 2021-03-05 PROCEDURE — 27096 PR INJECTION,SACROILIAC JOINT: ICD-10-PCS | Mod: LT,,, | Performed by: ANESTHESIOLOGY

## 2021-03-05 PROCEDURE — 27096 INJECT SACROILIAC JOINT: CPT | Mod: LT,,, | Performed by: ANESTHESIOLOGY

## 2021-03-05 PROCEDURE — 27096 INJECT SACROILIAC JOINT: CPT | Performed by: ANESTHESIOLOGY

## 2021-03-05 RX ORDER — LIDOCAINE HYDROCHLORIDE 10 MG/ML
INJECTION, SOLUTION EPIDURAL; INFILTRATION; INTRACAUDAL; PERINEURAL
Status: DISCONTINUED | OUTPATIENT
Start: 2021-03-05 | End: 2021-03-05 | Stop reason: HOSPADM

## 2021-03-05 RX ORDER — SODIUM CHLORIDE, SODIUM LACTATE, POTASSIUM CHLORIDE, CALCIUM CHLORIDE 600; 310; 30; 20 MG/100ML; MG/100ML; MG/100ML; MG/100ML
INJECTION, SOLUTION INTRAVENOUS ONCE AS NEEDED
Status: DISCONTINUED | OUTPATIENT
Start: 2021-03-05 | End: 2021-03-05 | Stop reason: HOSPADM

## 2021-03-05 RX ORDER — METHYLPREDNISOLONE ACETATE 80 MG/ML
INJECTION, SUSPENSION INTRA-ARTICULAR; INTRALESIONAL; INTRAMUSCULAR; SOFT TISSUE
Status: DISCONTINUED | OUTPATIENT
Start: 2021-03-05 | End: 2021-03-05 | Stop reason: HOSPADM

## 2021-03-05 RX ORDER — BUPIVACAINE HYDROCHLORIDE 2.5 MG/ML
INJECTION, SOLUTION EPIDURAL; INFILTRATION; INTRACAUDAL
Status: DISCONTINUED | OUTPATIENT
Start: 2021-03-05 | End: 2021-03-05 | Stop reason: HOSPADM

## 2021-03-08 VITALS
BODY MASS INDEX: 26.22 KG/M2 | WEIGHT: 177 LBS | HEIGHT: 69 IN | HEART RATE: 56 BPM | TEMPERATURE: 98 F | SYSTOLIC BLOOD PRESSURE: 151 MMHG | OXYGEN SATURATION: 100 % | DIASTOLIC BLOOD PRESSURE: 82 MMHG | RESPIRATION RATE: 20 BRPM

## 2021-03-31 ENCOUNTER — EXTERNAL CHRONIC CARE MANAGEMENT (OUTPATIENT)
Dept: PRIMARY CARE CLINIC | Facility: CLINIC | Age: 86
End: 2021-03-31
Payer: MEDICARE

## 2021-03-31 PROCEDURE — 99490 CHRNC CARE MGMT STAFF 1ST 20: CPT | Mod: PBBFAC | Performed by: INTERNAL MEDICINE

## 2021-03-31 PROCEDURE — 99490 CHRNC CARE MGMT STAFF 1ST 20: CPT | Mod: S$PBB,,, | Performed by: INTERNAL MEDICINE

## 2021-03-31 PROCEDURE — 99490 PR CHRONIC CARE MGMT, 1ST 20 MIN: ICD-10-PCS | Mod: S$PBB,,, | Performed by: INTERNAL MEDICINE

## 2021-04-05 ENCOUNTER — OFFICE VISIT (OUTPATIENT)
Dept: PAIN MEDICINE | Facility: CLINIC | Age: 86
End: 2021-04-05
Payer: MEDICARE

## 2021-04-05 VITALS
DIASTOLIC BLOOD PRESSURE: 78 MMHG | HEIGHT: 69 IN | HEART RATE: 64 BPM | WEIGHT: 177 LBS | BODY MASS INDEX: 26.22 KG/M2 | SYSTOLIC BLOOD PRESSURE: 132 MMHG

## 2021-04-05 DIAGNOSIS — R10.32 LEFT INGUINAL PAIN: ICD-10-CM

## 2021-04-05 DIAGNOSIS — M53.3 SACROILIAC JOINT PAIN: Primary | ICD-10-CM

## 2021-04-05 DIAGNOSIS — M70.62 GREATER TROCHANTERIC BURSITIS OF LEFT HIP: ICD-10-CM

## 2021-04-05 DIAGNOSIS — M51.36 DDD (DEGENERATIVE DISC DISEASE), LUMBAR: ICD-10-CM

## 2021-04-05 PROCEDURE — 99213 OFFICE O/P EST LOW 20 MIN: CPT | Mod: PBBFAC,PN | Performed by: PHYSICIAN ASSISTANT

## 2021-04-05 PROCEDURE — 99214 PR OFFICE/OUTPT VISIT, EST, LEVL IV, 30-39 MIN: ICD-10-PCS | Mod: S$PBB,,, | Performed by: PHYSICIAN ASSISTANT

## 2021-04-05 PROCEDURE — 99999 PR PBB SHADOW E&M-EST. PATIENT-LVL III: CPT | Mod: PBBFAC,,, | Performed by: PHYSICIAN ASSISTANT

## 2021-04-05 PROCEDURE — 99214 OFFICE O/P EST MOD 30 MIN: CPT | Mod: S$PBB,,, | Performed by: PHYSICIAN ASSISTANT

## 2021-04-05 PROCEDURE — 99999 PR PBB SHADOW E&M-EST. PATIENT-LVL III: ICD-10-PCS | Mod: PBBFAC,,, | Performed by: PHYSICIAN ASSISTANT

## 2021-04-05 RX ORDER — AMPICILLIN TRIHYDRATE 500 MG
1000 CAPSULE ORAL DAILY
COMMUNITY
Start: 2021-03-24 | End: 2022-12-12

## 2021-04-05 RX ORDER — MUPIROCIN 20 MG/G
OINTMENT TOPICAL
COMMUNITY
Start: 2021-01-27 | End: 2021-11-19

## 2021-04-13 ENCOUNTER — PES CALL (OUTPATIENT)
Dept: ADMINISTRATIVE | Facility: CLINIC | Age: 86
End: 2021-04-13

## 2021-04-16 ENCOUNTER — OFFICE VISIT (OUTPATIENT)
Dept: FAMILY MEDICINE | Facility: CLINIC | Age: 86
End: 2021-04-16
Payer: MEDICARE

## 2021-04-16 VITALS
HEART RATE: 55 BPM | SYSTOLIC BLOOD PRESSURE: 130 MMHG | BODY MASS INDEX: 26.7 KG/M2 | WEIGHT: 180.75 LBS | DIASTOLIC BLOOD PRESSURE: 76 MMHG | OXYGEN SATURATION: 98 % | TEMPERATURE: 98 F

## 2021-04-16 DIAGNOSIS — C82.61 CUTANEOUS FOLLICLE CENTER LYMPHOMA INVOLVING LYMPH NODES OF HEAD: ICD-10-CM

## 2021-04-16 DIAGNOSIS — R94.120 ABNORMAL HEARING SCREEN: ICD-10-CM

## 2021-04-16 DIAGNOSIS — I70.0 CALCIFICATION OF AORTA: ICD-10-CM

## 2021-04-16 DIAGNOSIS — E78.2 MIXED HYPERLIPIDEMIA: ICD-10-CM

## 2021-04-16 DIAGNOSIS — Z00.00 ENCOUNTER FOR PREVENTIVE HEALTH EXAMINATION: Primary | ICD-10-CM

## 2021-04-16 PROCEDURE — G0439 PR MEDICARE ANNUAL WELLNESS SUBSEQUENT VISIT: ICD-10-PCS | Mod: S$GLB,,, | Performed by: NURSE PRACTITIONER

## 2021-04-16 PROCEDURE — G0439 PPPS, SUBSEQ VISIT: HCPCS | Mod: S$GLB,,, | Performed by: NURSE PRACTITIONER

## 2021-04-30 ENCOUNTER — EXTERNAL CHRONIC CARE MANAGEMENT (OUTPATIENT)
Dept: PRIMARY CARE CLINIC | Facility: CLINIC | Age: 86
End: 2021-04-30
Payer: MEDICARE

## 2021-04-30 PROCEDURE — 99490 PR CHRONIC CARE MGMT, 1ST 20 MIN: ICD-10-PCS | Mod: S$PBB,,, | Performed by: INTERNAL MEDICINE

## 2021-04-30 PROCEDURE — 99490 CHRNC CARE MGMT STAFF 1ST 20: CPT | Mod: PBBFAC | Performed by: INTERNAL MEDICINE

## 2021-04-30 PROCEDURE — 99490 CHRNC CARE MGMT STAFF 1ST 20: CPT | Mod: S$PBB,,, | Performed by: INTERNAL MEDICINE

## 2021-05-20 ENCOUNTER — OFFICE VISIT (OUTPATIENT)
Dept: CARDIOLOGY | Facility: CLINIC | Age: 86
End: 2021-05-20
Payer: MEDICARE

## 2021-05-20 VITALS
WEIGHT: 177 LBS | OXYGEN SATURATION: 98 % | BODY MASS INDEX: 26.22 KG/M2 | RESPIRATION RATE: 16 BRPM | SYSTOLIC BLOOD PRESSURE: 126 MMHG | DIASTOLIC BLOOD PRESSURE: 78 MMHG | HEIGHT: 69 IN | HEART RATE: 57 BPM

## 2021-05-20 DIAGNOSIS — R94.31 ABNORMAL EKG: ICD-10-CM

## 2021-05-20 DIAGNOSIS — E78.2 MIXED HYPERLIPIDEMIA: Primary | ICD-10-CM

## 2021-05-20 PROCEDURE — 99213 OFFICE O/P EST LOW 20 MIN: CPT | Mod: S$GLB,,, | Performed by: NURSE PRACTITIONER

## 2021-05-20 PROCEDURE — 99213 PR OFFICE/OUTPT VISIT, EST, LEVL III, 20-29 MIN: ICD-10-PCS | Mod: S$GLB,,, | Performed by: NURSE PRACTITIONER

## 2021-05-31 ENCOUNTER — EXTERNAL CHRONIC CARE MANAGEMENT (OUTPATIENT)
Dept: PRIMARY CARE CLINIC | Facility: CLINIC | Age: 86
End: 2021-05-31
Payer: MEDICARE

## 2021-05-31 PROCEDURE — 99490 PR CHRONIC CARE MGMT, 1ST 20 MIN: ICD-10-PCS | Mod: S$PBB,,, | Performed by: INTERNAL MEDICINE

## 2021-05-31 PROCEDURE — 99490 CHRNC CARE MGMT STAFF 1ST 20: CPT | Mod: PBBFAC | Performed by: INTERNAL MEDICINE

## 2021-05-31 PROCEDURE — 99490 CHRNC CARE MGMT STAFF 1ST 20: CPT | Mod: S$PBB,,, | Performed by: INTERNAL MEDICINE

## 2021-06-11 ENCOUNTER — PATIENT MESSAGE (OUTPATIENT)
Dept: PAIN MEDICINE | Facility: CLINIC | Age: 86
End: 2021-06-11

## 2021-06-14 ENCOUNTER — LAB VISIT (OUTPATIENT)
Dept: LAB | Facility: HOSPITAL | Age: 86
End: 2021-06-14
Attending: INTERNAL MEDICINE
Payer: MEDICARE

## 2021-06-14 DIAGNOSIS — E78.2 MIXED HYPERLIPIDEMIA: ICD-10-CM

## 2021-06-14 DIAGNOSIS — Z12.5 SCREENING PSA (PROSTATE SPECIFIC ANTIGEN): ICD-10-CM

## 2021-06-14 LAB
ALBUMIN SERPL BCP-MCNC: 3.9 G/DL (ref 3.5–5.2)
ALP SERPL-CCNC: 84 U/L (ref 55–135)
ALT SERPL W/O P-5'-P-CCNC: 20 U/L (ref 10–44)
ANION GAP SERPL CALC-SCNC: 9 MMOL/L (ref 8–16)
AST SERPL-CCNC: 27 U/L (ref 10–40)
BILIRUB SERPL-MCNC: 1.1 MG/DL (ref 0.1–1)
BUN SERPL-MCNC: 16 MG/DL (ref 8–23)
CALCIUM SERPL-MCNC: 10.5 MG/DL (ref 8.7–10.5)
CHLORIDE SERPL-SCNC: 105 MMOL/L (ref 95–110)
CHOLEST SERPL-MCNC: 139 MG/DL (ref 120–199)
CHOLEST/HDLC SERPL: 2.5 {RATIO} (ref 2–5)
CO2 SERPL-SCNC: 27 MMOL/L (ref 23–29)
COMPLEXED PSA SERPL-MCNC: 0.52 NG/ML (ref 0–4)
CREAT SERPL-MCNC: 1 MG/DL (ref 0.5–1.4)
EST. GFR  (AFRICAN AMERICAN): >60 ML/MIN/1.73 M^2
EST. GFR  (NON AFRICAN AMERICAN): >60 ML/MIN/1.73 M^2
GLUCOSE SERPL-MCNC: 91 MG/DL (ref 70–110)
HDLC SERPL-MCNC: 55 MG/DL (ref 40–75)
HDLC SERPL: 39.6 % (ref 20–50)
LDLC SERPL CALC-MCNC: 63.8 MG/DL (ref 63–159)
NONHDLC SERPL-MCNC: 84 MG/DL
POTASSIUM SERPL-SCNC: 4.4 MMOL/L (ref 3.5–5.1)
PROT SERPL-MCNC: 6.8 G/DL (ref 6–8.4)
SODIUM SERPL-SCNC: 141 MMOL/L (ref 136–145)
TRIGL SERPL-MCNC: 101 MG/DL (ref 30–150)

## 2021-06-14 PROCEDURE — 84153 ASSAY OF PSA TOTAL: CPT | Performed by: INTERNAL MEDICINE

## 2021-06-14 PROCEDURE — 80061 LIPID PANEL: CPT | Performed by: INTERNAL MEDICINE

## 2021-06-14 PROCEDURE — 36415 COLL VENOUS BLD VENIPUNCTURE: CPT | Mod: PO | Performed by: INTERNAL MEDICINE

## 2021-06-14 PROCEDURE — 80053 COMPREHEN METABOLIC PANEL: CPT | Performed by: INTERNAL MEDICINE

## 2021-06-21 ENCOUNTER — OFFICE VISIT (OUTPATIENT)
Dept: INTERNAL MEDICINE | Facility: CLINIC | Age: 86
End: 2021-06-21
Payer: MEDICARE

## 2021-06-21 VITALS
HEIGHT: 69 IN | HEART RATE: 63 BPM | SYSTOLIC BLOOD PRESSURE: 120 MMHG | WEIGHT: 180.75 LBS | DIASTOLIC BLOOD PRESSURE: 80 MMHG | BODY MASS INDEX: 26.77 KG/M2 | OXYGEN SATURATION: 98 %

## 2021-06-21 DIAGNOSIS — M17.9 OSTEOARTHRITIS OF KNEE, UNSPECIFIED LATERALITY, UNSPECIFIED OSTEOARTHRITIS TYPE: ICD-10-CM

## 2021-06-21 DIAGNOSIS — R91.1 PULMONARY NODULE: ICD-10-CM

## 2021-06-21 DIAGNOSIS — E78.2 MIXED HYPERLIPIDEMIA: ICD-10-CM

## 2021-06-21 DIAGNOSIS — G47.33 OBSTRUCTIVE SLEEP APNEA SYNDROME: Primary | ICD-10-CM

## 2021-06-21 PROCEDURE — 99214 PR OFFICE/OUTPT VISIT, EST, LEVL IV, 30-39 MIN: ICD-10-PCS | Mod: S$PBB,,, | Performed by: INTERNAL MEDICINE

## 2021-06-21 PROCEDURE — 99214 OFFICE O/P EST MOD 30 MIN: CPT | Mod: S$PBB,,, | Performed by: INTERNAL MEDICINE

## 2021-06-21 PROCEDURE — 99213 OFFICE O/P EST LOW 20 MIN: CPT | Mod: PBBFAC | Performed by: INTERNAL MEDICINE

## 2021-06-21 PROCEDURE — 99999 PR PBB SHADOW E&M-EST. PATIENT-LVL III: CPT | Mod: PBBFAC,,, | Performed by: INTERNAL MEDICINE

## 2021-06-21 PROCEDURE — 99999 PR PBB SHADOW E&M-EST. PATIENT-LVL III: ICD-10-PCS | Mod: PBBFAC,,, | Performed by: INTERNAL MEDICINE

## 2021-06-21 RX ORDER — ATORVASTATIN CALCIUM 20 MG/1
20 TABLET, FILM COATED ORAL DAILY
Qty: 90 TABLET | Refills: 3 | Status: SHIPPED | OUTPATIENT
Start: 2021-06-21 | End: 2022-06-21 | Stop reason: SDUPTHER

## 2021-06-30 ENCOUNTER — EXTERNAL CHRONIC CARE MANAGEMENT (OUTPATIENT)
Dept: PRIMARY CARE CLINIC | Facility: CLINIC | Age: 86
End: 2021-06-30
Payer: MEDICARE

## 2021-06-30 PROCEDURE — 99490 CHRNC CARE MGMT STAFF 1ST 20: CPT | Mod: PBBFAC | Performed by: INTERNAL MEDICINE

## 2021-06-30 PROCEDURE — 99490 CHRNC CARE MGMT STAFF 1ST 20: CPT | Mod: S$PBB,,, | Performed by: INTERNAL MEDICINE

## 2021-06-30 PROCEDURE — 99490 PR CHRONIC CARE MGMT, 1ST 20 MIN: ICD-10-PCS | Mod: S$PBB,,, | Performed by: INTERNAL MEDICINE

## 2021-07-19 ENCOUNTER — OFFICE VISIT (OUTPATIENT)
Dept: RADIATION ONCOLOGY | Facility: CLINIC | Age: 86
End: 2021-07-19
Payer: MEDICARE

## 2021-07-19 VITALS
OXYGEN SATURATION: 98 % | SYSTOLIC BLOOD PRESSURE: 111 MMHG | HEART RATE: 63 BPM | WEIGHT: 182.63 LBS | TEMPERATURE: 98 F | DIASTOLIC BLOOD PRESSURE: 62 MMHG | BODY MASS INDEX: 26.97 KG/M2

## 2021-07-19 DIAGNOSIS — C82.61 CUTANEOUS FOLLICLE CENTER LYMPHOMA INVOLVING LYMPH NODES OF HEAD: Primary | ICD-10-CM

## 2021-07-19 PROCEDURE — 99214 PR OFFICE/OUTPT VISIT, EST, LEVL IV, 30-39 MIN: ICD-10-PCS | Mod: S$GLB,,, | Performed by: RADIOLOGY

## 2021-07-19 PROCEDURE — 99214 OFFICE O/P EST MOD 30 MIN: CPT | Mod: S$GLB,,, | Performed by: RADIOLOGY

## 2021-07-19 RX ORDER — NAPROXEN 500 MG/1
500 TABLET ORAL 2 TIMES DAILY WITH MEALS
COMMUNITY
End: 2021-09-10

## 2021-07-25 PROBLEM — B35.6 JOCK ITCH: Status: RESOLVED | Noted: 2017-07-05 | Resolved: 2021-07-25

## 2021-07-31 ENCOUNTER — EXTERNAL CHRONIC CARE MANAGEMENT (OUTPATIENT)
Dept: PRIMARY CARE CLINIC | Facility: CLINIC | Age: 86
End: 2021-07-31
Payer: MEDICARE

## 2021-07-31 PROCEDURE — 99490 CHRNC CARE MGMT STAFF 1ST 20: CPT | Mod: S$PBB,,, | Performed by: INTERNAL MEDICINE

## 2021-07-31 PROCEDURE — 99490 PR CHRONIC CARE MGMT, 1ST 20 MIN: ICD-10-PCS | Mod: S$PBB,,, | Performed by: INTERNAL MEDICINE

## 2021-07-31 PROCEDURE — 99490 CHRNC CARE MGMT STAFF 1ST 20: CPT | Mod: PBBFAC | Performed by: INTERNAL MEDICINE

## 2021-08-20 ENCOUNTER — TELEPHONE (OUTPATIENT)
Dept: INTERNAL MEDICINE | Facility: CLINIC | Age: 86
End: 2021-08-20

## 2021-08-24 ENCOUNTER — HOSPITAL ENCOUNTER (OUTPATIENT)
Dept: RADIOLOGY | Facility: HOSPITAL | Age: 86
Discharge: HOME | End: 2021-08-24
Attending: INTERNAL MEDICINE
Payer: MEDICARE

## 2021-08-24 VITALS — BODY MASS INDEX: 25.77 KG/M2 | HEIGHT: 70 IN | WEIGHT: 180 LBS

## 2021-08-24 DIAGNOSIS — C82.61 CUTANEOUS FOLLICLE CENTER LYMPHOMA INVOLVING LYMPH NODES OF HEAD: ICD-10-CM

## 2021-08-24 LAB — GLUCOSE SERPL-MCNC: 106 MG/DL (ref 70–110)

## 2021-08-24 PROCEDURE — 78816 PET IMAGE W/CT FULL BODY: CPT | Mod: TC,PO

## 2021-08-31 ENCOUNTER — EXTERNAL CHRONIC CARE MANAGEMENT (OUTPATIENT)
Dept: PRIMARY CARE CLINIC | Facility: CLINIC | Age: 86
End: 2021-08-31
Payer: MEDICARE

## 2021-08-31 PROCEDURE — 99490 PR CHRONIC CARE MGMT, 1ST 20 MIN: ICD-10-PCS | Mod: S$PBB,,, | Performed by: INTERNAL MEDICINE

## 2021-08-31 PROCEDURE — 99490 CHRNC CARE MGMT STAFF 1ST 20: CPT | Mod: PBBFAC | Performed by: INTERNAL MEDICINE

## 2021-08-31 PROCEDURE — 99490 CHRNC CARE MGMT STAFF 1ST 20: CPT | Mod: S$PBB,,, | Performed by: INTERNAL MEDICINE

## 2021-09-01 ENCOUNTER — TELEPHONE (OUTPATIENT)
Dept: HEMATOLOGY/ONCOLOGY | Facility: CLINIC | Age: 86
End: 2021-09-01

## 2021-09-01 DIAGNOSIS — R94.8 ABNORMAL POSITRON EMISSION TOMOGRAPHY (PET) SCAN: Primary | ICD-10-CM

## 2021-09-01 DIAGNOSIS — C82.61 CUTANEOUS FOLLICLE CENTER LYMPHOMA INVOLVING LYMPH NODES OF HEAD: ICD-10-CM

## 2021-09-02 ENCOUNTER — TELEPHONE (OUTPATIENT)
Dept: HEMATOLOGY/ONCOLOGY | Facility: CLINIC | Age: 86
End: 2021-09-02

## 2021-09-02 DIAGNOSIS — R94.8 ABNORMAL POSITRON EMISSION TOMOGRAPHY (PET) SCAN: Primary | ICD-10-CM

## 2021-09-09 ENCOUNTER — HOSPITAL ENCOUNTER (OUTPATIENT)
Dept: RADIOLOGY | Facility: HOSPITAL | Age: 86
Discharge: HOME | End: 2021-09-09
Attending: NURSE PRACTITIONER
Payer: MEDICARE

## 2021-09-09 DIAGNOSIS — R94.8 ABNORMAL POSITRON EMISSION TOMOGRAPHY (PET) SCAN: ICD-10-CM

## 2021-09-09 LAB
CREAT SERPL-MCNC: 1 MG/DL (ref 0.5–1.4)
SAMPLE: NORMAL

## 2021-09-09 PROCEDURE — 70491 CT SOFT TISSUE NECK W/DYE: CPT | Mod: TC,PO

## 2021-09-09 PROCEDURE — 82565 ASSAY OF CREATININE: CPT | Mod: PO

## 2021-09-09 PROCEDURE — 25500020 PHARM REV CODE 255: Mod: PO | Performed by: NURSE PRACTITIONER

## 2021-09-09 RX ADMIN — IOHEXOL 100 ML: 350 INJECTION, SOLUTION INTRAVENOUS at 08:09

## 2021-09-10 ENCOUNTER — TELEPHONE (OUTPATIENT)
Dept: RADIOLOGY | Facility: HOSPITAL | Age: 86
End: 2021-09-10

## 2021-09-10 RX ORDER — NAPROXEN SODIUM 220 MG
220 TABLET ORAL 2 TIMES DAILY WITH MEALS
COMMUNITY
End: 2022-12-12

## 2021-09-15 ENCOUNTER — TELEPHONE (OUTPATIENT)
Dept: HEMATOLOGY/ONCOLOGY | Facility: CLINIC | Age: 86
End: 2021-09-15

## 2021-09-15 ENCOUNTER — HOSPITAL ENCOUNTER (OUTPATIENT)
Dept: RADIOLOGY | Facility: HOSPITAL | Age: 86
Discharge: HOME | End: 2021-09-15
Attending: NURSE PRACTITIONER
Payer: MEDICARE

## 2021-09-15 DIAGNOSIS — R94.8 ABNORMAL POSITRON EMISSION TOMOGRAPHY (PET) SCAN: ICD-10-CM

## 2021-09-15 DIAGNOSIS — R59.1 GENERALIZED ENLARGED LYMPH NODES: ICD-10-CM

## 2021-09-15 PROCEDURE — 76882 US LMTD JT/FCL EVL NVASC XTR: CPT | Mod: TC,LT

## 2021-09-30 ENCOUNTER — OFFICE VISIT (OUTPATIENT)
Dept: HEMATOLOGY/ONCOLOGY | Facility: CLINIC | Age: 86
End: 2021-09-30
Payer: MEDICARE

## 2021-09-30 ENCOUNTER — EXTERNAL CHRONIC CARE MANAGEMENT (OUTPATIENT)
Dept: PRIMARY CARE CLINIC | Facility: CLINIC | Age: 86
End: 2021-09-30
Payer: MEDICARE

## 2021-09-30 ENCOUNTER — PATIENT MESSAGE (OUTPATIENT)
Dept: INTERNAL MEDICINE | Facility: CLINIC | Age: 86
End: 2021-09-30

## 2021-09-30 VITALS
DIASTOLIC BLOOD PRESSURE: 69 MMHG | BODY MASS INDEX: 26.05 KG/M2 | SYSTOLIC BLOOD PRESSURE: 120 MMHG | HEART RATE: 61 BPM | WEIGHT: 182 LBS | RESPIRATION RATE: 18 BRPM | HEIGHT: 70 IN

## 2021-09-30 DIAGNOSIS — C82.61 CUTANEOUS FOLLICLE CENTER LYMPHOMA INVOLVING LYMPH NODES OF HEAD: Primary | ICD-10-CM

## 2021-09-30 DIAGNOSIS — G62.9 NEUROPATHY: Primary | ICD-10-CM

## 2021-09-30 PROCEDURE — 99214 PR OFFICE/OUTPT VISIT, EST, LEVL IV, 30-39 MIN: ICD-10-PCS | Mod: S$GLB,,, | Performed by: INTERNAL MEDICINE

## 2021-09-30 PROCEDURE — 99490 PR CHRONIC CARE MGMT, 1ST 20 MIN: ICD-10-PCS | Mod: S$PBB,,, | Performed by: INTERNAL MEDICINE

## 2021-09-30 PROCEDURE — 99490 CHRNC CARE MGMT STAFF 1ST 20: CPT | Mod: S$PBB,,, | Performed by: INTERNAL MEDICINE

## 2021-09-30 PROCEDURE — 99490 CHRNC CARE MGMT STAFF 1ST 20: CPT | Mod: PBBFAC | Performed by: INTERNAL MEDICINE

## 2021-09-30 PROCEDURE — 99214 OFFICE O/P EST MOD 30 MIN: CPT | Mod: S$GLB,,, | Performed by: INTERNAL MEDICINE

## 2021-10-01 ENCOUNTER — TELEPHONE (OUTPATIENT)
Dept: NEUROLOGY | Facility: CLINIC | Age: 86
End: 2021-10-01

## 2021-10-01 ENCOUNTER — PATIENT MESSAGE (OUTPATIENT)
Dept: INTERNAL MEDICINE | Facility: CLINIC | Age: 86
End: 2021-10-01

## 2021-10-01 ENCOUNTER — PATIENT MESSAGE (OUTPATIENT)
Dept: NEUROLOGY | Facility: CLINIC | Age: 86
End: 2021-10-01

## 2021-10-31 ENCOUNTER — EXTERNAL CHRONIC CARE MANAGEMENT (OUTPATIENT)
Dept: PRIMARY CARE CLINIC | Facility: CLINIC | Age: 86
End: 2021-10-31
Payer: MEDICARE

## 2021-10-31 PROCEDURE — 99490 CHRNC CARE MGMT STAFF 1ST 20: CPT | Mod: S$PBB,,, | Performed by: INTERNAL MEDICINE

## 2021-10-31 PROCEDURE — 99490 PR CHRONIC CARE MGMT, 1ST 20 MIN: ICD-10-PCS | Mod: S$PBB,,, | Performed by: INTERNAL MEDICINE

## 2021-10-31 PROCEDURE — 99490 CHRNC CARE MGMT STAFF 1ST 20: CPT | Mod: PBBFAC | Performed by: INTERNAL MEDICINE

## 2021-11-10 ENCOUNTER — TELEPHONE (OUTPATIENT)
Dept: PAIN MEDICINE | Facility: CLINIC | Age: 86
End: 2021-11-10
Payer: MEDICARE

## 2021-11-11 ENCOUNTER — PATIENT OUTREACH (OUTPATIENT)
Dept: ADMINISTRATIVE | Facility: OTHER | Age: 86
End: 2021-11-11
Payer: MEDICARE

## 2021-11-11 ENCOUNTER — OFFICE VISIT (OUTPATIENT)
Dept: PAIN MEDICINE | Facility: CLINIC | Age: 86
End: 2021-11-11
Payer: MEDICARE

## 2021-11-11 VITALS
WEIGHT: 182 LBS | BODY MASS INDEX: 26.05 KG/M2 | DIASTOLIC BLOOD PRESSURE: 84 MMHG | SYSTOLIC BLOOD PRESSURE: 158 MMHG | HEIGHT: 70 IN | HEART RATE: 57 BPM

## 2021-11-11 DIAGNOSIS — M46.1 SACROILIITIS: Primary | ICD-10-CM

## 2021-11-11 DIAGNOSIS — M51.36 DDD (DEGENERATIVE DISC DISEASE), LUMBAR: ICD-10-CM

## 2021-11-11 DIAGNOSIS — M70.62 GREATER TROCHANTERIC BURSITIS OF LEFT HIP: ICD-10-CM

## 2021-11-11 DIAGNOSIS — M53.3 SACROILIAC JOINT PAIN: Primary | ICD-10-CM

## 2021-11-11 PROCEDURE — 99213 OFFICE O/P EST LOW 20 MIN: CPT | Mod: PBBFAC,PN | Performed by: PHYSICIAN ASSISTANT

## 2021-11-11 PROCEDURE — 99213 PR OFFICE/OUTPT VISIT, EST, LEVL III, 20-29 MIN: ICD-10-PCS | Mod: S$PBB,,, | Performed by: PHYSICIAN ASSISTANT

## 2021-11-11 PROCEDURE — 99213 OFFICE O/P EST LOW 20 MIN: CPT | Mod: S$PBB,,, | Performed by: PHYSICIAN ASSISTANT

## 2021-11-11 PROCEDURE — 99999 PR PBB SHADOW E&M-EST. PATIENT-LVL III: ICD-10-PCS | Mod: PBBFAC,,, | Performed by: PHYSICIAN ASSISTANT

## 2021-11-11 PROCEDURE — 99999 PR PBB SHADOW E&M-EST. PATIENT-LVL III: CPT | Mod: PBBFAC,,, | Performed by: PHYSICIAN ASSISTANT

## 2021-11-12 RX ORDER — TRAMADOL HYDROCHLORIDE 50 MG/1
50 TABLET ORAL EVERY 6 HOURS PRN
Qty: 28 TABLET | Refills: 0 | Status: SHIPPED | OUTPATIENT
Start: 2021-11-12 | End: 2021-11-19

## 2021-11-19 ENCOUNTER — LAB VISIT (OUTPATIENT)
Dept: LAB | Facility: HOSPITAL | Age: 86
End: 2021-11-19
Attending: STUDENT IN AN ORGANIZED HEALTH CARE EDUCATION/TRAINING PROGRAM
Payer: MEDICARE

## 2021-11-19 ENCOUNTER — OFFICE VISIT (OUTPATIENT)
Dept: NEUROLOGY | Facility: CLINIC | Age: 86
End: 2021-11-19
Payer: MEDICARE

## 2021-11-19 VITALS
WEIGHT: 185.31 LBS | SYSTOLIC BLOOD PRESSURE: 131 MMHG | HEIGHT: 70 IN | DIASTOLIC BLOOD PRESSURE: 78 MMHG | HEART RATE: 60 BPM | BODY MASS INDEX: 26.53 KG/M2

## 2021-11-19 DIAGNOSIS — R52 PAIN, UNSPECIFIED: ICD-10-CM

## 2021-11-19 DIAGNOSIS — R26.9 GAIT DIFFICULTY: ICD-10-CM

## 2021-11-19 DIAGNOSIS — M54.9 DORSALGIA, UNSPECIFIED: ICD-10-CM

## 2021-11-19 DIAGNOSIS — G62.9 NEUROPATHY: Primary | ICD-10-CM

## 2021-11-19 DIAGNOSIS — G60.9 HEREDITARY AND IDIOPATHIC NEUROPATHY, UNSPECIFIED: ICD-10-CM

## 2021-11-19 DIAGNOSIS — G62.9 NEUROPATHY: ICD-10-CM

## 2021-11-19 DIAGNOSIS — R29.2 HYPERREFLEXIA: ICD-10-CM

## 2021-11-19 LAB — TSH SERPL DL<=0.005 MIU/L-ACNC: 1.42 UIU/ML (ref 0.4–4)

## 2021-11-19 PROCEDURE — 84165 PROTEIN E-PHORESIS SERUM: CPT | Performed by: STUDENT IN AN ORGANIZED HEALTH CARE EDUCATION/TRAINING PROGRAM

## 2021-11-19 PROCEDURE — 84207 ASSAY OF VITAMIN B-6: CPT | Performed by: STUDENT IN AN ORGANIZED HEALTH CARE EDUCATION/TRAINING PROGRAM

## 2021-11-19 PROCEDURE — 99999 PR PBB SHADOW E&M-EST. PATIENT-LVL V: ICD-10-PCS | Mod: PBBFAC,,, | Performed by: STUDENT IN AN ORGANIZED HEALTH CARE EDUCATION/TRAINING PROGRAM

## 2021-11-19 PROCEDURE — 84446 ASSAY OF VITAMIN E: CPT | Performed by: STUDENT IN AN ORGANIZED HEALTH CARE EDUCATION/TRAINING PROGRAM

## 2021-11-19 PROCEDURE — 84443 ASSAY THYROID STIM HORMONE: CPT | Performed by: STUDENT IN AN ORGANIZED HEALTH CARE EDUCATION/TRAINING PROGRAM

## 2021-11-19 PROCEDURE — 84165 PROTEIN E-PHORESIS SERUM: CPT | Mod: 26,,, | Performed by: PATHOLOGY

## 2021-11-19 PROCEDURE — 99215 OFFICE O/P EST HI 40 MIN: CPT | Mod: PBBFAC | Performed by: STUDENT IN AN ORGANIZED HEALTH CARE EDUCATION/TRAINING PROGRAM

## 2021-11-19 PROCEDURE — 84165 PATHOLOGIST INTERPRETATION SPE: ICD-10-PCS | Mod: 26,,, | Performed by: PATHOLOGY

## 2021-11-19 PROCEDURE — 82525 ASSAY OF COPPER: CPT | Performed by: STUDENT IN AN ORGANIZED HEALTH CARE EDUCATION/TRAINING PROGRAM

## 2021-11-19 PROCEDURE — 99205 PR OFFICE/OUTPT VISIT, NEW, LEVL V, 60-74 MIN: ICD-10-PCS | Mod: S$PBB,,, | Performed by: STUDENT IN AN ORGANIZED HEALTH CARE EDUCATION/TRAINING PROGRAM

## 2021-11-19 PROCEDURE — 99999 PR PBB SHADOW E&M-EST. PATIENT-LVL V: CPT | Mod: PBBFAC,,, | Performed by: STUDENT IN AN ORGANIZED HEALTH CARE EDUCATION/TRAINING PROGRAM

## 2021-11-19 PROCEDURE — 99205 OFFICE O/P NEW HI 60 MIN: CPT | Mod: S$PBB,,, | Performed by: STUDENT IN AN ORGANIZED HEALTH CARE EDUCATION/TRAINING PROGRAM

## 2021-11-22 LAB
ALBUMIN SERPL ELPH-MCNC: 3.87 G/DL (ref 3.35–5.55)
ALPHA1 GLOB SERPL ELPH-MCNC: 0.31 G/DL (ref 0.17–0.41)
ALPHA2 GLOB SERPL ELPH-MCNC: 0.66 G/DL (ref 0.43–0.99)
B-GLOBULIN SERPL ELPH-MCNC: 0.7 G/DL (ref 0.5–1.1)
GAMMA GLOB SERPL ELPH-MCNC: 0.97 G/DL (ref 0.67–1.58)
PATHOLOGIST INTERPRETATION SPE: NORMAL
PROT SERPL-MCNC: 6.5 G/DL (ref 6–8.4)
VIT B12 SERPL-MCNC: 1017 NG/L (ref 180–914)

## 2021-11-23 LAB
A-TOCOPHEROL VIT E SERPL-MCNC: 1618 UG/DL (ref 500–1800)
COPPER SERPL-MCNC: 907 UG/L (ref 665–1480)
PYRIDOXAL SERPL-MCNC: 63 UG/L (ref 5–50)

## 2021-11-26 ENCOUNTER — HOSPITAL ENCOUNTER (EMERGENCY)
Facility: HOSPITAL | Age: 86
Discharge: HOME | End: 2021-11-26
Attending: EMERGENCY MEDICINE
Payer: MEDICARE

## 2021-11-26 VITALS
BODY MASS INDEX: 25.05 KG/M2 | RESPIRATION RATE: 15 BRPM | HEIGHT: 70 IN | WEIGHT: 175 LBS | TEMPERATURE: 98 F | SYSTOLIC BLOOD PRESSURE: 167 MMHG | DIASTOLIC BLOOD PRESSURE: 79 MMHG | HEART RATE: 54 BPM | OXYGEN SATURATION: 97 %

## 2021-11-26 DIAGNOSIS — R42 VERTIGO: Primary | ICD-10-CM

## 2021-11-26 DIAGNOSIS — R42 DIZZINESS: ICD-10-CM

## 2021-11-26 LAB
ALBUMIN SERPL BCP-MCNC: 4 G/DL (ref 3.5–5.2)
ALP SERPL-CCNC: 78 U/L (ref 55–135)
ALT SERPL W/O P-5'-P-CCNC: 25 U/L (ref 10–44)
ANION GAP SERPL CALC-SCNC: 5 MMOL/L (ref 8–16)
AST SERPL-CCNC: 27 U/L (ref 10–40)
BASOPHILS # BLD AUTO: 0.04 K/UL (ref 0–0.2)
BASOPHILS NFR BLD: 0.9 % (ref 0–1.9)
BILIRUB SERPL-MCNC: 1.2 MG/DL (ref 0.1–1)
BILIRUB UR QL STRIP: NEGATIVE
BILIRUB UR QL STRIP: NEGATIVE
BUN SERPL-MCNC: 14 MG/DL (ref 8–23)
CALCIUM SERPL-MCNC: 9.7 MG/DL (ref 8.7–10.5)
CHLORIDE SERPL-SCNC: 105 MMOL/L (ref 95–110)
CK SERPL-CCNC: 102 U/L (ref 20–200)
CLARITY UR: CLEAR
CLARITY UR: CLEAR
CO2 SERPL-SCNC: 25 MMOL/L (ref 23–29)
COLOR UR: YELLOW
COLOR UR: YELLOW
CREAT SERPL-MCNC: 1 MG/DL (ref 0.5–1.4)
DIFFERENTIAL METHOD: ABNORMAL
EOSINOPHIL # BLD AUTO: 0.1 K/UL (ref 0–0.5)
EOSINOPHIL NFR BLD: 2.6 % (ref 0–8)
ERYTHROCYTE [DISTWIDTH] IN BLOOD BY AUTOMATED COUNT: 13.1 % (ref 11.5–14.5)
EST. GFR  (AFRICAN AMERICAN): >60 ML/MIN/1.73 M^2
EST. GFR  (NON AFRICAN AMERICAN): >60 ML/MIN/1.73 M^2
GLUCOSE SERPL-MCNC: 102 MG/DL (ref 70–110)
GLUCOSE UR QL STRIP: NEGATIVE
GLUCOSE UR QL STRIP: NEGATIVE
HCT VFR BLD AUTO: 40 % (ref 40–54)
HGB BLD-MCNC: 13.6 G/DL (ref 14–18)
HGB UR QL STRIP: NEGATIVE
HGB UR QL STRIP: NEGATIVE
IMM GRANULOCYTES # BLD AUTO: 0.01 K/UL (ref 0–0.04)
IMM GRANULOCYTES NFR BLD AUTO: 0.2 % (ref 0–0.5)
KETONES UR QL STRIP: NEGATIVE
KETONES UR QL STRIP: NEGATIVE
LEUKOCYTE ESTERASE UR QL STRIP: NEGATIVE
LEUKOCYTE ESTERASE UR QL STRIP: NEGATIVE
LYMPHOCYTES # BLD AUTO: 1.3 K/UL (ref 1–4.8)
LYMPHOCYTES NFR BLD: 28.6 % (ref 18–48)
MAGNESIUM SERPL-MCNC: 2.1 MG/DL (ref 1.6–2.6)
MCH RBC QN AUTO: 33.7 PG (ref 27–31)
MCHC RBC AUTO-ENTMCNC: 34 G/DL (ref 32–36)
MCV RBC AUTO: 99 FL (ref 82–98)
MONOCYTES # BLD AUTO: 0.6 K/UL (ref 0.3–1)
MONOCYTES NFR BLD: 12.7 % (ref 4–15)
NEUTROPHILS # BLD AUTO: 2.5 K/UL (ref 1.8–7.7)
NEUTROPHILS NFR BLD: 55 % (ref 38–73)
NITRITE UR QL STRIP: NEGATIVE
NITRITE UR QL STRIP: NEGATIVE
NRBC BLD-RTO: 0 /100 WBC
PH UR STRIP: 7 [PH] (ref 5–8)
PH UR STRIP: 7 [PH] (ref 5–8)
PLATELET # BLD AUTO: 137 K/UL (ref 150–450)
PMV BLD AUTO: 11.3 FL (ref 9.2–12.9)
POTASSIUM SERPL-SCNC: 4.3 MMOL/L (ref 3.5–5.1)
PROT SERPL-MCNC: 6.9 G/DL (ref 6–8.4)
PROT UR QL STRIP: NEGATIVE
PROT UR QL STRIP: NEGATIVE
RBC # BLD AUTO: 4.04 M/UL (ref 4.6–6.2)
SODIUM SERPL-SCNC: 135 MMOL/L (ref 136–145)
SP GR UR STRIP: 1.01 (ref 1–1.03)
SP GR UR STRIP: 1.01 (ref 1–1.03)
TROPONIN I SERPL DL<=0.01 NG/ML-MCNC: <0.03 NG/ML
URN SPEC COLLECT METH UR: NORMAL
URN SPEC COLLECT METH UR: NORMAL
UROBILINOGEN UR STRIP-ACNC: NEGATIVE EU/DL
UROBILINOGEN UR STRIP-ACNC: NEGATIVE EU/DL
WBC # BLD AUTO: 4.55 K/UL (ref 3.9–12.7)

## 2021-11-26 PROCEDURE — 85025 COMPLETE CBC W/AUTO DIFF WBC: CPT | Performed by: EMERGENCY MEDICINE

## 2021-11-26 PROCEDURE — 93010 ELECTROCARDIOGRAM REPORT: CPT | Mod: ,,, | Performed by: INTERNAL MEDICINE

## 2021-11-26 PROCEDURE — 80053 COMPREHEN METABOLIC PANEL: CPT | Performed by: EMERGENCY MEDICINE

## 2021-11-26 PROCEDURE — 36415 COLL VENOUS BLD VENIPUNCTURE: CPT | Performed by: EMERGENCY MEDICINE

## 2021-11-26 PROCEDURE — 93005 ELECTROCARDIOGRAM TRACING: CPT | Performed by: INTERNAL MEDICINE

## 2021-11-26 PROCEDURE — 82550 ASSAY OF CK (CPK): CPT | Performed by: EMERGENCY MEDICINE

## 2021-11-26 PROCEDURE — 81003 URINALYSIS AUTO W/O SCOPE: CPT | Performed by: EMERGENCY MEDICINE

## 2021-11-26 PROCEDURE — 83735 ASSAY OF MAGNESIUM: CPT | Performed by: EMERGENCY MEDICINE

## 2021-11-26 PROCEDURE — 93010 EKG 12-LEAD: ICD-10-PCS | Mod: ,,, | Performed by: INTERNAL MEDICINE

## 2021-11-26 PROCEDURE — 99284 EMERGENCY DEPT VISIT MOD MDM: CPT | Mod: 25

## 2021-11-26 PROCEDURE — 84484 ASSAY OF TROPONIN QUANT: CPT | Performed by: EMERGENCY MEDICINE

## 2021-11-26 RX ORDER — TRAMADOL HYDROCHLORIDE 50 MG/1
50 TABLET ORAL EVERY 6 HOURS PRN
COMMUNITY
End: 2022-12-12

## 2021-11-26 RX ORDER — MECLIZINE HYDROCHLORIDE 25 MG/1
25 TABLET ORAL 3 TIMES DAILY PRN
Qty: 20 TABLET | Refills: 0 | Status: SHIPPED | OUTPATIENT
Start: 2021-11-26 | End: 2021-11-26 | Stop reason: SDUPTHER

## 2021-11-26 RX ORDER — MECLIZINE HYDROCHLORIDE 25 MG/1
25 TABLET ORAL 3 TIMES DAILY PRN
Qty: 20 TABLET | Refills: 0 | Status: SHIPPED | OUTPATIENT
Start: 2021-11-26 | End: 2021-12-21

## 2021-11-30 ENCOUNTER — HOSPITAL ENCOUNTER (OUTPATIENT)
Facility: AMBULARY SURGERY CENTER | Age: 86
Discharge: HOME | End: 2021-11-30
Attending: ANESTHESIOLOGY | Admitting: ANESTHESIOLOGY
Payer: MEDICARE

## 2021-11-30 ENCOUNTER — EXTERNAL CHRONIC CARE MANAGEMENT (OUTPATIENT)
Dept: PRIMARY CARE CLINIC | Facility: CLINIC | Age: 86
End: 2021-11-30
Payer: MEDICARE

## 2021-11-30 DIAGNOSIS — M53.3 SACROILIAC JOINT PAIN: Primary | ICD-10-CM

## 2021-11-30 PROCEDURE — 77002 NEEDLE LOCALIZATION BY XRAY: CPT | Performed by: ANESTHESIOLOGY

## 2021-11-30 PROCEDURE — 99490 CHRNC CARE MGMT STAFF 1ST 20: CPT | Mod: S$PBB,,, | Performed by: INTERNAL MEDICINE

## 2021-11-30 PROCEDURE — 27096 INJECT SACROILIAC JOINT: CPT | Performed by: ANESTHESIOLOGY

## 2021-11-30 PROCEDURE — 99490 PR CHRONIC CARE MGMT, 1ST 20 MIN: ICD-10-PCS | Mod: S$PBB,,, | Performed by: INTERNAL MEDICINE

## 2021-11-30 PROCEDURE — 99490 CHRNC CARE MGMT STAFF 1ST 20: CPT | Mod: PBBFAC | Performed by: INTERNAL MEDICINE

## 2021-11-30 PROCEDURE — 27096 PR INJECTION,SACROILIAC JOINT: ICD-10-PCS | Mod: LT,,, | Performed by: ANESTHESIOLOGY

## 2021-11-30 PROCEDURE — 20610 DRAIN/INJ JOINT/BURSA W/O US: CPT | Mod: 59,LT,, | Performed by: ANESTHESIOLOGY

## 2021-11-30 PROCEDURE — 20610 DRAIN/INJ JOINT/BURSA W/O US: CPT | Performed by: ANESTHESIOLOGY

## 2021-11-30 PROCEDURE — 27096 INJECT SACROILIAC JOINT: CPT | Mod: LT,,, | Performed by: ANESTHESIOLOGY

## 2021-11-30 PROCEDURE — 20610 PR DRAIN/INJECT LARGE JOINT/BURSA: ICD-10-PCS | Mod: 59,LT,, | Performed by: ANESTHESIOLOGY

## 2021-11-30 RX ORDER — LIDOCAINE HYDROCHLORIDE 10 MG/ML
INJECTION, SOLUTION EPIDURAL; INFILTRATION; INTRACAUDAL; PERINEURAL
Status: DISCONTINUED | OUTPATIENT
Start: 2021-11-30 | End: 2021-11-30 | Stop reason: HOSPADM

## 2021-11-30 RX ORDER — BUPIVACAINE HYDROCHLORIDE 2.5 MG/ML
INJECTION, SOLUTION EPIDURAL; INFILTRATION; INTRACAUDAL
Status: DISCONTINUED | OUTPATIENT
Start: 2021-11-30 | End: 2021-11-30 | Stop reason: HOSPADM

## 2021-11-30 RX ORDER — METHYLPREDNISOLONE ACETATE 80 MG/ML
INJECTION, SUSPENSION INTRA-ARTICULAR; INTRALESIONAL; INTRAMUSCULAR; SOFT TISSUE
Status: DISCONTINUED | OUTPATIENT
Start: 2021-11-30 | End: 2021-11-30 | Stop reason: HOSPADM

## 2021-11-30 RX ORDER — SODIUM CHLORIDE, SODIUM LACTATE, POTASSIUM CHLORIDE, CALCIUM CHLORIDE 600; 310; 30; 20 MG/100ML; MG/100ML; MG/100ML; MG/100ML
INJECTION, SOLUTION INTRAVENOUS ONCE AS NEEDED
Status: DISCONTINUED | OUTPATIENT
Start: 2021-11-30 | End: 2021-11-30 | Stop reason: HOSPADM

## 2021-12-01 VITALS
DIASTOLIC BLOOD PRESSURE: 85 MMHG | OXYGEN SATURATION: 99 % | HEIGHT: 70 IN | TEMPERATURE: 98 F | HEART RATE: 58 BPM | BODY MASS INDEX: 26.05 KG/M2 | WEIGHT: 182 LBS | SYSTOLIC BLOOD PRESSURE: 154 MMHG | RESPIRATION RATE: 17 BRPM

## 2021-12-03 ENCOUNTER — HOSPITAL ENCOUNTER (OUTPATIENT)
Dept: RADIOLOGY | Facility: HOSPITAL | Age: 86
Discharge: HOME | End: 2021-12-03
Attending: STUDENT IN AN ORGANIZED HEALTH CARE EDUCATION/TRAINING PROGRAM
Payer: MEDICARE

## 2021-12-03 DIAGNOSIS — G62.9 NEUROPATHY: ICD-10-CM

## 2021-12-03 DIAGNOSIS — R29.2 HYPERREFLEXIA: ICD-10-CM

## 2021-12-03 DIAGNOSIS — M54.9 DORSALGIA, UNSPECIFIED: ICD-10-CM

## 2021-12-03 PROCEDURE — 72146 MRI CHEST SPINE W/O DYE: CPT | Mod: TC,PO

## 2021-12-03 PROCEDURE — 72148 MRI LUMBAR SPINE W/O DYE: CPT | Mod: TC,PO

## 2021-12-06 ENCOUNTER — OFFICE VISIT (OUTPATIENT)
Dept: CARDIOLOGY | Facility: CLINIC | Age: 86
End: 2021-12-06
Payer: MEDICARE

## 2021-12-06 VITALS
RESPIRATION RATE: 16 BRPM | HEART RATE: 72 BPM | WEIGHT: 179 LBS | SYSTOLIC BLOOD PRESSURE: 142 MMHG | HEIGHT: 70 IN | DIASTOLIC BLOOD PRESSURE: 80 MMHG | OXYGEN SATURATION: 94 % | BODY MASS INDEX: 25.62 KG/M2

## 2021-12-06 DIAGNOSIS — R94.31 ABNORMAL EKG: ICD-10-CM

## 2021-12-06 DIAGNOSIS — E78.2 MIXED HYPERLIPIDEMIA: ICD-10-CM

## 2021-12-06 DIAGNOSIS — R42 VERTIGO: Primary | ICD-10-CM

## 2021-12-06 PROCEDURE — 93000 ELECTROCARDIOGRAM COMPLETE: CPT | Mod: S$GLB,,, | Performed by: INTERNAL MEDICINE

## 2021-12-06 PROCEDURE — 93000 EKG 12-LEAD: ICD-10-PCS | Mod: S$GLB,,, | Performed by: INTERNAL MEDICINE

## 2021-12-06 PROCEDURE — 99215 OFFICE O/P EST HI 40 MIN: CPT | Mod: S$GLB,,, | Performed by: INTERNAL MEDICINE

## 2021-12-06 PROCEDURE — 99215 PR OFFICE/OUTPT VISIT, EST, LEVL V, 40-54 MIN: ICD-10-PCS | Mod: S$GLB,,, | Performed by: INTERNAL MEDICINE

## 2021-12-06 RX ORDER — MECLIZINE HYDROCHLORIDE 25 MG/1
25 TABLET ORAL 3 TIMES DAILY PRN
Qty: 45 TABLET | Refills: 0 | Status: SHIPPED | OUTPATIENT
Start: 2021-12-06 | End: 2021-12-21 | Stop reason: SDUPTHER

## 2021-12-14 ENCOUNTER — LAB VISIT (OUTPATIENT)
Dept: LAB | Facility: HOSPITAL | Age: 86
End: 2021-12-14
Attending: INTERNAL MEDICINE
Payer: MEDICARE

## 2021-12-14 DIAGNOSIS — E78.2 MIXED HYPERLIPIDEMIA: ICD-10-CM

## 2021-12-14 LAB
ALBUMIN SERPL BCP-MCNC: 3.8 G/DL (ref 3.5–5.2)
ALP SERPL-CCNC: 92 U/L (ref 55–135)
ALT SERPL W/O P-5'-P-CCNC: 22 U/L (ref 10–44)
ANION GAP SERPL CALC-SCNC: 6 MMOL/L (ref 8–16)
AST SERPL-CCNC: 21 U/L (ref 10–40)
BASOPHILS # BLD AUTO: 0.03 K/UL (ref 0–0.2)
BASOPHILS NFR BLD: 0.5 % (ref 0–1.9)
BILIRUB SERPL-MCNC: 1 MG/DL (ref 0.1–1)
BUN SERPL-MCNC: 17 MG/DL (ref 8–23)
CALCIUM SERPL-MCNC: 10 MG/DL (ref 8.7–10.5)
CHLORIDE SERPL-SCNC: 103 MMOL/L (ref 95–110)
CO2 SERPL-SCNC: 25 MMOL/L (ref 23–29)
CREAT SERPL-MCNC: 0.8 MG/DL (ref 0.5–1.4)
DIFFERENTIAL METHOD: ABNORMAL
EOSINOPHIL # BLD AUTO: 0.2 K/UL (ref 0–0.5)
EOSINOPHIL NFR BLD: 3.9 % (ref 0–8)
ERYTHROCYTE [DISTWIDTH] IN BLOOD BY AUTOMATED COUNT: 13.2 % (ref 11.5–14.5)
EST. GFR  (AFRICAN AMERICAN): >60 ML/MIN/1.73 M^2
EST. GFR  (NON AFRICAN AMERICAN): >60 ML/MIN/1.73 M^2
GLUCOSE SERPL-MCNC: 83 MG/DL (ref 70–110)
HCT VFR BLD AUTO: 40.4 % (ref 40–54)
HGB BLD-MCNC: 13.6 G/DL (ref 14–18)
IMM GRANULOCYTES # BLD AUTO: 0.03 K/UL (ref 0–0.04)
IMM GRANULOCYTES NFR BLD AUTO: 0.5 % (ref 0–0.5)
LYMPHOCYTES # BLD AUTO: 1.7 K/UL (ref 1–4.8)
LYMPHOCYTES NFR BLD: 30.8 % (ref 18–48)
MCH RBC QN AUTO: 33.8 PG (ref 27–31)
MCHC RBC AUTO-ENTMCNC: 33.7 G/DL (ref 32–36)
MCV RBC AUTO: 101 FL (ref 82–98)
MONOCYTES # BLD AUTO: 0.5 K/UL (ref 0.3–1)
MONOCYTES NFR BLD: 8.4 % (ref 4–15)
NEUTROPHILS # BLD AUTO: 3.1 K/UL (ref 1.8–7.7)
NEUTROPHILS NFR BLD: 55.9 % (ref 38–73)
NRBC BLD-RTO: 0 /100 WBC
PLATELET # BLD AUTO: 153 K/UL (ref 150–450)
PMV BLD AUTO: 11.3 FL (ref 9.2–12.9)
POTASSIUM SERPL-SCNC: 3.8 MMOL/L (ref 3.5–5.1)
PROT SERPL-MCNC: 6.7 G/DL (ref 6–8.4)
RBC # BLD AUTO: 4.02 M/UL (ref 4.6–6.2)
SODIUM SERPL-SCNC: 134 MMOL/L (ref 136–145)
WBC # BLD AUTO: 5.62 K/UL (ref 3.9–12.7)

## 2021-12-14 PROCEDURE — 80053 COMPREHEN METABOLIC PANEL: CPT | Performed by: INTERNAL MEDICINE

## 2021-12-14 PROCEDURE — 36415 COLL VENOUS BLD VENIPUNCTURE: CPT | Mod: PO | Performed by: INTERNAL MEDICINE

## 2021-12-14 PROCEDURE — 85025 COMPLETE CBC W/AUTO DIFF WBC: CPT | Performed by: INTERNAL MEDICINE

## 2021-12-21 ENCOUNTER — OFFICE VISIT (OUTPATIENT)
Dept: INTERNAL MEDICINE | Facility: CLINIC | Age: 86
End: 2021-12-21
Payer: MEDICARE

## 2021-12-21 VITALS
HEART RATE: 66 BPM | DIASTOLIC BLOOD PRESSURE: 79 MMHG | BODY MASS INDEX: 26.22 KG/M2 | WEIGHT: 183.19 LBS | HEIGHT: 70 IN | SYSTOLIC BLOOD PRESSURE: 128 MMHG | OXYGEN SATURATION: 99 %

## 2021-12-21 DIAGNOSIS — M53.3 SACROILIAC JOINT PAIN: ICD-10-CM

## 2021-12-21 DIAGNOSIS — N40.0 BENIGN PROSTATIC HYPERPLASIA WITHOUT LOWER URINARY TRACT SYMPTOMS: ICD-10-CM

## 2021-12-21 DIAGNOSIS — E78.2 MIXED HYPERLIPIDEMIA: Primary | ICD-10-CM

## 2021-12-21 DIAGNOSIS — C82.61 CUTANEOUS FOLLICLE CENTER LYMPHOMA INVOLVING LYMPH NODES OF HEAD: ICD-10-CM

## 2021-12-21 DIAGNOSIS — M17.9 OSTEOARTHRITIS OF KNEE, UNSPECIFIED LATERALITY, UNSPECIFIED OSTEOARTHRITIS TYPE: ICD-10-CM

## 2021-12-21 DIAGNOSIS — G47.33 OBSTRUCTIVE SLEEP APNEA SYNDROME: ICD-10-CM

## 2021-12-21 PROCEDURE — 99214 OFFICE O/P EST MOD 30 MIN: CPT | Mod: S$PBB,,, | Performed by: INTERNAL MEDICINE

## 2021-12-21 PROCEDURE — 99999 PR PBB SHADOW E&M-EST. PATIENT-LVL III: ICD-10-PCS | Mod: PBBFAC,,, | Performed by: INTERNAL MEDICINE

## 2021-12-21 PROCEDURE — 99999 PR PBB SHADOW E&M-EST. PATIENT-LVL III: CPT | Mod: PBBFAC,,, | Performed by: INTERNAL MEDICINE

## 2021-12-21 PROCEDURE — 99213 OFFICE O/P EST LOW 20 MIN: CPT | Mod: PBBFAC | Performed by: INTERNAL MEDICINE

## 2021-12-21 PROCEDURE — 99214 PR OFFICE/OUTPT VISIT, EST, LEVL IV, 30-39 MIN: ICD-10-PCS | Mod: S$PBB,,, | Performed by: INTERNAL MEDICINE

## 2021-12-21 RX ORDER — MECLIZINE HYDROCHLORIDE 25 MG/1
25 TABLET ORAL 3 TIMES DAILY PRN
Qty: 45 TABLET | Refills: 3 | Status: SHIPPED | OUTPATIENT
Start: 2021-12-21 | End: 2022-06-21

## 2021-12-30 ENCOUNTER — OFFICE VISIT (OUTPATIENT)
Dept: PAIN MEDICINE | Facility: CLINIC | Age: 86
End: 2021-12-30
Payer: MEDICARE

## 2021-12-30 VITALS
DIASTOLIC BLOOD PRESSURE: 78 MMHG | SYSTOLIC BLOOD PRESSURE: 127 MMHG | HEART RATE: 57 BPM | BODY MASS INDEX: 26.2 KG/M2 | WEIGHT: 183 LBS | HEIGHT: 70 IN

## 2021-12-30 DIAGNOSIS — M51.36 DDD (DEGENERATIVE DISC DISEASE), LUMBAR: ICD-10-CM

## 2021-12-30 DIAGNOSIS — M70.62 GREATER TROCHANTERIC BURSITIS OF LEFT HIP: ICD-10-CM

## 2021-12-30 DIAGNOSIS — M46.1 SACROILIITIS: Primary | ICD-10-CM

## 2021-12-30 PROCEDURE — 99213 PR OFFICE/OUTPT VISIT, EST, LEVL III, 20-29 MIN: ICD-10-PCS | Mod: S$PBB,,, | Performed by: PHYSICIAN ASSISTANT

## 2021-12-30 PROCEDURE — 99999 PR PBB SHADOW E&M-EST. PATIENT-LVL III: ICD-10-PCS | Mod: PBBFAC,,, | Performed by: PHYSICIAN ASSISTANT

## 2021-12-30 PROCEDURE — 99213 OFFICE O/P EST LOW 20 MIN: CPT | Mod: PBBFAC,PN | Performed by: PHYSICIAN ASSISTANT

## 2021-12-30 PROCEDURE — 99999 PR PBB SHADOW E&M-EST. PATIENT-LVL III: CPT | Mod: PBBFAC,,, | Performed by: PHYSICIAN ASSISTANT

## 2021-12-30 PROCEDURE — 99213 OFFICE O/P EST LOW 20 MIN: CPT | Mod: S$PBB,,, | Performed by: PHYSICIAN ASSISTANT

## 2021-12-31 ENCOUNTER — EXTERNAL CHRONIC CARE MANAGEMENT (OUTPATIENT)
Dept: PRIMARY CARE CLINIC | Facility: CLINIC | Age: 86
End: 2021-12-31
Payer: MEDICARE

## 2021-12-31 PROCEDURE — 99490 CHRNC CARE MGMT STAFF 1ST 20: CPT | Mod: S$PBB,,, | Performed by: INTERNAL MEDICINE

## 2021-12-31 PROCEDURE — 99490 PR CHRONIC CARE MGMT, 1ST 20 MIN: ICD-10-PCS | Mod: S$PBB,,, | Performed by: INTERNAL MEDICINE

## 2021-12-31 PROCEDURE — 99490 CHRNC CARE MGMT STAFF 1ST 20: CPT | Mod: PBBFAC | Performed by: INTERNAL MEDICINE

## 2022-02-14 ENCOUNTER — HOSPITAL ENCOUNTER (OUTPATIENT)
Dept: RADIOLOGY | Facility: HOSPITAL | Age: 87
Discharge: HOME | End: 2022-02-14
Attending: INTERNAL MEDICINE
Payer: MEDICARE

## 2022-02-14 VITALS — HEIGHT: 70 IN | BODY MASS INDEX: 25.05 KG/M2 | WEIGHT: 175 LBS

## 2022-02-14 DIAGNOSIS — C82.61 CUTANEOUS FOLLICLE CENTER LYMPHOMA INVOLVING LYMPH NODES OF HEAD: ICD-10-CM

## 2022-02-14 LAB — GLUCOSE SERPL-MCNC: 97 MG/DL (ref 70–110)

## 2022-02-14 PROCEDURE — 78816 PET IMAGE W/CT FULL BODY: CPT | Mod: TC,PO

## 2022-02-22 DIAGNOSIS — D84.9 IMMUNOSUPPRESSED STATUS: ICD-10-CM

## 2022-02-28 ENCOUNTER — EXTERNAL CHRONIC CARE MANAGEMENT (OUTPATIENT)
Dept: PRIMARY CARE CLINIC | Facility: CLINIC | Age: 87
End: 2022-02-28
Payer: MEDICARE

## 2022-02-28 PROCEDURE — 99490 CHRNC CARE MGMT STAFF 1ST 20: CPT | Mod: S$PBB,,, | Performed by: INTERNAL MEDICINE

## 2022-02-28 PROCEDURE — 99490 PR CHRONIC CARE MGMT, 1ST 20 MIN: ICD-10-PCS | Mod: S$PBB,,, | Performed by: INTERNAL MEDICINE

## 2022-02-28 PROCEDURE — 99490 CHRNC CARE MGMT STAFF 1ST 20: CPT | Mod: PBBFAC | Performed by: INTERNAL MEDICINE

## 2022-03-28 NOTE — PROGRESS NOTES
Attending Statement:. I saw and evaluated the patient. I discussed the patient's case with the Resident.     Patient was seen via face-to-face    Darlyn Ramirez DO       [facility] Hematology/Oncology  PROGRESS NOTE -   Follow-up Visit      Subjective:       Patient ID:   NAME: Bryson Kellogg : 1935     86 y.o. male    Referring Doc: Osiris Lopez  Other Physicians: Kirk Cueto ([facility]-PCP); Niecy ([facility]); Luisana    Chief Complaint:  cutaneous follicular lymphoma f/u    History of Present Illness:     Patient returns today for a regularly scheduled follow-up visit.  The patient is here today to go over the results of the recently ordered labs, tests and studies. He is here with his wife. He is doing ok with no new issues. No new issues. Breathing ok. No CP, SOB, HA's or N/V.     He previously saw Dr Lieberman with rad/onc and they completed XRT with 22 total on 2020.  He had PEt scan on 2020 with no evidence of a systemic lymphoma process.     He saw Dr Floyd in Dec 2021; he has some back injections with Dr Varela in 2021    He saw saw Dr Lopez with dermatology in 2022 and had clear report.        He had recent PET on 2022          Discussed covid19 precautions. He had his vaccinations            ROS:   GEN: normal without any fever, night sweats or weight loss  HEENT: normal with no HA's, sore throat, stiff neck, changes in vision  CV: normal with no CP, SOB, PND, LEMOS or orthopnea  PULM: normal with no SOB, cough, hemoptysis, sputum or pleuritic pain  GI: normal with no abdominal pain, nausea, vomiting, constipation, diarrhea, melanotic stools, BRBPR, or hematemesis  : normal with no hematuria, dysuria  BREAST: normal with no mass, discharge, pain  SKIN: normal with no rash, erythema, bruising, or swelling    Pain Scale:  0    Allergies:  Review of patient's allergies indicates:   Allergen Reactions    Sulfa (sulfonamide antibiotics) Rash     Childhood allergy       Medications:    Current Outpatient Medications:     ascorbic acid, vitamin C, (VITAMIN C) 500 MG tablet, Take 500 mg by mouth once daily., Disp: , Rfl:     aspirin (ECOTRIN) 81 MG  "EC tablet, Take 81 mg by mouth once daily., Disp: , Rfl:     atorvastatin (LIPITOR) 20 MG tablet, Take 1 tablet (20 mg total) by mouth once daily., Disp: 90 tablet, Rfl: 3    co-enzyme Q-10 50 mg capsule, Take 50 mg by mouth once daily., Disp: , Rfl:     cyanocobalamin (VITAMIN B-12) 250 MCG tablet, Take 250 mcg by mouth once daily., Disp: , Rfl:     naproxen sodium (ANAPROX) 220 MG tablet, Take 220 mg by mouth 2 (two) times daily with meals., Disp: , Rfl:     PYRIDOXINE HCL (VITAMIN B-6 ORAL), Take 1 tablet by mouth once daily., Disp: , Rfl:     traMADoL (ULTRAM) 50 mg tablet, Take 50 mg by mouth every 6 (six) hours as needed for Pain., Disp: , Rfl:     vit C/E/Zn/coppr/lutein/zeaxan (PRESERVISION AREDS-2 ORAL), Take 1 tablet by mouth 2 (two) times a day., Disp: , Rfl:     VITAMIN D3 25 mcg (1,000 unit) capsule, Take 1,000 Units by mouth once daily., Disp: , Rfl:     meclizine (ANTIVERT) 25 mg tablet, Take 1 tablet (25 mg total) by mouth 3 (three) times daily as needed (Dizziness)., Disp: 45 tablet, Rfl: 3    PMHx/PSHx Updates:  See patient's last visit with me on 9/30/2021  See H&P on 8/13/2020        Pathology:  Cancer Staging  No matching staging information was found for the patient.          Objective:     Vitals:  Blood pressure (!) 147/84, pulse 60, temperature 98 °F (36.7 °C), resp. rate 18, height 5' 10" (1.778 m), weight 85.7 kg (189 lb).    Physical Examination:   GEN: no apparent distress, comfortable; AAOx3  HEAD: atraumatic and normocephalic; healed scar on left anterior auricular region  EYES: no pallor, no icterus, PERRLA  ENT: OMM, no pharyngeal erythema, external ears WNL; no nasal discharge; no thrush  NECK: no masses, thyroid normal, trachea midline, no LAD/LN's, supple  CV: RRR with no murmur; normal pulse; normal S1 and S2; no pedal edema  CHEST: Normal respiratory effort; CTAB; normal breath sounds; no wheeze or crackles  ABDOM: nontender and nondistended; soft; normal bowel sounds; " no rebound/guarding  MUSC/Skeletal: ROM normal; no crepitus; joints normal; no deformities or arthropathy  EXTREM: no clubbing, cyanosis, inflammation or swelling  SKIN: no rashes, lesions, ulcers, petechiae or subcutaneous nodules  : no mcgill  NEURO: grossly intact; motor/sensory WNL; AAOx3; no tremors  PSYCH: normal mood, affect and behavior  LYMPH: normal cervical, supraclavicular, axillary and groin LN's            Labs:   CMP  Sodium   Date Value Ref Range Status   12/14/2021 134 (L) 136 - 145 mmol/L Final     Potassium   Date Value Ref Range Status   12/14/2021 3.8 3.5 - 5.1 mmol/L Final     Chloride   Date Value Ref Range Status   12/14/2021 103 95 - 110 mmol/L Final     CO2   Date Value Ref Range Status   12/14/2021 25 23 - 29 mmol/L Final     Glucose   Date Value Ref Range Status   12/14/2021 83 70 - 110 mg/dL Final     BUN   Date Value Ref Range Status   12/14/2021 17 8 - 23 mg/dL Final     Creatinine   Date Value Ref Range Status   12/14/2021 0.8 0.5 - 1.4 mg/dL Final   11/23/2012 1.2 0.5 - 1.4 mg/dL Final     Calcium   Date Value Ref Range Status   12/14/2021 10.0 8.7 - 10.5 mg/dL Final   11/23/2012 9.4 8.7 - 10.5 mg/dL Final     Total Protein   Date Value Ref Range Status   12/14/2021 6.7 6.0 - 8.4 g/dL Final     Albumin   Date Value Ref Range Status   12/14/2021 3.8 3.5 - 5.2 g/dL Final     Total Bilirubin   Date Value Ref Range Status   12/14/2021 1.0 0.1 - 1.0 mg/dL Final     Comment:     For infants and newborns, interpretation of results should be based  on gestational age, weight and in agreement with clinical  observations.    Premature Infant recommended reference ranges:  Up to 24 hours.............<8.0 mg/dL  Up to 48 hours............<12.0 mg/dL  3-5 days..................<15.0 mg/dL  6-29 days.................<15.0 mg/dL       Alkaline Phosphatase   Date Value Ref Range Status   12/14/2021 92 55 - 135 U/L Final   11/23/2012 85 55 - 135 U/L Final     AST   Date Value Ref Range Status    12/14/2021 21 10 - 40 U/L Final   11/23/2012 22 10 - 40 U/L Final     ALT   Date Value Ref Range Status   12/14/2021 22 10 - 44 U/L Final     Anion Gap   Date Value Ref Range Status   12/14/2021 6 (L) 8 - 16 mmol/L Final   11/23/2012 10 5 - 15 meq/L Final     eGFR if    Date Value Ref Range Status   12/14/2021 >60.0 >60 mL/min/1.73 m^2 Final     eGFR if non    Date Value Ref Range Status   12/14/2021 >60.0 >60 mL/min/1.73 m^2 Final     Comment:     Calculation used to obtain the estimated glomerular filtration  rate (eGFR) is the CKD-EPI equation.            Lab Results   Component Value Date    WBC 5.62 12/14/2021    HGB 13.6 (L) 12/14/2021    HCT 40.4 12/14/2021     (H) 12/14/2021     12/14/2021             Radiology/Diagnostic Studies:      PET 2/14/2022:    IMPRESSION:  No PET/CT evidence of FDG avid disease.        US axilla 9/15/2021:    FINDINGS: Sonographic assessment targeted to the left axilla was performed in planning for possible biopsy. Recently reported lymph node (PET/CT August 24, 2021) is identified, measuring 2.2 x 1.0 cm. Margins are sharp, and fatty hilum is preserved.      CT Soft Neck  9/9/2021:    IMPRESSION: No enlarged cervical chain lymph nodes or cervical soft tissue mass        PET  8/24/2021:    Impression:     1. Nonspecific FDG uptake in left supraclavicular soft tissues, new, without abnormal CT correlate.  If further imaging evaluation is needed, soft tissue neck CT with IV contrast can be considered.  2. 11 mm soft tissue mass in left axilla which is moderately FDG avid and has surrounding inflammatory fat stranding.  Recurrent lymphoma is a consideration.  Reactive lymph node uptake due to infectious or inflammatory etiology also conceivable.  3. No other evidence of lymphoproliferative disorder.  4. Unchanged pulmonary nodules        PET  2/19/2021:  IMPRESSION: No evidence of active lymphoproliferative disease.          Nm Pet Ct  Whole Body    Result Date: 8/24/2020  EXAMINATION: NM PET CT WHOLE BODY CLINICAL HISTORY: Cutaneous follicle center lymphoma, lymph nodes of head, face, and neck, initial staging, lung nodules, C 82.61, R 91.8. TECHNIQUE: Following the injection of 12.8 mCi of F-18 labeled FDG into a left antecubital vein, PET CT was performed from the vertex of the skull through the feet with an integrated full ring PET CT scanner with image fusion. The patient's serum glucose at the time of the exam was 88 mg/dL. COMPARISON: Multiple prior exams including chest CT of 01/05/2017. FINDINGS: There is no abnormal focal FDG hypermetabolism in the head, neck, chest, abdomen, pelvis, or the skeletal system to suggest active lymphoproliferative disease.  There is osseous FDG hypermetabolism associated with cervical and lumbar degenerative facet arthropathy. CT images of the brain show scattered areas of nonspecific white matter hypoattenuation, with no intra-axial mass or abnormal extra-axial fluid.  There is generalized prominence of the cortical sulci and ventricles.  No suspicious sclerotic or lytic osseous abnormalities of the calvarium. CT images of the chest show no new suspicious pulmonary nodules or masses, with stable 5 mm oval noncalcified nodule in the left lower lobe image 153, with stable oval 5 mm nodular opacity in the right lower lobe along the major fissure image 128.  There is stable apical fibronodular scarring, with no pleural or pericardial effusion.  No new suspicious pulmonary nodules or masses.  There are aortic and coronary arterial vascular calcifications. CT images of the abdomen and pelvis show cholecystectomy clips, few splenic granulomatous calcifications, nonspecific peripancreatic calcifications, hypoattenuating bilateral renal lesions suggestive of cysts, and scattered aortoiliac vascular calcifications, with no ascites. CT images of the lower extremities show no enlarged lymph nodes or soft tissue masses,  with diffuse arterial vascular calcifications.  There is advanced arthropathy of the left 1st metatarsophalangeal joint.  There is intervertebral disc space narrowing and facet arthropathy in the spine, with bilateral glenohumeral arthropathic changes.  No suspicious sclerotic or lytic osseous abnormalities by CT.     1. No evidence of active lymphoproliferative disease. 2. Stable subcentimeter noncalcified pulmonary nodules dating back to 01/05/2017, compatible with benign etiology. 3. Additional observations as above. Electronically signed by: Balta Ta MD Date:    08/24/2020 Time:    13:22      I have reviewed all available lab results and radiology reports.    Assessment/Plan:   (1) 86 y.o. 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   - excisional biopsy was performed on 7/28/2020  - CD 20 +l BCL-6 stain is positive; CD30 was negative  - dicussed pathology and went over the latest NCCN guidelines (version 2.2020)  - refer to rad/onc and schedule PET    9/8/2020:  - He saw Dr Lieberman with rad/onc and they have started him on XRT with 22 total. He is in his 2nd week of XRT.   - He had PEt scan on 8/24/2020 with no evidence of a systemic lymphoma process.     10/12/2020:  - He previously saw Dr Lieberman with rad/onc and they completed XRT with 22 total on 9/29/2020.    - He had PEt scan on 8/24/2020 with no evidence of a systemic lymphoma process.   - he will need f/u with derm and rad/onc   - discussed consideration for [facility] evaluation at some point in future if ever needed but he wants to hold off on that right now    3/1/2021:  - He saw saw Dr Lopez recently on Jan 18th 2021 with dermatology and had clear report.  - He had recent PET on 2/19/2021 which was negative for any recurrent lymphoma  - He went to ER in jan  2021 with bout of vertigo.    9/30/2021:  - recent evaluation with derm which was good  - recent PET, CT neck and US axilla with borderline LN in axilla; however, patient had covid vaccination day before he had PET which could be the etiology  - CT Neck was negative  - US really did not elucidate any LN to biopsy  - discussed with patient and his wife and the are in agreement with just getting repeat PET in FEb 2022    3/29/2022:  - he saw Dr lopez recently with derm  - recent PEt on 2/14/2022 negative     (2) Hypercholesterolemia     (3) Hx/of TIA in 1999     (4) BPH     (5) KATHY     (6) Pulmonary nodules - no tobacco history; monitored via CT scans with last one in Jan 2017     (7) Hx/of colon polyps     (8) Rosacea     (9) Arthritis and DJD - knee    (10) Mild anemia     VISIT DIAGNOSES:      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 Follow-up with rad/onc as directed by them (July 2022)  2. rescan with PEt again in Aug 2022  3. Continue with regular dermatologic surveillance (July 2022)  4. discussed consideration for [facility] evaluation at some point in future if ever needed  5. Check up to date labs every 3-6 months  6. F/u with derm, PCP    RTC in 6 months  Fax note to  Nory Lopez; Niecy/Cathy Varela    Discussion:     Pathology Discussion:     I reviewed and discussed the pathology report(s) and radiograph reports (if available) in as simple to understand and/or laymen's terms to the best of my ability. I had an indepth conversation with the patient and went over the patient's individual diagnosis based on the information that was currently available. I discussed the TNM staging process with regard to the patient's particular cancer type, and the calculated stage based on the currently available TNM data and literature. I discussed the available prognostic data with regard to the current staging information and how it relates to the prognosis of their  "particular neoplastic process.          NCCN Guidelines:     I discussed the available treatment option(s) in accordance with the latest literature from the NCCN Clinical Practice Guidelines for the patient's particular type of cancer disorder. The NCCN Guidelines provide a "document evidence-based (and) consensus-driven management" of the care of oncology patients. The treatment recommendations were made not only in accordance to the NCCN guidelines, but also factored in to account the patient's overall age, condition, performance status and their medical co-morbidities. I went over the risks and benefits of the the treatment options (if any could be made) with regard to their particular cancer type, their cancer stage, their age, and their co-morbidities.      COVID-19 Discussion:     I had long discussion with patient and any applicable family about the COVID-19 coronavirus epidemic and the recommended precautions with regard to cancer and/or hematology patients. I have re-iterated the CDC recommendations for adequate hand washing, use of hand -like products, and coughing into elbow, etc. In addition, especially for our patients who are on chemotherapy and/or our otherwise immunocompromised patients, I have recommended avoidance of crowds, including movie theaters, restaurants, churches, etc. I have recommended avoidance of any sick or symptomatic family members and/or friends. I have also recommended avoidance of any raw and unwashed food products, and general avoidance of food items that have not been prepared by themselves. The patient has been asked to call us immediately with any symptom developments, issues, questions or other general concerns.         I spent over 25 mins of time with the patient. Reviewed results of the recently ordered labs, tests and studies; made directives with regards to the results. Over half of this time was spent couseling and coordinating care.    I have explained all of " the above in detail and the patient understands all of the current recommendation(s). I have answered all of their questions to the best of my ability and to their complete satisfaction.   The patient is to continue with the current management plan.            Electronically signed by Mehdi Alvarez MD                   Answers for HPI/ROS submitted by the patient on 3/26/2022  appetite change : No  unexpected weight change: No  mouth sores: No  visual disturbance: No  cough: No  shortness of breath: No  chest pain: No  abdominal pain: No  diarrhea: No  frequency: No  back pain: No  rash: No  headaches: No  adenopathy: No  nervous/ anxious: No

## 2022-03-29 ENCOUNTER — OFFICE VISIT (OUTPATIENT)
Dept: HEMATOLOGY/ONCOLOGY | Facility: CLINIC | Age: 87
End: 2022-03-29
Payer: MEDICARE

## 2022-03-29 VITALS
HEIGHT: 70 IN | WEIGHT: 189 LBS | TEMPERATURE: 98 F | BODY MASS INDEX: 27.06 KG/M2 | RESPIRATION RATE: 18 BRPM | DIASTOLIC BLOOD PRESSURE: 84 MMHG | HEART RATE: 60 BPM | SYSTOLIC BLOOD PRESSURE: 147 MMHG

## 2022-03-29 DIAGNOSIS — C82.61 CUTANEOUS FOLLICLE CENTER LYMPHOMA INVOLVING LYMPH NODES OF HEAD: Primary | ICD-10-CM

## 2022-03-29 PROCEDURE — 99214 PR OFFICE/OUTPT VISIT, EST, LEVL IV, 30-39 MIN: ICD-10-PCS | Mod: S$GLB,,, | Performed by: INTERNAL MEDICINE

## 2022-03-29 PROCEDURE — 99214 OFFICE O/P EST MOD 30 MIN: CPT | Mod: S$GLB,,, | Performed by: INTERNAL MEDICINE

## 2022-03-31 ENCOUNTER — EXTERNAL CHRONIC CARE MANAGEMENT (OUTPATIENT)
Dept: PRIMARY CARE CLINIC | Facility: CLINIC | Age: 87
End: 2022-03-31
Payer: MEDICARE

## 2022-03-31 PROCEDURE — 99490 CHRNC CARE MGMT STAFF 1ST 20: CPT | Mod: S$PBB,,, | Performed by: INTERNAL MEDICINE

## 2022-03-31 PROCEDURE — 99490 CHRNC CARE MGMT STAFF 1ST 20: CPT | Mod: PBBFAC | Performed by: INTERNAL MEDICINE

## 2022-03-31 PROCEDURE — 99490 PR CHRONIC CARE MGMT, 1ST 20 MIN: ICD-10-PCS | Mod: S$PBB,,, | Performed by: INTERNAL MEDICINE

## 2022-04-14 ENCOUNTER — TELEPHONE (OUTPATIENT)
Dept: ADMINISTRATIVE | Facility: CLINIC | Age: 87
End: 2022-04-14
Payer: MEDICARE

## 2022-04-18 ENCOUNTER — TELEPHONE (OUTPATIENT)
Dept: ADMINISTRATIVE | Facility: CLINIC | Age: 87
End: 2022-04-18
Payer: MEDICARE

## 2022-04-30 ENCOUNTER — EXTERNAL CHRONIC CARE MANAGEMENT (OUTPATIENT)
Dept: PRIMARY CARE CLINIC | Facility: CLINIC | Age: 87
End: 2022-04-30
Payer: MEDICARE

## 2022-04-30 PROCEDURE — 99490 CHRNC CARE MGMT STAFF 1ST 20: CPT | Mod: S$PBB,,, | Performed by: INTERNAL MEDICINE

## 2022-04-30 PROCEDURE — 99490 CHRNC CARE MGMT STAFF 1ST 20: CPT | Mod: PBBFAC | Performed by: INTERNAL MEDICINE

## 2022-04-30 PROCEDURE — 99490 PR CHRONIC CARE MGMT, 1ST 20 MIN: ICD-10-PCS | Mod: S$PBB,,, | Performed by: INTERNAL MEDICINE

## 2022-05-03 ENCOUNTER — OFFICE VISIT (OUTPATIENT)
Dept: FAMILY MEDICINE | Facility: CLINIC | Age: 87
End: 2022-05-03
Payer: MEDICARE

## 2022-05-03 VITALS
WEIGHT: 188.25 LBS | TEMPERATURE: 98 F | OXYGEN SATURATION: 97 % | DIASTOLIC BLOOD PRESSURE: 72 MMHG | SYSTOLIC BLOOD PRESSURE: 134 MMHG | HEART RATE: 54 BPM | BODY MASS INDEX: 26.95 KG/M2 | HEIGHT: 70 IN

## 2022-05-03 DIAGNOSIS — I70.0 CALCIFICATION OF AORTA: ICD-10-CM

## 2022-05-03 DIAGNOSIS — Z00.00 ENCOUNTER FOR PREVENTIVE HEALTH EXAMINATION: Primary | ICD-10-CM

## 2022-05-03 DIAGNOSIS — M46.1 SACROILIITIS: ICD-10-CM

## 2022-05-03 DIAGNOSIS — C82.61 CUTANEOUS FOLLICLE CENTER LYMPHOMA INVOLVING LYMPH NODES OF HEAD: ICD-10-CM

## 2022-05-03 PROCEDURE — 99999 PR PBB SHADOW E&M-EST. PATIENT-LVL V: CPT | Mod: PBBFAC,,, | Performed by: NURSE PRACTITIONER

## 2022-05-03 PROCEDURE — 99215 OFFICE O/P EST HI 40 MIN: CPT | Mod: PBBFAC,PO | Performed by: NURSE PRACTITIONER

## 2022-05-03 PROCEDURE — 99999 PR PBB SHADOW E&M-EST. PATIENT-LVL V: ICD-10-PCS | Mod: PBBFAC,,, | Performed by: NURSE PRACTITIONER

## 2022-05-03 PROCEDURE — G0439 PR MEDICARE ANNUAL WELLNESS SUBSEQUENT VISIT: ICD-10-PCS | Mod: ,,, | Performed by: NURSE PRACTITIONER

## 2022-05-03 PROCEDURE — G0439 PPPS, SUBSEQ VISIT: HCPCS | Mod: ,,, | Performed by: NURSE PRACTITIONER

## 2022-05-03 RX ORDER — LORATADINE 10 MG/1
10 TABLET ORAL
COMMUNITY
Start: 2022-04-21 | End: 2022-12-12

## 2022-05-03 NOTE — PATIENT INSTRUCTIONS
Counseling and Referral of Other Preventative  (Italic type indicates deductible and co-insurance are waived)    Patient Name: Bryson Kellogg  Today's Date: 5/3/2022    Health Maintenance       Date Due Completion Date    Pneumococcal Vaccines (Age 65+) (3 - PPSV23 or PCV20) 12/18/2018 10/12/2015    Colonoscopy 11/03/2021 11/3/2016    COVID-19 Vaccine (4 - Booster for Moderna series) 12/23/2021 8/23/2021    Lipid Panel 06/14/2026 6/14/2021    TETANUS VACCINE 08/01/2026 8/1/2016        No orders of the defined types were placed in this encounter.    The following information is provided to all patients.  This information is to help you find resources for any of the problems found today that may be affecting your health:                Living healthy guide: www.[facility].[place].gov      Understanding Diabetes: www.diabetes.org      Eating healthy: www.cdc.gov/healthyweight      [facility] home safety checklist: www.cdc.gov/steadi/patient.html      Agency on Aging: www.goea.[place].[facility]      Alcoholics anonymous (AA): www.aa.org      Physical Activity: www.nellie.nih.gov/sc6ussc      Tobacco use: www.quitwithusla.org

## 2022-05-03 NOTE — PROGRESS NOTES
"  Bryson Kellogg presented for a  Medicare AWV and comprehensive Health Risk Assessment today. The following components were reviewed and updated:    · Medical history  · Family History  · Social history  · Allergies and Current Medications  · Health Risk Assessment  · Health Maintenance  · Care Team         ** See Completed Assessments for Annual Wellness Visit within the encounter summary.**         The following assessments were completed:  · Living Situation  · CAGE  · Depression Screening  · Timed Get Up and Go  · Whisper Test  · Cognitive Function Screening  · Nutrition Screening  · ADL Screening  · PAQ Screening            Vitals:    05/03/22 1058   BP: 134/72   Pulse: (!) 54   Temp: 97.8 °F (36.6 °C)   SpO2: 97%   Weight: 85.4 kg (188 lb 4.4 oz)   Height: 5' 10" (1.778 m)     Body mass index is 27.01 kg/m².  Physical Exam  Constitutional:       Appearance: He is well-developed.   HENT:      Head: Normocephalic and atraumatic.      Right Ear: Hearing normal.      Left Ear: Hearing normal.      Nose: Nose normal.   Eyes:      General: Lids are normal.      Conjunctiva/sclera: Conjunctivae normal.      Pupils: Pupils are equal, round, and reactive to light.   Cardiovascular:      Rate and Rhythm: Normal rate.   Pulmonary:      Effort: Pulmonary effort is normal.   Abdominal:      Palpations: Abdomen is soft.   Musculoskeletal:         General: Normal range of motion.      Cervical back: Normal range of motion and neck supple.   Skin:     General: Skin is warm and dry.   Neurological:      Mental Status: He is alert and oriented to person, place, and time.               Diagnoses and health risks identified today and associated recommendations/orders:    1. Encounter for preventive health examination  Discussed health maintenance guidelines appropriate for age.          2. 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 Alvarez       3. " Calcification of aorta  Stable, continue to monitor  Followed by pcp     4. Mixed hyperlipidemia  Controlled, continue current medication regimen  Patient taking statin  Followed by pcp        5. Abnormal hearing screen  Declined ENT at this time    6. Sacroiliitis  Stable, continue care and monitoring per pain management           Provided Bryson with a 5-10 year written screening schedule and personal prevention plan. Recommendations were developed using the USPSTF age appropriate recommendations. Education, counseling, and referrals were provided as needed. After Visit Summary printed and given to patient which includes a list of additional screenings\tests needed.    Follow up for One year for Annual Wellness Visit.    Brook Haynes, NP  I offered to discuss advanced care planning, including how to pick a person who would make decisions for you if you were unable to make them for yourself, called a health care power of , and what kind of decisions you might make such as use of life sustaining treatments such as ventilators and tube feeding when faced with a life limiting illness recorded on a living will that they will need to know. (How you want to be cared for as you near the end of your natural life)     X  Patient has advanced directives on file, which we reviewed, and they do not wish to make changes.

## 2022-05-11 ENCOUNTER — IMMUNIZATION (OUTPATIENT)
Dept: PRIMARY CARE CLINIC | Facility: CLINIC | Age: 87
End: 2022-05-11
Payer: MEDICARE

## 2022-05-11 DIAGNOSIS — Z23 NEED FOR VACCINATION: Primary | ICD-10-CM

## 2022-05-11 PROCEDURE — 91306 COVID-19, MRNA, LNP-S, PF, 100 MCG/0.25 ML DOSE VACCINE (MODERNA BOOSTER): ICD-10-PCS | Mod: S$GLB,,, | Performed by: FAMILY MEDICINE

## 2022-05-11 PROCEDURE — 0064A COVID-19, MRNA, LNP-S, PF, 100 MCG/0.25 ML DOSE VACCINE (MODERNA BOOSTER): ICD-10-PCS | Mod: S$GLB,,, | Performed by: FAMILY MEDICINE

## 2022-05-11 PROCEDURE — 91306 COVID-19, MRNA, LNP-S, PF, 100 MCG/0.25 ML DOSE VACCINE (MODERNA BOOSTER): CPT | Mod: S$GLB,,, | Performed by: FAMILY MEDICINE

## 2022-05-11 PROCEDURE — 0064A COVID-19, MRNA, LNP-S, PF, 100 MCG/0.25 ML DOSE VACCINE (MODERNA BOOSTER): CPT | Mod: S$GLB,,, | Performed by: FAMILY MEDICINE

## 2022-05-31 ENCOUNTER — EXTERNAL CHRONIC CARE MANAGEMENT (OUTPATIENT)
Dept: PRIMARY CARE CLINIC | Facility: CLINIC | Age: 87
End: 2022-05-31
Payer: MEDICARE

## 2022-05-31 PROCEDURE — 99490 CHRNC CARE MGMT STAFF 1ST 20: CPT | Mod: PBBFAC | Performed by: INTERNAL MEDICINE

## 2022-05-31 PROCEDURE — 99490 PR CHRONIC CARE MGMT, 1ST 20 MIN: ICD-10-PCS | Mod: S$PBB,,, | Performed by: INTERNAL MEDICINE

## 2022-05-31 PROCEDURE — 99490 CHRNC CARE MGMT STAFF 1ST 20: CPT | Mod: S$PBB,,, | Performed by: INTERNAL MEDICINE

## 2022-06-10 ENCOUNTER — TELEPHONE (OUTPATIENT)
Dept: INTERNAL MEDICINE | Facility: CLINIC | Age: 87
End: 2022-06-10
Payer: MEDICARE

## 2022-06-10 NOTE — TELEPHONE ENCOUNTER
----- Message from Jerri José sent at 6/10/2022 12:20 PM CDT -----  Contact: [phone] Sharyn(pt wife)  Caller is requesting to schedule their Lab appointment prior to annual appointment.  Order is not listed in EPIC.  Please enter order and contact patient to schedule.      Preferred Date and Time of Labs: 06/14/22 morning    Date of Annual Physical Appointment: 06/21/22    Where would they like the lab performed? [facility]    Would the patient rather a call back or a response via [facility]? Call back

## 2022-06-13 DIAGNOSIS — E78.2 MIXED HYPERLIPIDEMIA: ICD-10-CM

## 2022-06-13 DIAGNOSIS — R94.31 ABNORMAL ELECTROCARDIOGRAM (ECG) (EKG): ICD-10-CM

## 2022-06-13 DIAGNOSIS — R42 VERTIGO: Primary | ICD-10-CM

## 2022-06-14 ENCOUNTER — LAB VISIT (OUTPATIENT)
Dept: LAB | Facility: HOSPITAL | Age: 87
End: 2022-06-14
Attending: INTERNAL MEDICINE
Payer: MEDICARE

## 2022-06-14 DIAGNOSIS — C82.61 CUTANEOUS FOLLICLE CENTER LYMPHOMA INVOLVING LYMPH NODES OF HEAD: ICD-10-CM

## 2022-06-14 LAB
ALBUMIN SERPL BCP-MCNC: 3.6 G/DL (ref 3.5–5.2)
ALP SERPL-CCNC: 81 U/L (ref 55–135)
ALT SERPL W/O P-5'-P-CCNC: 20 U/L (ref 10–44)
ANION GAP SERPL CALC-SCNC: 10 MMOL/L (ref 8–16)
AST SERPL-CCNC: 26 U/L (ref 10–40)
BASOPHILS # BLD AUTO: 0.03 K/UL (ref 0–0.2)
BASOPHILS NFR BLD: 0.7 % (ref 0–1.9)
BILIRUB SERPL-MCNC: 1 MG/DL (ref 0.1–1)
BUN SERPL-MCNC: 24 MG/DL (ref 8–23)
CALCIUM SERPL-MCNC: 10.1 MG/DL (ref 8.7–10.5)
CHLORIDE SERPL-SCNC: 105 MMOL/L (ref 95–110)
CO2 SERPL-SCNC: 24 MMOL/L (ref 23–29)
CREAT SERPL-MCNC: 1 MG/DL (ref 0.5–1.4)
DIFFERENTIAL METHOD: ABNORMAL
EOSINOPHIL # BLD AUTO: 0.2 K/UL (ref 0–0.5)
EOSINOPHIL NFR BLD: 4.9 % (ref 0–8)
ERYTHROCYTE [DISTWIDTH] IN BLOOD BY AUTOMATED COUNT: 13.8 % (ref 11.5–14.5)
EST. GFR  (AFRICAN AMERICAN): >60 ML/MIN/1.73 M^2
EST. GFR  (NON AFRICAN AMERICAN): >60 ML/MIN/1.73 M^2
GLUCOSE SERPL-MCNC: 85 MG/DL (ref 70–110)
HCT VFR BLD AUTO: 39.9 % (ref 40–54)
HGB BLD-MCNC: 13.2 G/DL (ref 14–18)
IMM GRANULOCYTES # BLD AUTO: 0.02 K/UL (ref 0–0.04)
IMM GRANULOCYTES NFR BLD AUTO: 0.4 % (ref 0–0.5)
LYMPHOCYTES # BLD AUTO: 1.4 K/UL (ref 1–4.8)
LYMPHOCYTES NFR BLD: 31.9 % (ref 18–48)
MCH RBC QN AUTO: 33.3 PG (ref 27–31)
MCHC RBC AUTO-ENTMCNC: 33.1 G/DL (ref 32–36)
MCV RBC AUTO: 101 FL (ref 82–98)
MONOCYTES # BLD AUTO: 0.5 K/UL (ref 0.3–1)
MONOCYTES NFR BLD: 11.3 % (ref 4–15)
NEUTROPHILS # BLD AUTO: 2.3 K/UL (ref 1.8–7.7)
NEUTROPHILS NFR BLD: 50.8 % (ref 38–73)
NRBC BLD-RTO: 0 /100 WBC
PLATELET # BLD AUTO: 149 K/UL (ref 150–450)
PMV BLD AUTO: 11.6 FL (ref 9.2–12.9)
POTASSIUM SERPL-SCNC: 4.6 MMOL/L (ref 3.5–5.1)
PROT SERPL-MCNC: 6.5 G/DL (ref 6–8.4)
RBC # BLD AUTO: 3.96 M/UL (ref 4.6–6.2)
SODIUM SERPL-SCNC: 139 MMOL/L (ref 136–145)
WBC # BLD AUTO: 4.51 K/UL (ref 3.9–12.7)

## 2022-06-14 PROCEDURE — 36415 COLL VENOUS BLD VENIPUNCTURE: CPT | Mod: PO | Performed by: INTERNAL MEDICINE

## 2022-06-14 PROCEDURE — 80053 COMPREHEN METABOLIC PANEL: CPT | Performed by: INTERNAL MEDICINE

## 2022-06-14 PROCEDURE — 85025 COMPLETE CBC W/AUTO DIFF WBC: CPT | Performed by: INTERNAL MEDICINE

## 2022-06-15 ENCOUNTER — TELEPHONE (OUTPATIENT)
Dept: INTERNAL MEDICINE | Facility: CLINIC | Age: 87
End: 2022-06-15
Payer: MEDICARE

## 2022-06-15 NOTE — TELEPHONE ENCOUNTER
----- Message from Naina Valente sent at 6/15/2022 11:14 AM CDT -----  Contact: PT spouse Pt [phone]  Pt spouse calling to speak with the nurse because pt did his labs  yesterday 6/14 but it has a different provider attach to it? She would like to make sure that's okay and to see why the other test that he usually dose is not on there? Please call to advise

## 2022-06-15 NOTE — TELEPHONE ENCOUNTER
Notified pt wife that orders were from another provider but it is still viewable in my chart for Dr. Cueto to see. Advised the pt wife to contact Dr. Alvarez's office to make sure labs aren't needed prior to visit in September since lab orders were used from him.    Aleah WEINER

## 2022-06-17 ENCOUNTER — TELEPHONE (OUTPATIENT)
Dept: CARDIOLOGY | Facility: CLINIC | Age: 87
End: 2022-06-17
Payer: MEDICARE

## 2022-06-17 NOTE — TELEPHONE ENCOUNTER
Informed pt he was cancelled due to testing not done. Pt understood but did want another appt after testing.

## 2022-06-17 NOTE — TELEPHONE ENCOUNTER
geraldinem canceling his   6/20 appt with number asking pt to call back to r/s and we can also give him the number to schedule his test that got post poned

## 2022-06-17 NOTE — TELEPHONE ENCOUNTER
----- Message from Alice Johnson sent at 6/17/2022 11:53 AM CDT -----  Contact: pt  Type: Needs Medical Advice         Who Called: pt wife Sharyn Albert Call Back Number:[phone]  Additional Information: Requesting a call back regarding pt wife is asking for office to call her back about why his appt was cancelled and when he can be seen.   Please Advise- Thank you

## 2022-06-20 ENCOUNTER — PATIENT MESSAGE (OUTPATIENT)
Dept: CARDIOLOGY | Facility: CLINIC | Age: 87
End: 2022-06-20
Payer: MEDICARE

## 2022-06-21 ENCOUNTER — OFFICE VISIT (OUTPATIENT)
Dept: INTERNAL MEDICINE | Facility: CLINIC | Age: 87
End: 2022-06-21
Payer: MEDICARE

## 2022-06-21 VITALS
OXYGEN SATURATION: 98 % | HEIGHT: 70 IN | SYSTOLIC BLOOD PRESSURE: 130 MMHG | HEART RATE: 70 BPM | BODY MASS INDEX: 27.15 KG/M2 | DIASTOLIC BLOOD PRESSURE: 70 MMHG | WEIGHT: 189.63 LBS

## 2022-06-21 DIAGNOSIS — N40.0 BENIGN PROSTATIC HYPERPLASIA WITHOUT LOWER URINARY TRACT SYMPTOMS: Primary | ICD-10-CM

## 2022-06-21 DIAGNOSIS — C82.61 CUTANEOUS FOLLICLE CENTER LYMPHOMA INVOLVING LYMPH NODES OF HEAD: ICD-10-CM

## 2022-06-21 DIAGNOSIS — R91.1 PULMONARY NODULE: ICD-10-CM

## 2022-06-21 DIAGNOSIS — Z12.5 SCREENING PSA (PROSTATE SPECIFIC ANTIGEN): ICD-10-CM

## 2022-06-21 DIAGNOSIS — G47.33 OBSTRUCTIVE SLEEP APNEA SYNDROME: ICD-10-CM

## 2022-06-21 DIAGNOSIS — E78.2 MIXED HYPERLIPIDEMIA: ICD-10-CM

## 2022-06-21 PROCEDURE — 99999 PR PBB SHADOW E&M-EST. PATIENT-LVL III: CPT | Mod: PBBFAC,,, | Performed by: INTERNAL MEDICINE

## 2022-06-21 PROCEDURE — 99213 OFFICE O/P EST LOW 20 MIN: CPT | Mod: PBBFAC | Performed by: INTERNAL MEDICINE

## 2022-06-21 PROCEDURE — 99214 PR OFFICE/OUTPT VISIT, EST, LEVL IV, 30-39 MIN: ICD-10-PCS | Mod: S$PBB,,, | Performed by: INTERNAL MEDICINE

## 2022-06-21 PROCEDURE — 99214 OFFICE O/P EST MOD 30 MIN: CPT | Mod: S$PBB,,, | Performed by: INTERNAL MEDICINE

## 2022-06-21 PROCEDURE — 99999 PR PBB SHADOW E&M-EST. PATIENT-LVL III: ICD-10-PCS | Mod: PBBFAC,,, | Performed by: INTERNAL MEDICINE

## 2022-06-21 RX ORDER — ATORVASTATIN CALCIUM 20 MG/1
20 TABLET, FILM COATED ORAL DAILY
Qty: 90 TABLET | Refills: 3 | Status: SHIPPED | OUTPATIENT
Start: 2022-06-21 | End: 2022-12-12 | Stop reason: SDUPTHER

## 2022-06-21 NOTE — PROGRESS NOTES
He is a 86 year-old gentleman coming in today to follow up ongoing medical   problems.      1. He had  Lumbar stenosis.  It was causing pain down his right leg.  He did PT and had injections ( last 2-3 weeks ago)   Then he had a operation in April or June 2020 in [place], [place].  Surgery was successful and he has not back pain anymore.  He is avoiding lifting heavy weights.   He is was doing PT   trying to strengthen his right leg.  He walks about 3 miles a day.            2. He has a history of colon polyps, which his last colonoscopy was   11/2016. . He had one polyp  And it was hyperplastic and he does not need to follow up.  He has been  Using miralax and it has been helping.          3. Hyperlipidemia. He is on Lipitor 40mg . Recent cholesterol shows total   cholesterol 139, HDL 55 , triglycerides 101, and LDL 64, which is stable  from last time. He is tolerating the Lipitor 10 mg well.     4. He has a BPH, which he says has been dong well. He has 1 episode of nacturia  Around 2-3 am. . NO hesitancy or urgency.     5. cutaneous follicular lymphoma dx in August of 2020.   :Dr. 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  BCL2 and BCL6 staining patterns are confirmatory He previously saw Dr Lieberman with rad/onc and they completed XRT with 22 total on 9/29/2020.  He had PET scan on 8/24/2020 with no evidence of a systemic lymphoma process. The area was on the Left side of face.  He saw Derm again in Jan 2022  and saw Oncology in March.             6. KATHY: results from 4/09 study. SEVERE obstructive sleep apnea syndrome with 289 respiratory obstructions/RDI 47 with snoring moderate intermittent and hypersomnia with CPAP. He tried Bipap for about a year. He was seeing a Sleep MD in [facility], but could not tolerated CPAP or BiPAP. HE has been exercising and sleeping well. NO day time fatigue. No witnessed sleep apnea or  "snoring.     7.  Pulmonary nodules. He had a CT scan of his lung 2 times this year to review the pulmonary nodules the last one in 1/2017 was unremarkable. no weight loss or resp problems reported. He has never smoked.     SOCIAL HISTORY: He is retired from [employer]. He is . No   alcohol use. No drug use. Lives in [place]. Lives with wife and they are active and travel a lot.  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      REVIEW OF SYSTEMS:   Gen - no fatigue weight  is stable since last visit.  He is still walking 3 miles a day (wants to get up to 5 miles a day   He has been eating out  Less with the pandemic.     Eyes - no eye pain or visual changes  ENT - no hoarseness or sore throat  CV - no CP or SOB  Pulm - no cough or wheezing  GI - no N/V/D   no dysuria or incontinence  MS - no joint pain or muscle pain  Skin - no rash, or c/o of skin lesions  Neuro - no HA, dizziness  Heme - no abnormal bleeding or bruising  Endo - no polydipsia, or temperature changes  Psych - no anxiety or depression    PHYSICAL EXAMINATION: He is a well-appearing gentleman in no acute   Distress. Walking without any difficulty or without assistance device.    /79 (BP Location: Left arm, Patient Position: Sitting, BP Method: Large (Manual))   Pulse 66   Ht 5' 10" (1.778 m)   Wt 83.1 kg (183 lb 3.2 oz)   SpO2 99%   BMI 26.29 kg/m²      Ear canals are open. TMs are   clear. Oropharynx is clear. Pupils equal, round, and reactive to light   and accommodation. He is wearing glasses.   NECK: Supple. Thyroid is not enlarged. No carotid bruits. He has no   cervical, axillary, or inguinal lymphadenopathy detected.   CHEST: Clear without wheeze.   CARDIOVASCULAR: S1, S2, regular rate and rhythm without murmur, gallop,   or rub.   ABDOMEN: Soft, nontender. No hepatosplenomegaly. No guarding or rebound   tenderness. Abdominal aorta is not enlarged.   He has normal biceps, triceps, patellar deep tendon reflexes. Normal   muscle " "strength, upper and lower extremities.    :Her has no supicous sking lesions  He is wearing a fitbit on his left wrist.          1. History of colon polyps.- last note said he did not need a follow up c-scope-- we will discuss next year.  2. History of hyperlipidemia with history of TIA-- I am unsure about the TIA diagnosis. Did not get a lipid panel this visit.   3. History of rosacea. stable   4 pulmonary nodules-- ct chest reviewed  -- no need for follow up. -- Reviewed his recent PET scan   5. Sleep apnea- off CPAP and BIPAP-- does not want to "fool  with it any more"- says he is sleeping well.  Less snoring since weight loss.    6. cutaneous follicular lymphoma-- following with Hem and derm.      7. Stumbles walking across the floor-- working with pt-- discussed fall prevention or using a cane or walker.  Might benefit from some PT.   Discussed. MRI results-- Multilevel degenerative disc and facet changes associated with levoscoliosis. There is degenerative anterolisthesis at L4-5 and L5-S1. There is resultant multilevel central canal and foraminal narrowing. Of particular note is severe left foraminal narrowing at L4-5 with the protruded disc margin impinging the left L4 dorsal root ganglion. Correlate with corresponding left L4 radicular symptoms.     Right side is weaker, but this may be from previous damage pre- surgery.  Reviewed Neurology;s note.    8. Asked for PSA next time-- discussed.         "

## 2022-06-24 ENCOUNTER — TELEPHONE (OUTPATIENT)
Dept: CARDIOLOGY | Facility: HOSPITAL | Age: 87
End: 2022-06-24

## 2022-06-24 NOTE — TELEPHONE ENCOUNTER
Patient advised, test will be at [facility] ([address]   Will need to register on the first floor at the main entrance.   Patient advised that arrival time is 6:40am.  Patient advised that he may be here about 3.5-4 hours, and may want to bring something to occupy their time, as there will be periods of waiting.    Patient advised, may take his medications prior to testing if you need to.  Advised if he needs to eat to take his medications, please keep it light, like toast and juice.    Patient advised to avoid all caffeine 12 hours prior to testing.  This includes decaf tea and coffee.    Wear comfortable clothing.   No lotions, oils, or powders to the upper chest area. May wear deodorant.    No metal jewelry, buttons, or zippers to the upper body.  Patient verbalizes understanding of instructions.

## 2022-06-27 ENCOUNTER — HOSPITAL ENCOUNTER (OUTPATIENT)
Dept: RADIOLOGY | Facility: HOSPITAL | Age: 87
Discharge: HOME | End: 2022-06-27
Attending: INTERNAL MEDICINE
Payer: MEDICARE

## 2022-06-27 ENCOUNTER — HOSPITAL ENCOUNTER (OUTPATIENT)
Dept: CARDIOLOGY | Facility: HOSPITAL | Age: 87
Discharge: HOME | End: 2022-06-27
Attending: INTERNAL MEDICINE
Payer: MEDICARE

## 2022-06-27 VITALS — HEIGHT: 70 IN | WEIGHT: 189 LBS | BODY MASS INDEX: 27.06 KG/M2

## 2022-06-27 DIAGNOSIS — R42 VERTIGO: ICD-10-CM

## 2022-06-27 DIAGNOSIS — E78.2 MIXED HYPERLIPIDEMIA: ICD-10-CM

## 2022-06-27 DIAGNOSIS — R94.31 ABNORMAL EKG: ICD-10-CM

## 2022-06-27 PROCEDURE — 93306 TTE W/DOPPLER COMPLETE: CPT | Mod: 26,,, | Performed by: INTERNAL MEDICINE

## 2022-06-27 PROCEDURE — 78452 STRESS TEST WITH MYOCARDIAL PERFUSION (CUPID ONLY): ICD-10-PCS | Mod: 26,,, | Performed by: INTERNAL MEDICINE

## 2022-06-27 PROCEDURE — 93306 ECHO (CUPID ONLY): ICD-10-PCS | Mod: 26,,, | Performed by: INTERNAL MEDICINE

## 2022-06-27 PROCEDURE — 93018 STRESS TEST WITH MYOCARDIAL PERFUSION (CUPID ONLY): ICD-10-PCS | Mod: ,,, | Performed by: INTERNAL MEDICINE

## 2022-06-27 PROCEDURE — 93018 CV STRESS TEST I&R ONLY: CPT | Mod: ,,, | Performed by: INTERNAL MEDICINE

## 2022-06-27 PROCEDURE — 78452 HT MUSCLE IMAGE SPECT MULT: CPT | Mod: 26,,, | Performed by: INTERNAL MEDICINE

## 2022-06-27 PROCEDURE — 93306 TTE W/DOPPLER COMPLETE: CPT

## 2022-06-27 PROCEDURE — 93016 CV STRESS TEST SUPVJ ONLY: CPT | Mod: ,,, | Performed by: INTERNAL MEDICINE

## 2022-06-27 PROCEDURE — 93016 STRESS TEST WITH MYOCARDIAL PERFUSION (CUPID ONLY): ICD-10-PCS | Mod: ,,, | Performed by: INTERNAL MEDICINE

## 2022-06-27 PROCEDURE — 93017 CV STRESS TEST TRACING ONLY: CPT

## 2022-06-27 PROCEDURE — A9502 TC99M TETROFOSMIN: HCPCS

## 2022-06-27 RX ORDER — REGADENOSON 0.08 MG/ML
0.4 INJECTION, SOLUTION INTRAVENOUS ONCE
Status: DISCONTINUED | OUTPATIENT
Start: 2022-06-27 | End: 2022-06-28 | Stop reason: HOSPADM

## 2022-06-28 ENCOUNTER — TELEPHONE (OUTPATIENT)
Dept: INTERNAL MEDICINE | Facility: CLINIC | Age: 87
End: 2022-06-28
Payer: MEDICARE

## 2022-06-28 NOTE — TELEPHONE ENCOUNTER
----- Message from Nidia Vazquez sent at 6/28/2022 10:13 AM CDT -----  Contact: [phone]  Darnell with Physical therapy is calling they will be sending a fax over for the pt to an AOS and they are requesting the dr to sign the paper and send back and if the dr agrees to write the order stating that the pt needs the ASO please advise and give return call     Fax [phone]

## 2022-06-30 ENCOUNTER — EXTERNAL CHRONIC CARE MANAGEMENT (OUTPATIENT)
Dept: PRIMARY CARE CLINIC | Facility: CLINIC | Age: 87
End: 2022-06-30
Payer: MEDICARE

## 2022-06-30 PROCEDURE — 99490 CHRNC CARE MGMT STAFF 1ST 20: CPT | Mod: PBBFAC | Performed by: INTERNAL MEDICINE

## 2022-06-30 PROCEDURE — 99490 PR CHRONIC CARE MGMT, 1ST 20 MIN: ICD-10-PCS | Mod: S$PBB,,, | Performed by: INTERNAL MEDICINE

## 2022-06-30 PROCEDURE — 99490 CHRNC CARE MGMT STAFF 1ST 20: CPT | Mod: S$PBB,,, | Performed by: INTERNAL MEDICINE

## 2022-07-01 LAB
CV STRESS BASE HR: 52 BPM
DIASTOLIC BLOOD PRESSURE: 80 MMHG
EJECTION FRACTION- HIGH: 59 %
END DIASTOLIC INDEX-HIGH: 155 ML/M2
END DIASTOLIC INDEX-LOW: 91 ML/M2
END SYSTOLIC INDEX-HIGH: 78 ML/M2
END SYSTOLIC INDEX-LOW: 40 ML/M2
NUC STRESS DIASTOLIC VOLUME INDEX: 61
NUC STRESS EJECTION FRACTION: 81 %
NUC STRESS SYSTOLIC VOLUME INDEX: 12
OHS CV CPX 1 MINUTE RECOVERY HEART RATE: 114 BPM
OHS CV CPX 85 PERCENT MAX PREDICTED HEART RATE MALE: 115
OHS CV CPX ESTIMATED METS: 5
OHS CV CPX MAX PREDICTED HEART RATE: 135
OHS CV CPX PATIENT IS FEMALE: 0
OHS CV CPX PATIENT IS MALE: 1
OHS CV CPX PEAK DIASTOLIC BLOOD PRESSURE: 92 MMHG
OHS CV CPX PEAK HEAR RATE: 121 BPM
OHS CV CPX PEAK RATE PRESSURE PRODUCT: NORMAL
OHS CV CPX PEAK SYSTOLIC BLOOD PRESSURE: 152 MMHG
OHS CV CPX PERCENT MAX PREDICTED HEART RATE ACHIEVED: 90
OHS CV CPX RATE PRESSURE PRODUCT PRESENTING: 6864
RETIRED EF AND QEF - SEE NOTES: 47 %
STRESS ECHO POST EXERCISE DUR MIN: 4 MINUTES
STRESS ECHO POST EXERCISE DUR SEC: 9 SECONDS
STRESS ST DEPRESSION: 0.7 MM
SYSTOLIC BLOOD PRESSURE: 132 MMHG

## 2022-07-07 ENCOUNTER — OFFICE VISIT (OUTPATIENT)
Dept: CARDIOLOGY | Facility: CLINIC | Age: 87
End: 2022-07-07
Payer: MEDICARE

## 2022-07-07 VITALS
HEIGHT: 70 IN | SYSTOLIC BLOOD PRESSURE: 132 MMHG | HEART RATE: 57 BPM | RESPIRATION RATE: 16 BRPM | WEIGHT: 187 LBS | OXYGEN SATURATION: 98 % | BODY MASS INDEX: 26.77 KG/M2 | DIASTOLIC BLOOD PRESSURE: 78 MMHG

## 2022-07-07 DIAGNOSIS — R94.39 ABNORMAL FINDING ON CARDIOVASCULAR STRESS TEST: Primary | ICD-10-CM

## 2022-07-07 DIAGNOSIS — E78.2 MIXED HYPERLIPIDEMIA: ICD-10-CM

## 2022-07-07 DIAGNOSIS — R94.31 ABNORMAL ELECTROCARDIOGRAM (ECG) (EKG): ICD-10-CM

## 2022-07-07 DIAGNOSIS — R03.0 PRE-HYPERTENSION: ICD-10-CM

## 2022-07-07 LAB
AV INDEX (PROSTH): 0.84
AV MEAN GRADIENT: 3 MMHG
AV PEAK GRADIENT: 7 MMHG
AV VALVE AREA: 2.89 CM2
AV VELOCITY RATIO: 0.85
BSA FOR ECHO PROCEDURE: 2.06 M2
CV ECHO LV RWT: 0.82 CM
DOP CALC AO PEAK VEL: 1.3 M/S
DOP CALC AO VTI: 34.1 CM
DOP CALC LVOT AREA: 3.5 CM2
DOP CALC LVOT DIAMETER: 2.1 CM
DOP CALC LVOT PEAK VEL: 1.1 M/S
DOP CALC LVOT STROKE VOLUME: 98.66 CM3
DOP CALCLVOT PEAK VEL VTI: 28.5 CM
E WAVE DECELERATION TIME: 248 MSEC
ECHO LV POSTERIOR WALL: 1.21 CM (ref 0.6–1.1)
EJECTION FRACTION: 65 %
FRACTIONAL SHORTENING: 34 % (ref 28–44)
INTERVENTRICULAR SEPTUM: 1.21 CM (ref 0.6–1.1)
IVRT: 106 MSEC
LEFT ATRIUM SIZE: 3.8 CM
LEFT INTERNAL DIMENSION IN SYSTOLE: 1.95 CM (ref 2.1–4)
LEFT VENTRICLE DIASTOLIC VOLUME INDEX: 16.47 ML/M2
LEFT VENTRICLE DIASTOLIC VOLUME: 33.6 ML
LEFT VENTRICLE MASS INDEX: 53 G/M2
LEFT VENTRICLE SYSTOLIC VOLUME INDEX: 5.8 ML/M2
LEFT VENTRICLE SYSTOLIC VOLUME: 11.9 ML
LEFT VENTRICULAR INTERNAL DIMENSION IN DIASTOLE: 2.95 CM (ref 3.5–6)
LEFT VENTRICULAR MASS: 108.08 G
LVOT MG: 2 MMHG
LVOT MV: 0.69 CM/S
MV STENOSIS PRESSURE HALF TIME: 53 MS
MV VALVE AREA P 1/2 METHOD: 4.15 CM2
PISA TR MAX VEL: 2.3 M/S
RIGHT VENTRICULAR END-DIASTOLIC DIMENSION: 3.02 CM
TDI LATERAL: 0.07 M/S
TDI SEPTAL: 0.05 M/S
TDI: 0.06 M/S
TR MAX PG: 21 MMHG

## 2022-07-07 PROCEDURE — 99214 OFFICE O/P EST MOD 30 MIN: CPT | Mod: S$GLB,,, | Performed by: INTERNAL MEDICINE

## 2022-07-07 PROCEDURE — 99214 PR OFFICE/OUTPT VISIT, EST, LEVL IV, 30-39 MIN: ICD-10-PCS | Mod: S$GLB,,, | Performed by: INTERNAL MEDICINE

## 2022-07-07 NOTE — PROGRESS NOTES
Subjective:    Patient ID:  Brsyon Kellogg is a 87 y.o. male     Chief Complaint   Patient presents with    Results       HPI:  Mr Bryson Kellogg is a 87 y.o. male here for follow-up.  Patient recently had a stress test and echocardiogram.  Patient is doing well his breathing is good denies any shortness of breath or difficulty in breathing denies any chest pain or tightness or heaviness denies any dizziness or lightheadedness or loss of consciousness falls or head injury.  Patient has been taking all his medications regularly.      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 8/12/2020    Hx of colonic polyp 11/3/2016    Hyperlipidemia     Personal history of colonic polyps     Colon polyps    Renal stone 11/23/2012    Rosacea     Shingles     Sleep apnea     TIA (transient ischemic attack)      Past Surgical History:   Procedure Laterality Date    BACK SURGERY  05/2020    Lum/Kyle    CHOLECYSTECTOMY      COLONOSCOPY N/A 11/3/2016    Procedure: COLONOSCOPY;  Surgeon: Alex Cuellar MD;  Location: [facility];  Service: Endoscopy;  Laterality: N/A;    EYE SURGERY Bilateral 2014    cataracts    EYE SURGERY Right 10/2016    tear duct surgery    INJECTION OF ANESTHETIC AGENT AROUND LATERAL BRANCH NERVES OF SACROILIAC JOINT Left 3/5/2021    Procedure: left SI joint injection;  Surgeon: Pedro Varela MD;  Location: [facility] OR;  Service: Pain Management;  Laterality: Left;  left SI joint injection     INJECTION OF JOINT Left 11/30/2021    Procedure: Injection, Joint Sacroiliac and GTB;  Surgeon: Pedro Varela MD;  Location: [facility] OR;  Service: Pain Management;  Laterality: Left;    INJECTION OF JOINT Left 11/30/2021    Procedure: INJECTION, JOINT, SHOULDER, HIP, OR KNEE;  Surgeon: Pedro  DAINTA Varela MD;  Location[facility];  Service: Pain Management;  Laterality: Left;  GTB injestion    JOINT REPLACEMENT Left 2013    knee replacemant      left knee     Social History     Tobacco Use    Smoking status: Never Smoker    Smokeless tobacco: Never Used   Substance Use Topics    Alcohol use: No    Drug use: No     Family History   Problem Relation Age of Onset    No Known Problems Mother     Heart disease Father     Heart disease Brother     No Known Problems Sister     No Known Problems Daughter     No Known Problems Brother         Review of Systems:   Constitution: Negative for diaphoresis and fever.   HEENT: Negative for nosebleeds.    Cardiovascular: Negative for chest pain       No dyspnea on exertion       No leg swelling        No palpitations  Respiratory: Negative for shortness of breath and wheezing.    Hematologic/Lymphatic: Negative for bleeding problem. Does not bruise/bleed easily.   Skin: Negative for color change and rash.   Musculoskeletal: Negative for falls and myalgias.   Gastrointestinal: Negative for hematemesis and hematochezia.   Genitourinary: Negative for hematuria.   Neurological: Negative for dizziness and light-headedness.   Psychiatric/Behavioral: Negative for altered mental status and memory loss.          Objective:        Vitals:    07/07/22 0855   BP: 132/78   Pulse: (!) 57   Resp: 16       Lab Results   Component Value Date    WBC 4.51 06/14/2022    HGB 13.2 (L) 06/14/2022    HCT 39.9 (L) 06/14/2022     (L) 06/14/2022    CHOL 139 06/14/2021    TRIG 101 06/14/2021    HDL 55 06/14/2021    ALT 20 06/14/2022    AST 26 06/14/2022     06/14/2022    K 4.6 06/14/2022     06/14/2022    CREATININE 1.0 06/14/2022    BUN 24 (H) 06/14/2022    CO2 24 06/14/2022    TSH 1.420 11/19/2021    PSA 0.52 06/14/2021    INR 1.1 01/31/2021    HGBA1C 5.4 01/03/2018        ECHOCARDIOGRAM RESULTS  Results for orders placed during the hospital encounter of  06/27/22    Echo    Interpretation Summary  · The left ventricle is normal in size with concentric remodeling and normal systolic function.  · The estimated ejection fraction is 65%.  · Normal left ventricular diastolic function.  · Normal right ventricular size with normal right ventricular systolic function.        CURRENT/PREVIOUS VISIT EKG  Results for orders placed or performed in visit on 12/06/21   IN OFFICE EKG 12-LEAD (to [place])    Collection Time: 12/06/21  2:24 PM    Narrative    Test Reason : R94.31,    Vent. Rate : 057 BPM     Atrial Rate : 057 BPM     P-R Int : 172 ms          QRS Dur : 102 ms      QT Int : 422 ms       P-R-T Axes : 035 047 -01 degrees     QTc Int : 410 ms    Sinus bradycardia  Possible Anterior infarct ,age undetermined  T wave abnormality, consider inferior ischemia  Abnormal ECG  When compared with ECG of 26-NOV-2021 10:43,  T wave inversion now evident in Inferior leads  Confirmed by Dedrick Floyd MD ([id number]) on 1/15/2022 8:53:56 PM    Referred By:             Confirmed By:Dedrick Floyd MD     No valid procedures specified.   Results for orders placed during the hospital encounter of 06/27/22    Nuclear Stress - Cardiology Interpreted    Interpretation Summary    Equivocal myocardial perfusion scan.    There is a mild intensity, small sized, equivocal perfusion abnormality that is fixed in the inferobasilar wall(s). This finding is equivocal due to soft tissue shadow.    There are no other significant perfusion abnormalities.    There is a trivial to mild intensity fixed perfusion abnormality in the inferobasilar wall of the left ventricle, secondary to soft tissue attenuation.    The gated perfusion images showed an ejection fraction of 81% post stress. Normal ejection fraction is greater than 47%.    There is normal wall motion at rest and post stress.    LV cavity size is normal at rest and normal at stress.    The EKG portion of this study is negative for ischemia.     The patient reported no chest pain during the stress test.    The test was stopped because the patient experienced arrhythmia.    There were no arrhythmias during stress.      Physical Exam:  CONSTITUTIONAL: No fever, no chills  HEENT: Normocephalic, atraumatic,pupils reactive to light                 NECK:  No JVD no carotid bruit  CVS: S1S2+, RRR, faint systolic murmurs,   LUNGS: Clear  ABDOMEN: Soft, NT, BS+  EXTREMITIES: No cyanosis, edema  : No mcgill catheter  NEURO: AAO X 3  PSY: Normal affect      Medication List with Changes/Refills   Current Medications    ASCORBIC ACID, VITAMIN C, (VITAMIN C) 500 MG TABLET    Take 500 mg by mouth once daily.    ASPIRIN (ECOTRIN) 81 MG EC TABLET    Take 81 mg by mouth once daily.    ATORVASTATIN (LIPITOR) 20 MG TABLET    Take 1 tablet (20 mg total) by mouth once daily.    CO-ENZYME Q-10 50 MG CAPSULE    Take 50 mg by mouth once daily.    CYANOCOBALAMIN (VITAMIN B-12) 250 MCG TABLET    Take 250 mcg by mouth once daily.    LORATADINE (CLARITIN) 10 MG TABLET    Take 10 mg by mouth.    NAPROXEN SODIUM (ANAPROX) 220 MG TABLET    Take 220 mg by mouth 2 (two) times daily with meals.    PYRIDOXINE HCL (VITAMIN B-6 ORAL)    Take 1 tablet by mouth once daily.    TRAMADOL (ULTRAM) 50 MG TABLET    Take 50 mg by mouth every 6 (six) hours as needed for Pain.    VIT C/E/ZN/COPPR/LUTEIN/ZEAXAN (PRESERVISION AREDS-2 ORAL)    Take 1 tablet by mouth 2 (two) times a day.    VITAMIN D3 25 MCG (1,000 UNIT) CAPSULE    Take 1,000 Units by mouth once daily.             Assessment:       1. Abnormal finding on cardiovascular stress test    2. Mixed hyperlipidemia    3. Abnormal electrocardiogram (ECG) (EKG)    4. Pre-hypertension         Plan:   1. Discussed with patient in regards with his stress echo results.  He is got a small inferior basal wall fixed perfusion defect.  Has normal left ventricle systolic function and there were no diagnostic EKG changes during the stress test and did not  have any chest pain during the stress test.  2 discussed with patient the options which include cardiac catheterization coronary angiography versus exercise stress echo was is medical management.  Patient had a coronary angiography in the 90s when he went for a stress test and was found to have a perfusion defect at that time.  And he ended up having patent coronaries.  3. Patient's blood pressure is well controlled is 132/78 and has been fluctuating little bit.  Consideration to start a small dose of amlodipine 2.5 mg p.o. q.day.  4. Patient is currently on Lipitor 20 mg p.o. q.day continue the same and his total cholesterol is 139 HDL is 55 LDL is 63 and triglycerides of 101 continue the same dosage.  5. Patient to continue current management and if he should have any chest discomfort discussed with patient that he would need a coronary angiography.  And patient to seek medical attention and to come to the emergency room immediately.  6. I will see him back in the office in 6 months time.  7. His echocardiogram shows normal LV function normal RV function with an ejection fraction of 65% and no significant valvular dysfunction.        Problem List Items Addressed This Visit        Cardiac/Vascular    Hyperlipidemia      Other Visit Diagnoses     Abnormal finding on cardiovascular stress test    -  Primary    Abnormal electrocardiogram (ECG) (EKG)        Pre-hypertension              No follow-ups on file.

## 2022-07-26 ENCOUNTER — OFFICE VISIT (OUTPATIENT)
Dept: RADIATION ONCOLOGY | Facility: CLINIC | Age: 87
End: 2022-07-26
Payer: MEDICARE

## 2022-07-26 VITALS
HEART RATE: 74 BPM | OXYGEN SATURATION: 98 % | BODY MASS INDEX: 26.54 KG/M2 | RESPIRATION RATE: 16 BRPM | WEIGHT: 185 LBS | SYSTOLIC BLOOD PRESSURE: 113 MMHG | DIASTOLIC BLOOD PRESSURE: 70 MMHG

## 2022-07-26 DIAGNOSIS — C82.61 CUTANEOUS FOLLICLE CENTER LYMPHOMA INVOLVING LYMPH NODES OF HEAD: Primary | ICD-10-CM

## 2022-07-26 PROCEDURE — 99214 OFFICE O/P EST MOD 30 MIN: CPT | Mod: S$GLB,,, | Performed by: RADIOLOGY

## 2022-07-26 PROCEDURE — 99214 PR OFFICE/OUTPT VISIT, EST, LEVL IV, 30-39 MIN: ICD-10-PCS | Mod: S$GLB,,, | Performed by: RADIOLOGY

## 2022-07-26 NOTE — PROGRESS NOTES
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     HISTORY OF PRESENT ILLNESS:   87 y.o. male who presented with enlarging lesion of the left preauricular space x 3mos.  Sun exposure history includes being an .  Dr. 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  BCL2 and BCL6 staining patterns are confirmatory.  Patient was referred to Dr. Alvarez and PET-CT scan was (-). He is referred for radiation oncology opinion.     Patient ultimately completed definitive radiotherapy, 36 Gy in 20 fractions ending August 29, 2020.  Treatment was very well tolerated with minimal radiation dermatitis.    INTERVAL HISTORY:     Patient presents today without complaints.  He denies fever, chills, chest pain, shortness of breath, cough or hemoptysis.  He denies appetite or energy changes, weight loss, night sweats.  He denies new lumps or bumps.  He continues to see Rakesh Lopez and Kim regularly.    REVIEW OF SYSTEMS:  A complete review of systems was performed and the patient denies any acute concerns/changes other than per HPI    Past Medical History:   Diagnosis Date    Allergy     Bradycardia 1/4/2017    Cutaneous follicle center lymphoma involving lymph nodes of head 8/12/2020    Hx of colonic polyp 11/3/2016    Hyperlipidemia     Personal history of colonic polyps     Colon polyps    Renal stone 11/23/2012    Rosacea     Shingles     Sleep apnea     TIA (transient ischemic attack)      Past Surgical History:   Procedure Laterality Date    BACK SURGERY  05/2020    Lum/Kyle    CHOLECYSTECTOMY      COLONOSCOPY N/A 11/3/2016    Procedure: COLONOSCOPY;  Surgeon: Alex Cuellar MD;  Location: [facility];  Service: Endoscopy;  Laterality: N/A;    EYE SURGERY Bilateral 2014     cataracts    EYE SURGERY Right 10/2016    tear duct surgery    INJECTION OF ANESTHETIC AGENT AROUND LATERAL BRANCH NERVES OF SACROILIAC JOINT Left 3/5/2021    Procedure: left SI joint injection;  Surgeon: Pedro Varela MD;  Location: [facility] OR;  Service: Pain Management;  Laterality: Left;  left SI joint injection     INJECTION OF JOINT Left 11/30/2021    Procedure: Injection, Joint Sacroiliac and GTB;  Surgeon: Pedro Varela MD;  Location: [facility] OR;  Service: Pain Management;  Laterality: Left;    INJECTION OF JOINT Left 11/30/2021    Procedure: INJECTION, JOINT, SHOULDER, HIP, OR KNEE;  Surgeon: Pedro Varela MD;  Location: [facility] OR;  Service: Pain Management;  Laterality: Left;  GTB injestion    JOINT REPLACEMENT Left 2013    knee replacemant      left knee     Social History     Socioeconomic History    Marital status:      Spouse name: Sharyn   Tobacco Use    Smoking status: Never Smoker    Smokeless tobacco: Never Used   Substance and Sexual Activity    Alcohol use: No    Drug use: No    Sexual activity: Yes     Partners: Female     Social Determinants of Health     Financial Resource Strain: Low Risk     Difficulty of Paying Living Expenses: Not hard at all   Food Insecurity: No Food Insecurity    Worried About Running Out of Food in the Last Year: Never true    Ran Out of Food in the Last Year: Never true   Transportation Needs: No Transportation Needs    Lack of Transportation (Medical): No    Lack of Transportation (Non-Medical): No   Physical Activity: Sufficiently Active    Days of Exercise per Week: 7 days    Minutes of Exercise per Session: 30 min   Stress: No Stress Concern Present    Feeling of Stress : Not at all   Social Connections: Socially Integrated    Frequency of Communication with Friends and Family: More than three times a week    Frequency of Social Gatherings with Friends and Family: More than three times a week    Attends Quaker Services: More than 4 times per  year    Active Member of Clubs or Organizations: Yes    Attends Club or Organization Meetings: More than 4 times per year    Marital Status:    Housing Stability: Low Risk     Unable to Pay for Housing in the Last Year: No    Number of Places Lived in the Last Year: 1    Unstable Housing in the Last Year: No     Family History   Problem Relation Age of Onset    No Known Problems Mother     Heart disease Father     Heart disease Brother     No Known Problems Sister     No Known Problems Daughter     No Known Problems Brother      Medication List with Changes/Refills   Current Medications    ASCORBIC ACID, VITAMIN C, (VITAMIN C) 500 MG TABLET    Take 500 mg by mouth once daily.    ASPIRIN (ECOTRIN) 81 MG EC TABLET    Take 81 mg by mouth once daily.    ATORVASTATIN (LIPITOR) 20 MG TABLET    Take 1 tablet (20 mg total) by mouth once daily.    AZITHROMYCIN (Z-YUE) 250 MG TABLET    Take 250 mg by mouth.    CO-ENZYME Q-10 50 MG CAPSULE    Take 50 mg by mouth once daily.    CYANOCOBALAMIN (VITAMIN B-12) 250 MCG TABLET    Take 250 mcg by mouth once daily.    FLUTICASONE PROPIONATE (FLONASE) 50 MCG/ACTUATION NASAL SPRAY    1 spray by Nasal route.    LORATADINE (CLARITIN) 10 MG TABLET    Take 10 mg by mouth.    MECLIZINE (ANTIVERT) 25 MG TABLET        METHYLPREDNISOLONE (MEDROL DOSEPACK) 4 MG TABLET    Take by mouth.    NAPROXEN SODIUM (ANAPROX) 220 MG TABLET    Take 220 mg by mouth 2 (two) times daily with meals.    PYRIDOXINE HCL (VITAMIN B-6 ORAL)    Take 1 tablet by mouth once daily.    TRAMADOL (ULTRAM) 50 MG TABLET    Take 50 mg by mouth every 6 (six) hours as needed for Pain.    VIT A/VIT C/VIT E/ZINC/COPPER (PRESERVISION AREDS ORAL)    Take 1 tablet by mouth 2 (two) times daily.    VIT C/E/ZN/COPPR/LUTEIN/ZEAXAN (PRESERVISION AREDS-2 ORAL)    Take 1 tablet by mouth 2 (two) times a day.    VITAMIN D3 25 MCG (1,000 UNIT) CAPSULE    Take 1,000 Units by mouth once daily.     Review of patient's allergies  indicates:   Allergen Reactions    Sulfa (sulfonamide antibiotics) Rash     Childhood allergy       QUALITY OF LIFE: 100%- Normal, No Complaints, No Evidence of Disease    Vitals:    07/26/22 1439   BP: 113/70   Pulse: 74   Resp: 16   SpO2: 98%   Weight: 83.9 kg (185 lb)   PainSc: 0-No pain     Body mass index is 26.54 kg/m².    PHYSICAL EXAM:  GENERAL: alert; in no apparent distress.   HEAD: normocephalic, atraumatic.  EYES: pupils are equal, round, reactive to light and accommodation. Sclera anicteric. Conjunctiva not injected.   NOSE/THROAT: no nasal erythema or rhinorrhea. Oropharynx pink, without erythema, ulcerations or thrush.   NECK: no cervical motion rigidity; supple with no masses.  CHEST: clear to auscultation bilaterally; no wheezes, crackles or rubs. Patient is speaking comfortably on room air with normal work of breathing without using accessory muscles of respiration.  CARDIOVASCULAR: regular rate and rhythm; no murmurs, rubs or gallops.  ABDOMEN: soft, nontender, nondistended. Bowel sounds present.   MUSCULOSKELETAL: no tenderness to palpation along the spine or scapulae. Normal range of motion.  NEUROLOGIC: cranial nerves II-XII intact bilaterally. Strength 5/5 in bilateral upper and lower extremities. No sensory deficits appreciated. Reflexes globally intact. No cerebellar signs. Normal gait.  LYMPHATIC: no cervical, supraclavicular or axillary adenopathy appreciated bilaterally.   EXTREMITIES: no clubbing, cyanosis, edema.  SKIN: no erythema, rashes, ulcerations noted.     ANCILLARY DATA:   2/14/22 PET  IMPRESSION: No PET/CT evidence of FDG avid disease.    ASSESSMENT: Bryson Kellogg is a 87 y.o. 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    PLAN:   Bryson Kellogg presents without complaints.  On review of systems, physical exam and February PET-CT scan there  is no evidence of disease.      I recommended continue follow with Dermatology and with Dr. Alvarez and agree with PET-CT surveillance at this interval.      RTC prn per patient subjective findings or noted changes by MedOnc and/or Derm exams.    All questions answered and contact information provided. Patient understands free to call us anytime with any questions or concerns regarding radiation therapy.    I have personally seen and evaluated this patient. Greater than 50% of this time was spent discussing coordination of care and/or counseling.    PHYSICIAN: Devan Hurd III, MD

## 2022-07-31 ENCOUNTER — EXTERNAL CHRONIC CARE MANAGEMENT (OUTPATIENT)
Dept: PRIMARY CARE CLINIC | Facility: CLINIC | Age: 87
End: 2022-07-31
Payer: MEDICARE

## 2022-07-31 PROCEDURE — 99490 PR CHRONIC CARE MGMT, 1ST 20 MIN: ICD-10-PCS | Mod: S$PBB,,, | Performed by: INTERNAL MEDICINE

## 2022-07-31 PROCEDURE — 99490 CHRNC CARE MGMT STAFF 1ST 20: CPT | Mod: PBBFAC | Performed by: INTERNAL MEDICINE

## 2022-07-31 PROCEDURE — 99490 CHRNC CARE MGMT STAFF 1ST 20: CPT | Mod: S$PBB,,, | Performed by: INTERNAL MEDICINE

## 2022-08-22 ENCOUNTER — HOSPITAL ENCOUNTER (OUTPATIENT)
Dept: RADIOLOGY | Facility: HOSPITAL | Age: 87
Discharge: HOME | End: 2022-08-22
Attending: INTERNAL MEDICINE
Payer: MEDICARE

## 2022-08-22 VITALS — HEIGHT: 70 IN | WEIGHT: 175 LBS | BODY MASS INDEX: 25.05 KG/M2

## 2022-08-22 DIAGNOSIS — C82.61 CUTANEOUS FOLLICLE CENTER LYMPHOMA INVOLVING LYMPH NODES OF HEAD: ICD-10-CM

## 2022-08-22 LAB — GLUCOSE SERPL-MCNC: 103 MG/DL (ref 70–110)

## 2022-08-22 PROCEDURE — 78816 PET IMAGE W/CT FULL BODY: CPT | Mod: TC,PO,PS

## 2022-08-31 ENCOUNTER — EXTERNAL CHRONIC CARE MANAGEMENT (OUTPATIENT)
Dept: PRIMARY CARE CLINIC | Facility: CLINIC | Age: 87
End: 2022-08-31
Payer: MEDICARE

## 2022-08-31 PROCEDURE — 99490 PR CHRONIC CARE MGMT, 1ST 20 MIN: ICD-10-PCS | Mod: S$PBB,,, | Performed by: INTERNAL MEDICINE

## 2022-08-31 PROCEDURE — 99490 CHRNC CARE MGMT STAFF 1ST 20: CPT | Mod: PBBFAC | Performed by: INTERNAL MEDICINE

## 2022-08-31 PROCEDURE — 99490 CHRNC CARE MGMT STAFF 1ST 20: CPT | Mod: S$PBB,,, | Performed by: INTERNAL MEDICINE

## 2022-09-05 NOTE — PROGRESS NOTES
[facility] Hematology/Oncology  PROGRESS NOTE -   Follow-up Visit      Subjective:       Patient ID:   NAME: Bryson Kellogg : 1935     87 y.o. male    Referring Doc: Osiris Lopez  Other Physicians: Kirk Cueto ([facility]-PCP); Niecy ([facility]); Luisana    Chief Complaint:  cutaneous follicular lymphoma f/u    History of Present Illness:     Patient returns today for a regularly scheduled follow-up visit.  The patient is here today to go over the results of the recently ordered labs, tests and studies. He is here with his wife.     He is doing ok with no new issues. No new issues. Breathing ok. No CP, SOB, HA's or N/V.     He previously saw Dr Lieberman with rad/onc and they completed XRT with 22 total on 2020.        He saw Dr Floyd in 2022; He saw saw Dr Lopez with dermatology on 2022 with clear report      He had recent PET on 2022          Discussed covid19 precautions. He had his vaccinations            ROS:   GEN: normal without any fever, night sweats or weight loss  HEENT: normal with no HA's, sore throat, stiff neck, changes in vision  CV: normal with no CP, SOB, PND, LEMOS or orthopnea  PULM: normal with no SOB, cough, hemoptysis, sputum or pleuritic pain  GI: normal with no abdominal pain, nausea, vomiting, constipation, diarrhea, melanotic stools, BRBPR, or hematemesis  : normal with no hematuria, dysuria  BREAST: normal with no mass, discharge, pain  SKIN: normal with no rash, erythema, bruising, or swelling    Pain Scale:  0    Allergies:  Review of patient's allergies indicates:   Allergen Reactions    Sulfa (sulfonamide antibiotics) Rash     Childhood allergy       Medications:    Current Outpatient Medications:     ascorbic acid, vitamin C, (VITAMIN C) 500 MG tablet, Take 500 mg by mouth once daily., Disp: , Rfl:     aspirin (ECOTRIN) 81 MG EC tablet, Take 81 mg by mouth once daily., Disp: , Rfl:     atorvastatin (LIPITOR) 20 MG tablet, Take 1 tablet (20 mg total) by mouth  "once daily., Disp: 90 tablet, Rfl: 3    azithromycin (Z-YUE) 250 MG tablet, Take 250 mg by mouth., Disp: , Rfl:     co-enzyme Q-10 50 mg capsule, Take 50 mg by mouth once daily., Disp: , Rfl:     cyanocobalamin (VITAMIN B-12) 250 MCG tablet, Take 250 mcg by mouth once daily., Disp: , Rfl:     fluticasone propionate (FLONASE) 50 mcg/actuation nasal spray, 1 spray by Nasal route., Disp: , Rfl:     loratadine (CLARITIN) 10 mg tablet, Take 10 mg by mouth., Disp: , Rfl:     meclizine (ANTIVERT) 25 mg tablet, , Disp: , Rfl:     methylPREDNISolone (MEDROL DOSEPACK) 4 mg tablet, Take by mouth., Disp: , Rfl:     naproxen sodium (ANAPROX) 220 MG tablet, Take 220 mg by mouth 2 (two) times daily with meals., Disp: , Rfl:     PYRIDOXINE HCL (VITAMIN B-6 ORAL), Take 1 tablet by mouth once daily., Disp: , Rfl:     traMADoL (ULTRAM) 50 mg tablet, Take 50 mg by mouth every 6 (six) hours as needed for Pain., Disp: , Rfl:     vit A/vit C/vit E/zinc/copper (PRESERVISION AREDS ORAL), Take 1 tablet by mouth 2 (two) times daily., Disp: , Rfl:     vit C/E/Zn/coppr/lutein/zeaxan (PRESERVISION AREDS-2 ORAL), Take 1 tablet by mouth 2 (two) times a day., Disp: , Rfl:     VITAMIN D3 25 mcg (1,000 unit) capsule, Take 1,000 Units by mouth once daily., Disp: , Rfl:     PMHx/PSHx Updates:  See patient's last visit with me on 3/29/2022  See H&P on 8/13/2020        Pathology:   Cancer Staging   No matching staging information was found for the patient.          Objective:     Vitals:  Blood pressure 132/79, pulse 62, temperature 98.6 °F (37 °C), resp. rate 18, height 5' 10" (1.778 m), weight 85.5 kg (188 lb 9.6 oz).    Physical Examination:   GEN: no apparent distress, comfortable; AAOx3  HEAD: atraumatic and normocephalic; healed scar on left anterior auricular region  EYES: no pallor, no icterus, PERRLA  ENT: OMM, no pharyngeal erythema, external ears WNL; no nasal discharge; no thrush  NECK: no masses, thyroid normal, trachea midline, no LAD/LN's, " supple  CV: RRR with no murmur; normal pulse; normal S1 and S2; no pedal edema  CHEST: Normal respiratory effort; CTAB; normal breath sounds; no wheeze or crackles  ABDOM: nontender and nondistended; soft; normal bowel sounds; no rebound/guarding  MUSC/Skeletal: ROM normal; no crepitus; joints normal; no deformities or arthropathy  EXTREM: no clubbing, cyanosis, inflammation or swelling  SKIN: no rashes, lesions, ulcers, petechiae or subcutaneous nodules  : no mcgill  NEURO: grossly intact; motor/sensory WNL; AAOx3; no tremors  PSYCH: normal mood, affect and behavior  LYMPH: normal cervical, supraclavicular, axillary and groin LN's            Labs:   CMP  Sodium   Date Value Ref Range Status   06/14/2022 139 136 - 145 mmol/L Final     Potassium   Date Value Ref Range Status   06/14/2022 4.6 3.5 - 5.1 mmol/L Final     Chloride   Date Value Ref Range Status   06/14/2022 105 95 - 110 mmol/L Final     CO2   Date Value Ref Range Status   06/14/2022 24 23 - 29 mmol/L Final     Glucose   Date Value Ref Range Status   06/14/2022 85 70 - 110 mg/dL Final     BUN   Date Value Ref Range Status   06/14/2022 24 (H) 8 - 23 mg/dL Final     Creatinine   Date Value Ref Range Status   06/14/2022 1.0 0.5 - 1.4 mg/dL Final   11/23/2012 1.2 0.5 - 1.4 mg/dL Final     Calcium   Date Value Ref Range Status   06/14/2022 10.1 8.7 - 10.5 mg/dL Final   11/23/2012 9.4 8.7 - 10.5 mg/dL Final     Total Protein   Date Value Ref Range Status   06/14/2022 6.5 6.0 - 8.4 g/dL Final     Albumin   Date Value Ref Range Status   06/14/2022 3.6 3.5 - 5.2 g/dL Final     Total Bilirubin   Date Value Ref Range Status   06/14/2022 1.0 0.1 - 1.0 mg/dL Final     Comment:     For infants and newborns, interpretation of results should be based  on gestational age, weight and in agreement with clinical  observations.    Premature Infant recommended reference ranges:  Up to 24 hours.............<8.0 mg/dL  Up to 48 hours............<12.0 mg/dL  3-5  days..................<15.0 mg/dL  6-29 days.................<15.0 mg/dL       Alkaline Phosphatase   Date Value Ref Range Status   06/14/2022 81 55 - 135 U/L Final   11/23/2012 85 55 - 135 U/L Final     AST   Date Value Ref Range Status   06/14/2022 26 10 - 40 U/L Final   11/23/2012 22 10 - 40 U/L Final     ALT   Date Value Ref Range Status   06/14/2022 20 10 - 44 U/L Final     Anion Gap   Date Value Ref Range Status   06/14/2022 10 8 - 16 mmol/L Final   11/23/2012 10 5 - 15 meq/L Final     eGFR if    Date Value Ref Range Status   06/14/2022 >60.0 >60 mL/min/1.73 m^2 Final     eGFR if non    Date Value Ref Range Status   06/14/2022 >60.0 >60 mL/min/1.73 m^2 Final     Comment:     Calculation used to obtain the estimated glomerular filtration  rate (eGFR) is the CKD-EPI equation.            Lab Results   Component Value Date    WBC 4.51 06/14/2022    HGB 13.2 (L) 06/14/2022    HCT 39.9 (L) 06/14/2022     (H) 06/14/2022     (L) 06/14/2022             Radiology/Diagnostic Studies:    PET 8/22/2022:  IMPRESSION:  No evidence of recurrent or metastatic disease        PET 2/14/2022:    IMPRESSION:  No PET/CT evidence of FDG avid disease.        US axilla 9/15/2021:    FINDINGS: Sonographic assessment targeted to the left axilla was performed in planning for possible biopsy. Recently reported lymph node (PET/CT August 24, 2021) is identified, measuring 2.2 x 1.0 cm. Margins are sharp, and fatty hilum is preserved.      CT Soft Neck  9/9/2021:    IMPRESSION: No enlarged cervical chain lymph nodes or cervical soft tissue mass        PET  8/24/2021:    Impression:     1. Nonspecific FDG uptake in left supraclavicular soft tissues, new, without abnormal CT correlate.  If further imaging evaluation is needed, soft tissue neck CT with IV contrast can be considered.  2. 11 mm soft tissue mass in left axilla which is moderately FDG avid and has surrounding inflammatory fat stranding.   Recurrent lymphoma is a consideration.  Reactive lymph node uptake due to infectious or inflammatory etiology also conceivable.  3. No other evidence of lymphoproliferative disorder.  4. Unchanged pulmonary nodules        PET  2/19/2021:  IMPRESSION: No evidence of active lymphoproliferative disease.          Nm Pet Ct Whole Body    Result Date: 8/24/2020  EXAMINATION: NM PET CT WHOLE BODY CLINICAL HISTORY: Cutaneous follicle center lymphoma, lymph nodes of head, face, and neck, initial staging, lung nodules, C 82.61, R 91.8. TECHNIQUE: Following the injection of 12.8 mCi of F-18 labeled FDG into a left antecubital vein, PET CT was performed from the vertex of the skull through the feet with an integrated full ring PET CT scanner with image fusion. The patient's serum glucose at the time of the exam was 88 mg/dL. COMPARISON: Multiple prior exams including chest CT of 01/05/2017. FINDINGS: There is no abnormal focal FDG hypermetabolism in the head, neck, chest, abdomen, pelvis, or the skeletal system to suggest active lymphoproliferative disease.  There is osseous FDG hypermetabolism associated with cervical and lumbar degenerative facet arthropathy. CT images of the brain show scattered areas of nonspecific white matter hypoattenuation, with no intra-axial mass or abnormal extra-axial fluid.  There is generalized prominence of the cortical sulci and ventricles.  No suspicious sclerotic or lytic osseous abnormalities of the calvarium. CT images of the chest show no new suspicious pulmonary nodules or masses, with stable 5 mm oval noncalcified nodule in the left lower lobe image 153, with stable oval 5 mm nodular opacity in the right lower lobe along the major fissure image 128.  There is stable apical fibronodular scarring, with no pleural or pericardial effusion.  No new suspicious pulmonary nodules or masses.  There are aortic and coronary arterial vascular calcifications. CT images of the abdomen and pelvis show  cholecystectomy clips, few splenic granulomatous calcifications, nonspecific peripancreatic calcifications, hypoattenuating bilateral renal lesions suggestive of cysts, and scattered aortoiliac vascular calcifications, with no ascites. CT images of the lower extremities show no enlarged lymph nodes or soft tissue masses, with diffuse arterial vascular calcifications.  There is advanced arthropathy of the left 1st metatarsophalangeal joint.  There is intervertebral disc space narrowing and facet arthropathy in the spine, with bilateral glenohumeral arthropathic changes.  No suspicious sclerotic or lytic osseous abnormalities by CT.     1. No evidence of active lymphoproliferative disease. 2. Stable subcentimeter noncalcified pulmonary nodules dating back to 01/05/2017, compatible with benign etiology. 3. Additional observations as above. Electronically signed by: Balta Ta MD Date:    08/24/2020 Time:    13:22      I have reviewed all available lab results and radiology reports.    Assessment/Plan:   (1) 87 y.o. 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   - excisional biopsy was performed on 7/28/2020  - CD 20 +l BCL-6 stain is positive; CD30 was negative  - dicussed pathology and went over the latest NCCN guidelines (version 2.2020)  - refer to rad/onc and schedule PET    9/8/2020:  - He saw Dr Lieberman with rad/onc and they have started him on XRT with 22 total. He is in his 2nd week of XRT.   - He had PEt scan on 8/24/2020 with no evidence of a systemic lymphoma process.     10/12/2020:  - He previously saw Dr Lieberman with rad/onc and they completed XRT with 22 total on 9/29/2020.    - He had PEt scan on 8/24/2020 with no evidence of a systemic lymphoma process.   - he will need f/u with derm and rad/onc   - discussed  consideration for [facility] evaluation at some point in future if ever needed but he wants to hold off on that right now    3/1/2021:  - He saw saw Dr Lopez recently on Jan 18th 2021 with dermatology and had clear report.  - He had recent PET on 2/19/2021 which was negative for any recurrent lymphoma  - He went to ER in jan 2021 with bout of vertigo.    9/30/2021:  - recent evaluation with derm which was good  - recent PET, CT neck and US axilla with borderline LN in axilla; however, patient had covid vaccination day before he had PET which could be the etiology  - CT Neck was negative  - US really did not elucidate any LN to biopsy  - discussed with patient and his wife and the are in agreement with just getting repeat PET in FEb 2022    3/29/2022:  - he saw Dr lopez recently with derm  - recent PEt on 2/14/2022 negative    9/6/2022:  - he saw Dr Lopez recently in July 2022 with clear report  - he had recent negative PET on 8/22/2022     (2) Hypercholesterolemia     (3) Hx/of TIA in 1999     (4) BPH     (5) KATHY     (6) Pulmonary nodules - no tobacco history; monitored via CT scans with last one in Jan 2017     (7) Hx/of colon polyps     (8) Rosacea     (9) Arthritis and DJD - knee    (10) Mild anemia     VISIT DIAGNOSES:      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 Follow-up with rad/onc, derm etc as directed by them   2. Rescan with PEt again in Feb 2023  3. Continue with regular dermatologic surveillance    4. discussed consideration for [facility] evaluation at some point in future if ever needed  5. Check up to date labs every 3-6 months  6. F/u with derm, PCP    RTC in 6 months  Fax note to  Kristian Lopez/Cathy Varela    Discussion:     Pathology Discussion:     I reviewed and discussed the pathology report(s) and radiograph reports (if available) in as simple to understand and/or laymen's terms to the best of my ability. I had an indepth conversation  "with the patient and went over the patient's individual diagnosis based on the information that was currently available. I discussed the TNM staging process with regard to the patient's particular cancer type, and the calculated stage based on the currently available TNM data and literature. I discussed the available prognostic data with regard to the current staging information and how it relates to the prognosis of their particular neoplastic process.          NCCN Guidelines:     I discussed the available treatment option(s) in accordance with the latest literature from the NCCN Clinical Practice Guidelines for the patient's particular type of cancer disorder. The NCCN Guidelines provide a "document evidence-based (and) consensus-driven management" of the care of oncology patients. The treatment recommendations were made not only in accordance to the NCCN guidelines, but also factored in to account the patient's overall age, condition, performance status and their medical co-morbidities. I went over the risks and benefits of the the treatment options (if any could be made) with regard to their particular cancer type, their cancer stage, their age, and their co-morbidities.      COVID-19 Discussion:     I had long discussion with patient and any applicable family about the COVID-19 coronavirus epidemic and the recommended precautions with regard to cancer and/or hematology patients. I have re-iterated the CDC recommendations for adequate hand washing, use of hand -like products, and coughing into elbow, etc. In addition, especially for our patients who are on chemotherapy and/or our otherwise immunocompromised patients, I have recommended avoidance of crowds, including movie theaters, restaurants, churches, etc. I have recommended avoidance of any sick or symptomatic family members and/or friends. I have also recommended avoidance of any raw and unwashed food products, and general avoidance of food items " that have not been prepared by themselves. The patient has been asked to call us immediately with any symptom developments, issues, questions or other general concerns.         I spent over 25 mins of time with the patient. Reviewed results of the recently ordered labs, tests and studies; made directives with regards to the results. Over half of this time was spent couseling and coordinating care.    I have explained all of the above in detail and the patient understands all of the current recommendation(s). I have answered all of their questions to the best of my ability and to their complete satisfaction.   The patient is to continue with the current management plan.            Electronically signed by Mehdi Alvarez MD                 Answers submitted by the patient for this visit:  Review of Systems Questionnaire (Submitted on 9/3/2022)  appetite change : No  unexpected weight change: No  mouth sores: No  visual disturbance: No  cough: No  shortness of breath: No  chest pain: No  abdominal pain: No  diarrhea: No  frequency: No  back pain: No  rash: No  headaches: No  adenopathy: No  nervous/ anxious: No

## 2022-09-06 ENCOUNTER — OFFICE VISIT (OUTPATIENT)
Dept: HEMATOLOGY/ONCOLOGY | Facility: CLINIC | Age: 87
End: 2022-09-06
Payer: MEDICARE

## 2022-09-06 VITALS
HEART RATE: 62 BPM | RESPIRATION RATE: 18 BRPM | HEIGHT: 70 IN | SYSTOLIC BLOOD PRESSURE: 132 MMHG | WEIGHT: 188.63 LBS | BODY MASS INDEX: 27 KG/M2 | DIASTOLIC BLOOD PRESSURE: 79 MMHG | TEMPERATURE: 99 F

## 2022-09-06 DIAGNOSIS — C82.61 CUTANEOUS FOLLICLE CENTER LYMPHOMA INVOLVING LYMPH NODES OF HEAD: Primary | ICD-10-CM

## 2022-09-06 PROCEDURE — 99214 PR OFFICE/OUTPT VISIT, EST, LEVL IV, 30-39 MIN: ICD-10-PCS | Mod: S$GLB,,, | Performed by: INTERNAL MEDICINE

## 2022-09-06 PROCEDURE — 99214 OFFICE O/P EST MOD 30 MIN: CPT | Mod: S$GLB,,, | Performed by: INTERNAL MEDICINE

## 2022-09-27 ENCOUNTER — TELEPHONE (OUTPATIENT)
Dept: CARDIOLOGY | Facility: CLINIC | Age: 87
End: 2022-09-27
Payer: MEDICARE

## 2022-09-27 NOTE — TELEPHONE ENCOUNTER
She is aware we are unable to schedule the schedule is on hold. She is requesting to have the letter sent back to them. We do have them on the wait list for Dr. Dedrick Floyd.

## 2022-09-27 NOTE — TELEPHONE ENCOUNTER
----- Message from Cristofer Jackson sent at 9/27/2022 10:23 AM CDT -----  Contact: PT [phone]  Type: Needs Medical Advice  Who Called:  pt  Best Call Back Number: [phone]  Additional Information: pt is calling the office to find out why he has not received a call back from them to schedule an appt. Please call back and advise.  PER THE PT PLEASE CALL BACK TODAY ASAP

## 2022-09-30 ENCOUNTER — EXTERNAL CHRONIC CARE MANAGEMENT (OUTPATIENT)
Dept: PRIMARY CARE CLINIC | Facility: CLINIC | Age: 87
End: 2022-09-30
Payer: MEDICARE

## 2022-09-30 PROCEDURE — 99490 CHRNC CARE MGMT STAFF 1ST 20: CPT | Mod: PBBFAC | Performed by: INTERNAL MEDICINE

## 2022-09-30 PROCEDURE — 99490 CHRNC CARE MGMT STAFF 1ST 20: CPT | Mod: S$PBB,,, | Performed by: INTERNAL MEDICINE

## 2022-09-30 PROCEDURE — 99490 PR CHRONIC CARE MGMT, 1ST 20 MIN: ICD-10-PCS | Mod: S$PBB,,, | Performed by: INTERNAL MEDICINE

## 2022-10-13 ENCOUNTER — TELEPHONE (OUTPATIENT)
Dept: CARDIOLOGY | Facility: CLINIC | Age: 87
End: 2022-10-13
Payer: MEDICARE

## 2022-10-13 NOTE — TELEPHONE ENCOUNTER
----- Message from Trixie Lemos sent at 10/13/2022 11:16 AM CDT -----  Type: Appointment Request      Name of Caller:Wife     When is the first available appointment?NA      Would the patient rather a call back or a response via [facility]? Call    Best Call Back Number:[phone] (home)     Additional Information: Patient needs to schedule 6 mth follow up. Wife and  prefer to be seen together

## 2022-10-13 NOTE — TELEPHONE ENCOUNTER
Spoke wife. Told her we are still waiting for December schedule. They are on the waiting list. I will call back when I get the schedule

## 2022-10-31 ENCOUNTER — EXTERNAL CHRONIC CARE MANAGEMENT (OUTPATIENT)
Dept: PRIMARY CARE CLINIC | Facility: CLINIC | Age: 87
End: 2022-10-31
Payer: MEDICARE

## 2022-10-31 PROCEDURE — 99490 CHRNC CARE MGMT STAFF 1ST 20: CPT | Mod: PBBFAC | Performed by: INTERNAL MEDICINE

## 2022-10-31 PROCEDURE — 99490 CHRNC CARE MGMT STAFF 1ST 20: CPT | Mod: S$PBB,,, | Performed by: INTERNAL MEDICINE

## 2022-10-31 PROCEDURE — 99490 PR CHRONIC CARE MGMT, 1ST 20 MIN: ICD-10-PCS | Mod: S$PBB,,, | Performed by: INTERNAL MEDICINE

## 2022-11-15 ENCOUNTER — TELEPHONE (OUTPATIENT)
Dept: CARDIOLOGY | Facility: CLINIC | Age: 87
End: 2022-11-15
Payer: MEDICARE

## 2022-11-15 NOTE — TELEPHONE ENCOUNTER
Spoke to the Wife. Told him we didn't have any more appointment in December for Dr Dedrick Floyd. They were offered Lizette Rocha and the new doctor they refused both. They are still on the waiting list to see Dr Dedrick floyd as they requested.

## 2022-11-15 NOTE — TELEPHONE ENCOUNTER
----- Message from Destinee Estes sent at 11/15/2022 10:46 AM CST -----  Contact: pt  Schedule    Pt calling because received a letter and was told he be called back to schedule in Nov or Dec and not call back, please call pt at [phone]      Please note his wife and him see the same dr and both wants around the same time. Sharyn Kellogg.    Thanks

## 2022-11-30 ENCOUNTER — EXTERNAL CHRONIC CARE MANAGEMENT (OUTPATIENT)
Dept: PRIMARY CARE CLINIC | Facility: CLINIC | Age: 87
End: 2022-11-30
Payer: MEDICARE

## 2022-11-30 PROCEDURE — 99490 CHRNC CARE MGMT STAFF 1ST 20: CPT | Mod: PBBFAC | Performed by: INTERNAL MEDICINE

## 2022-11-30 PROCEDURE — 99490 CHRNC CARE MGMT STAFF 1ST 20: CPT | Mod: S$PBB,,, | Performed by: INTERNAL MEDICINE

## 2022-11-30 PROCEDURE — 99490 PR CHRONIC CARE MGMT, 1ST 20 MIN: ICD-10-PCS | Mod: S$PBB,,, | Performed by: INTERNAL MEDICINE

## 2022-12-05 ENCOUNTER — LAB VISIT (OUTPATIENT)
Dept: LAB | Facility: HOSPITAL | Age: 87
End: 2022-12-05
Attending: INTERNAL MEDICINE
Payer: MEDICARE

## 2022-12-05 DIAGNOSIS — E78.2 MIXED HYPERLIPIDEMIA: ICD-10-CM

## 2022-12-05 DIAGNOSIS — Z12.5 SCREENING PSA (PROSTATE SPECIFIC ANTIGEN): ICD-10-CM

## 2022-12-05 LAB
CHOLEST SERPL-MCNC: 141 MG/DL (ref 120–199)
CHOLEST/HDLC SERPL: 2.9 {RATIO} (ref 2–5)
COMPLEXED PSA SERPL-MCNC: 0.5 NG/ML (ref 0–4)
HDLC SERPL-MCNC: 48 MG/DL (ref 40–75)
HDLC SERPL: 34 % (ref 20–50)
LDLC SERPL CALC-MCNC: 78.2 MG/DL (ref 63–159)
NONHDLC SERPL-MCNC: 93 MG/DL
TRIGL SERPL-MCNC: 74 MG/DL (ref 30–150)

## 2022-12-05 PROCEDURE — 36415 COLL VENOUS BLD VENIPUNCTURE: CPT | Mod: PO | Performed by: INTERNAL MEDICINE

## 2022-12-05 PROCEDURE — 84153 ASSAY OF PSA TOTAL: CPT | Performed by: INTERNAL MEDICINE

## 2022-12-05 PROCEDURE — 80061 LIPID PANEL: CPT | Performed by: INTERNAL MEDICINE

## 2022-12-12 ENCOUNTER — OFFICE VISIT (OUTPATIENT)
Dept: INTERNAL MEDICINE | Facility: CLINIC | Age: 87
End: 2022-12-12
Payer: MEDICARE

## 2022-12-12 ENCOUNTER — IMMUNIZATION (OUTPATIENT)
Dept: INTERNAL MEDICINE | Facility: CLINIC | Age: 87
End: 2022-12-12
Payer: MEDICARE

## 2022-12-12 ENCOUNTER — TELEPHONE (OUTPATIENT)
Dept: INTERNAL MEDICINE | Facility: CLINIC | Age: 87
End: 2022-12-12

## 2022-12-12 VITALS
HEART RATE: 66 BPM | BODY MASS INDEX: 26.99 KG/M2 | SYSTOLIC BLOOD PRESSURE: 122 MMHG | WEIGHT: 188.5 LBS | HEIGHT: 70 IN | OXYGEN SATURATION: 98 % | DIASTOLIC BLOOD PRESSURE: 88 MMHG

## 2022-12-12 DIAGNOSIS — E78.2 MIXED HYPERLIPIDEMIA: ICD-10-CM

## 2022-12-12 DIAGNOSIS — G47.33 OBSTRUCTIVE SLEEP APNEA SYNDROME: ICD-10-CM

## 2022-12-12 DIAGNOSIS — R91.1 PULMONARY NODULE: ICD-10-CM

## 2022-12-12 DIAGNOSIS — C82.61 CUTANEOUS FOLLICLE CENTER LYMPHOMA INVOLVING LYMPH NODES OF HEAD: Primary | ICD-10-CM

## 2022-12-12 DIAGNOSIS — Z23 NEED FOR VACCINATION: Primary | ICD-10-CM

## 2022-12-12 PROCEDURE — 0124A COVID-19, MRNA, LNP-S, BIVALENT BOOSTER, PF, 30 MCG/0.3 ML DOSE: CPT | Mod: PBBFAC,CV19

## 2022-12-12 PROCEDURE — 99213 OFFICE O/P EST LOW 20 MIN: CPT | Mod: PBBFAC | Performed by: INTERNAL MEDICINE

## 2022-12-12 PROCEDURE — 99999 PR PBB SHADOW E&M-EST. PATIENT-LVL III: ICD-10-PCS | Mod: PBBFAC,,, | Performed by: INTERNAL MEDICINE

## 2022-12-12 PROCEDURE — 99213 PR OFFICE/OUTPT VISIT, EST, LEVL III, 20-29 MIN: ICD-10-PCS | Mod: S$PBB,,, | Performed by: INTERNAL MEDICINE

## 2022-12-12 PROCEDURE — 99213 OFFICE O/P EST LOW 20 MIN: CPT | Mod: S$PBB,,, | Performed by: INTERNAL MEDICINE

## 2022-12-12 PROCEDURE — 99999 PR PBB SHADOW E&M-EST. PATIENT-LVL III: CPT | Mod: PBBFAC,,, | Performed by: INTERNAL MEDICINE

## 2022-12-12 PROCEDURE — 91312 COVID-19, MRNA, LNP-S, BIVALENT BOOSTER, PF, 30 MCG/0.3 ML DOSE: CPT | Mod: PBBFAC

## 2022-12-12 RX ORDER — ATORVASTATIN CALCIUM 20 MG/1
20 TABLET, FILM COATED ORAL DAILY
Qty: 90 TABLET | Refills: 3 | Status: SHIPPED | OUTPATIENT
Start: 2022-12-12 | End: 2023-08-09 | Stop reason: SDUPTHER

## 2022-12-12 NOTE — PROGRESS NOTES
He is a 87  year-old gentleman coming in today to follow up ongoing medical   problems.      1. He had  Lumbar stenosis.  It was causing pain down his right leg.  He is doing some PT. Last visit he complained about a shuffling gait and the PT has been helping.     He had a operation in April or June 2020 in [place], [place] for lumbar spinal stenosis and that has taken care of the pain.  .  S He is avoiding lifting heavy weights.   He is   doing PT   trying to strengthen his right leg.  He walks about 3 miles a day.            2. He has a history of colon polyps, which his last colonoscopy was   11/2016. . He had one polyp  And it was hyperplastic and he does not need to follow up.  He has been  Using miralax and it has been helping.          3. Hyperlipidemia. He is on Lipitor 40mg . Recent cholesterol shows total   cholesterol 141 , HDL 48 , triglycerides 74, and LDL 78, which is stable  from last time. He is tolerating the Lipitor 20 mg well.     4. He has a BPH, which he says has been dong well. He has 1 episode of nacturia  Around 2-3 am. . NO hesitancy or urgency.     5. 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   :Dr. 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  BCL2 and BCL6 staining patterns are confirmatory He previously saw Dr Lieberman with rad/onc and they completed XRT with 22 total on 9/29/2020.  He had PET scan on 8/24/2020 with no evidence of a systemic lymphoma process. The area was on the Left side of face.  He saw Derm again in Sept 2022  and saw Oncology in 9/2022           6. KATHY: results from 4/09 study. SEVERE obstructive sleep apnea syndrome with 289 respiratory obstructions/RDI 47 with snoring moderate intermittent and hypersomnia with CPAP. He tried Bipap for about a year. He was seeing a Sleep MD in [facility], but could not tolerated CPAP or BiPAP. HE has been exercising and sleeping well. NO day  "time fatigue. No witnessed sleep apnea or snoring.          SOCIAL HISTORY: He is retired from [employer]. He is . No   alcohol use. No drug use. Lives in [place]. Lives with wife and they are active and travel a lot.  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      REVIEW OF SYSTEMS:   Gen - no fatigue weight  is up 5#  last visit.  He is still walking 3 miles a day         Eyes - no eye pain or visual changes  ENT - no hoarseness or sore throat  CV - no CP or SOB  Pulm - no cough or wheezing  GI - no N/V/D   no dysuria or incontinence  MS - no joint pain or muscle pain  Skin - no rash, or c/o of skin lesions  Neuro - no HA, dizziness  Heme - no abnormal bleeding or bruising  Endo - no polydipsia, or temperature changes  Psych - no anxiety or depression    PHYSICAL EXAMINATION: He is a well-appearing gentleman in no acute   Distress. Walking without any difficulty or without assistance device.      /88 (BP Location: Right arm, Patient Position: Sitting, BP Method: Medium (Manual))   Pulse 66   Ht 5' 10" (1.778 m)   Wt 85.5 kg (188 lb 7.9 oz)   SpO2 98%   BMI 27.05 kg/m²      Ear canals are open. TMs are   clear. Oropharynx is clear. Pupils equal, round, and reactive to light   and accommodation. He is wearing glasses.   NECK: Supple. Thyroid is not enlarged. No carotid bruits. He has no   cervical, axillary, or inguinal lymphadenopathy detected.   CHEST: Clear without wheeze.   CARDIOVASCULAR: S1, S2, regular rate and rhythm without murmur, gallop,   or rub.   ABDOMEN: Soft, nontender. No hepatosplenomegaly. No guarding or rebound   tenderness. Abdominal aorta is not enlarged.   He has normal biceps, triceps, patellar deep tendon reflexes. Normal   muscle strength, upper and lower extremities.    :Her has no supicous sking lesions  He is wearing a fitbit on his left wrist.    Walks with mild limb favoring his left leg.          1. History of colon polyps.- last note said he did not need a follow " up c-scope--we discussed today    2. History of hyperlipidemia - better controled.    3. History of rosacea. stable   4 pulmonary nodules-- ct chest reviewed  -- no need for follow up. -- Reviewed his recent PET scan   5. Sleep apnea- off CPAP and BIPAP-- says he is sleeping well.  Less snoring since weight loss.    6. cutaneous follicular lymphoma-- following with Hem and derm.

## 2022-12-31 ENCOUNTER — EXTERNAL CHRONIC CARE MANAGEMENT (OUTPATIENT)
Dept: PRIMARY CARE CLINIC | Facility: CLINIC | Age: 87
End: 2022-12-31
Payer: MEDICARE

## 2022-12-31 PROCEDURE — 99490 PR CHRONIC CARE MGMT, 1ST 20 MIN: ICD-10-PCS | Mod: S$PBB,,, | Performed by: INTERNAL MEDICINE

## 2022-12-31 PROCEDURE — 99490 CHRNC CARE MGMT STAFF 1ST 20: CPT | Mod: PBBFAC | Performed by: INTERNAL MEDICINE

## 2022-12-31 PROCEDURE — 99490 CHRNC CARE MGMT STAFF 1ST 20: CPT | Mod: S$PBB,,, | Performed by: INTERNAL MEDICINE

## 2023-01-12 ENCOUNTER — OFFICE VISIT (OUTPATIENT)
Dept: CARDIOLOGY | Facility: CLINIC | Age: 88
End: 2023-01-12
Payer: MEDICARE

## 2023-01-12 VITALS
SYSTOLIC BLOOD PRESSURE: 120 MMHG | DIASTOLIC BLOOD PRESSURE: 80 MMHG | BODY MASS INDEX: 27.2 KG/M2 | HEART RATE: 63 BPM | HEIGHT: 70 IN | WEIGHT: 190 LBS | OXYGEN SATURATION: 98 % | RESPIRATION RATE: 16 BRPM

## 2023-01-12 DIAGNOSIS — R03.0 PRE-HYPERTENSION: ICD-10-CM

## 2023-01-12 DIAGNOSIS — E78.2 MIXED HYPERLIPIDEMIA: ICD-10-CM

## 2023-01-12 DIAGNOSIS — R94.31 ABNORMAL ELECTROCARDIOGRAM (ECG) (EKG): ICD-10-CM

## 2023-01-12 DIAGNOSIS — R94.39 ABNORMAL FINDING ON CARDIOVASCULAR STRESS TEST: ICD-10-CM

## 2023-01-12 DIAGNOSIS — R42 VERTIGO: ICD-10-CM

## 2023-01-12 DIAGNOSIS — I70.0 CALCIFICATION OF AORTA: Primary | ICD-10-CM

## 2023-01-12 PROCEDURE — 93010 ELECTROCARDIOGRAM REPORT: CPT | Mod: S$PBB,,, | Performed by: INTERNAL MEDICINE

## 2023-01-12 PROCEDURE — 99214 PR OFFICE/OUTPT VISIT, EST, LEVL IV, 30-39 MIN: ICD-10-PCS | Mod: S$PBB,,, | Performed by: INTERNAL MEDICINE

## 2023-01-12 PROCEDURE — 99213 OFFICE O/P EST LOW 20 MIN: CPT | Mod: PBBFAC,PN | Performed by: INTERNAL MEDICINE

## 2023-01-12 PROCEDURE — 99999 PR PBB SHADOW E&M-EST. PATIENT-LVL III: CPT | Mod: PBBFAC,,, | Performed by: INTERNAL MEDICINE

## 2023-01-12 PROCEDURE — 93005 ELECTROCARDIOGRAM TRACING: CPT | Mod: PBBFAC,PN | Performed by: INTERNAL MEDICINE

## 2023-01-12 PROCEDURE — 99999 PR PBB SHADOW E&M-EST. PATIENT-LVL III: ICD-10-PCS | Mod: PBBFAC,,, | Performed by: INTERNAL MEDICINE

## 2023-01-12 PROCEDURE — 99214 OFFICE O/P EST MOD 30 MIN: CPT | Mod: S$PBB,,, | Performed by: INTERNAL MEDICINE

## 2023-01-12 PROCEDURE — 93010 EKG 12-LEAD: ICD-10-PCS | Mod: S$PBB,,, | Performed by: INTERNAL MEDICINE

## 2023-01-12 NOTE — PROGRESS NOTES
Subjective:    Patient ID:  Bryson Kellogg is a 87 y.o. male     Chief Complaint   Patient presents with    Follow-up       HPI:  Mr Bryson Kellogg is a 87 y.o. male is here for follow-up.    Patient has been doing well no specific complaints at the present time.  Denies any chest pain or tightness heaviness denies any dizziness or lightheadedness denies any loss of consciousness falls or head injury.  He has been taking his medications regularly.        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    Hx of colonic polyp 11/3/2016    Hyperlipidemia     Personal history of colonic polyps     Colon polyps    Renal stone 11/23/2012    Rosacea     Shingles     Sleep apnea     TIA (transient ischemic attack)      Past Surgical History:   Procedure Laterality Date    BACK SURGERY  05/2020    Lum/Kyle    CHOLECYSTECTOMY      COLONOSCOPY N/A 11/3/2016    Procedure: COLONOSCOPY;  Surgeon: Alex Cuellar MD;  Location: [facility];  Service: Endoscopy;  Laterality: N/A;    EYE SURGERY Bilateral 2014    cataracts    EYE SURGERY Right 10/2016    tear duct surgery    INJECTION OF ANESTHETIC AGENT AROUND LATERAL BRANCH NERVES OF SACROILIAC JOINT Left 3/5/2021    Procedure: left SI joint injection;  Surgeon: Pedro Varela MD;  Location: [facility] OR;  Service: Pain Management;  Laterality: Left;  left SI joint injection     INJECTION OF JOINT Left 11/30/2021    Procedure: Injection, Joint Sacroiliac and GTB;  Surgeon: Pedro Varela MD;  Location: [facility] OR;  Service: Pain Management;  Laterality: Left;    INJECTION OF JOINT Left 11/30/2021    Procedure: INJECTION, JOINT, SHOULDER, HIP, OR KNEE;  Surgeon: Pedro Varela MD;  Location: [facility] OR;  Service: Pain Management;  Laterality: Left;  GTB injestion    JOINT  REPLACEMENT Left 2013    knee replacemant      left knee     Social History     Tobacco Use    Smoking status: Never    Smokeless tobacco: Never   Substance Use Topics    Alcohol use: No    Drug use: No     Family History   Problem Relation Age of Onset    No Known Problems Mother     Heart disease Father     Heart disease Brother     No Known Problems Sister     No Known Problems Daughter     No Known Problems Brother         Review of Systems:   Constitution: Negative for diaphoresis and fever.   HEENT: Negative for nosebleeds.    Cardiovascular: Negative for chest pain       No dyspnea on exertion       No leg swelling        No palpitations  Respiratory: Negative for shortness of breath and wheezing.    Hematologic/Lymphatic: Negative for bleeding problem. Does not bruise/bleed easily.   Skin: Negative for color change and rash.   Musculoskeletal: Negative for falls and myalgias.   Gastrointestinal: Negative for hematemesis and hematochezia.   Genitourinary: Negative for hematuria.   Neurological: Negative for dizziness and light-headedness.   Psychiatric/Behavioral: Negative for altered mental status and memory loss.          Objective:        Vitals:    01/12/23 1001   BP: 120/80   Pulse: 63   Resp: 16       Lab Results   Component Value Date    WBC 4.51 06/14/2022    HGB 13.2 (L) 06/14/2022    HCT 39.9 (L) 06/14/2022     (L) 06/14/2022    CHOL 141 12/05/2022    TRIG 74 12/05/2022    HDL 48 12/05/2022    ALT 20 06/14/2022    AST 26 06/14/2022     06/14/2022    K 4.6 06/14/2022     06/14/2022    CREATININE 1.0 06/14/2022    BUN 24 (H) 06/14/2022    CO2 24 06/14/2022    TSH 1.420 11/19/2021    PSA 0.50 12/05/2022    INR 1.1 01/31/2021    HGBA1C 5.4 01/03/2018        ECHOCARDIOGRAM RESULTS  Results for orders placed during the hospital encounter of 06/27/22    Echo    Interpretation Summary  · The left ventricle is normal in size with concentric remodeling and normal systolic function.  · The  estimated ejection fraction is 65%.  · Normal left ventricular diastolic function.  · Normal right ventricular size with normal right ventricular systolic function.        CURRENT/PREVIOUS VISIT EKG  Results for orders placed or performed in visit on 12/06/21   IN OFFICE EKG 12-LEAD (to Philipp)    Collection Time: 12/06/21  2:24 PM    Narrative    Test Reason : R94.31,    Vent. Rate : 057 BPM     Atrial Rate : 057 BPM     P-R Int : 172 ms          QRS Dur : 102 ms      QT Int : 422 ms       P-R-T Axes : 035 047 -01 degrees     QTc Int : 410 ms    Sinus bradycardia  Possible Anterior infarct ,age undetermined  T wave abnormality, consider inferior ischemia  Abnormal ECG  When compared with ECG of 26-NOV-2021 10:43,  T wave inversion now evident in Inferior leads  Confirmed by Dedrick Floyd MD ([id number]) on 1/15/2022 8:53:56 PM    Referred By:             Confirmed By:Dedrick Floyd MD     No valid procedures specified.   Results for orders placed during the hospital encounter of 06/27/22    Nuclear Stress - Cardiology Interpreted    Interpretation Summary    Equivocal myocardial perfusion scan.    There is a mild intensity, small sized, equivocal perfusion abnormality that is fixed in the inferobasilar wall(s). This finding is equivocal due to soft tissue shadow.    There are no other significant perfusion abnormalities.    There is a trivial to mild intensity fixed perfusion abnormality in the inferobasilar wall of the left ventricle, secondary to soft tissue attenuation.    The gated perfusion images showed an ejection fraction of 81% post stress. Normal ejection fraction is greater than 47%.    There is normal wall motion at rest and post stress.    LV cavity size is normal at rest and normal at stress.    The EKG portion of this study is negative for ischemia.    The patient reported no chest pain during the stress test.    The test was stopped because the patient experienced arrhythmia.    There were no arrhythmias  during stress.      Physical Exam:  CONSTITUTIONAL: No fever, no chills  HEENT: Normocephalic, atraumatic,pupils reactive to light                 NECK:  No JVD no carotid bruit  CVS: S1S2+, RRR, systolic murmurs,   LUNGS: Clear  ABDOMEN: Soft, NT, BS+  EXTREMITIES: No cyanosis, edema  : No mcgill catheter  NEURO: AAO X 3  PSY: Normal affect      Medication List with Changes/Refills   Current Medications    ASCORBIC ACID, VITAMIN C, (VITAMIN C) 500 MG TABLET    Take 500 mg by mouth once daily.    ATORVASTATIN (LIPITOR) 20 MG TABLET    Take 1 tablet (20 mg total) by mouth once daily.    VIT A/VIT C/VIT E/ZINC/COPPER (PRESERVISION AREDS ORAL)    Take 1 tablet by mouth 2 (two) times daily.             Assessment:       1. Calcification of aorta    2. Mixed hyperlipidemia    3. Pre-hypertension    4. Vertigo    5. Abnormal finding on cardiovascular stress test    6. Abnormal electrocardiogram (ECG) (EKG)         Plan:   1. Calcification of the aorta   Patient is doing well.  He is on atorvastatin 20 mg on a daily basis continue the same.  2. Mixed hyperlipidemia   Patient is on atorvastatin 20 mg p.o. q.day he also started taking Co Q10 100 mg p.o. q.day continue the same.  Patient is doing well his primary physician is checking his cholesterol and liver function test.    On his last blood work his total cholesterol is 141 HDL is 48 LDL is 78 and triglycerides are 74.  3. Pre hypertension   Patient's blood pressure is very well controlled is 120/80 is currently not on any antihypertensives.    4. EKG  Reviewed his EKG independently patient is in sinus bradycardia with heart rate of 58 beats per minute and poor R-wave progression in the anterior precordial leads no acute ST T-wave changes essentially within normal limits.    5. Patient to continue his current management and is doing well and I will see him back in the office in 6 months.        Problem List Items Addressed This Visit          Cardiac/Vascular     Hyperlipidemia    Calcification of aorta - Primary    Relevant Orders    IN OFFICE EKG 12-LEAD (to [facility])     Other Visit Diagnoses       Pre-hypertension        Vertigo        Abnormal finding on cardiovascular stress test        Abnormal electrocardiogram (ECG) (EKG)                No follow-ups on file.

## 2023-01-31 ENCOUNTER — EXTERNAL CHRONIC CARE MANAGEMENT (OUTPATIENT)
Dept: PRIMARY CARE CLINIC | Facility: CLINIC | Age: 88
End: 2023-01-31
Payer: MEDICARE

## 2023-01-31 PROCEDURE — 99490 CHRNC CARE MGMT STAFF 1ST 20: CPT | Mod: PBBFAC | Performed by: INTERNAL MEDICINE

## 2023-01-31 PROCEDURE — 99490 CHRNC CARE MGMT STAFF 1ST 20: CPT | Mod: S$PBB,,, | Performed by: INTERNAL MEDICINE

## 2023-01-31 PROCEDURE — 99490 PR CHRONIC CARE MGMT, 1ST 20 MIN: ICD-10-PCS | Mod: S$PBB,,, | Performed by: INTERNAL MEDICINE

## 2023-02-13 ENCOUNTER — TELEPHONE (OUTPATIENT)
Dept: FAMILY MEDICINE | Facility: CLINIC | Age: 88
End: 2023-02-13
Payer: MEDICARE

## 2023-02-13 NOTE — TELEPHONE ENCOUNTER
Called and spoke to pt's wife about setting up AWV  Appt set up for 5/10/2023 @11:00 w/Ms Candida at [facility]

## 2023-02-23 ENCOUNTER — HOSPITAL ENCOUNTER (OUTPATIENT)
Dept: RADIOLOGY | Facility: HOSPITAL | Age: 88
Discharge: HOME | End: 2023-02-23
Attending: INTERNAL MEDICINE
Payer: MEDICARE

## 2023-02-23 VITALS — HEIGHT: 70 IN | BODY MASS INDEX: 25.77 KG/M2 | WEIGHT: 180 LBS

## 2023-02-23 DIAGNOSIS — C82.61 CUTANEOUS FOLLICLE CENTER LYMPHOMA INVOLVING LYMPH NODES OF HEAD: ICD-10-CM

## 2023-02-23 LAB — GLUCOSE SERPL-MCNC: 107 MG/DL (ref 70–110)

## 2023-02-23 PROCEDURE — 78816 PET IMAGE W/CT FULL BODY: CPT | Mod: TC,PS,PO

## 2023-02-23 PROCEDURE — A9552 F18 FDG: HCPCS | Mod: PO

## 2023-02-28 ENCOUNTER — EXTERNAL CHRONIC CARE MANAGEMENT (OUTPATIENT)
Dept: PRIMARY CARE CLINIC | Facility: CLINIC | Age: 88
End: 2023-02-28
Payer: MEDICARE

## 2023-02-28 PROCEDURE — 99490 CHRNC CARE MGMT STAFF 1ST 20: CPT | Mod: S$PBB,,, | Performed by: INTERNAL MEDICINE

## 2023-02-28 PROCEDURE — 99490 CHRNC CARE MGMT STAFF 1ST 20: CPT | Mod: PBBFAC | Performed by: INTERNAL MEDICINE

## 2023-02-28 PROCEDURE — 99490 PR CHRONIC CARE MGMT, 1ST 20 MIN: ICD-10-PCS | Mod: S$PBB,,, | Performed by: INTERNAL MEDICINE

## 2023-03-01 ENCOUNTER — TELEPHONE (OUTPATIENT)
Dept: HEMATOLOGY/ONCOLOGY | Facility: CLINIC | Age: 88
End: 2023-03-01

## 2023-03-01 NOTE — TELEPHONE ENCOUNTER
Called and spoke with patient regarding blood work needed appointment on 3/6/23.  Pt verbalized understanding

## 2023-03-02 ENCOUNTER — LAB VISIT (OUTPATIENT)
Dept: LAB | Facility: HOSPITAL | Age: 88
End: 2023-03-02
Payer: MEDICARE

## 2023-03-02 DIAGNOSIS — C82.61 CUTANEOUS FOLLICLE CENTER LYMPHOMA INVOLVING LYMPH NODES OF HEAD: ICD-10-CM

## 2023-03-02 LAB
BASOPHILS # BLD AUTO: 0.04 K/UL (ref 0–0.2)
BASOPHILS NFR BLD: 0.7 % (ref 0–1.9)
DIFFERENTIAL METHOD: ABNORMAL
EOSINOPHIL # BLD AUTO: 0.1 K/UL (ref 0–0.5)
EOSINOPHIL NFR BLD: 2.3 % (ref 0–8)
ERYTHROCYTE [DISTWIDTH] IN BLOOD BY AUTOMATED COUNT: 13.4 % (ref 11.5–14.5)
HCT VFR BLD AUTO: 38.7 % (ref 40–54)
HGB BLD-MCNC: 13.2 G/DL (ref 14–18)
IMM GRANULOCYTES # BLD AUTO: 0.01 K/UL (ref 0–0.04)
IMM GRANULOCYTES NFR BLD AUTO: 0.2 % (ref 0–0.5)
LYMPHOCYTES # BLD AUTO: 1.8 K/UL (ref 1–4.8)
LYMPHOCYTES NFR BLD: 31.7 % (ref 18–48)
MCH RBC QN AUTO: 34.3 PG (ref 27–31)
MCHC RBC AUTO-ENTMCNC: 34.1 G/DL (ref 32–36)
MCV RBC AUTO: 101 FL (ref 82–98)
MONOCYTES # BLD AUTO: 0.6 K/UL (ref 0.3–1)
MONOCYTES NFR BLD: 10.1 % (ref 4–15)
NEUTROPHILS # BLD AUTO: 3.1 K/UL (ref 1.8–7.7)
NEUTROPHILS NFR BLD: 55 % (ref 38–73)
NRBC BLD-RTO: 0 /100 WBC
PLATELET # BLD AUTO: 155 K/UL (ref 150–450)
PMV BLD AUTO: 11.6 FL (ref 9.2–12.9)
RBC # BLD AUTO: 3.85 M/UL (ref 4.6–6.2)
WBC # BLD AUTO: 5.64 K/UL (ref 3.9–12.7)

## 2023-03-02 PROCEDURE — 80053 COMPREHEN METABOLIC PANEL: CPT | Performed by: INTERNAL MEDICINE

## 2023-03-02 PROCEDURE — 36415 COLL VENOUS BLD VENIPUNCTURE: CPT | Mod: PO | Performed by: INTERNAL MEDICINE

## 2023-03-02 PROCEDURE — 85025 COMPLETE CBC W/AUTO DIFF WBC: CPT | Performed by: INTERNAL MEDICINE

## 2023-03-03 LAB
ALBUMIN SERPL BCP-MCNC: 4 G/DL (ref 3.5–5.2)
ALP SERPL-CCNC: 98 U/L (ref 55–135)
ALT SERPL W/O P-5'-P-CCNC: 20 U/L (ref 10–44)
ANION GAP SERPL CALC-SCNC: 7 MMOL/L (ref 8–16)
AST SERPL-CCNC: 24 U/L (ref 10–40)
BILIRUB SERPL-MCNC: 0.8 MG/DL (ref 0.1–1)
BUN SERPL-MCNC: 19 MG/DL (ref 8–23)
CALCIUM SERPL-MCNC: 10.5 MG/DL (ref 8.7–10.5)
CHLORIDE SERPL-SCNC: 105 MMOL/L (ref 95–110)
CO2 SERPL-SCNC: 25 MMOL/L (ref 23–29)
CREAT SERPL-MCNC: 1 MG/DL (ref 0.5–1.4)
EST. GFR  (NO RACE VARIABLE): >60 ML/MIN/1.73 M^2
GLUCOSE SERPL-MCNC: 84 MG/DL (ref 70–110)
POTASSIUM SERPL-SCNC: 4.1 MMOL/L (ref 3.5–5.1)
PROT SERPL-MCNC: 7 G/DL (ref 6–8.4)
SODIUM SERPL-SCNC: 137 MMOL/L (ref 136–145)

## 2023-03-03 NOTE — PROGRESS NOTES
[facility] Hematology/Oncology  PROGRESS NOTE -   Follow-up Visit      Subjective:       Patient ID:   NAME: Bryson Kellogg : 1935     87 y.o. male    Referring Doc: Osiris Lopez  Other Physicians: Kirk Cueto ([facility]-PCP); Niecy ([facility]); Luisana    Chief Complaint:  cutaneous follicular lymphoma f/u    History of Present Illness:     Patient returns today for a regularly scheduled follow-up visit.  The patient is here today to go over the results of the recently ordered labs, tests and studies. He is here with his wife.     He is doing ok with no new issues. No new issues. Breathing ok. No CP, SOB, HA's or N/V.     He had some skin lesions removed by Dr Lopez with dermatology; he saw her last on 2023    He previously was followed Dr Lieberman with rad/onc and they completed XRT with 22 total on 2020.        He last saw Dr EN Floyd in 2023    He had PEt scan on 2023 with no evidence of cancer          Discussed covid19 precautions. He had his vaccinations            ROS:   GEN: normal without any fever, night sweats or weight loss  HEENT: normal with no HA's, sore throat, stiff neck, changes in vision  CV: normal with no CP, SOB, PND, LEMOS or orthopnea  PULM: normal with no SOB, cough, hemoptysis, sputum or pleuritic pain  GI: normal with no abdominal pain, nausea, vomiting, constipation, diarrhea, melanotic stools, BRBPR, or hematemesis  : normal with no hematuria, dysuria  BREAST: normal with no mass, discharge, pain  SKIN: normal with no rash, erythema, bruising, or swelling    Pain Scale:  0    Allergies:  Review of patient's allergies indicates:   Allergen Reactions    Sulfa (sulfonamide antibiotics) Rash     Childhood allergy       Medications:    Current Outpatient Medications:     ascorbic acid, vitamin C, (VITAMIN C) 500 MG tablet, Take 500 mg by mouth once daily., Disp: , Rfl:     atorvastatin (LIPITOR) 20 MG tablet, Take 1 tablet (20 mg total) by mouth once daily., Disp:  90 tablet, Rfl: 3    fluticasone propionate (FLONASE) 50 mcg/actuation nasal spray, 1 spray by Nasal route., Disp: , Rfl:     vit A/vit C/vit E/zinc/copper (PRESERVISION AREDS ORAL), Take 1 tablet by mouth 2 (two) times daily., Disp: , Rfl:     PMHx/PSHx Updates:  See patient's last visit with me on 9/6/2022  See H&P on 8/13/2020        Pathology:   Cancer Staging   No matching staging information was found for the patient.    Skin biopsy: 2/27/2023:    DIAGNOSIS:   03/01/2023 WPL/jr     1.  RIGHT ADJACENT TO LATERAL CANTHUS, SHAVE:   - ACTINIC KERATOSIS.   - NODULAR SOLAR ELASTOSIS.     2.  LEFT CHEST, PUNCH:   - EPIDERMAL CYST, INFUNDIBULAR TYPE.     3.  LEFT MIDDLE BACK, PUNCH:   - EPIDERMAL CYST, INFUNDIBULAR TYPE.      Objective:     Vitals:  There were no vitals taken for this visit.    Physical Examination:   GEN: no apparent distress, comfortable; AAOx3  HEAD: atraumatic and normocephalic; healed scar on left anterior auricular region  EYES: no pallor, no icterus, PERRLA  ENT: OMM, no pharyngeal erythema, external ears WNL; no nasal discharge; no thrush  NECK: no masses, thyroid normal, trachea midline, no LAD/LN's, supple  CV: RRR with no murmur; normal pulse; normal S1 and S2; no pedal edema  CHEST: Normal respiratory effort; CTAB; normal breath sounds; no wheeze or crackles  ABDOM: nontender and nondistended; soft; normal bowel sounds; no rebound/guarding  MUSC/Skeletal: ROM normal; no crepitus; joints normal; no deformities or arthropathy  EXTREM: no clubbing, cyanosis, inflammation or swelling  SKIN: no rashes, lesions, ulcers, petechiae or subcutaneous nodules; recent skin biopsies  : no mcgill  NEURO: grossly intact; motor/sensory WNL; AAOx3; no tremors  PSYCH: normal mood, affect and behavior  LYMPH: normal cervical, supraclavicular, axillary and groin LN's            Labs:   CMP  Sodium   Date Value Ref Range Status   03/02/2023 137 136 - 145 mmol/L Final     Potassium   Date Value Ref Range Status    03/02/2023 4.1 3.5 - 5.1 mmol/L Final     Chloride   Date Value Ref Range Status   03/02/2023 105 95 - 110 mmol/L Final     CO2   Date Value Ref Range Status   03/02/2023 25 23 - 29 mmol/L Final     Glucose   Date Value Ref Range Status   03/02/2023 84 70 - 110 mg/dL Final     BUN   Date Value Ref Range Status   03/02/2023 19 8 - 23 mg/dL Final     Creatinine   Date Value Ref Range Status   03/02/2023 1.0 0.5 - 1.4 mg/dL Final   11/23/2012 1.2 0.5 - 1.4 mg/dL Final     Calcium   Date Value Ref Range Status   03/02/2023 10.5 8.7 - 10.5 mg/dL Final   11/23/2012 9.4 8.7 - 10.5 mg/dL Final     Total Protein   Date Value Ref Range Status   03/02/2023 7.0 6.0 - 8.4 g/dL Final     Albumin   Date Value Ref Range Status   03/02/2023 4.0 3.5 - 5.2 g/dL Final     Total Bilirubin   Date Value Ref Range Status   03/02/2023 0.8 0.1 - 1.0 mg/dL Final     Comment:     For infants and newborns, interpretation of results should be based  on gestational age, weight and in agreement with clinical  observations.    Premature Infant recommended reference ranges:  Up to 24 hours.............<8.0 mg/dL  Up to 48 hours............<12.0 mg/dL  3-5 days..................<15.0 mg/dL  6-29 days.................<15.0 mg/dL       Alkaline Phosphatase   Date Value Ref Range Status   03/02/2023 98 55 - 135 U/L Final   11/23/2012 85 55 - 135 U/L Final     AST   Date Value Ref Range Status   03/02/2023 24 10 - 40 U/L Final   11/23/2012 22 10 - 40 U/L Final     ALT   Date Value Ref Range Status   03/02/2023 20 10 - 44 U/L Final     Anion Gap   Date Value Ref Range Status   03/02/2023 7 (L) 8 - 16 mmol/L Final   11/23/2012 10 5 - 15 meq/L Final     eGFR if    Date Value Ref Range Status   06/14/2022 >60.0 >60 mL/min/1.73 m^2 Final     eGFR if non    Date Value Ref Range Status   06/14/2022 >60.0 >60 mL/min/1.73 m^2 Final     Comment:     Calculation used to obtain the estimated glomerular filtration  rate (eGFR) is the  CKD-EPI equation.            Lab Results   Component Value Date    WBC 5.64 03/02/2023    HGB 13.2 (L) 03/02/2023    HCT 38.7 (L) 03/02/2023     (H) 03/02/2023     03/02/2023             Radiology/Diagnostic Studies:    PET 2/23/2023:    IMPRESSION:  1. No evidence of recurrent FDG avid lymphoproliferative disease.  2. Additional observations as described.         PET 8/22/2022:  IMPRESSION:  No evidence of recurrent or metastatic disease        PET 2/14/2022:    IMPRESSION:  No PET/CT evidence of FDG avid disease.        US axilla 9/15/2021:    FINDINGS: Sonographic assessment targeted to the left axilla was performed in planning for possible biopsy. Recently reported lymph node (PET/CT August 24, 2021) is identified, measuring 2.2 x 1.0 cm. Margins are sharp, and fatty hilum is preserved.      CT Soft Neck  9/9/2021:    IMPRESSION: No enlarged cervical chain lymph nodes or cervical soft tissue mass        PET  8/24/2021:    Impression:     1. Nonspecific FDG uptake in left supraclavicular soft tissues, new, without abnormal CT correlate.  If further imaging evaluation is needed, soft tissue neck CT with IV contrast can be considered.  2. 11 mm soft tissue mass in left axilla which is moderately FDG avid and has surrounding inflammatory fat stranding.  Recurrent lymphoma is a consideration.  Reactive lymph node uptake due to infectious or inflammatory etiology also conceivable.  3. No other evidence of lymphoproliferative disorder.  4. Unchanged pulmonary nodules        PET  2/19/2021:  IMPRESSION: No evidence of active lymphoproliferative disease.          Nm Pet Ct Whole Body    Result Date: 8/24/2020  EXAMINATION: NM PET CT WHOLE BODY CLINICAL HISTORY: Cutaneous follicle center lymphoma, lymph nodes of head, face, and neck, initial staging, lung nodules, C 82.61, R 91.8. TECHNIQUE: Following the injection of 12.8 mCi of F-18 labeled FDG into a left antecubital vein, PET CT was performed from the  vertex of the skull through the feet with an integrated full ring PET CT scanner with image fusion. The patient's serum glucose at the time of the exam was 88 mg/dL. COMPARISON: Multiple prior exams including chest CT of 01/05/2017. FINDINGS: There is no abnormal focal FDG hypermetabolism in the head, neck, chest, abdomen, pelvis, or the skeletal system to suggest active lymphoproliferative disease.  There is osseous FDG hypermetabolism associated with cervical and lumbar degenerative facet arthropathy. CT images of the brain show scattered areas of nonspecific white matter hypoattenuation, with no intra-axial mass or abnormal extra-axial fluid.  There is generalized prominence of the cortical sulci and ventricles.  No suspicious sclerotic or lytic osseous abnormalities of the calvarium. CT images of the chest show no new suspicious pulmonary nodules or masses, with stable 5 mm oval noncalcified nodule in the left lower lobe image 153, with stable oval 5 mm nodular opacity in the right lower lobe along the major fissure image 128.  There is stable apical fibronodular scarring, with no pleural or pericardial effusion.  No new suspicious pulmonary nodules or masses.  There are aortic and coronary arterial vascular calcifications. CT images of the abdomen and pelvis show cholecystectomy clips, few splenic granulomatous calcifications, nonspecific peripancreatic calcifications, hypoattenuating bilateral renal lesions suggestive of cysts, and scattered aortoiliac vascular calcifications, with no ascites. CT images of the lower extremities show no enlarged lymph nodes or soft tissue masses, with diffuse arterial vascular calcifications.  There is advanced arthropathy of the left 1st metatarsophalangeal joint.  There is intervertebral disc space narrowing and facet arthropathy in the spine, with bilateral glenohumeral arthropathic changes.  No suspicious sclerotic or lytic osseous abnormalities by CT.     1. No evidence of  active lymphoproliferative disease. 2. Stable subcentimeter noncalcified pulmonary nodules dating back to 01/05/2017, compatible with benign etiology. 3. Additional observations as above. Electronically signed by: Balta Ta MD Date:    08/24/2020 Time:    13:22      I have reviewed all available lab results and radiology reports.    Assessment/Plan:   (1) 87 y.o. 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   - excisional biopsy was performed on 7/28/2020  - CD 20 +l BCL-6 stain is positive; CD30 was negative  - dicussed pathology and went over the latest NCCN guidelines (version 2.2020)  - refer to rad/onc and schedule PET    9/8/2020:  - He saw Dr Lieberman with rad/onc and they have started him on XRT with 22 total. He is in his 2nd week of XRT.   - He had PEt scan on 8/24/2020 with no evidence of a systemic lymphoma process.     10/12/2020:  - He previously saw Dr Lieberman with rad/onc and they completed XRT with 22 total on 9/29/2020.    - He had PEt scan on 8/24/2020 with no evidence of a systemic lymphoma process.   - he will need f/u with derm and rad/onc   - discussed consideration for [facility] evaluation at some point in future if ever needed but he wants to hold off on that right now    3/1/2021:  - He saw saw Dr Lopez recently on Jan 18th 2021 with dermatology and had clear report.  - He had recent PET on 2/19/2021 which was negative for any recurrent lymphoma  - He went to ER in jan 2021 with bout of vertigo.    9/30/2021:  - recent evaluation with derm which was good  - recent PET, CT neck and US axilla with borderline LN in axilla; however, patient had covid vaccination day before he had PET which could be the etiology  - CT Neck was negative  - US really did not elucidate any LN to biopsy  - discussed with  patient and his wife and the are in agreement with just getting repeat PET in FEb 2022    3/29/2022:  - he saw Dr lopez recently with derm  - recent PEt on 2/14/2022 negative    9/6/2022:  - he saw Dr Lopez recently in July 2022 with clear report  - he had recent negative PET on 8/22/2022    3/6/2023:  - recent biopsies with Dr Lopez which were negative for cancer  - latest PEt on 2/23/2023 negative     (2) Hypercholesterolemia     (3) Hx/of TIA in 1999     (4) BPH     (5) KATHY     (6) Pulmonary nodules - no tobacco history; monitored via CT scans with last one in Jan 2017     (7) Hx/of colon polyps     (8) Rosacea     (9) Arthritis and DJD - knee    (10) Mild anemia     VISIT DIAGNOSES:      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 Follow-up with rad/onc, derm etc as directed by them   2. Rescan with PEt again in Feb 2024  3. Continue with regular dermatologic surveillance    4. discussed consideration for [facility] evaluation at some point in future if ever needed  5. Check up to date labs every 3-6 months  6. F/u with derm, PCP    RTC in 6 months  Fax note to  Kristian Lopez/Cathy Varela    Discussion:     Pathology Discussion:     I reviewed and discussed the pathology report(s) and radiograph reports (if available) in as simple to understand and/or laymen's terms to the best of my ability. I had an indepth conversation with the patient and went over the patient's individual diagnosis based on the information that was currently available. I discussed the TNM staging process with regard to the patient's particular cancer type, and the calculated stage based on the currently available TNM data and literature. I discussed the available prognostic data with regard to the current staging information and how it relates to the prognosis of their particular neoplastic process.          NCCN Guidelines:     I discussed the available treatment option(s) in  "accordance with the latest literature from the NCCN Clinical Practice Guidelines for the patient's particular type of cancer disorder. The NCCN Guidelines provide a "document evidence-based (and) consensus-driven management" of the care of oncology patients. The treatment recommendations were made not only in accordance to the NCCN guidelines, but also factored in to account the patient's overall age, condition, performance status and their medical co-morbidities. I went over the risks and benefits of the the treatment options (if any could be made) with regard to their particular cancer type, their cancer stage, their age, and their co-morbidities.      COVID-19 Discussion:     I had long discussion with patient and any applicable family about the COVID-19 coronavirus epidemic and the recommended precautions with regard to cancer and/or hematology patients. I have re-iterated the CDC recommendations for adequate hand washing, use of hand -like products, and coughing into elbow, etc. In addition, especially for our patients who are on chemotherapy and/or our otherwise immunocompromised patients, I have recommended avoidance of crowds, including movie theaters, restaurants, churches, etc. I have recommended avoidance of any sick or symptomatic family members and/or friends. I have also recommended avoidance of any raw and unwashed food products, and general avoidance of food items that have not been prepared by themselves. The patient has been asked to call us immediately with any symptom developments, issues, questions or other general concerns.         I spent over 25 mins of time with the patient. Reviewed results of the recently ordered labs, tests and studies; made directives with regards to the results. Over half of this time was spent couseling and coordinating care.    I have explained all of the above in detail and the patient understands all of the current recommendation(s). I have answered all of " their questions to the best of my ability and to their complete satisfaction.   The patient is to continue with the current management plan.            Electronically signed by Mehdi Alvarez MD                  Answers submitted by the patient for this visit:  Review of Systems Questionnaire (Submitted on 2/28/2023)  appetite change : No  unexpected weight change: No  mouth sores: No  visual disturbance: No  cough: No  shortness of breath: No  chest pain: No  abdominal pain: No  diarrhea: No  frequency: No  back pain: No  rash: No  headaches: No  adenopathy: No  nervous/ anxious: No

## 2023-03-06 ENCOUNTER — OFFICE VISIT (OUTPATIENT)
Dept: HEMATOLOGY/ONCOLOGY | Facility: CLINIC | Age: 88
End: 2023-03-06
Payer: MEDICARE

## 2023-03-06 VITALS
HEIGHT: 70 IN | SYSTOLIC BLOOD PRESSURE: 158 MMHG | HEART RATE: 60 BPM | RESPIRATION RATE: 16 BRPM | DIASTOLIC BLOOD PRESSURE: 80 MMHG | WEIGHT: 188.88 LBS | BODY MASS INDEX: 27.04 KG/M2 | TEMPERATURE: 98 F

## 2023-03-06 DIAGNOSIS — C82.61 CUTANEOUS FOLLICLE CENTER LYMPHOMA INVOLVING LYMPH NODES OF HEAD: Primary | ICD-10-CM

## 2023-03-06 PROCEDURE — 99214 PR OFFICE/OUTPT VISIT, EST, LEVL IV, 30-39 MIN: ICD-10-PCS | Mod: S$GLB,,, | Performed by: INTERNAL MEDICINE

## 2023-03-06 PROCEDURE — 99214 OFFICE O/P EST MOD 30 MIN: CPT | Mod: S$GLB,,, | Performed by: INTERNAL MEDICINE

## 2023-03-31 ENCOUNTER — EXTERNAL CHRONIC CARE MANAGEMENT (OUTPATIENT)
Dept: PRIMARY CARE CLINIC | Facility: CLINIC | Age: 88
End: 2023-03-31
Payer: MEDICARE

## 2023-03-31 PROCEDURE — 99490 CHRNC CARE MGMT STAFF 1ST 20: CPT | Mod: PBBFAC | Performed by: INTERNAL MEDICINE

## 2023-03-31 PROCEDURE — 99490 PR CHRONIC CARE MGMT, 1ST 20 MIN: ICD-10-PCS | Mod: S$PBB,,, | Performed by: INTERNAL MEDICINE

## 2023-03-31 PROCEDURE — 99490 CHRNC CARE MGMT STAFF 1ST 20: CPT | Mod: S$PBB,,, | Performed by: INTERNAL MEDICINE

## 2023-04-30 ENCOUNTER — EXTERNAL CHRONIC CARE MANAGEMENT (OUTPATIENT)
Dept: PRIMARY CARE CLINIC | Facility: CLINIC | Age: 88
End: 2023-04-30
Payer: MEDICARE

## 2023-04-30 PROCEDURE — 99490 CHRNC CARE MGMT STAFF 1ST 20: CPT | Mod: PBBFAC | Performed by: INTERNAL MEDICINE

## 2023-04-30 PROCEDURE — 99490 CHRNC CARE MGMT STAFF 1ST 20: CPT | Mod: S$PBB,,, | Performed by: INTERNAL MEDICINE

## 2023-04-30 PROCEDURE — 99490 PR CHRONIC CARE MGMT, 1ST 20 MIN: ICD-10-PCS | Mod: S$PBB,,, | Performed by: INTERNAL MEDICINE

## 2023-05-16 ENCOUNTER — TELEPHONE (OUTPATIENT)
Dept: ADMINISTRATIVE | Facility: CLINIC | Age: 88
End: 2023-05-16
Payer: MEDICARE

## 2023-05-17 ENCOUNTER — PATIENT OUTREACH (OUTPATIENT)
Dept: ADMINISTRATIVE | Facility: HOSPITAL | Age: 88
End: 2023-05-17
Payer: MEDICARE

## 2023-05-17 ENCOUNTER — OFFICE VISIT (OUTPATIENT)
Dept: FAMILY MEDICINE | Facility: CLINIC | Age: 88
End: 2023-05-17
Payer: MEDICARE

## 2023-05-17 VITALS
OXYGEN SATURATION: 97 % | WEIGHT: 189.13 LBS | DIASTOLIC BLOOD PRESSURE: 78 MMHG | BODY MASS INDEX: 27.08 KG/M2 | TEMPERATURE: 98 F | SYSTOLIC BLOOD PRESSURE: 118 MMHG | HEART RATE: 54 BPM | HEIGHT: 70 IN | RESPIRATION RATE: 12 BRPM

## 2023-05-17 DIAGNOSIS — E78.2 MIXED HYPERLIPIDEMIA: ICD-10-CM

## 2023-05-17 DIAGNOSIS — M46.1 SACROILIITIS: ICD-10-CM

## 2023-05-17 DIAGNOSIS — E66.3 OVERWEIGHT (BMI 25.0-29.9): ICD-10-CM

## 2023-05-17 DIAGNOSIS — Z00.00 ENCOUNTER FOR PREVENTIVE HEALTH EXAMINATION: Primary | ICD-10-CM

## 2023-05-17 DIAGNOSIS — I70.0 CALCIFICATION OF AORTA: ICD-10-CM

## 2023-05-17 DIAGNOSIS — R91.1 PULMONARY NODULE: ICD-10-CM

## 2023-05-17 DIAGNOSIS — Z85.72 HISTORY OF LYMPHOMA: ICD-10-CM

## 2023-05-17 PROCEDURE — G0439 PPPS, SUBSEQ VISIT: HCPCS | Mod: ,,, | Performed by: FAMILY MEDICINE

## 2023-05-17 PROCEDURE — G0439 PR MEDICARE ANNUAL WELLNESS SUBSEQUENT VISIT: ICD-10-PCS | Mod: ,,, | Performed by: FAMILY MEDICINE

## 2023-05-17 NOTE — PROGRESS NOTES
"  Bryson Kellogg presented for a  Medicare AWV and comprehensive Health Risk Assessment today. The following components were reviewed and updated:    Medical history  Family History  Social history  Allergies and Current Medications  Health Risk Assessment  Health Maintenance  Care Team         ** See Completed Assessments for Annual Wellness Visit within the encounter summary.**         The following assessments were completed:  Living Situation  CAGE  Depression Screening  Timed Get Up and Go  Whisper Test  Cognitive Function Screening  Nutrition Screening  ADL Screening  PAQ Screening          Vitals:    05/17/23 0943   BP: 118/78   BP Location: Right arm   Patient Position: Sitting   BP Method: Medium (Manual)   Pulse: (!) 54   Resp: 12   Temp: 97.8 °F (36.6 °C)   TempSrc: Oral   SpO2: 97%   Weight: 85.8 kg (189 lb 2.5 oz)   Height: 5' 10" (1.778 m)     Body mass index is 27.14 kg/m².  Physical Exam  Vitals reviewed.   Constitutional:       Appearance: Normal appearance.   HENT:      Head: Normocephalic and atraumatic.   Eyes:      Pupils: Pupils are equal, round, and reactive to light.   Pulmonary:      Effort: Pulmonary effort is normal. No respiratory distress.   Skin:     General: Skin is warm and dry.   Neurological:      General: No focal deficit present.      Mental Status: He is alert and oriented to person, place, and time.   Psychiatric:         Mood and Affect: Mood normal.         Behavior: Behavior normal.       The ASCVD Risk score (Snyder DK, et al., 2019) failed to calculate for the following reasons:    The 2019 ASCVD risk score is only valid for ages 40 to 79        Diagnoses and health risks identified today and associated recommendations/orders:    1. Encounter for preventive health examination    2. Overweight (BMI 25.0-29.9)  Body mass index is 27.14 kg/m².  Continue healthy diet and regular exercise as tolerated.  Continue medications as prescribed.  Follow up with PCP, Kirk Cueto MD "     3. Pulmonary nodule  Stable  Continue medications as prescribed.  Follow up with PCP and pulmonology as needed    4. Calcification of aorta  Stable  Continue medications as prescribed.  Follow up with PCP and cardiology, Dr Floyd    5. Mixed hyperlipidemia  Stable  Continue medications as prescribed.  Follow up with PCP and cardio    6. Sacroiliitis  Stable  Continue medications as prescribed.  Follow up with PCP     7. History of lymphoma  Stable  Continue medications as prescribed.  Follow up with PCP and hem/onc, Dr Wilson      Provided Bryson with a 5-10 year written screening schedule and personal prevention plan. Recommendations were developed using the USPSTF age appropriate recommendations. Education, counseling, and referrals were provided as needed. After Visit Summary printed and given to patient which includes a list of additional screenings\tests needed.    Follow up if symptoms worsen or fail to improve, for 1 year for AWV, scheduled appt.    Violet Tirado NP        Future Appointments       Date Provider Specialty Appt Notes    6/9/2023  Lab     6/16/2023 Kirk Cueto Jr., MD Internal Medicine 6 mnth f/u              Review for Opioid Screening: Pt does not have Rx for Opioids/addictive substances  Review for Substance Use Disorders: Pt does not have Rx for Opioids/addictive substances     I offered to discuss advanced care planning, including how to pick a person who would make decisions for you if you were unable to make them for yourself, called a health care power of , and what kind of decisions you might make such as use of life sustaining treatments such as ventilators and tube feeding when faced with a life limiting illness recorded on a living will that they will need to know. (How you want to be cared for as you near the end of your natural life)     X  Patient has advanced directives written and agrees to provide copies to the institution.

## 2023-05-17 NOTE — PATIENT INSTRUCTIONS
Counseling and Referral of Other Preventative  (Italic type indicates deductible and co-insurance are waived)    Patient Name: Bryson Kellogg  Today's Date: 5/17/2023    Health Maintenance       Date Due Completion Date    Colonoscopy 11/03/2021 11/3/2016    TETANUS VACCINE 08/01/2026 8/1/2016    Lipid Panel 12/05/2027 12/5/2022        No orders of the defined types were placed in this encounter.      The following information is provided to all patients.  This information is to help you find resources for any of the problems found today that may be affecting your health:                Living healthy guide: www.[facility].[place]      Understanding Diabetes: www.diabetes.org      Eating healthy: www.cdc.gov/healthyweight      CDC home safety checklist: www.cdc.gov/steadi/patient.html      Agency on Aging: www.goea.[place].[facility]      Alcoholics anonymous (AA): www.aa.org      Physical Activity: www.nellie.nih.gov/yv8iifa      Tobacco use: www.quitwithusla.org

## 2023-05-31 ENCOUNTER — TELEPHONE (OUTPATIENT)
Dept: UROLOGY | Facility: CLINIC | Age: 88
End: 2023-05-31
Payer: MEDICARE

## 2023-05-31 ENCOUNTER — EXTERNAL CHRONIC CARE MANAGEMENT (OUTPATIENT)
Dept: PRIMARY CARE CLINIC | Facility: CLINIC | Age: 88
End: 2023-05-31
Payer: MEDICARE

## 2023-05-31 PROCEDURE — 99490 CHRNC CARE MGMT STAFF 1ST 20: CPT | Mod: PBBFAC | Performed by: INTERNAL MEDICINE

## 2023-05-31 PROCEDURE — 99490 PR CHRONIC CARE MGMT, 1ST 20 MIN: ICD-10-PCS | Mod: S$PBB,,, | Performed by: INTERNAL MEDICINE

## 2023-05-31 PROCEDURE — 99490 CHRNC CARE MGMT STAFF 1ST 20: CPT | Mod: S$PBB,,, | Performed by: INTERNAL MEDICINE

## 2023-05-31 NOTE — TELEPHONE ENCOUNTER
Upon chart review provider recommended sooner appointment with np to initiate evaluation for kidney pain. Called and spoke with patient offered appointment with np on 6/1. Patient accepted appointment.

## 2023-06-01 ENCOUNTER — OFFICE VISIT (OUTPATIENT)
Dept: UROLOGY | Facility: CLINIC | Age: 88
End: 2023-06-01
Payer: MEDICARE

## 2023-06-01 ENCOUNTER — HOSPITAL ENCOUNTER (OUTPATIENT)
Dept: RADIOLOGY | Facility: HOSPITAL | Age: 88
Discharge: HOME | End: 2023-06-01
Attending: NURSE PRACTITIONER
Payer: MEDICARE

## 2023-06-01 DIAGNOSIS — R10.31 RIGHT LOWER QUADRANT ABDOMINAL PAIN: ICD-10-CM

## 2023-06-01 DIAGNOSIS — R10.9 FLANK PAIN: ICD-10-CM

## 2023-06-01 DIAGNOSIS — N23 KIDNEY PAIN: Primary | ICD-10-CM

## 2023-06-01 LAB
BILIRUBIN, UA POC OHS: NEGATIVE
BLOOD, UA POC OHS: NEGATIVE
CLARITY, UA POC OHS: CLEAR
COLOR, UA POC OHS: YELLOW
GLUCOSE, UA POC OHS: NEGATIVE
KETONES, UA POC OHS: NEGATIVE
LEUKOCYTES, UA POC OHS: NEGATIVE
NITRITE, UA POC OHS: NEGATIVE
PH, UA POC OHS: 7
PROTEIN, UA POC OHS: NEGATIVE
SPECIFIC GRAVITY, UA POC OHS: 1.02
UROBILINOGEN, UA POC OHS: 1

## 2023-06-01 PROCEDURE — 99999 PR PBB SHADOW E&M-EST. PATIENT-LVL III: CPT | Mod: PBBFAC,,, | Performed by: NURSE PRACTITIONER

## 2023-06-01 PROCEDURE — 74176 CT ABD & PELVIS W/O CONTRAST: CPT | Mod: 26,,, | Performed by: RADIOLOGY

## 2023-06-01 PROCEDURE — 81003 URINALYSIS AUTO W/O SCOPE: CPT | Mod: PBBFAC,PO | Performed by: NURSE PRACTITIONER

## 2023-06-01 PROCEDURE — 74176 CT ABD & PELVIS W/O CONTRAST: CPT | Mod: TC

## 2023-06-01 PROCEDURE — 74176 CT RENAL STONE STUDY ABD PELVIS WO: ICD-10-PCS | Mod: 26,,, | Performed by: RADIOLOGY

## 2023-06-01 PROCEDURE — 99204 OFFICE O/P NEW MOD 45 MIN: CPT | Mod: S$PBB,,, | Performed by: NURSE PRACTITIONER

## 2023-06-01 PROCEDURE — 99999 PR PBB SHADOW E&M-EST. PATIENT-LVL III: ICD-10-PCS | Mod: PBBFAC,,, | Performed by: NURSE PRACTITIONER

## 2023-06-01 PROCEDURE — 99204 PR OFFICE/OUTPT VISIT, NEW, LEVL IV, 45-59 MIN: ICD-10-PCS | Mod: S$PBB,,, | Performed by: NURSE PRACTITIONER

## 2023-06-01 PROCEDURE — 99213 OFFICE O/P EST LOW 20 MIN: CPT | Mod: PBBFAC,PO | Performed by: NURSE PRACTITIONER

## 2023-06-01 NOTE — PROGRESS NOTES
[facility] Note  Staff: RUBEN Naik    PCP: MD Nory    Chief Complaint: Kidney pain    Subjective:        HPI: Bryson Kellogg is a 87 y.o. male NEW PT presents today in office with recent onset of right flank and right sided lower abdominal pains x one week at this time.  He is with his wife during ov today.    UA in office today showed normal findings.  Pt states during ov, the pain is localized in right lower abdominal area, it comes and goes every 2-3 days.  Pain worsens with movement and change of positions.    Pt also recently has been undergoing physical therapy on same side where pain is located to his right knee/leg.      The pt was last evaluated by [facility] in 2013 for hx of urinary calculi.  Last BM was last night.    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    Hx of colonic polyp 11/03/2016    Hyperlipidemia     Personal history of colonic polyps     Colon polyps    Renal stone 11/23/2012    Rosacea     Shingles     Sleep apnea     Squamous cell carcinoma of skin     TIA (transient ischemic attack)      PSHx:  Past Surgical History:   Procedure Laterality Date    BACK SURGERY  05/2020    Lum/Kyle    CHOLECYSTECTOMY      COLONOSCOPY N/A 11/3/2016    Procedure: COLONOSCOPY;  Surgeon: Alex Cuellar MD;  Location: [facility];  Service: Endoscopy;  Laterality: N/A;    EYE SURGERY Bilateral 2014    cataracts    EYE SURGERY Right 10/2016    tear duct surgery    INJECTION OF ANESTHETIC AGENT AROUND LATERAL BRANCH NERVES OF SACROILIAC JOINT Left 3/5/2021    Procedure: left SI joint injection;  Surgeon: Pedro Varela MD;  Location: [facility] OR;  Service: Pain Management;  Laterality: Left;  left SI joint injection     INJECTION OF JOINT Left 11/30/2021     Procedure: Injection, Joint Sacroiliac and GTB;  Surgeon: Pedro Varela MD;  Location: [facility] OR;  Service: Pain Management;  Laterality: Left;    INJECTION OF JOINT Left 11/30/2021    Procedure: INJECTION, JOINT, SHOULDER, HIP, OR KNEE;  Surgeon: Pedro Varela MD;  Location: [facility] OR;  Service: Pain Management;  Laterality: Left;  GTB injestion    JOINT REPLACEMENT Left 2013    knee replacemant      left knee     Allergies:  Sulfa (sulfonamide antibiotics)    Medications: reviewed   Objective:   There were no vitals filed for this visit.    General:WDWN in NAD  Eyes: PERRLA, normal conjunctiva  Respiratory: no increased work on breathing, clear to auscultation  Cardiovascular: regular rate and rhythm. No obvious extremity edema.  GI: palpation of masses. No tenderness. No hepatosplenomegaly to palpation.  Musculoskeletal: normal range of motion of bilateral upper extremities. Normal muscle strength and tone.  Skin: no obvious rashes or lesions. No tightening of skin noted.  Neurologic: CN grossly normal. Normal sensation.   Psychiatric: awake, alert and oriented x 3. Mood and affect normal. Cooperative.    Assessment:       1. Kidney pain    2. Flank pain          Plan:   Right side pain vs. Muscle strain:    CT Renal Stone Study to be performed today to rule out  issues at this time.    F/u we will contact pt after we receive imaging results for next poc.  Pt and family verbalized understanding at this time.    [facility]: Active    Nelli Smith, PATP-C

## 2023-06-30 ENCOUNTER — EXTERNAL CHRONIC CARE MANAGEMENT (OUTPATIENT)
Dept: PRIMARY CARE CLINIC | Facility: CLINIC | Age: 88
End: 2023-06-30
Payer: MEDICARE

## 2023-06-30 PROCEDURE — 99490 CHRNC CARE MGMT STAFF 1ST 20: CPT | Mod: S$PBB,,, | Performed by: INTERNAL MEDICINE

## 2023-06-30 PROCEDURE — 99490 CHRNC CARE MGMT STAFF 1ST 20: CPT | Mod: PBBFAC | Performed by: INTERNAL MEDICINE

## 2023-06-30 PROCEDURE — 99490 PR CHRONIC CARE MGMT, 1ST 20 MIN: ICD-10-PCS | Mod: S$PBB,,, | Performed by: INTERNAL MEDICINE

## 2023-07-31 ENCOUNTER — HOSPITAL ENCOUNTER (EMERGENCY)
Facility: HOSPITAL | Age: 88
Discharge: HOME | End: 2023-07-31
Attending: EMERGENCY MEDICINE
Payer: MEDICARE

## 2023-07-31 ENCOUNTER — EXTERNAL CHRONIC CARE MANAGEMENT (OUTPATIENT)
Dept: PRIMARY CARE CLINIC | Facility: CLINIC | Age: 88
End: 2023-07-31
Payer: MEDICARE

## 2023-07-31 VITALS
HEIGHT: 70 IN | WEIGHT: 180 LBS | HEART RATE: 54 BPM | DIASTOLIC BLOOD PRESSURE: 86 MMHG | SYSTOLIC BLOOD PRESSURE: 174 MMHG | TEMPERATURE: 98 F | OXYGEN SATURATION: 97 % | BODY MASS INDEX: 25.77 KG/M2 | RESPIRATION RATE: 22 BRPM

## 2023-07-31 DIAGNOSIS — I10 HYPERTENSION, UNSPECIFIED TYPE: ICD-10-CM

## 2023-07-31 DIAGNOSIS — R42 DIZZINESS: Primary | ICD-10-CM

## 2023-07-31 LAB
ALBUMIN SERPL BCP-MCNC: 4 G/DL (ref 3.5–5.2)
ALP SERPL-CCNC: 100 U/L (ref 55–135)
ALT SERPL W/O P-5'-P-CCNC: 23 U/L (ref 10–44)
ANION GAP SERPL CALC-SCNC: 10 MMOL/L (ref 8–16)
AST SERPL-CCNC: 23 U/L (ref 10–40)
BASOPHILS # BLD AUTO: 0.02 K/UL (ref 0–0.2)
BASOPHILS NFR BLD: 0.5 % (ref 0–1.9)
BILIRUB SERPL-MCNC: 0.9 MG/DL (ref 0.1–1)
BILIRUB UR QL STRIP: NEGATIVE
BNP SERPL-MCNC: 41 PG/ML (ref 0–99)
BUN SERPL-MCNC: 18 MG/DL (ref 8–23)
CALCIUM SERPL-MCNC: 10.2 MG/DL (ref 8.7–10.5)
CHLORIDE SERPL-SCNC: 104 MMOL/L (ref 95–110)
CLARITY UR: CLEAR
CO2 SERPL-SCNC: 25 MMOL/L (ref 23–29)
COLOR UR: COLORLESS
CREAT SERPL-MCNC: 1 MG/DL (ref 0.5–1.4)
DIFFERENTIAL METHOD: ABNORMAL
EOSINOPHIL # BLD AUTO: 0.1 K/UL (ref 0–0.5)
EOSINOPHIL NFR BLD: 3 % (ref 0–8)
ERYTHROCYTE [DISTWIDTH] IN BLOOD BY AUTOMATED COUNT: 13.1 % (ref 11.5–14.5)
EST. GFR  (NO RACE VARIABLE): >60 ML/MIN/1.73 M^2
GLUCOSE SERPL-MCNC: 113 MG/DL (ref 70–110)
GLUCOSE UR QL STRIP: NEGATIVE
HCT VFR BLD AUTO: 40.3 % (ref 40–54)
HGB BLD-MCNC: 13.9 G/DL (ref 14–18)
HGB UR QL STRIP: NEGATIVE
IMM GRANULOCYTES # BLD AUTO: 0.02 K/UL (ref 0–0.04)
IMM GRANULOCYTES NFR BLD AUTO: 0.5 % (ref 0–0.5)
KETONES UR QL STRIP: NEGATIVE
LEUKOCYTE ESTERASE UR QL STRIP: NEGATIVE
LYMPHOCYTES # BLD AUTO: 1 K/UL (ref 1–4.8)
LYMPHOCYTES NFR BLD: 27.8 % (ref 18–48)
MAGNESIUM SERPL-MCNC: 2.1 MG/DL (ref 1.6–2.6)
MCH RBC QN AUTO: 34.3 PG (ref 27–31)
MCHC RBC AUTO-ENTMCNC: 34.5 G/DL (ref 32–36)
MCV RBC AUTO: 100 FL (ref 82–98)
MONOCYTES # BLD AUTO: 0.4 K/UL (ref 0.3–1)
MONOCYTES NFR BLD: 10 % (ref 4–15)
NEUTROPHILS # BLD AUTO: 2.2 K/UL (ref 1.8–7.7)
NEUTROPHILS NFR BLD: 58.2 % (ref 38–73)
NITRITE UR QL STRIP: NEGATIVE
NRBC BLD-RTO: 0 /100 WBC
PH UR STRIP: 8 [PH] (ref 5–8)
PLATELET # BLD AUTO: 138 K/UL (ref 150–450)
PMV BLD AUTO: 11.2 FL (ref 9.2–12.9)
POTASSIUM SERPL-SCNC: 4.1 MMOL/L (ref 3.5–5.1)
PROT SERPL-MCNC: 7.1 G/DL (ref 6–8.4)
PROT UR QL STRIP: NEGATIVE
RBC # BLD AUTO: 4.05 M/UL (ref 4.6–6.2)
SODIUM SERPL-SCNC: 139 MMOL/L (ref 136–145)
SP GR UR STRIP: 1.01 (ref 1–1.03)
TROPONIN I SERPL HS-MCNC: 5.5 PG/ML (ref 0–14.9)
URN SPEC COLLECT METH UR: ABNORMAL
UROBILINOGEN UR STRIP-ACNC: NEGATIVE EU/DL
WBC # BLD AUTO: 3.71 K/UL (ref 3.9–12.7)

## 2023-07-31 PROCEDURE — 99490 CHRNC CARE MGMT STAFF 1ST 20: CPT | Mod: S$PBB,,, | Performed by: INTERNAL MEDICINE

## 2023-07-31 PROCEDURE — 25000003 PHARM REV CODE 250: Performed by: EMERGENCY MEDICINE

## 2023-07-31 PROCEDURE — 93005 ELECTROCARDIOGRAM TRACING: CPT | Performed by: INTERNAL MEDICINE

## 2023-07-31 PROCEDURE — 85025 COMPLETE CBC W/AUTO DIFF WBC: CPT | Performed by: EMERGENCY MEDICINE

## 2023-07-31 PROCEDURE — 99490 CHRNC CARE MGMT STAFF 1ST 20: CPT | Mod: PBBFAC | Performed by: INTERNAL MEDICINE

## 2023-07-31 PROCEDURE — 93010 ELECTROCARDIOGRAM REPORT: CPT | Mod: ,,, | Performed by: INTERNAL MEDICINE

## 2023-07-31 PROCEDURE — 80053 COMPREHEN METABOLIC PANEL: CPT | Performed by: EMERGENCY MEDICINE

## 2023-07-31 PROCEDURE — 93010 EKG 12-LEAD: ICD-10-PCS | Mod: ,,, | Performed by: INTERNAL MEDICINE

## 2023-07-31 PROCEDURE — 99285 EMERGENCY DEPT VISIT HI MDM: CPT | Mod: 25

## 2023-07-31 PROCEDURE — 83880 ASSAY OF NATRIURETIC PEPTIDE: CPT | Performed by: EMERGENCY MEDICINE

## 2023-07-31 PROCEDURE — 99490 PR CHRONIC CARE MGMT, 1ST 20 MIN: ICD-10-PCS | Mod: S$PBB,,, | Performed by: INTERNAL MEDICINE

## 2023-07-31 PROCEDURE — 81003 URINALYSIS AUTO W/O SCOPE: CPT | Performed by: EMERGENCY MEDICINE

## 2023-07-31 PROCEDURE — 83735 ASSAY OF MAGNESIUM: CPT | Performed by: EMERGENCY MEDICINE

## 2023-07-31 PROCEDURE — 84484 ASSAY OF TROPONIN QUANT: CPT | Performed by: EMERGENCY MEDICINE

## 2023-07-31 RX ORDER — BUTALBITAL, ACETAMINOPHEN AND CAFFEINE 50; 325; 40 MG/1; MG/1; MG/1
1 TABLET ORAL ONCE
Status: COMPLETED | OUTPATIENT
Start: 2023-07-31 | End: 2023-07-31

## 2023-07-31 RX ORDER — LISINOPRIL 10 MG/1
10 TABLET ORAL DAILY
Qty: 30 TABLET | Refills: 0 | Status: SHIPPED | OUTPATIENT
Start: 2023-07-31 | End: 2023-08-09 | Stop reason: SDUPTHER

## 2023-07-31 RX ORDER — BUTALBITAL, ACETAMINOPHEN AND CAFFEINE 50; 325; 40 MG/1; MG/1; MG/1
1 TABLET ORAL EVERY 6 HOURS PRN
Qty: 20 TABLET | Refills: 0 | Status: SHIPPED | OUTPATIENT
Start: 2023-07-31

## 2023-07-31 RX ADMIN — BUTALBITAL, ACETAMINOPHEN, AND CAFFEINE 1 TABLET: 50; 325; 40 TABLET, COATED ORAL at 09:07

## 2023-07-31 NOTE — ED PROVIDER NOTES
Encounter Date: 7/31/2023       History     Chief Complaint   Patient presents with    Dizziness     X 1 day. Worse with fast moves    Hypertension     Patient presents complaining of dizziness and lightheadedness.  Patient having high blood pressure.  At the worst symptoms are mild-to-moderate.  Nothing makes it better or worse.  He denies any one-sided numbness tingling weakness.  No difficulty walking.  No chest pain or shortness a breath.      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    Hx of colonic polyp 11/03/2016    Hyperlipidemia     Personal history of colonic polyps     Colon polyps    Renal stone 11/23/2012    Rosacea     Shingles     Sleep apnea     Squamous cell carcinoma of skin     TIA (transient ischemic attack)      Past Surgical History:   Procedure Laterality Date    BACK SURGERY  05/2020    Lum/Kyle    CHOLECYSTECTOMY      COLONOSCOPY N/A 11/3/2016    Procedure: COLONOSCOPY;  Surgeon: Alex Cuellar MD;  Location: [facility];  Service: Endoscopy;  Laterality: N/A;    EYE SURGERY Bilateral 2014    cataracts    EYE SURGERY Right 10/2016    tear duct surgery    INJECTION OF ANESTHETIC AGENT AROUND LATERAL BRANCH NERVES OF SACROILIAC JOINT Left 3/5/2021    Procedure: left SI joint injection;  Surgeon: Pedro Varela MD;  Location: [facility] OR;  Service: Pain Management;  Laterality: Left;  left SI joint injection     INJECTION OF JOINT Left 11/30/2021    Procedure: Injection, Joint Sacroiliac and GTB;  Surgeon: Pedro Varela MD;  Location: [facility] OR;  Service: Pain Management;  Laterality: Left;    INJECTION OF JOINT Left 11/30/2021    Procedure: INJECTION, JOINT, SHOULDER, HIP, OR KNEE;  Surgeon: Pedro Varela MD;  Location: [facility] OR;  Service: Pain Management;  Laterality:  Left;  GTB injestion    JOINT REPLACEMENT Left 2013    knee replacemant      left knee     Family History   Problem Relation Age of Onset    No Known Problems Mother     Heart disease Father     Heart disease Brother     No Known Problems Sister     No Known Problems Daughter     No Known Problems Brother      Social History     Tobacco Use    Smoking status: Never    Smokeless tobacco: Never   Substance Use Topics    Alcohol use: No    Drug use: No     Review of Systems   All other systems reviewed and are negative.      Physical Exam     Initial Vitals [07/31/23 0715]   BP Pulse Resp Temp SpO2   (!) 182/90 61 16 97.6 °F (36.4 °C) 98 %      MAP       --         Physical Exam    Nursing note and vitals reviewed.  Constitutional: He appears well-developed and well-nourished. He is not diaphoretic. No distress.   HENT:   Head: Normocephalic and atraumatic.   Mouth/Throat: Oropharynx is clear and moist.   Eyes: EOM are normal.   Neck: Neck supple.   Normal range of motion.  Cardiovascular:  Normal rate, regular rhythm, normal heart sounds and intact distal pulses.           Pulmonary/Chest: Breath sounds normal. No respiratory distress.   Abdominal: Abdomen is soft.   Musculoskeletal:         General: Normal range of motion.      Cervical back: Normal range of motion and neck supple.     Neurological: He is alert and oriented to person, place, and time. He has normal strength. No cranial nerve deficit.   Vision-normal  Neglect-normal  Aphasia - normal  Pronator drift - normal  Cerebellum - normal   Skin: Skin is warm and dry.   Psychiatric: He has a normal mood and affect. His behavior is normal. Judgment and thought content normal.         ED Course   Procedures  Labs Reviewed   CBC W/ AUTO DIFFERENTIAL - Abnormal; Notable for the following components:       Result Value    WBC 3.71 (*)     RBC 4.05 (*)     Hemoglobin 13.9 (*)      (*)     MCH 34.3 (*)     Platelets 138 (*)     All other components within  normal limits   COMPREHENSIVE METABOLIC PANEL - Abnormal; Notable for the following components:    Glucose 113 (*)     All other components within normal limits   MAGNESIUM   TROPONIN I HIGH SENSITIVITY   B-TYPE NATRIURETIC PEPTIDE   TROPONIN I HIGH SENSITIVITY   URINALYSIS, REFLEX TO URINE CULTURE        ECG Results              EKG 12-lead (In process)  Result time 07/31/23 08:52:59      In process by Interface, Lab In [facility] (07/31/23 08:52:59)                   Narrative:    Test Reason : R07.9,    Vent. Rate : 054 BPM     Atrial Rate : 054 BPM     P-R Int : 198 ms          QRS Dur : 100 ms      QT Int : 426 ms       P-R-T Axes : 052 016 052 degrees     QTc Int : 403 ms    Sinus bradycardia  Otherwise normal ECG  When compared with ECG of 12-JAN-2023 09:53,  No significant change was found    Referred By: [email or web address]   SELF           Confirmed By:                                   Imaging Results              CT Head Without Contrast (Final result)  Result time 07/31/23 08:33:14      Final result by Tico Dean MD (07/31/23 08:33:14)                   Narrative:    CMS MANDATED QUALITY DATA - CT RADIATION - 436    All CT scans at this facility utilize dose modulation, iterative reconstruction, and/or weight based dosing when appropriate to reduce radiation dose to as low as reasonably achievable.        Reason: Dizziness, persistent/recurrent, cardiac or vascular cause suspected dizziness    TECHNIQUE: Head CT without IV contrast.    COMPARISON: None    FINDINGS:    Gray-white differentiation is maintained without hemorrhage, midline shift, or mass effect. Periventricular and subcortical white matter low-attenuation bilaterally suggest chronic small vessel ischemic changes. Diffuse cerebral and cerebellar atrophy is evident.    The ventricles and cisterns are maintained.    Calvarium is intact. Visualized sinuses are clear.    IMPRESSION:    1. No acute intracranial abnormality.  2. Chronic and involutional  changes of the brain.        Electronically signed by:  Tico Dean DO  7/31/2023 8:33 AM i[employer] Workstation: [id number]                                     X-Ray Chest AP Portable (Final result)  Result time 07/31/23 08:24:54      Final result by Tico Dean MD (07/31/23 08:24:54)                   Narrative:    Reason: Chest Pain    FINDINGS:    Portable chest with comparison chest x-ray January 31, 2021 show normal cardiomediastinal silhouette.  Lungs are clear. Pulmonary vasculature is normal. No acute osseous abnormality.    IMPRESSION:    No acute cardiopulmonary abnormality.    Electronically signed by:  Tico Dean DO  7/31/2023 8:24 AM [employer] Workstation: [id number]                                     Medications   butalbital-acetaminophen-caffeine -40 mg per tablet 1 tablet (1 tablet Oral Given 7/31/23 0918)     Medical Decision Making:   Initial Assessment:   No apparent distress  Differential Diagnosis:   Considerations include but are not limited to hypertensive emergency, acute kidney injury, dehydration, electrolyte abnormalities, end-organ damage  Clinical Tests:   Lab Tests: Reviewed and Ordered  Radiological Study: Ordered and Reviewed  Medical Tests: Reviewed and Ordered  ED Management:  MDM    Patient presents for emergent evaluation of acute dizziness that poses a threat to life and/or bodily function.    In the ED patient found to have acute high blood pressure, dizziness.    I ordered labs and personally reviewed them.  Labs significant for no acute abnormality.  I ordered X-rays and personally reviewed them and reviewed the radiologist interpretation.  Xray significant for no acute cardiopulmonary process.    I ordered EKG and personally reviewed it.  EKG significant for regular rate and rhythm without ST elevation.    I ordered CT scan and personally reviewed it and reviewed the radiologist interpretation.  CT significant for no acute intracranial process.      Discharge  MDM    Patient was managed in the ED with oral Fioricet.    The response to treatment was improved.  Patient with hypertension in the emergency department with symptoms of headache and dizziness.  No evidence of any end-organ damage with normal neurological exam.  We will start patient on low-dose lisinopril and have follow up with cardiologist next week.  Patient was discharged in stable condition.  Detailed return precautions discussed.                          Clinical Impression:   Final diagnoses:  [R42] Dizziness (Primary)  [I10] Hypertension, unspecified type        ED Disposition Condition    Discharge Stable          ED Prescriptions       Medication Sig Dispense Start Date End Date Auth. Provider    lisinopriL 10 MG tablet Take 1 tablet (10 mg total) by mouth once daily. 30 tablet 7/31/2023 7/30/2024 Behzad Wilhelm MD    butalbital-acetaminophen-caffeine -40 mg (FIORICET, ESGIC) -40 mg per tablet Take 1 tablet by mouth every 6 (six) hours as needed for Pain or Headaches. 20 tablet 7/31/2023 -- Behzad Wilhelm MD          Follow-up Information       Follow up With Specialties Details Why Contact Info    Kirk Cueto Jr., MD Internal Medicine Schedule an appointment as soon as possible for a visit in 2 days  [address]  [place] [address]  [phone]               Behzad Wilhelm MD  07/31/23 9211

## 2023-08-09 ENCOUNTER — TELEPHONE (OUTPATIENT)
Dept: CARDIOLOGY | Facility: CLINIC | Age: 88
End: 2023-08-09
Payer: MEDICARE

## 2023-08-09 ENCOUNTER — OFFICE VISIT (OUTPATIENT)
Dept: CARDIOLOGY | Facility: CLINIC | Age: 88
End: 2023-08-09
Payer: MEDICARE

## 2023-08-09 VITALS
BODY MASS INDEX: 26.48 KG/M2 | OXYGEN SATURATION: 99 % | HEIGHT: 70 IN | SYSTOLIC BLOOD PRESSURE: 114 MMHG | WEIGHT: 185 LBS | DIASTOLIC BLOOD PRESSURE: 72 MMHG | HEART RATE: 66 BPM

## 2023-08-09 DIAGNOSIS — R42 DIZZINESS: ICD-10-CM

## 2023-08-09 DIAGNOSIS — E78.2 MIXED HYPERLIPIDEMIA: ICD-10-CM

## 2023-08-09 DIAGNOSIS — I95.1 ORTHOSTATIC HYPOTENSION: ICD-10-CM

## 2023-08-09 DIAGNOSIS — I10 PRIMARY HYPERTENSION: ICD-10-CM

## 2023-08-09 DIAGNOSIS — R42 VERTIGO: Primary | ICD-10-CM

## 2023-08-09 PROCEDURE — 99214 OFFICE O/P EST MOD 30 MIN: CPT | Mod: S$PBB,,, | Performed by: NURSE PRACTITIONER

## 2023-08-09 PROCEDURE — 99999 PR PBB SHADOW E&M-EST. PATIENT-LVL IV: CPT | Mod: PBBFAC,,, | Performed by: NURSE PRACTITIONER

## 2023-08-09 PROCEDURE — 99999 PR PBB SHADOW E&M-EST. PATIENT-LVL IV: ICD-10-PCS | Mod: PBBFAC,,, | Performed by: NURSE PRACTITIONER

## 2023-08-09 PROCEDURE — 99214 PR OFFICE/OUTPT VISIT, EST, LEVL IV, 30-39 MIN: ICD-10-PCS | Mod: S$PBB,,, | Performed by: NURSE PRACTITIONER

## 2023-08-09 PROCEDURE — 99214 OFFICE O/P EST MOD 30 MIN: CPT | Mod: PBBFAC,PN | Performed by: NURSE PRACTITIONER

## 2023-08-09 RX ORDER — NEOMYCIN/POLYMYXIN B/PRAMOXINE 3.5-10K-1
1 CREAM (GRAM) TOPICAL DAILY
COMMUNITY

## 2023-08-09 RX ORDER — CYANOCOBALAMIN (VITAMIN B-12) 1000MCG/15
15 LIQUID (ML) ORAL DAILY
COMMUNITY

## 2023-08-09 RX ORDER — ASPIRIN 81 MG/1
81 TABLET ORAL DAILY
COMMUNITY

## 2023-08-09 RX ORDER — PYRIDOXINE HCL (VITAMIN B6) 100 MG
50 TABLET ORAL DAILY
COMMUNITY

## 2023-08-09 RX ORDER — LISINOPRIL 10 MG/1
5 TABLET ORAL DAILY
Qty: 30 TABLET | Refills: 0 | Status: SHIPPED | OUTPATIENT
Start: 2023-08-09 | End: 2023-10-24 | Stop reason: SDUPTHER

## 2023-08-09 RX ORDER — MECLIZINE HYDROCHLORIDE 25 MG/1
25 TABLET ORAL 3 TIMES DAILY PRN
COMMUNITY
End: 2023-10-20

## 2023-08-09 RX ORDER — ATORVASTATIN CALCIUM 20 MG/1
20 TABLET, FILM COATED ORAL DAILY
Qty: 90 TABLET | Refills: 3 | Status: SHIPPED | OUTPATIENT
Start: 2023-08-09 | End: 2023-11-21 | Stop reason: SDUPTHER

## 2023-08-09 RX ORDER — ACETAMINOPHEN 160 MG/5ML
200 SUSPENSION, ORAL (FINAL DOSE FORM) ORAL DAILY
COMMUNITY

## 2023-08-09 NOTE — TELEPHONE ENCOUNTER
----- Message from Vijay Holm sent at 8/9/2023  9:17 AM CDT -----  Regarding: pharmacy  Contact: Pharmacy  Type:  Pharmacy Calling to Clarify an RX    Name of Caller:  [facility] - [place], [place] - [address] [address]  Phone: [phone] Fax: [phone]  Pharmacy Name:  Prescription Name:lisinopriL 10 MG tablet  What do they need to clarify?:dosage  Best Call Back Number:  Additional Information:

## 2023-08-09 NOTE — PROGRESS NOTES
Subjective:    Patient ID:  Bryson Kellogg is a 88 y.o. male patient here for evaluation Follow-up      History of Present Illness:     Mr. Kellogg has been having dizziness and lightheadedness with a history of vertigo. He went to ER and was sent home after a negative CT.He was placed on lisinopril because of high blood pressure at that time. He denies Chest pain or SOB. He is able to work outside with no problems. Today In office he is orthostatic lying 136/78, sitting 127/81,and standing 113/71.  EKG at hospital SB HR 56.      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;  Service: Endoscopy;  Laterality: N/A;    EYE SURGERY Bilateral 2014    cataracts    EYE SURGERY Right 10/2016    tear duct surgery    INJECTION OF ANESTHETIC AGENT AROUND LATERAL BRANCH NERVES OF SACROILIAC JOINT Left 3/5/2021    Procedure: left SI joint injection;  Surgeon: Pedro Varela MD;  Location: [facility] OR;  Service: Pain Management;  Laterality: Left;  left SI joint injection     INJECTION OF JOINT Left 11/30/2021    Procedure: Injection, Joint Sacroiliac and GTB;  Surgeon: Pedro Varela MD;  Location: [facility] OR;  Service: Pain Management;  Laterality: Left;    INJECTION OF JOINT Left 11/30/2021    Procedure: INJECTION, JOINT, SHOULDER, HIP, OR KNEE;   Surgeon: Pedro Varela MD;  Location: [facility] OR;  Service: Pain Management;  Laterality: Left;  GTB injestion    JOINT REPLACEMENT Left 2013    knee replacemant      left knee     Social History     Tobacco Use    Smoking status: Never    Smokeless tobacco: Never   Substance Use Topics    Alcohol use: No    Drug use: No        Review of Systems:    As noted in HPI                 Objective        Vitals:    08/09/23 0824   BP: 114/72   Pulse: 66       LIPIDS - LAST 2   Lab Results   Component Value Date    CHOL 141 12/05/2022    CHOL 139 06/14/2021    HDL 48 12/05/2022    HDL 55 06/14/2021    LDLCALC 78.2 12/05/2022    LDLCALC 63.8 06/14/2021    TRIG 74 12/05/2022    TRIG 101 06/14/2021    CHOLHDL 34.0 12/05/2022    CHOLHDL 39.6 06/14/2021       CBC - LAST 2  Lab Results   Component Value Date    WBC 3.71 (L) 07/31/2023    WBC 5.64 03/02/2023    RBC 4.05 (L) 07/31/2023    RBC 3.85 (L) 03/02/2023    HGB 13.9 (L) 07/31/2023    HGB 13.2 (L) 03/02/2023    HCT 40.3 07/31/2023    HCT 38.7 (L) 03/02/2023     (H) 07/31/2023     (H) 03/02/2023    MCH 34.3 (H) 07/31/2023    MCH 34.3 (H) 03/02/2023    MCHC 34.5 07/31/2023    MCHC 34.1 03/02/2023    RDW 13.1 07/31/2023    RDW 13.4 03/02/2023     (L) 07/31/2023     03/02/2023    MPV 11.2 07/31/2023    MPV 11.6 03/02/2023    GRAN 2.2 07/31/2023    GRAN 58.2 07/31/2023    LYMPH 1.0 07/31/2023    LYMPH 27.8 07/31/2023    MONO 0.4 07/31/2023    MONO 10.0 07/31/2023    BASO 0.02 07/31/2023    BASO 0.04 03/02/2023    NRBC 0 07/31/2023    NRBC 0 03/02/2023       CHEMISTRY & LIVER FUNCTION - LAST 2  Lab Results   Component Value Date     07/31/2023     03/02/2023    K 4.1 07/31/2023    K 4.1 03/02/2023     07/31/2023     03/02/2023    CO2 25 07/31/2023    CO2 25 03/02/2023    ANIONGAP 10 07/31/2023    ANIONGAP 7 (L) 03/02/2023    BUN 18 07/31/2023    BUN 19 03/02/2023    CREATININE 1.0 07/31/2023    CREATININE 1.0 03/02/2023     (H)  07/31/2023    GLU 84 03/02/2023    CALCIUM 10.2 07/31/2023    CALCIUM 10.5 03/02/2023    MG 2.1 07/31/2023    MG 2.1 11/26/2021    ALBUMIN 4.0 07/31/2023    ALBUMIN 4.0 03/02/2023    PROT 7.1 07/31/2023    PROT 7.0 03/02/2023    ALKPHOS 100 07/31/2023    ALKPHOS 98 03/02/2023    ALT 23 07/31/2023    ALT 20 03/02/2023    AST 23 07/31/2023    AST 24 03/02/2023    BILITOT 0.9 07/31/2023    BILITOT 0.8 03/02/2023        CARDIAC PROFILE - LAST 2  Lab Results   Component Value Date    BNP 41 07/31/2023     (H) 01/31/2021     11/26/2021    TROPONINI <0.030 11/26/2021    TROPONINI <0.030 01/31/2021    TROPONINIHS 5.5 07/31/2023        COAGULATION - LAST 2  Lab Results   Component Value Date    LABPT 14.1 01/31/2021    INR 1.1 01/31/2021    INR 1.03 11/23/2012    APTT 25.9 11/23/2012       ENDOCRINE & PSA - LAST 2  Lab Results   Component Value Date    HGBA1C 5.4 01/03/2018    TSH 1.420 11/19/2021    TSH 1.459 01/03/2018    PSA 0.50 12/05/2022    PSA 0.52 06/14/2021        ECHOCARDIOGRAM RESULTS  Results for orders placed during the hospital encounter of 06/27/22    Echo    Interpretation Summary  · The left ventricle is normal in size with concentric remodeling and normal systolic function.  · The estimated ejection fraction is 65%.  · Normal left ventricular diastolic function.  · Normal right ventricular size with normal right ventricular systolic function.      CURRENT/PREVIOUS VISIT EKG  Results for orders placed or performed during the hospital encounter of 07/31/23   EKG 12-lead    Collection Time: 07/31/23  7:48 AM    Narrative    Test Reason : R07.9,    Vent. Rate : 054 BPM     Atrial Rate : 054 BPM     P-R Int : 198 ms          QRS Dur : 100 ms      QT Int : 426 ms       P-R-T Axes : 052 016 052 degrees     QTc Int : 403 ms    Sinus bradycardia  Otherwise normal ECG    Confirmed by Zofia RAHMAN, Jordyn Montana ([id number]) on 8/5/2023 12:52:07 PM    Referred By: MANISH   SELF           Confirmed By:Jordyn     Zofia RAHMAN     No valid procedures specified.   Results for orders placed during the hospital encounter of 06/27/22    Nuclear Stress - Cardiology Interpreted    Interpretation Summary    Equivocal myocardial perfusion scan.    There is a mild intensity, small sized, equivocal perfusion abnormality that is fixed in the inferobasilar wall(s). This finding is equivocal due to soft tissue shadow.    There are no other significant perfusion abnormalities.    There is a trivial to mild intensity fixed perfusion abnormality in the inferobasilar wall of the left ventricle, secondary to soft tissue attenuation.    The gated perfusion images showed an ejection fraction of 81% post stress. Normal ejection fraction is greater than 47%.    There is normal wall motion at rest and post stress.    LV cavity size is normal at rest and normal at stress.    The EKG portion of this study is negative for ischemia.    The patient reported no chest pain during the stress test.    The test was stopped because the patient experienced arrhythmia.    There were no arrhythmias during stress.    PHYSICAL EXAM  CONSTITUTIONAL: Well built, well nourished in no apparent distress  NECK: no carotid bruit, no JVD  LUNGS: CTA  CHEST WALL: no tenderness  HEART: regular rate and rhythm, S1, S2 normal, no murmur, click, rub or gallop   ABDOMEN: soft, non-tender; bowel sounds normal; no masses,  no organomegaly  EXTREMITIES: Extremities normal, no edema, no calf tenderness noted  NEURO: AAO X 3    I HAVE REVIEWED :    The vital signs, nurses notes, and all the pertinent radiology and labs.        Current Outpatient Medications   Medication Instructions    ascorbic acid (vitamin C) (VITAMIN C) 500 mg, Oral, Daily,      aspirin (ECOTRIN) 81 mg, Oral, Daily    atorvastatin (LIPITOR) 20 mg, Oral, Daily    butalbital-acetaminophen-caffeine -40 mg (FIORICET, ESGIC) -40 mg per tablet 1 tablet, Oral, Every 6 hours PRN    coenzyme Q10 200 mg, Oral,  Daily    cyanocobalamin, vitamin B-12, 1,000 mcg/15 mL Liqd Oral    fluticasone propionate (FLONASE) 50 mcg/actuation nasal spray 1 spray, Nasal    lisinopriL 5 mg, Oral, Daily    meclizine (ANTIVERT) 25 mg, Oral, 3 times daily PRN    metronidazole 1% (METROGEL) 1 % Gel Topical (Top), Daily, Apply thin film to entire face for rosacea    omega-3/dha/epa/ala/vitamin D3 (OMEGA-3-DHA-EPA-ALA-VIT D3) 50 mg (25 mg- 25 mg)-100 unit Chew Oral    pyridoxine (vitamin B6) (B-6) 50 mg, Oral, Daily    vit A/vit C/vit E/zinc/copper (PRESERVISION AREDS ORAL) 1 tablet, Oral, 2 times daily          Assessment & Plan     Primary hypertension  Decrease lisinopril to 5 mg at bedtime  Keep log  Low salt diet    Orthostatic hypotension  Wear support stockings   Stay hydrated    Hyperlipidemia  Continue Lipitor 20 mg daily    Dizziness  Vertigo  ENT  ZIO   Follow up 3 months time          No follow-ups on file.

## 2023-08-28 ENCOUNTER — OFFICE VISIT (OUTPATIENT)
Dept: INTERNAL MEDICINE | Facility: CLINIC | Age: 88
End: 2023-08-28
Payer: MEDICARE

## 2023-08-28 ENCOUNTER — LAB VISIT (OUTPATIENT)
Dept: LAB | Facility: HOSPITAL | Age: 88
End: 2023-08-28
Attending: INTERNAL MEDICINE
Payer: MEDICARE

## 2023-08-28 ENCOUNTER — PATIENT MESSAGE (OUTPATIENT)
Dept: INTERNAL MEDICINE | Facility: CLINIC | Age: 88
End: 2023-08-28

## 2023-08-28 ENCOUNTER — IMMUNIZATION (OUTPATIENT)
Dept: INTERNAL MEDICINE | Facility: CLINIC | Age: 88
End: 2023-08-28
Payer: MEDICARE

## 2023-08-28 VITALS
DIASTOLIC BLOOD PRESSURE: 68 MMHG | HEIGHT: 70 IN | SYSTOLIC BLOOD PRESSURE: 116 MMHG | WEIGHT: 188.5 LBS | OXYGEN SATURATION: 97 % | BODY MASS INDEX: 26.99 KG/M2 | HEART RATE: 71 BPM

## 2023-08-28 DIAGNOSIS — I95.1 ORTHOSTATIC HYPOTENSION: ICD-10-CM

## 2023-08-28 DIAGNOSIS — I10 PRIMARY HYPERTENSION: ICD-10-CM

## 2023-08-28 DIAGNOSIS — K63.5 POLYP OF COLON, UNSPECIFIED PART OF COLON, UNSPECIFIED TYPE: ICD-10-CM

## 2023-08-28 DIAGNOSIS — I70.0 CALCIFICATION OF AORTA: ICD-10-CM

## 2023-08-28 DIAGNOSIS — G47.33 OBSTRUCTIVE SLEEP APNEA SYNDROME: ICD-10-CM

## 2023-08-28 DIAGNOSIS — Z85.72 HISTORY OF LYMPHOMA: ICD-10-CM

## 2023-08-28 DIAGNOSIS — E78.2 MIXED HYPERLIPIDEMIA: Primary | ICD-10-CM

## 2023-08-28 DIAGNOSIS — Z23 NEED FOR VACCINATION: Primary | ICD-10-CM

## 2023-08-28 LAB
ALBUMIN SERPL BCP-MCNC: 3.7 G/DL (ref 3.5–5.2)
ALP SERPL-CCNC: 96 U/L (ref 55–135)
ALT SERPL W/O P-5'-P-CCNC: 22 U/L (ref 10–44)
ANION GAP SERPL CALC-SCNC: 7 MMOL/L (ref 8–16)
AST SERPL-CCNC: 24 U/L (ref 10–40)
BASOPHILS # BLD AUTO: 0.03 K/UL (ref 0–0.2)
BASOPHILS NFR BLD: 0.6 % (ref 0–1.9)
BILIRUB SERPL-MCNC: 1.1 MG/DL (ref 0.1–1)
BUN SERPL-MCNC: 14 MG/DL (ref 8–23)
CALCIUM SERPL-MCNC: 9.8 MG/DL (ref 8.7–10.5)
CHLORIDE SERPL-SCNC: 105 MMOL/L (ref 95–110)
CO2 SERPL-SCNC: 26 MMOL/L (ref 23–29)
CREAT SERPL-MCNC: 1 MG/DL (ref 0.5–1.4)
DIFFERENTIAL METHOD: ABNORMAL
EOSINOPHIL # BLD AUTO: 0.1 K/UL (ref 0–0.5)
EOSINOPHIL NFR BLD: 2 % (ref 0–8)
ERYTHROCYTE [DISTWIDTH] IN BLOOD BY AUTOMATED COUNT: 13.5 % (ref 11.5–14.5)
EST. GFR  (NO RACE VARIABLE): >60 ML/MIN/1.73 M^2
GLUCOSE SERPL-MCNC: 117 MG/DL (ref 70–110)
HCT VFR BLD AUTO: 37.7 % (ref 40–54)
HGB BLD-MCNC: 12.6 G/DL (ref 14–18)
IMM GRANULOCYTES # BLD AUTO: 0.01 K/UL (ref 0–0.04)
IMM GRANULOCYTES NFR BLD AUTO: 0.2 % (ref 0–0.5)
LYMPHOCYTES # BLD AUTO: 1.5 K/UL (ref 1–4.8)
LYMPHOCYTES NFR BLD: 29.8 % (ref 18–48)
MCH RBC QN AUTO: 34.1 PG (ref 27–31)
MCHC RBC AUTO-ENTMCNC: 33.4 G/DL (ref 32–36)
MCV RBC AUTO: 102 FL (ref 82–98)
MONOCYTES # BLD AUTO: 0.5 K/UL (ref 0.3–1)
MONOCYTES NFR BLD: 9.7 % (ref 4–15)
NEUTROPHILS # BLD AUTO: 2.9 K/UL (ref 1.8–7.7)
NEUTROPHILS NFR BLD: 57.7 % (ref 38–73)
NRBC BLD-RTO: 0 /100 WBC
PLATELET # BLD AUTO: 135 K/UL (ref 150–450)
PMV BLD AUTO: 11.4 FL (ref 9.2–12.9)
POTASSIUM SERPL-SCNC: 3.9 MMOL/L (ref 3.5–5.1)
PROT SERPL-MCNC: 6.7 G/DL (ref 6–8.4)
RBC # BLD AUTO: 3.69 M/UL (ref 4.6–6.2)
SODIUM SERPL-SCNC: 138 MMOL/L (ref 136–145)
WBC # BLD AUTO: 5.06 K/UL (ref 3.9–12.7)

## 2023-08-28 PROCEDURE — 99999 PR PBB SHADOW E&M-EST. PATIENT-LVL IV: CPT | Mod: PBBFAC,,, | Performed by: INTERNAL MEDICINE

## 2023-08-28 PROCEDURE — 99999PBSHW COVID-19, MRNA, LNP-S, BIVALENT BOOSTER, PF, 30 MCG/0.3 ML DOSE: ICD-10-PCS | Mod: PBBFAC,,,

## 2023-08-28 PROCEDURE — 85025 COMPLETE CBC W/AUTO DIFF WBC: CPT | Performed by: INTERNAL MEDICINE

## 2023-08-28 PROCEDURE — 99214 OFFICE O/P EST MOD 30 MIN: CPT | Mod: PBBFAC,25 | Performed by: INTERNAL MEDICINE

## 2023-08-28 PROCEDURE — 99214 PR OFFICE/OUTPT VISIT, EST, LEVL IV, 30-39 MIN: ICD-10-PCS | Mod: S$PBB,,, | Performed by: INTERNAL MEDICINE

## 2023-08-28 PROCEDURE — 99999 PR PBB SHADOW E&M-EST. PATIENT-LVL IV: ICD-10-PCS | Mod: PBBFAC,,, | Performed by: INTERNAL MEDICINE

## 2023-08-28 PROCEDURE — 99214 OFFICE O/P EST MOD 30 MIN: CPT | Mod: S$PBB,,, | Performed by: INTERNAL MEDICINE

## 2023-08-28 PROCEDURE — 91312 COVID-19, MRNA, LNP-S, BIVALENT BOOSTER, PF, 30 MCG/0.3 ML DOSE: CPT | Mod: PBBFAC

## 2023-08-28 PROCEDURE — 80053 COMPREHEN METABOLIC PANEL: CPT | Performed by: INTERNAL MEDICINE

## 2023-08-28 PROCEDURE — 0124A COVID-19, MRNA, LNP-S, BIVALENT BOOSTER, PF, 30 MCG/0.3 ML DOSE: CPT | Mod: PBBFAC,CV19

## 2023-08-28 PROCEDURE — 99999PBSHW COVID-19, MRNA, LNP-S, BIVALENT BOOSTER, PF, 30 MCG/0.3 ML DOSE: Mod: PBBFAC,,,

## 2023-08-28 PROCEDURE — 36415 COLL VENOUS BLD VENIPUNCTURE: CPT | Performed by: INTERNAL MEDICINE

## 2023-08-28 NOTE — PROGRESS NOTES
He is a 88  year-old gentleman coming in today to follow up ongoing medical   problems.  No falls.      1. He had  Lumbar stenosis.  It was causing pain down his right leg.  He is doing some PT. Last visit he complained about a shuffling gait and the PT has been helping-  he has topped pt since last visit but may comeback later in the year.  .     He had a operation in April or June 2020 in [place], [place] for lumbar spinal stenosis and that has taken care of the pain.  .    He is avoiding lifting heavy weights.   .  He walks about 3 miles a day.            2. He has a history of colon polyps, which his last colonoscopy was   11/2016. . He had one polyp  And it was hyperplastic and he does not need to follow up.  He has been  Using miralax and it has been helping.  e is still interested in Colonoscopy.  I will refer to GI as recommended for his c-scope          3. Hyperlipidemia. He is on Lipitor 40mg . Recent cholesterol shows total   cholesterol 141 , HDL 48 , triglycerides 74, and LDL 78 ( 12/5/2022)   which is stable   . He is tolerating the Lipitor 20 mg well.     4. He has a BPH, which he says has been dong well. He has 1 episode of nacturia  Around 2-3 am. . NO hesitancy or urgency.     5. 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   :Dr. 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  BCL2 and BCL6 staining patterns are confirmatory He previously saw Dr Lieberman with rad/onc and they completed XRT with 22 total on 9/29/2020.  He had PET scan on 8/24/2020 with no evidence of a systemic lymphoma process. The area was on the Left side of face.  He saw Derm again in Aug 2023 and saw Oncology in 3/2023           6. KATHY: results from 4/09 study. SEVERE obstructive sleep apnea syndrome with 289 respiratory obstructions/RDI 47 with snoring moderate intermittent and hypersomnia with CPAP. He tried Bipap for about a year. He was  "seeing a Sleep MD in [facility], but could not tolerated CPAP or BiPAP. HE has been exercising and sleeping well. NO day time fatigue. No witnessed sleep apnea or snoring. feeling well.      7. He had some vertigo earlier this month-- this has resolved-- sent by cards and ent.           SOCIAL HISTORY: He is retired from [employer]. He is . No   alcohol use. No drug use. Lives in [place]. Lives with wife and they are active and travel a lot.  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      REVIEW OF SYSTEMS:   Gen - no fatigue weight  stable.  He is still walking 3 miles a day         Eyes - no eye pain or visual changes  ENT - no hoarseness or sore throat  CV - no CP or SOB  Pulm - no cough or wheezing  GI - no N/V/D   no dysuria or incontinence  MS - no joint pain or muscle pain  Skin - no rash, or c/o of skin lesions  Neuro - no HA, dizziness  Heme - no abnormal bleeding or bruising  Endo - no polydipsia, or temperature changes  Psych - no anxiety or depression    PHYSICAL EXAMINATION: He is a well-appearing gentleman in no acute   Distress. Walking without any difficulty or without assistance device.         /68 (BP Location: Left arm, Patient Position: Sitting, BP Method: Medium (Manual))   Pulse 71   Ht 5' 10" (1.778 m)   Wt 85.5 kg (188 lb 7.9 oz)   SpO2 97%   BMI 27.05 kg/m²        Ear canals are open. TMs are   clear. Oropharynx is clear. Pupils equal, round, and reactive to light   and accommodation. He is wearing glasses.   NECK: Supple. Thyroid is not enlarged. No carotid bruits. He has no   cervical, axillary, or inguinal lymphadenopathy detected.   CHEST: Clear without wheeze.   CARDIOVASCULAR: S1, S2, regular rate and rhythm without murmur, gallop,   or rub.   ABDOMEN: Soft, nontender. No hepatosplenomegaly. No guarding or rebound   tenderness. Abdominal aorta is not enlarged.   He has normal biceps, triceps, patellar deep tendon reflexes. Normal   muscle strength, upper and lower " extremities.    :Her has no supicous sking lesions  He is wearing a fitbit on his left wrist.    Walks with mild limb favoring his left leg.          1. History of colon polyps.- last note said he did not need a follow up c-scope--we discussed today -- last polyp in 2016 was hyperplastic.  Discussed.    2. History of hyperlipidemia - better controled.    3. History of rosacea. stable   4 pulmonary nodules-- ct chest reviewed  -- no need for follow up. -- Reviewed his recent PET scan 9/06/2022.     5. Sleep apnea- off CPAP and BIPAP-- says he is sleeping well.  Less snoring since weight loss.    6. cutaneous follicular lymphoma-- following with Hem and derm.   7.  Recent holter reviewed.-- SVT- 4-17 beats, avg rate 109.  Ventricular bigemin and trigeminy present.

## 2023-08-31 ENCOUNTER — EXTERNAL CHRONIC CARE MANAGEMENT (OUTPATIENT)
Dept: PRIMARY CARE CLINIC | Facility: CLINIC | Age: 88
End: 2023-08-31
Payer: MEDICARE

## 2023-08-31 PROCEDURE — 99490 PR CHRONIC CARE MGMT, 1ST 20 MIN: ICD-10-PCS | Mod: S$PBB,,, | Performed by: INTERNAL MEDICINE

## 2023-08-31 PROCEDURE — 99490 CHRNC CARE MGMT STAFF 1ST 20: CPT | Mod: PBBFAC | Performed by: INTERNAL MEDICINE

## 2023-08-31 PROCEDURE — 99490 CHRNC CARE MGMT STAFF 1ST 20: CPT | Mod: S$PBB,,, | Performed by: INTERNAL MEDICINE

## 2023-09-05 NOTE — PROGRESS NOTES
[facility] Hematology/Oncology  PROGRESS NOTE -   Follow-up Visit      Subjective:       Patient ID:   NAME: Bryson eKllogg : 1935     88 y.o. male    Referring Doc: Osiris Lopez  Other Physicians: Kirk Cueto ([facility]-PCP); Niecy ([facility]); Luisana    Chief Complaint:  cutaneous follicular lymphoma f/u    History of Present Illness:     Patient returns today for a regularly scheduled follow-up visit.  The patient is here today to go over the results of the recently ordered labs, tests and studies. He is here with his wife.     He is doing ok with no new issues. No new issues. Breathing ok. No CP, SOB, HA's or N/V.     He saw Dr Cueto, his PCP, last week and had some labs done    He saw Dr Lopez with dermatology again on 2023 and had only a few spots that required burning    He previously was followed Dr Lieberman with rad/onc and they completed XRT with 22 total on 2020.        He last saw Dr EN Floyd in 2023 and sees him again in near future; he had a bout of vertigo about a month ago which has since resolved    He saw christopher Smith with  in 2023; CT renal was done on 2023    He had PEt scan on 2023 with no evidence of cancer          Discussed covid19 precautions. He had his vaccinations            ROS:   GEN: normal without any fever, night sweats or weight loss  HEENT: normal with no HA's, sore throat, stiff neck, changes in vision  CV: normal with no CP, SOB, PND, LEMOS or orthopnea  PULM: normal with no SOB, cough, hemoptysis, sputum or pleuritic pain  GI: normal with no abdominal pain, nausea, vomiting, constipation, diarrhea, melanotic stools, BRBPR, or hematemesis  : normal with no hematuria, dysuria  BREAST: normal with no mass, discharge, pain  SKIN: normal with no rash, erythema, bruising, or swelling    Pain Scale:  0    Allergies:  Review of patient's allergies indicates:   Allergen Reactions    Sulfa (sulfonamide antibiotics) Rash     Childhood allergy        Medications:    Current Outpatient Medications:     ascorbic acid, vitamin C, (VITAMIN C) 500 MG tablet, Take 500 mg by mouth once daily., Disp: , Rfl:     aspirin (ECOTRIN) 81 MG EC tablet, Take 81 mg by mouth once daily., Disp: , Rfl:     atorvastatin (LIPITOR) 20 MG tablet, Take 1 tablet (20 mg total) by mouth once daily., Disp: 90 tablet, Rfl: 3    butalbital-acetaminophen-caffeine -40 mg (FIORICET, ESGIC) -40 mg per tablet, Take 1 tablet by mouth every 6 (six) hours as needed for Pain or Headaches., Disp: 20 tablet, Rfl: 0    coenzyme Q10 200 mg capsule, Take 200 mg by mouth once daily., Disp: , Rfl:     cyanocobalamin, vitamin B-12, 1,000 mcg/15 mL Liqd, Take by mouth., Disp: , Rfl:     fluticasone propionate (FLONASE) 50 mcg/actuation nasal spray, 1 spray by Nasal route., Disp: , Rfl:     lisinopriL 10 MG tablet, Take 0.5 tablets (5 mg total) by mouth once daily., Disp: 30 tablet, Rfl: 0    meclizine (ANTIVERT) 25 mg tablet, Take 25 mg by mouth 3 (three) times daily as needed., Disp: , Rfl:     metronidazole 1% (METROGEL) 1 % Gel, Apply topically once daily. Apply thin film to entire face for rosacea, Disp: 60 g, Rfl: 3    omega-3/dha/epa/ala/vitamin D3 (OMEGA-3-DHA-EPA-ALA-VIT D3) 50 mg (25 mg- 25 mg)-100 unit Chew, Take by mouth., Disp: , Rfl:     pyridoxine, vitamin B6, (B-6) 100 MG Tab, Take 50 mg by mouth once daily., Disp: , Rfl:     vit A/vit C/vit E/zinc/copper (PRESERVISION AREDS ORAL), Take 1 tablet by mouth 2 (two) times daily., Disp: , Rfl:     PMHx/PSHx Updates:  See patient's last visit with me on 3/6/2023  See H&P on 8/13/2020        Pathology:   Cancer Staging   No matching staging information was found for the patient.    Skin biopsy: 2/27/2023:    DIAGNOSIS:   03/01/2023 WPL/jr     1.  RIGHT ADJACENT TO LATERAL CANTHUS, SHAVE:   - ACTINIC KERATOSIS.   - NODULAR SOLAR ELASTOSIS.     2.  LEFT CHEST, PUNCH:   - EPIDERMAL CYST, INFUNDIBULAR TYPE.     3.  LEFT MIDDLE BACK,  "PUNCH:   - EPIDERMAL CYST, INFUNDIBULAR TYPE.      Objective:     Vitals:  Blood pressure 125/73, pulse 66, temperature 97.3 °F (36.3 °C), resp. rate 18, height 5' 10" (1.778 m), weight 84.8 kg (187 lb).    Physical Examination:   GEN: no apparent distress, comfortable; AAOx3  HEAD: atraumatic and normocephalic; healed scar on left anterior auricular region  EYES: no pallor, no icterus, PERRLA  ENT: OMM, no pharyngeal erythema, external ears WNL; no nasal discharge; no thrush  NECK: no masses, thyroid normal, trachea midline, no LAD/LN's, supple  CV: RRR with no murmur; normal pulse; normal S1 and S2; no pedal edema  CHEST: Normal respiratory effort; CTAB; normal breath sounds; no wheeze or crackles  ABDOM: nontender and nondistended; soft; normal bowel sounds; no rebound/guarding  MUSC/Skeletal: ROM normal; no crepitus; joints normal; no deformities or arthropathy  EXTREM: no clubbing, cyanosis, inflammation or swelling  SKIN: no rashes, lesions, ulcers, petechiae or subcutaneous nodules; recent skin biopsies  : no mcgill  NEURO: grossly intact; motor/sensory WNL; AAOx3; no tremors  PSYCH: normal mood, affect and behavior  LYMPH: normal cervical, supraclavicular, axillary and groin LN's            Labs:   CMP  Sodium   Date Value Ref Range Status   08/28/2023 138 136 - 145 mmol/L Final     Potassium   Date Value Ref Range Status   08/28/2023 3.9 3.5 - 5.1 mmol/L Final     Chloride   Date Value Ref Range Status   08/28/2023 105 95 - 110 mmol/L Final     CO2   Date Value Ref Range Status   08/28/2023 26 23 - 29 mmol/L Final     Glucose   Date Value Ref Range Status   08/28/2023 117 (H) 70 - 110 mg/dL Final     BUN   Date Value Ref Range Status   08/28/2023 14 8 - 23 mg/dL Final     Creatinine   Date Value Ref Range Status   08/28/2023 1.0 0.5 - 1.4 mg/dL Final   11/23/2012 1.2 0.5 - 1.4 mg/dL Final     Calcium   Date Value Ref Range Status   08/28/2023 9.8 8.7 - 10.5 mg/dL Final   11/23/2012 9.4 8.7 - 10.5 mg/dL " Final     Total Protein   Date Value Ref Range Status   08/28/2023 6.7 6.0 - 8.4 g/dL Final     Albumin   Date Value Ref Range Status   08/28/2023 3.7 3.5 - 5.2 g/dL Final     Total Bilirubin   Date Value Ref Range Status   08/28/2023 1.1 (H) 0.1 - 1.0 mg/dL Final     Comment:     For infants and newborns, interpretation of results should be based  on gestational age, weight and in agreement with clinical  observations.    Premature Infant recommended reference ranges:  Up to 24 hours.............<8.0 mg/dL  Up to 48 hours............<12.0 mg/dL  3-5 days..................<15.0 mg/dL  6-29 days.................<15.0 mg/dL       Alkaline Phosphatase   Date Value Ref Range Status   08/28/2023 96 55 - 135 U/L Final   11/23/2012 85 55 - 135 U/L Final     AST   Date Value Ref Range Status   08/28/2023 24 10 - 40 U/L Final   11/23/2012 22 10 - 40 U/L Final     ALT   Date Value Ref Range Status   08/28/2023 22 10 - 44 U/L Final     Anion Gap   Date Value Ref Range Status   08/28/2023 7 (L) 8 - 16 mmol/L Final   11/23/2012 10 5 - 15 meq/L Final     eGFR if    Date Value Ref Range Status   06/14/2022 >60.0 >60 mL/min/1.73 m^2 Final     eGFR if non    Date Value Ref Range Status   06/14/2022 >60.0 >60 mL/min/1.73 m^2 Final     Comment:     Calculation used to obtain the estimated glomerular filtration  rate (eGFR) is the CKD-EPI equation.            Lab Results   Component Value Date    WBC 5.06 08/28/2023    HGB 12.6 (L) 08/28/2023    HCT 37.7 (L) 08/28/2023     (H) 08/28/2023     (L) 08/28/2023     Gran # (ANC) 1.8 - 7.7 K/uL 2.9      Lymph # 1.0 - 4.8 K/uL 1.5     Gran % 38.0 - 73.0 % 57.7        Radiology/Diagnostic Studies:    PET 2/23/2023:    IMPRESSION:  1. No evidence of recurrent FDG avid lymphoproliferative disease.  2. Additional observations as described.         PET 8/22/2022:  IMPRESSION:  No evidence of recurrent or metastatic disease        PET  2/14/2022:    IMPRESSION:  No PET/CT evidence of FDG avid disease.        US axilla 9/15/2021:    FINDINGS: Sonographic assessment targeted to the left axilla was performed in planning for possible biopsy. Recently reported lymph node (PET/CT August 24, 2021) is identified, measuring 2.2 x 1.0 cm. Margins are sharp, and fatty hilum is preserved.      CT Soft Neck  9/9/2021:    IMPRESSION: No enlarged cervical chain lymph nodes or cervical soft tissue mass        PET  8/24/2021:    Impression:     1. Nonspecific FDG uptake in left supraclavicular soft tissues, new, without abnormal CT correlate.  If further imaging evaluation is needed, soft tissue neck CT with IV contrast can be considered.  2. 11 mm soft tissue mass in left axilla which is moderately FDG avid and has surrounding inflammatory fat stranding.  Recurrent lymphoma is a consideration.  Reactive lymph node uptake due to infectious or inflammatory etiology also conceivable.  3. No other evidence of lymphoproliferative disorder.  4. Unchanged pulmonary nodules        PET  2/19/2021:  IMPRESSION: No evidence of active lymphoproliferative disease.          Nm Pet Ct Whole Body    Result Date: 8/24/2020  EXAMINATION: NM PET CT WHOLE BODY CLINICAL HISTORY: Cutaneous follicle center lymphoma, lymph nodes of head, face, and neck, initial staging, lung nodules, C 82.61, R 91.8. TECHNIQUE: Following the injection of 12.8 mCi of F-18 labeled FDG into a left antecubital vein, PET CT was performed from the vertex of the skull through the feet with an integrated full ring PET CT scanner with image fusion. The patient's serum glucose at the time of the exam was 88 mg/dL. COMPARISON: Multiple prior exams including chest CT of 01/05/2017. FINDINGS: There is no abnormal focal FDG hypermetabolism in the head, neck, chest, abdomen, pelvis, or the skeletal system to suggest active lymphoproliferative disease.  There is osseous FDG hypermetabolism associated with cervical and  lumbar degenerative facet arthropathy. CT images of the brain show scattered areas of nonspecific white matter hypoattenuation, with no intra-axial mass or abnormal extra-axial fluid.  There is generalized prominence of the cortical sulci and ventricles.  No suspicious sclerotic or lytic osseous abnormalities of the calvarium. CT images of the chest show no new suspicious pulmonary nodules or masses, with stable 5 mm oval noncalcified nodule in the left lower lobe image 153, with stable oval 5 mm nodular opacity in the right lower lobe along the major fissure image 128.  There is stable apical fibronodular scarring, with no pleural or pericardial effusion.  No new suspicious pulmonary nodules or masses.  There are aortic and coronary arterial vascular calcifications. CT images of the abdomen and pelvis show cholecystectomy clips, few splenic granulomatous calcifications, nonspecific peripancreatic calcifications, hypoattenuating bilateral renal lesions suggestive of cysts, and scattered aortoiliac vascular calcifications, with no ascites. CT images of the lower extremities show no enlarged lymph nodes or soft tissue masses, with diffuse arterial vascular calcifications.  There is advanced arthropathy of the left 1st metatarsophalangeal joint.  There is intervertebral disc space narrowing and facet arthropathy in the spine, with bilateral glenohumeral arthropathic changes.  No suspicious sclerotic or lytic osseous abnormalities by CT.     1. No evidence of active lymphoproliferative disease. 2. Stable subcentimeter noncalcified pulmonary nodules dating back to 01/05/2017, compatible with benign etiology. 3. Additional observations as above. Electronically signed by: Balta Ta MD Date:    08/24/2020 Time:    13:22      I have reviewed all available lab results and radiology reports.    Assessment/Plan:   (1) 88 y.o. 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   - excisional biopsy was performed on 7/28/2020  - CD 20 +l BCL-6 stain is positive; CD30 was negative  - dicussed pathology and went over the latest NCCN guidelines (version 2.2020)  - refer to rad/onc and schedule PET    9/8/2020:  - He saw Dr Lieberman with rad/onc and they have started him on XRT with 22 total. He is in his 2nd week of XRT.   - He had PEt scan on 8/24/2020 with no evidence of a systemic lymphoma process.     10/12/2020:  - He previously saw Dr Lieberman with rad/onc and they completed XRT with 22 total on 9/29/2020.    - He had PEt scan on 8/24/2020 with no evidence of a systemic lymphoma process.   - he will need f/u with derm and rad/onc   - discussed consideration for [facility] evaluation at some point in future if ever needed but he wants to hold off on that right now    3/1/2021:  - He saw saw Dr Lopez recently on Jan 18th 2021 with dermatology and had clear report.  - He had recent PET on 2/19/2021 which was negative for any recurrent lymphoma  - He went to ER in jan 2021 with bout of vertigo.    9/30/2021:  - recent evaluation with derm which was good  - recent PET, CT neck and US axilla with borderline LN in axilla; however, patient had covid vaccination day before he had PET which could be the etiology  - CT Neck was negative  - US really did not elucidate any LN to biopsy  - discussed with patient and his wife and the are in agreement with just getting repeat PET in FEb 2022    3/29/2022:  - he saw Dr lopez recently with derm  - recent PEt on 2/14/2022 negative    9/6/2022:  - he saw Dr Lopez recently in July 2022 with clear report  - he had recent negative PET on 8/22/2022    3/6/2023:  - recent biopsies with Dr Lopez which were negative for cancer  - latest PEt on 2/23/2023 negative    9/6/2023:  - he saw Dr Lopez with derm on 8/18 with just a  coupe of spots requiring burning  - look to repeat PEt in Feb 2024     (2) Hypercholesterolemia     (3) Hx/of TIA in 1999     (4) BPH     (5) KATHY     (6) Pulmonary nodules - no tobacco history; monitored via CT scans with last one in Jan 2017     (7) Hx/of colon polyps     (8) Rosacea     (9) Arthritis and DJD - knee    (10) Mild anemia     9/6/2023:  - hgb a little lower this time around  - he is seeing Dr Esteban next week for evaluation for colonoscopy  - check up to date iron panel and b12/folate    VISIT DIAGNOSES:      Cutaneous follicle center lymphoma involving lymph nodes of head  -     NM PET CT Whole Body; Future; Expected date: 02/12/2024    History of lymphoma  -     NM PET CT Whole Body; Future; Expected date: 02/12/2024    Anemia, unspecified type  -     CBC Auto Differential; Standing  -     Comprehensive Metabolic Panel; Standing  -     Iron and TIBC; Standing  -     Ferritin; Standing  -     Vitamin B12; Standing  -     Folate; Standing    Nutritional anemia, unspecified  -     CBC Auto Differential; Standing  -     Comprehensive Metabolic Panel; Standing  -     Iron and TIBC; Standing  -     Ferritin; Standing  -     Vitamin B12; Standing  -     Folate; Standing          PLAN:  1. Follow-up with rad/onc, derm etc as directed by them   2. Rescan with PEt again in Feb 2024  3. Continue with regular dermatologic surveillance    4. Check up to date iron panel and b12/folate  5. Check up to date labs every 3-6 months  6. F/u with derm, PCP  7. Proceed with GI evaluation    RTC in 6 months  Fax note to  Nory Lopez; Niecy/Luisana Varela; Eulalia Watson    Discussion:     Pathology Discussion:     I reviewed and discussed the pathology report(s) and radiograph reports (if available) in as simple to understand and/or laymen's terms to the best of my ability. I had an indepth conversation with the patient and went over the patient's individual diagnosis based on the information that was currently  "available. I discussed the TNM staging process with regard to the patient's particular cancer type, and the calculated stage based on the currently available TNM data and literature. I discussed the available prognostic data with regard to the current staging information and how it relates to the prognosis of their particular neoplastic process.          NCCN Guidelines:     I discussed the available treatment option(s) in accordance with the latest literature from the NCCN Clinical Practice Guidelines for the patient's particular type of cancer disorder. The NCCN Guidelines provide a "document evidence-based (and) consensus-driven management" of the care of oncology patients. The treatment recommendations were made not only in accordance to the NCCN guidelines, but also factored in to account the patient's overall age, condition, performance status and their medical co-morbidities. I went over the risks and benefits of the the treatment options (if any could be made) with regard to their particular cancer type, their cancer stage, their age, and their co-morbidities.      COVID-19 Discussion:     I had long discussion with patient and any applicable family about the COVID-19 coronavirus epidemic and the recommended precautions with regard to cancer and/or hematology patients. I have re-iterated the CDC recommendations for adequate hand washing, use of hand -like products, and coughing into elbow, etc. In addition, especially for our patients who are on chemotherapy and/or our otherwise immunocompromised patients, I have recommended avoidance of crowds, including movie theaters, restaurants, churches, etc. I have recommended avoidance of any sick or symptomatic family members and/or friends. I have also recommended avoidance of any raw and unwashed food products, and general avoidance of food items that have not been prepared by themselves. The patient has been asked to call us immediately with any symptom " developments, issues, questions or other general concerns.         I spent over 25 mins of time with the patient. Reviewed results of the recently ordered labs, tests and studies; made directives with regards to the results. Over half of this time was spent couseling and coordinating care.    I have explained all of the above in detail and the patient understands all of the current recommendation(s). I have answered all of their questions to the best of my ability and to their complete satisfaction.   The patient is to continue with the current management plan.            Electronically signed by Mehdi Alvarez MD                   Answers submitted by the patient for this visit:  Review of Systems Questionnaire (Submitted on 8/31/2023)  appetite change : No  unexpected weight change: No  mouth sores: No  visual disturbance: No  cough: No  shortness of breath: No  chest pain: No  abdominal pain: No  diarrhea: No  frequency: No  back pain: No  rash: No  headaches: No  adenopathy: No  nervous/ anxious: No

## 2023-09-06 ENCOUNTER — LAB VISIT (OUTPATIENT)
Dept: LAB | Facility: HOSPITAL | Age: 88
End: 2023-09-06
Attending: INTERNAL MEDICINE
Payer: MEDICARE

## 2023-09-06 ENCOUNTER — OFFICE VISIT (OUTPATIENT)
Dept: HEMATOLOGY/ONCOLOGY | Facility: CLINIC | Age: 88
End: 2023-09-06
Payer: MEDICARE

## 2023-09-06 VITALS
SYSTOLIC BLOOD PRESSURE: 125 MMHG | BODY MASS INDEX: 26.77 KG/M2 | HEART RATE: 66 BPM | TEMPERATURE: 97 F | HEIGHT: 70 IN | DIASTOLIC BLOOD PRESSURE: 73 MMHG | RESPIRATION RATE: 18 BRPM | WEIGHT: 187 LBS

## 2023-09-06 DIAGNOSIS — D53.9 NUTRITIONAL ANEMIA, UNSPECIFIED: ICD-10-CM

## 2023-09-06 DIAGNOSIS — D64.9 ANEMIA, UNSPECIFIED TYPE: ICD-10-CM

## 2023-09-06 DIAGNOSIS — Z85.72 HISTORY OF LYMPHOMA: ICD-10-CM

## 2023-09-06 DIAGNOSIS — C82.61 CUTANEOUS FOLLICLE CENTER LYMPHOMA INVOLVING LYMPH NODES OF HEAD: Primary | ICD-10-CM

## 2023-09-06 LAB
ALBUMIN SERPL BCP-MCNC: 3.8 G/DL (ref 3.5–5.2)
ALP SERPL-CCNC: 94 U/L (ref 55–135)
ALT SERPL W/O P-5'-P-CCNC: 21 U/L (ref 10–44)
ANION GAP SERPL CALC-SCNC: 9 MMOL/L (ref 8–16)
AST SERPL-CCNC: 27 U/L (ref 10–40)
BASOPHILS # BLD AUTO: 0.03 K/UL (ref 0–0.2)
BASOPHILS NFR BLD: 0.6 % (ref 0–1.9)
BILIRUB SERPL-MCNC: 1.1 MG/DL (ref 0.1–1)
BUN SERPL-MCNC: 16 MG/DL (ref 8–23)
CALCIUM SERPL-MCNC: 10.2 MG/DL (ref 8.7–10.5)
CHLORIDE SERPL-SCNC: 107 MMOL/L (ref 95–110)
CO2 SERPL-SCNC: 25 MMOL/L (ref 23–29)
CREAT SERPL-MCNC: 1 MG/DL (ref 0.5–1.4)
DIFFERENTIAL METHOD: ABNORMAL
EOSINOPHIL # BLD AUTO: 0.1 K/UL (ref 0–0.5)
EOSINOPHIL NFR BLD: 2.8 % (ref 0–8)
ERYTHROCYTE [DISTWIDTH] IN BLOOD BY AUTOMATED COUNT: 13.2 % (ref 11.5–14.5)
EST. GFR  (NO RACE VARIABLE): >60 ML/MIN/1.73 M^2
FERRITIN SERPL-MCNC: 258 NG/ML (ref 20–300)
FOLATE SERPL-MCNC: 15.3 NG/ML (ref 4–24)
GLUCOSE SERPL-MCNC: 89 MG/DL (ref 70–110)
HCT VFR BLD AUTO: 37.9 % (ref 40–54)
HGB BLD-MCNC: 12.7 G/DL (ref 14–18)
IMM GRANULOCYTES # BLD AUTO: 0.02 K/UL (ref 0–0.04)
IMM GRANULOCYTES NFR BLD AUTO: 0.4 % (ref 0–0.5)
IRON SERPL-MCNC: 167 UG/DL (ref 45–160)
LYMPHOCYTES # BLD AUTO: 1.7 K/UL (ref 1–4.8)
LYMPHOCYTES NFR BLD: 33.6 % (ref 18–48)
MCH RBC QN AUTO: 33.7 PG (ref 27–31)
MCHC RBC AUTO-ENTMCNC: 33.5 G/DL (ref 32–36)
MCV RBC AUTO: 101 FL (ref 82–98)
MONOCYTES # BLD AUTO: 0.6 K/UL (ref 0.3–1)
MONOCYTES NFR BLD: 11.3 % (ref 4–15)
NEUTROPHILS # BLD AUTO: 2.5 K/UL (ref 1.8–7.7)
NEUTROPHILS NFR BLD: 51.3 % (ref 38–73)
NRBC BLD-RTO: 0 /100 WBC
PLATELET # BLD AUTO: 164 K/UL (ref 150–450)
PMV BLD AUTO: 11.5 FL (ref 9.2–12.9)
POTASSIUM SERPL-SCNC: 4.9 MMOL/L (ref 3.5–5.1)
PROT SERPL-MCNC: 6.9 G/DL (ref 6–8.4)
RBC # BLD AUTO: 3.77 M/UL (ref 4.6–6.2)
SATURATED IRON: 48 % (ref 20–50)
SODIUM SERPL-SCNC: 141 MMOL/L (ref 136–145)
TOTAL IRON BINDING CAPACITY: 348 UG/DL (ref 250–450)
TRANSFERRIN SERPL-MCNC: 235 MG/DL (ref 200–375)
VIT B12 SERPL-MCNC: 1030 PG/ML (ref 210–950)
WBC # BLD AUTO: 4.94 K/UL (ref 3.9–12.7)

## 2023-09-06 PROCEDURE — 80053 COMPREHEN METABOLIC PANEL: CPT | Performed by: INTERNAL MEDICINE

## 2023-09-06 PROCEDURE — 82746 ASSAY OF FOLIC ACID SERUM: CPT | Performed by: INTERNAL MEDICINE

## 2023-09-06 PROCEDURE — 99213 OFFICE O/P EST LOW 20 MIN: CPT | Mod: S$GLB,,, | Performed by: INTERNAL MEDICINE

## 2023-09-06 PROCEDURE — 84466 ASSAY OF TRANSFERRIN: CPT | Performed by: INTERNAL MEDICINE

## 2023-09-06 PROCEDURE — 36415 COLL VENOUS BLD VENIPUNCTURE: CPT | Mod: PO | Performed by: INTERNAL MEDICINE

## 2023-09-06 PROCEDURE — 99213 PR OFFICE/OUTPT VISIT, EST, LEVL III, 20-29 MIN: ICD-10-PCS | Mod: S$GLB,,, | Performed by: INTERNAL MEDICINE

## 2023-09-06 PROCEDURE — 85025 COMPLETE CBC W/AUTO DIFF WBC: CPT | Performed by: INTERNAL MEDICINE

## 2023-09-06 PROCEDURE — 82728 ASSAY OF FERRITIN: CPT | Performed by: INTERNAL MEDICINE

## 2023-09-06 PROCEDURE — 82607 VITAMIN B-12: CPT | Performed by: INTERNAL MEDICINE

## 2023-09-06 PROCEDURE — 83540 ASSAY OF IRON: CPT | Performed by: INTERNAL MEDICINE

## 2023-09-13 ENCOUNTER — OFFICE VISIT (OUTPATIENT)
Dept: GASTROENTEROLOGY | Facility: CLINIC | Age: 88
End: 2023-09-13
Payer: MEDICARE

## 2023-09-13 VITALS
SYSTOLIC BLOOD PRESSURE: 109 MMHG | WEIGHT: 185.44 LBS | BODY MASS INDEX: 26.6 KG/M2 | DIASTOLIC BLOOD PRESSURE: 69 MMHG | HEART RATE: 62 BPM

## 2023-09-13 DIAGNOSIS — Z86.010 HISTORY OF COLON POLYPS: Primary | ICD-10-CM

## 2023-09-13 PROCEDURE — 99999 PR PBB SHADOW E&M-EST. PATIENT-LVL III: CPT | Mod: PBBFAC,,, | Performed by: INTERNAL MEDICINE

## 2023-09-13 PROCEDURE — 99499 NO LOS: ICD-10-PCS | Mod: S$PBB,,, | Performed by: INTERNAL MEDICINE

## 2023-09-13 PROCEDURE — 99999 PR PBB SHADOW E&M-EST. PATIENT-LVL III: ICD-10-PCS | Mod: PBBFAC,,, | Performed by: INTERNAL MEDICINE

## 2023-09-13 PROCEDURE — 99499 UNLISTED E&M SERVICE: CPT | Mod: S$PBB,,, | Performed by: INTERNAL MEDICINE

## 2023-09-13 PROCEDURE — 99213 OFFICE O/P EST LOW 20 MIN: CPT | Mod: PBBFAC,PN | Performed by: INTERNAL MEDICINE

## 2023-09-13 NOTE — PROGRESS NOTES
Subjective     This is an established patient.      Patient ID: Bryson Kellogg is a 88 y.o. male.    Chief Complaint: Colon Polyps    Patient seen for personal history of colon polyps, diagnosed several years ago, few in number, small size, adenomatous histology, with associated signs/symptoms absent, and alleviating/exacerbating factors including none.  His last colonoscopy was in 2016 with Dr. Cuellar at which time a repeat scope in 5 years was recommended but he has not followed up for this until now.  We discussed risks and benefits of colonoscopy given his age and he wishes to proceed.      Review of Systems   Constitutional:  Negative for chills, fatigue and fever.   HENT:  Negative for trouble swallowing.    Respiratory:  Negative for cough, shortness of breath and wheezing.    Cardiovascular:  Negative for chest pain and palpitations.   Gastrointestinal:  Negative for abdominal pain, constipation, diarrhea, nausea and vomiting.   Musculoskeletal:  Negative for arthralgias and myalgias.   Integumentary:  Negative for color change and rash.   Neurological:  Negative for dizziness, weakness and numbness.   Psychiatric/Behavioral:  Negative for confusion. The patient is not nervous/anxious.    All other systems reviewed and are negative.         Objective     Vitals:    09/13/23 1501   BP: 109/69   BP Location: Left arm   Patient Position: Sitting   Pulse: 62   Weight: 84.1 kg (185 lb 6.5 oz)         Physical Exam  Constitutional:       Appearance: He is well-developed.   HENT:      Head: Normocephalic and atraumatic.   Eyes:      General: No scleral icterus.     Pupils: Pupils are equal, round, and reactive to light.   Neck:      Thyroid: No thyromegaly.   Cardiovascular:      Rate and Rhythm: Normal rate and regular rhythm.      Heart sounds: No murmur heard.  Pulmonary:      Effort: Pulmonary effort is normal.      Breath sounds: Normal breath sounds. No wheezing.   Abdominal:      General: Bowel sounds are  normal. There is no distension.      Palpations: Abdomen is soft.      Tenderness: There is no abdominal tenderness.   Musculoskeletal:      Cervical back: Normal range of motion and neck supple.   Lymphadenopathy:      Cervical: No cervical adenopathy.   Skin:     General: Skin is warm and dry.      Findings: No erythema or rash.   Neurological:      Mental Status: He is alert and oriented to person, place, and time.   Psychiatric:         Behavior: Behavior normal.       Lab Results   Component Value Date    WBC 4.94 09/06/2023    HGB 12.7 (L) 09/06/2023    HCT 37.9 (L) 09/06/2023     (H) 09/06/2023     09/06/2023         Old records from Dr. Cuellar reviewed and are as above in HPI       Assessment and Plan     1. History of colon polyps        Schedule colonoscopy  Further recommendations to follow after above.           No follow-ups on file.

## 2023-09-30 ENCOUNTER — EXTERNAL CHRONIC CARE MANAGEMENT (OUTPATIENT)
Dept: PRIMARY CARE CLINIC | Facility: CLINIC | Age: 88
End: 2023-09-30
Payer: MEDICARE

## 2023-09-30 PROCEDURE — 99490 CHRNC CARE MGMT STAFF 1ST 20: CPT | Mod: PBBFAC | Performed by: INTERNAL MEDICINE

## 2023-09-30 PROCEDURE — 99490 PR CHRONIC CARE MGMT, 1ST 20 MIN: ICD-10-PCS | Mod: S$PBB,,, | Performed by: INTERNAL MEDICINE

## 2023-09-30 PROCEDURE — 99490 CHRNC CARE MGMT STAFF 1ST 20: CPT | Mod: S$PBB,,, | Performed by: INTERNAL MEDICINE

## 2023-10-19 ENCOUNTER — HOSPITAL ENCOUNTER (OUTPATIENT)
Facility: HOSPITAL | Age: 88
Discharge: HOME | End: 2023-10-20
Attending: EMERGENCY MEDICINE | Admitting: STUDENT IN AN ORGANIZED HEALTH CARE EDUCATION/TRAINING PROGRAM
Payer: MEDICARE

## 2023-10-19 DIAGNOSIS — R60.9 SWELLING: ICD-10-CM

## 2023-10-19 DIAGNOSIS — M25.572 ACUTE LEFT ANKLE PAIN: Primary | ICD-10-CM

## 2023-10-19 DIAGNOSIS — R52 PAIN: ICD-10-CM

## 2023-10-19 LAB
ALBUMIN SERPL BCP-MCNC: 4.1 G/DL (ref 3.5–5.2)
ALP SERPL-CCNC: 110 U/L (ref 55–135)
ALT SERPL W/O P-5'-P-CCNC: 18 U/L (ref 10–44)
ANION GAP SERPL CALC-SCNC: 6 MMOL/L (ref 8–16)
AST SERPL-CCNC: 23 U/L (ref 10–40)
BASOPHILS # BLD AUTO: 0.04 K/UL (ref 0–0.2)
BASOPHILS NFR BLD: 0.5 % (ref 0–1.9)
BILIRUB SERPL-MCNC: 0.7 MG/DL (ref 0.1–1)
BUN SERPL-MCNC: 16 MG/DL (ref 8–23)
CALCIUM SERPL-MCNC: 10.2 MG/DL (ref 8.7–10.5)
CHLORIDE SERPL-SCNC: 106 MMOL/L (ref 95–110)
CO2 SERPL-SCNC: 27 MMOL/L (ref 23–29)
CREAT SERPL-MCNC: 1 MG/DL (ref 0.5–1.4)
DIFFERENTIAL METHOD: ABNORMAL
EOSINOPHIL # BLD AUTO: 0.2 K/UL (ref 0–0.5)
EOSINOPHIL NFR BLD: 2.2 % (ref 0–8)
ERYTHROCYTE [DISTWIDTH] IN BLOOD BY AUTOMATED COUNT: 13.6 % (ref 11.5–14.5)
EST. GFR  (NO RACE VARIABLE): >60 ML/MIN/1.73 M^2
ETHANOL SERPL-MCNC: <10 MG/DL
GLUCOSE SERPL-MCNC: 90 MG/DL (ref 70–110)
HCT VFR BLD AUTO: 37.2 % (ref 40–54)
HGB BLD-MCNC: 12.7 G/DL (ref 14–18)
IMM GRANULOCYTES # BLD AUTO: 0.02 K/UL (ref 0–0.04)
IMM GRANULOCYTES NFR BLD AUTO: 0.3 % (ref 0–0.5)
LYMPHOCYTES # BLD AUTO: 1.6 K/UL (ref 1–4.8)
LYMPHOCYTES NFR BLD: 21.1 % (ref 18–48)
MAGNESIUM SERPL-MCNC: 2.2 MG/DL (ref 1.6–2.6)
MCH RBC QN AUTO: 34.6 PG (ref 27–31)
MCHC RBC AUTO-ENTMCNC: 34.1 G/DL (ref 32–36)
MCV RBC AUTO: 101 FL (ref 82–98)
MONOCYTES # BLD AUTO: 0.8 K/UL (ref 0.3–1)
MONOCYTES NFR BLD: 10.7 % (ref 4–15)
NEUTROPHILS # BLD AUTO: 5.1 K/UL (ref 1.8–7.7)
NEUTROPHILS NFR BLD: 65.2 % (ref 38–73)
NRBC BLD-RTO: 0 /100 WBC
PHOSPHATE SERPL-MCNC: 2.7 MG/DL (ref 2.7–4.5)
PLATELET # BLD AUTO: 135 K/UL (ref 150–450)
PMV BLD AUTO: 11.2 FL (ref 9.2–12.9)
POTASSIUM SERPL-SCNC: 3.8 MMOL/L (ref 3.5–5.1)
PROT SERPL-MCNC: 7 G/DL (ref 6–8.4)
RBC # BLD AUTO: 3.67 M/UL (ref 4.6–6.2)
SODIUM SERPL-SCNC: 139 MMOL/L (ref 136–145)
WBC # BLD AUTO: 7.74 K/UL (ref 3.9–12.7)

## 2023-10-19 PROCEDURE — 80053 COMPREHEN METABOLIC PANEL: CPT | Performed by: STUDENT IN AN ORGANIZED HEALTH CARE EDUCATION/TRAINING PROGRAM

## 2023-10-19 PROCEDURE — 84100 ASSAY OF PHOSPHORUS: CPT | Performed by: EMERGENCY MEDICINE

## 2023-10-19 PROCEDURE — 85025 COMPLETE CBC W/AUTO DIFF WBC: CPT | Performed by: STUDENT IN AN ORGANIZED HEALTH CARE EDUCATION/TRAINING PROGRAM

## 2023-10-19 PROCEDURE — 99285 EMERGENCY DEPT VISIT HI MDM: CPT | Mod: 25

## 2023-10-19 PROCEDURE — 83735 ASSAY OF MAGNESIUM: CPT | Performed by: STUDENT IN AN ORGANIZED HEALTH CARE EDUCATION/TRAINING PROGRAM

## 2023-10-19 PROCEDURE — 82077 ASSAY SPEC XCP UR&BREATH IA: CPT | Performed by: EMERGENCY MEDICINE

## 2023-10-19 PROCEDURE — 36415 COLL VENOUS BLD VENIPUNCTURE: CPT | Performed by: EMERGENCY MEDICINE

## 2023-10-20 VITALS
WEIGHT: 181.19 LBS | HEART RATE: 68 BPM | DIASTOLIC BLOOD PRESSURE: 60 MMHG | HEIGHT: 71 IN | BODY MASS INDEX: 25.37 KG/M2 | TEMPERATURE: 99 F | SYSTOLIC BLOOD PRESSURE: 124 MMHG | OXYGEN SATURATION: 97 % | RESPIRATION RATE: 16 BRPM

## 2023-10-20 PROBLEM — M25.572 ACUTE LEFT ANKLE PAIN: Status: ACTIVE | Noted: 2023-10-20

## 2023-10-20 PROCEDURE — 97161 PT EVAL LOW COMPLEX 20 MIN: CPT

## 2023-10-20 PROCEDURE — G0378 HOSPITAL OBSERVATION PER HR: HCPCS

## 2023-10-20 PROCEDURE — 97116 GAIT TRAINING THERAPY: CPT

## 2023-10-20 PROCEDURE — 25000003 PHARM REV CODE 250

## 2023-10-20 RX ORDER — ACETAMINOPHEN 500 MG
5000 TABLET ORAL DAILY
Status: DISCONTINUED | OUTPATIENT
Start: 2023-10-20 | End: 2023-10-20 | Stop reason: HOSPADM

## 2023-10-20 RX ORDER — ACETAMINOPHEN 500 MG
5000 TABLET ORAL DAILY
COMMUNITY

## 2023-10-20 RX ORDER — ONDANSETRON 2 MG/ML
4 INJECTION INTRAMUSCULAR; INTRAVENOUS EVERY 8 HOURS PRN
Status: DISCONTINUED | OUTPATIENT
Start: 2023-10-20 | End: 2023-10-20 | Stop reason: HOSPADM

## 2023-10-20 RX ORDER — SODIUM CHLORIDE 0.9 % (FLUSH) 0.9 %
2 SYRINGE (ML) INJECTION
Status: DISCONTINUED | OUTPATIENT
Start: 2023-10-20 | End: 2023-10-20 | Stop reason: HOSPADM

## 2023-10-20 RX ORDER — LANOLIN ALCOHOL/MO/W.PET/CERES
1000 CREAM (GRAM) TOPICAL DAILY
Status: DISCONTINUED | OUTPATIENT
Start: 2023-10-20 | End: 2023-10-20 | Stop reason: HOSPADM

## 2023-10-20 RX ORDER — ACETAMINOPHEN 325 MG/1
650 TABLET ORAL EVERY 8 HOURS PRN
Status: DISCONTINUED | OUTPATIENT
Start: 2023-10-20 | End: 2023-10-20 | Stop reason: HOSPADM

## 2023-10-20 RX ORDER — ATORVASTATIN CALCIUM 20 MG/1
20 TABLET, FILM COATED ORAL DAILY
Status: DISCONTINUED | OUTPATIENT
Start: 2023-10-20 | End: 2023-10-20 | Stop reason: HOSPADM

## 2023-10-20 RX ORDER — LANOLIN ALCOHOL/MO/W.PET/CERES
50 CREAM (GRAM) TOPICAL DAILY
Status: DISCONTINUED | OUTPATIENT
Start: 2023-10-20 | End: 2023-10-20 | Stop reason: HOSPADM

## 2023-10-20 RX ORDER — ASPIRIN 81 MG/1
81 TABLET ORAL DAILY
Status: DISCONTINUED | OUTPATIENT
Start: 2023-10-20 | End: 2023-10-20 | Stop reason: HOSPADM

## 2023-10-20 RX ORDER — ENOXAPARIN SODIUM 100 MG/ML
40 INJECTION SUBCUTANEOUS EVERY 24 HOURS
Status: DISCONTINUED | OUTPATIENT
Start: 2023-10-20 | End: 2023-10-20 | Stop reason: HOSPADM

## 2023-10-20 RX ORDER — PROMETHAZINE HYDROCHLORIDE 25 MG/1
25 TABLET ORAL EVERY 6 HOURS PRN
Status: DISCONTINUED | OUTPATIENT
Start: 2023-10-20 | End: 2023-10-20 | Stop reason: HOSPADM

## 2023-10-20 RX ORDER — TALC
6 POWDER (GRAM) TOPICAL NIGHTLY PRN
Status: DISCONTINUED | OUTPATIENT
Start: 2023-10-20 | End: 2023-10-20 | Stop reason: HOSPADM

## 2023-10-20 RX ORDER — ASCORBIC ACID 500 MG
500 TABLET ORAL DAILY
Status: DISCONTINUED | OUTPATIENT
Start: 2023-10-20 | End: 2023-10-20 | Stop reason: HOSPADM

## 2023-10-20 RX ADMIN — CYANOCOBALAMIN TAB 1000 MCG 1000 MCG: 1000 TAB at 09:10

## 2023-10-20 RX ADMIN — ACETAMINOPHEN 650 MG: 325 TABLET ORAL at 05:10

## 2023-10-20 RX ADMIN — OXYCODONE HYDROCHLORIDE AND ACETAMINOPHEN 500 MG: 500 TABLET ORAL at 09:10

## 2023-10-20 RX ADMIN — Medication 5000 UNITS: at 09:10

## 2023-10-20 RX ADMIN — PYRIDOXINE HCL TAB 50 MG 50 MG: 50 TAB at 09:10

## 2023-10-20 RX ADMIN — Medication 81 MG: at 09:10

## 2023-10-20 NOTE — PLAN OF CARE
Per scheduling, no appointments available. Inbasket message sent to pcp clinic requesting hospital follow up appt. Clinic to contact patient with appt information     10/20/23 2811   Final Note   Hospital Resources/Appts/Education Provided Appointment suggestion unavailable   Post-Acute Status   Post-Acute Authorization Other   Other Status Awaiting f/u Appts

## 2023-10-20 NOTE — ASSESSMENT & PLAN NOTE
Patient fell asleep with his legs crossed, left ankle on right knee  Patient woke up and had swelling and pain about 3 hours later   Patient was able to partially weight bear using a walker that he had at home.   XR left tibia/fibula  - No apparent acute osseous abnormality  XR left foot and ankle   - LEFT ANKLE: No acute findings.  - LEFT FOOT: No acute findings.    Patient walked in shoes with a walker and states that it was painful however he had a stable gait   Patient and patient's family did not feel like he could go home at this time   Ace wrap applied for compression  Patient can ice the ankle 15 min on 15 min off  Patient should elevate the ankle   Tylenol ordered for pain

## 2023-10-20 NOTE — PLAN OF CARE
Pt was explained LE. Pt verbalized understanding of LE and signed. LE scanned to .       10/20/23 1013   LE Message   Medicare Outpatient and Observation Notification regarding financial responsibility Given to patient/caregiver;Explained to patient/caregiver;Signed/date by patient/caregiver   Date LE was signed 10/20/23   Time LE was signed 1013

## 2023-10-20 NOTE — PT/OT/SLP PROGRESS
Occupational Therapy      Patient Name:  Bryson Kellogg   MRN:  [id number]    Patient not seen today secondary to Patient discharged prior to initiation of evaluation. Will follow-up no.    10/20/2023

## 2023-10-20 NOTE — HPI
89 y/o male with history of HLD, HTN, and lymphoma presents to the ED for left ankle pain and swelling. Patient states that he fell asleep, for an hour or two, in his recliner earlier today with his legs crossed. 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 When he came in from working he sat down and noticed that his ankle was swollen and he was unable to place full pressure on it. His wife brought him a walker and he was able to get around the house, partial weight bearing, and get dressed to come to the ED. Patient states that his left leg is usually his strong leg and that he has some residual unsteadiness in his gait from a lumbar spine problem that he had many years ago. Patient is amenable to trying to walk on his ankle now and states that he feels like the swelling may have gone down some since he had arrived.     ED: Vitals showed HR 66, /93, RR 20 saturating at 98% on RA. Labs showed CMP, magnesium, ethanol, and phosphorus WNL and a stable CBC. Xray of the tibia, fibula, ankle, and foot were all negative for fracture. CT head showed no acute changes. US of the LE showed no sonographic evidence for left lower extremity deep venous thrombosis.

## 2023-10-20 NOTE — DISCHARGE INSTRUCTIONS
Please follow up with your primary care in 1 week. If no better may need podiatry (foot doctor) referral and/or PT evaluation outpatient.  Continue the rest, ice, elevation.  Can use NSAIDs sparingly if needed for pain.

## 2023-10-20 NOTE — HOSPITAL COURSE
The Pt was monitored closely during his stay.  His swelling and pain improved significantly with compression and ice.  Imaging revealed no acute bony abnormalities.  Pt was feeling much better since admission.  He worked with PT and did well.  He was instructed on safety precautions and use of assistive device which he already has not home.  Discussed the importance of compression which physical therapy educated his wife at length about.  We discussed elevation, continued rest and ice.  We discussed the importance of close follow up with his PCP and should his injury persists, perhaps a referral to Podiatry to further evaluate with additional imaging as well as potential for physical therapy follow-up.  We did discuss the use of NSAIDs sparingly as needed for pain.  Return precautions were provided.  Pt and wife verbalized understanding and agreement with discharge plan.    Pt was seen on day of discharge and deemed appropriate for discharge.

## 2023-10-20 NOTE — H&P
[facility] - Emergency Dept  Hospital Medicine  History & Physical    Patient Name: Bryson Kellogg  MRN: [id number]  Patient Class: Emergency  Admission Date: 10/19/2023  Attending Physician:  Dr. Brown  Primary Care Provider: Kirk Cueto Jr., MD         Patient information was obtained from patient, spouse/SO and ER records.     Subjective:     Principal Problem:<principal problem not specified>    Chief Complaint:   Chief Complaint   Patient presents with    Ankle Pain     Left ankle pain and swelling that started today. Denies injury and trauma. Endorses pins and needles feelings in it and unable to place weight on it.         HPI: 89 y/o male with history of HLD, HTN, and lymphoma presents to the ED for left ankle pain and swelling. Patient states that he fell asleep, for an hour or two, in his recliner earlier today with his legs crossed. 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 When he came in from working he sat down and noticed that his ankle was swollen and he was unable to place full pressure on it. His wife brought him a walker and he was able to get around the house, partial weight bearing, and get dressed to come to the ED. Patient states that his left leg is usually his strong leg and that he has some residual unsteadiness in his gait from a lumbar spine problem that he had many years ago. Patient is amenable to trying to walk on his ankle now and states that he feels like the swelling may have gone down some since he had arrived.     ED: Vitals showed HR 66, /93, RR 20 saturating at 98% on RA. Labs showed CMP, magnesium, ethanol, and phosphorus WNL and a stable CBC. Xray of the tibia, fibula, ankle, and foot were all negative for fracture. CT head showed no acute changes. US of the LE showed no sonographic evidence for left lower extremity deep venous thrombosis.       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    Hx of colonic polyp 11/03/2016    Hyperlipidemia     Personal history of colonic polyps     Colon polyps    Renal stone 11/23/2012    Rosacea     Shingles     Sleep apnea     Squamous cell carcinoma of skin     TIA (transient ischemic attack)        Past Surgical History:   Procedure Laterality Date    BACK SURGERY  05/2020    Lum/Kyle    CHOLECYSTECTOMY      COLONOSCOPY N/A 11/3/2016    Procedure: COLONOSCOPY;  Surgeon: Alex Cuellar MD;  Location: [facility];  Service: Endoscopy;  Laterality: N/A;    EYE SURGERY Bilateral 2014    cataracts    EYE SURGERY Right 10/2016    tear duct surgery    INJECTION OF ANESTHETIC AGENT AROUND LATERAL BRANCH NERVES OF SACROILIAC JOINT Left 3/5/2021    Procedure: left SI joint injection;  Surgeon: Pedro Varela MD;  Location: [facility] OR;  Service: Pain Management;  Laterality: Left;  left SI joint injection     INJECTION OF JOINT Left 11/30/2021    Procedure: Injection, Joint Sacroiliac and GTB;  Surgeon: Pedro Varela MD;  Location: [facility] OR;  Service: Pain Management;  Laterality: Left;    INJECTION OF JOINT Left 11/30/2021    Procedure: INJECTION, JOINT, SHOULDER, HIP, OR KNEE;  Surgeon: Pedro Varela MD;  Location: [facility] OR;  Service: Pain Management;  Laterality: Left;  GTB injestion    JOINT REPLACEMENT Left 2013    knee replacemant      left knee       Review of patient's allergies indicates:   Allergen Reactions    Sulfa (sulfonamide antibiotics) Rash     Childhood allergy       No current facility-administered medications on file prior to encounter.     Current Outpatient Medications on File Prior to Encounter   Medication Sig    ascorbic acid, vitamin C, (VITAMIN C) 500 MG tablet Take 500 mg by mouth once daily.    aspirin (ECOTRIN) 81 MG EC tablet Take 81 mg by mouth once daily.    atorvastatin (LIPITOR) 20 MG tablet Take 1  tablet (20 mg total) by mouth once daily.    butalbital-acetaminophen-caffeine -40 mg (FIORICET, ESGIC) -40 mg per tablet Take 1 tablet by mouth every 6 (six) hours as needed for Pain or Headaches.    coenzyme Q10 200 mg capsule Take 200 mg by mouth once daily.    cyanocobalamin, vitamin B-12, 1,000 mcg/15 mL Liqd Take by mouth.    lisinopriL 10 MG tablet Take 0.5 tablets (5 mg total) by mouth once daily. (Patient not taking: Reported on 10/20/2023)    meclizine (ANTIVERT) 25 mg tablet Take 25 mg by mouth 3 (three) times daily as needed.    metronidazole 1% (METROGEL) 1 % Gel Apply topically once daily. Apply thin film to entire face for rosacea    omega-3/dha/epa/ala/vitamin D3 (OMEGA-3-DHA-EPA-ALA-VIT D3) 50 mg (25 mg- 25 mg)-100 unit Chew Take by mouth.    pyridoxine, vitamin B6, (B-6) 100 MG Tab Take 50 mg by mouth once daily.    vit A/vit C/vit E/zinc/copper (PRESERVISION AREDS ORAL) Take 1 tablet by mouth 2 (two) times daily.     Family History       Problem Relation (Age of Onset)    Heart disease Father, Brother    No Known Problems Mother, Sister, Daughter, Brother          Tobacco Use    Smoking status: Never    Smokeless tobacco: Never   Substance and Sexual Activity    Alcohol use: No    Drug use: No    Sexual activity: Yes     Partners: Female     Review of Systems   Constitutional:  Negative for activity change, appetite change, chills, diaphoresis, fatigue and fever.   HENT:  Negative for congestion, ear discharge, ear pain, postnasal drip, rhinorrhea, sinus pressure and sore throat.    Eyes:  Negative for pain, discharge, redness and itching.   Respiratory:  Negative for cough, chest tightness and shortness of breath.    Cardiovascular:  Negative for chest pain and leg swelling.   Gastrointestinal:  Negative for abdominal pain, constipation, diarrhea, nausea and vomiting.   Genitourinary:  Negative for dysuria, frequency, hematuria and urgency.   Musculoskeletal:  Positive for arthralgias  (of the left ankle) and joint swelling (of the left ankle). Negative for back pain.   Skin:  Negative for color change, pallor and rash.   Neurological:  Negative for dizziness, syncope, facial asymmetry, weakness and light-headedness.   Psychiatric/Behavioral:  Negative for behavioral problems, confusion and hallucinations.      Objective:     Vital Signs (Most Recent):  Temp: 98.5 °F (36.9 °C) (10/19/23 1925)  Pulse: 70 (10/20/23 0030)  Resp: 16 (10/20/23 0030)  BP: 134/68 (10/20/23 0030)  SpO2: 99 % (10/20/23 0030) Vital Signs (24h Range):  Temp:  [98.5 °F (36.9 °C)] 98.5 °F (36.9 °C)  Pulse:  [66-73] 70  Resp:  [16-20] 16  SpO2:  [98 %-100 %] 99 %  BP: (134-155)/(68-93) 134/68     Weight: 79.4 kg (175 lb)  Body mass index is 25.11 kg/m².     Physical Exam  Vitals and nursing note reviewed.   Constitutional:       General: He is not in acute distress.     Appearance: Normal appearance. He is not ill-appearing, toxic-appearing or diaphoretic.   HENT:      Head: Normocephalic and atraumatic.      Nose: Nose normal.      Mouth/Throat:      Mouth: Mucous membranes are moist.   Eyes:      Extraocular Movements: Extraocular movements intact.   Cardiovascular:      Rate and Rhythm: Normal rate and regular rhythm.      Heart sounds: Normal heart sounds. No murmur heard.  Pulmonary:      Effort: Pulmonary effort is normal. No respiratory distress.      Breath sounds: Normal breath sounds. No wheezing.   Abdominal:      General: Abdomen is flat. Bowel sounds are normal.      Palpations: Abdomen is soft. There is no mass.      Tenderness: There is no abdominal tenderness. There is no guarding.      Hernia: No hernia is present.   Musculoskeletal:         General: Swelling (of the left ankle) and tenderness (in the joint space of the left ankle) present. No deformity. Normal range of motion.      Cervical back: Normal range of motion. No rigidity or tenderness.   Skin:     General: Skin is warm and dry.      Coloration: Skin  is not jaundiced.      Findings: No bruising or erythema.   Neurological:      General: No focal deficit present.      Mental Status: He is alert and oriented to person, place, and time. Mental status is at baseline.   Psychiatric:         Mood and Affect: Mood normal.         Behavior: Behavior normal.                Significant Labs: All pertinent labs within the past 24 hours have been reviewed.  CBC:   Recent Labs   Lab 10/19/23  2015   WBC 7.74   HGB 12.7*   HCT 37.2*   *     CMP:   Recent Labs   Lab 10/19/23  2015      K 3.8      CO2 27   GLU 90   BUN 16   CREATININE 1.0   CALCIUM 10.2   PROT 7.0   ALBUMIN 4.1   BILITOT 0.7   ALKPHOS 110   AST 23   ALT 18   ANIONGAP 6*     Magnesium:   Recent Labs   Lab 10/19/23  2015   MG 2.2       Significant Imaging: I have reviewed all pertinent imaging results/findings within the past 24 hours.    Assessment/Plan:     Acute left ankle pain  Patient fell asleep with his legs crossed, left ankle on right knee  Patient woke up and had swelling and pain about 3 hours later   Patient was able to partially weight bear using a walker that he had at home.   XR left tibia/fibula  - No apparent acute osseous abnormality  XR left foot and ankle   - LEFT ANKLE: No acute findings.  - LEFT FOOT: No acute findings.    Patient walked in shoes with a walker and states that it was painful however he had a stable gait   Patient and patient's family did not feel like he could go home at this time   Ace wrap applied for compression  Patient can ice the ankle 15 min on 15 min off  Patient should elevate the ankle   Tylenol ordered for pain  PT consulted  OT consulted    Hyperlipidemia  Continue home statin        VTE Risk Mitigation (From admission, onward)           Ordered     enoxaparin injection 40 mg  Daily         10/20/23 0112     IP VTE HIGH RISK PATIENT  Once         10/20/23 0112     Place sequential compression device  Until discontinued         10/20/23 0112                                    Babita Wilks PA-C  Department of Hospital Medicine  [facility] - Emergency Dept    N/A  Family history is reviewed and has not changed   Pertinent information:

## 2023-10-20 NOTE — ED PROVIDER NOTES
Encounter Date: 10/19/2023       History     Chief Complaint   Patient presents with    Ankle Pain     Left ankle pain and swelling that started today. Denies injury and trauma. Endorses pins and needles feelings in it and unable to place weight on it.      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     Patient reports that he fell asleep around noon with is left leg crossed. Woke up went to the gym and walked and then around 2 pm started feeling numbness and pins and needle sensation on his left foot. He denies any trauma, insect bites, or any falls. Patient is unable to bear weight. No coughs, chest pain, or shortness of breath.      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    Hx of colonic polyp 11/03/2016    Hyperlipidemia     Personal history of colonic polyps     Colon polyps    Renal stone 11/23/2012    Rosacea     Shingles     Sleep apnea     Squamous cell carcinoma of skin     TIA (transient ischemic attack)      Past Surgical History:   Procedure Laterality Date    BACK SURGERY  05/2020    Lum/Kyle    CHOLECYSTECTOMY      COLONOSCOPY N/A 11/3/2016    Procedure: COLONOSCOPY;  Surgeon: Alex Cuellar MD;  Location: [facility];  Service: Endoscopy;  Laterality: N/A;    EYE SURGERY Bilateral 2014    cataracts    EYE SURGERY Right 10/2016    tear duct surgery    INJECTION OF ANESTHETIC AGENT AROUND LATERAL BRANCH NERVES OF SACROILIAC JOINT Left 3/5/2021    Procedure: left SI joint injection;  Surgeon: Pedro Varela MD;  Location: [facility] OR;  Service: Pain Management;  Laterality: Left;  left SI joint injection      INJECTION OF JOINT Left 11/30/2021    Procedure: Injection, Joint Sacroiliac and GTB;  Surgeon: Pedro Varela MD;  Location: [facility] OR;  Service: Pain Management;  Laterality: Left;    INJECTION OF JOINT Left 11/30/2021    Procedure: INJECTION, JOINT, SHOULDER, HIP, OR KNEE;  Surgeon: Pedro Varela MD;  Location: [facility] OR;  Service: Pain Management;  Laterality: Left;  GTB injestion    JOINT REPLACEMENT Left 2013    knee replacemant      left knee     Family History   Problem Relation Age of Onset    No Known Problems Mother     Heart disease Father     Heart disease Brother     No Known Problems Sister     No Known Problems Daughter     No Known Problems Brother      Social History     Tobacco Use    Smoking status: Never    Smokeless tobacco: Never   Substance Use Topics    Alcohol use: No    Drug use: No     Review of Systems   Constitutional:  Positive for activity change. Negative for appetite change.   HENT:  Negative for trouble swallowing.    Eyes:  Negative for visual disturbance.   Respiratory:  Positive for shortness of breath. Negative for cough.    Gastrointestinal:  Negative for abdominal distention, nausea and vomiting.   Skin:  Negative for color change and rash.   Neurological:  Positive for numbness. Negative for facial asymmetry, weakness and headaches.   Psychiatric/Behavioral:  Negative for agitation and confusion.        Physical Exam     Initial Vitals [10/19/23 1925]   BP Pulse Resp Temp SpO2   (!) 155/93 66 20 98.5 °F (36.9 °C) 98 %      MAP       --         Physical Exam    Nursing note and vitals reviewed.  Constitutional: He appears well-developed and well-nourished.   HENT:   Head: Normocephalic and atraumatic.   Eyes: EOM are normal.   Neck: Neck supple.   Cardiovascular:  Normal rate.           No murmur heard.  Pulmonary/Chest: Breath sounds normal. He has no wheezes. He has no rhonchi.   Abdominal: Abdomen is soft. There is no rebound and no guarding.   Musculoskeletal:         General: No  edema. Normal range of motion.      Cervical back: Neck supple.      Comments: Left lower extremities: Strenght 4/5 on dorsal and plantar flexion. Pulse 2+, capillary refill <2. Tenderness to mid foot on palpation. Negative pain on malleoli palpation.     Neurological: He is oriented to person, place, and time.   Alert and oriented x 3  CN 2-12 intact  Cerebellar testing w/o dysmetria on finger to nose  Bilateral UE: sensation equal and symmetric Strength 5/5 on hand   Bilateral LE: Strength 4/5 on dorsal and plantar flexion on Left, 5/5 on Right.      Skin: Skin is warm.   Psychiatric: He has a normal mood and affect.         ED Course   Procedures  Labs Reviewed   CBC W/ AUTO DIFFERENTIAL - Abnormal; Notable for the following components:       Result Value    RBC 3.67 (*)     Hemoglobin 12.7 (*)     Hematocrit 37.2 (*)      (*)     MCH 34.6 (*)     Platelets 135 (*)     All other components within normal limits   COMPREHENSIVE METABOLIC PANEL - Abnormal; Notable for the following components:    Anion Gap 6 (*)     All other components within normal limits   MAGNESIUM   PHOSPHORUS   ALCOHOL,MEDICAL (ETHANOL)   PHOSPHORUS          Imaging Results              CT Head Without Contrast (Final result)  Result time 10/19/23 21:33:10      Final result by Pedro Ruiz MD (10/19/23 21:33:10)                   Narrative:      CT head without contrast on 10/19/2023    CLINICAL INDICATION: Numbness    TECHNIQUE:  Multiple axial images are obtained throughout the head without the administration of contrast.  This exam was performed according to our departmental dose-optimization program, which includes automated exposure control, adjustment of the mA and/or kV according to patient size and/or use of iterative reconstruction technique.  Total DLP is 971.1 mGycm.    COMPARISON: 7/31/2023    FINDINGS:  There is generalized cerebral atrophy. There is low density in the periventricular white matter consistent with  chronic small vessel ischemic changes. There is no hydrocephalus. There is no hemorrhage. There are no abnormal extra-axial fluid collections. There is no mass, mass effect or midline shift. There is no CT evidence of acute infarct. No bony abnormality is noted.    IMPRESSION: Atrophy and chronic small vessel ischemic changes with no acute intracranial abnormality.    Electronically signed by:  Roland Ruiz MD  10/19/2023 09:33 PM CDT Workstation: [id number]                                     X-Ray Ankle Complete Left (Final result)  Result time 10/19/23 23:01:51      Final result by Devan Holloway MD (10/19/23 23:01:51)                   Narrative:    EXAMINATION: XR FOOT 3 OR MORE VIEWS LEFT, XR ANKLE 3 OR MORE VIEWS LEFT    COMPARISON: None available.    INDICATION: Pain.    ANKLE FINDINGS: AP, oblique and lateral views of the left ankle are performed - 3 views. No acute fracture or dislocation is evident. No ankle effusion is evident. The ankle mortise appears intact. Prominent scattered atherosclerotic calcification is noted. The bones appear osteopenic. No radiopaque foreign body or subcutaneous gas is seen.    FOOT FINDINGS: AP, oblique and lateral views of the left foot are performed - 3 views. 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 No acute fracture or dislocation is evident. A very small Achilles insertion spur appears intact. Atherosclerotic calcification is again noted. The bones appear osteopenic. No radiopaque foreign body or subcutaneous gas is seen.    IMPRESSION:    LEFT ANKLE: No acute findings.    LEFT FOOT: No acute findings.    Electronically signed by:  Devan Holloway MD  10/19/2023 11:01 PM CDT Workstation: [id number]                                     X-Ray Foot Complete Left (Final result)  Result time 10/19/23 23:01:51      Final result by Devan Holloway MD (10/19/23 23:01:51)                   Narrative:    EXAMINATION: XR FOOT 3 OR MORE VIEWS LEFT, XR ANKLE 3  OR MORE VIEWS LEFT    COMPARISON: None available.    INDICATION: Pain.    ANKLE FINDINGS: AP, oblique and lateral views of the left ankle are performed - 3 views. No acute fracture or dislocation is evident. No ankle effusion is evident. The ankle mortise appears intact. Prominent scattered atherosclerotic calcification is noted. The bones appear osteopenic. No radiopaque foreign body or subcutaneous gas is seen.    FOOT FINDINGS: AP, oblique and lateral views of the left foot are performed - 3 views. 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 No acute fracture or dislocation is evident. A very small Achilles insertion spur appears intact. Atherosclerotic calcification is again noted. The bones appear osteopenic. No radiopaque foreign body or subcutaneous gas is seen.    IMPRESSION:    LEFT ANKLE: No acute findings.    LEFT FOOT: No acute findings.    Electronically signed by:  Devan Holloway MD  10/19/2023 11:01 PM CDT Workstation: [id number]                                     X-Ray Tibia Fibula 2 View Left (Final result)  Result time 10/19/23 23:12:17      Final result by Ed Lackey MD (10/19/23 23:12:17)                   Narrative:    EXAM:  XR Left Tibia and Fibula, 2 Views  CLINICAL HISTORY:  88 years old, Male;    ; pain  TECHNIQUE:  Frontal and lateral views of the left tibia and fibula.  COMPARISON:  No relevant prior studies available.    FINDINGS:  Bones/joints:  No acute findings. Possible tibiotalar joint effusion.  Soft tissues:  No acute findings.  No radiopaque foreign body.  Vasculature:  Arteriosclerosis.    IMPRESSION:  No apparent acute osseous abnormality.    Electronically signed by:  Ed Lackey MD  10/19/2023 11:12 PM CDT Workstation: [id number]                                     US Lower Extremity Veins Left (Final result)  Result time 10/19/23 20:55:00      Final result by Michael Major MD (10/19/23 20:55:00)                   Narrative:    EXAM  DESCRIPTION:  Site:  US LOWER EXTREMITY VEINS LEFT  RP:  US LOWER EXTREMITY VEINS LIMITED FOLLOW UP LEFT    CLINICAL HISTORY:  88 years  Male; ; left ankle pain and swelling    TECHNIQUE:  Multiple grayscale sonographic images of the leg were obtained utilizing a high-frequency linear array transducer supplemented with spectral analysis, color Doppler, compression and augmentation  techniques.      COMPARISON: None.    FINDINGS:  Left leg veins:  Common femoral: normal  Greater saphenous: normal  upper Superficial femoral: normal  mid Superficial femoral: normal  lower Superficial femoral: normal  Popliteal: normal  Posterior tibial: normal    IMPRESSION:    1.  No sonographic evidence for left lower extremity deep venous thrombosis.    Electronically signed by:  Michael Major MD  10/19/2023 08:55 PM CDT Workstation: [id number]                                     Medications - No data to display  Medical Decision Making  Amount and/or Complexity of Data Reviewed  Labs: ordered.  Radiology: ordered. Decision-making details documented in ED Course.    Risk  Decision regarding hospitalization.               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    Vitals notable for: afebirle and overall Hds  Physical exam as above  Ddx include but not limited to:compressive neuropathy, TIA, hypoglycemia, fracture, septic joint, DVT    Basic labs and imaging ordered. Case discussed with Dr. Castro.    Pedro Case  Emergency medicine  PGY3    Workup notable for:  No leukocytosis.   H/H stable at 12.7/37.2 plt thrombocytopenic  No major electrolyte abnormality  Glucose wnl  No elevated liver enzymes    TAMMIE:  No sonographic evidence for left lower extremity deep venous thrombosis    CTH:  Atrophy and chronic small vessel ischemic changes with no acute intracranial abnormality    Plain films LE: negative for fracture    Patient update on imaging and lab findings, attempt to walk the patient was unsuccessful, patient not stable to discharge to home. Medicine consulted for admission    Pedro Case  Emergency medicine PGY3           Clinical Impression:   Final diagnoses:  [R60.9] Swelling  [R52] Pain       ED Disposition Condition    Admit Stable                Pedro Case MD  Resident  10/19/23 1785

## 2023-10-20 NOTE — NURSING
Nurses Note -- 4 Eyes      10/20/2023   3:29 AM      Skin assessed during: Admit      [x] No Altered Skin Integrity Present    []Prevention Measures Documented      [] Yes- Altered Skin Integrity Present or Discovered   [] LDA Added if Not in Epic (Describe Wound)   [] New Altered Skin Integrity was Present on Admit and Documented in LDA   [] Wound Image Taken    Wound Care Consulted? No    Attending Nurse:  Damaris Horton RN/Staff Member:  JEYSON Wetzel

## 2023-10-20 NOTE — PLAN OF CARE
Discharge orders and chart reviewed. No other discharge needs noted at this time. Pt is clear for discharge from case management. Pt is discharging to home.    No pcp appts available - message sent to clinic. Clinic to contact patient with appt information    Per therapy, no additional OP needs.      10/20/23 1029   Final Note   Assessment Type Final Discharge Note   Anticipated Discharge Disposition Home   What phone number can be called within the next 1-3 days to see how you are doing after discharge? [phone]   Hospital Resources/Appts/Education Provided Appointment suggestion unavailable   Post-Acute Status   Other Status Awaiting f/u Appts   Discharge Delays None known at this time

## 2023-10-20 NOTE — SUBJECTIVE & OBJECTIVE
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    Hx of colonic polyp 11/03/2016    Hyperlipidemia     Personal history of colonic polyps     Colon polyps    Renal stone 11/23/2012    Rosacea     Shingles     Sleep apnea     Squamous cell carcinoma of skin     TIA (transient ischemic attack)        Past Surgical History:   Procedure Laterality Date    BACK SURGERY  05/2020    Lum/Kyle    CHOLECYSTECTOMY      COLONOSCOPY N/A 11/3/2016    Procedure: COLONOSCOPY;  Surgeon: Alex Cuellar MD;  Location: [facility];  Service: Endoscopy;  Laterality: N/A;    EYE SURGERY Bilateral 2014    cataracts    EYE SURGERY Right 10/2016    tear duct surgery    INJECTION OF ANESTHETIC AGENT AROUND LATERAL BRANCH NERVES OF SACROILIAC JOINT Left 3/5/2021    Procedure: left SI joint injection;  Surgeon: Pedro Varela MD;  Location: [facility] OR;  Service: Pain Management;  Laterality: Left;  left SI joint injection     INJECTION OF JOINT Left 11/30/2021    Procedure: Injection, Joint Sacroiliac and GTB;  Surgeon: Pedro Varela MD;  Location: [facility] OR;  Service: Pain Management;  Laterality: Left;    INJECTION OF JOINT Left 11/30/2021    Procedure: INJECTION, JOINT, SHOULDER, HIP, OR KNEE;  Surgeon: Pedro Varela MD;  Location: [facility] OR;  Service: Pain Management;  Laterality: Left;  GTB injestion    JOINT REPLACEMENT Left 2013    knee replacemant      left knee       Review of patient's allergies indicates:   Allergen Reactions    Sulfa (sulfonamide antibiotics) Rash     Childhood allergy       No current facility-administered medications on file prior to encounter.     Current Outpatient Medications on File Prior to Encounter   Medication Sig    ascorbic acid, vitamin C, (VITAMIN C) 500 MG tablet Take 500 mg by mouth once daily.    aspirin (ECOTRIN) 81 MG EC tablet Take 81 mg by mouth once daily.    atorvastatin (LIPITOR) 20 MG tablet Take 1  tablet (20 mg total) by mouth once daily.    butalbital-acetaminophen-caffeine -40 mg (FIORICET, ESGIC) -40 mg per tablet Take 1 tablet by mouth every 6 (six) hours as needed for Pain or Headaches.    coenzyme Q10 200 mg capsule Take 200 mg by mouth once daily.    cyanocobalamin, vitamin B-12, 1,000 mcg/15 mL Liqd Take by mouth.    lisinopriL 10 MG tablet Take 0.5 tablets (5 mg total) by mouth once daily. (Patient not taking: Reported on 10/20/2023)    meclizine (ANTIVERT) 25 mg tablet Take 25 mg by mouth 3 (three) times daily as needed.    metronidazole 1% (METROGEL) 1 % Gel Apply topically once daily. Apply thin film to entire face for rosacea    omega-3/dha/epa/ala/vitamin D3 (OMEGA-3-DHA-EPA-ALA-VIT D3) 50 mg (25 mg- 25 mg)-100 unit Chew Take by mouth.    pyridoxine, vitamin B6, (B-6) 100 MG Tab Take 50 mg by mouth once daily.    vit A/vit C/vit E/zinc/copper (PRESERVISION AREDS ORAL) Take 1 tablet by mouth 2 (two) times daily.     Family History       Problem Relation (Age of Onset)    Heart disease Father, Brother    No Known Problems Mother, Sister, Daughter, Brother          Tobacco Use    Smoking status: Never    Smokeless tobacco: Never   Substance and Sexual Activity    Alcohol use: No    Drug use: No    Sexual activity: Yes     Partners: Female     Review of Systems   Constitutional:  Negative for activity change, appetite change, chills, diaphoresis, fatigue and fever.   HENT:  Negative for congestion, ear discharge, ear pain, postnasal drip, rhinorrhea, sinus pressure and sore throat.    Eyes:  Negative for pain, discharge, redness and itching.   Respiratory:  Negative for cough, chest tightness and shortness of breath.    Cardiovascular:  Negative for chest pain and leg swelling.   Gastrointestinal:  Negative for abdominal pain, constipation, diarrhea, nausea and vomiting.   Genitourinary:  Negative for dysuria, frequency, hematuria and urgency.   Musculoskeletal:  Positive for arthralgias  (of the left ankle) and joint swelling (of the left ankle). Negative for back pain.   Skin:  Negative for color change, pallor and rash.   Neurological:  Negative for dizziness, syncope, facial asymmetry, weakness and light-headedness.   Psychiatric/Behavioral:  Negative for behavioral problems, confusion and hallucinations.      Objective:     Vital Signs (Most Recent):  Temp: 98.5 °F (36.9 °C) (10/19/23 1925)  Pulse: 70 (10/20/23 0030)  Resp: 16 (10/20/23 0030)  BP: 134/68 (10/20/23 0030)  SpO2: 99 % (10/20/23 0030) Vital Signs (24h Range):  Temp:  [98.5 °F (36.9 °C)] 98.5 °F (36.9 °C)  Pulse:  [66-73] 70  Resp:  [16-20] 16  SpO2:  [98 %-100 %] 99 %  BP: (134-155)/(68-93) 134/68     Weight: 79.4 kg (175 lb)  Body mass index is 25.11 kg/m².     Physical Exam  Vitals and nursing note reviewed.   Constitutional:       General: He is not in acute distress.     Appearance: Normal appearance. He is not ill-appearing, toxic-appearing or diaphoretic.   HENT:      Head: Normocephalic and atraumatic.      Nose: Nose normal.      Mouth/Throat:      Mouth: Mucous membranes are moist.   Eyes:      Extraocular Movements: Extraocular movements intact.   Cardiovascular:      Rate and Rhythm: Normal rate and regular rhythm.      Heart sounds: Normal heart sounds. No murmur heard.  Pulmonary:      Effort: Pulmonary effort is normal. No respiratory distress.      Breath sounds: Normal breath sounds. No wheezing.   Abdominal:      General: Abdomen is flat. Bowel sounds are normal.      Palpations: Abdomen is soft. There is no mass.      Tenderness: There is no abdominal tenderness. There is no guarding.      Hernia: No hernia is present.   Musculoskeletal:         General: Swelling (of the left ankle) and tenderness (in the joint space of the left ankle) present. No deformity. Normal range of motion.      Cervical back: Normal range of motion. No rigidity or tenderness.   Skin:     General: Skin is warm and dry.      Coloration: Skin  is not jaundiced.      Findings: No bruising or erythema.   Neurological:      General: No focal deficit present.      Mental Status: He is alert and oriented to person, place, and time. Mental status is at baseline.   Psychiatric:         Mood and Affect: Mood normal.         Behavior: Behavior normal.                Significant Labs: All pertinent labs within the past 24 hours have been reviewed.  CBC:   Recent Labs   Lab 10/19/23  2015   WBC 7.74   HGB 12.7*   HCT 37.2*   *     CMP:   Recent Labs   Lab 10/19/23  2015      K 3.8      CO2 27   GLU 90   BUN 16   CREATININE 1.0   CALCIUM 10.2   PROT 7.0   ALBUMIN 4.1   BILITOT 0.7   ALKPHOS 110   AST 23   ALT 18   ANIONGAP 6*     Magnesium:   Recent Labs   Lab 10/19/23  2015   MG 2.2       Significant Imaging: I have reviewed all pertinent imaging results/findings within the past 24 hours.

## 2023-10-20 NOTE — PT/OT/SLP EVAL
"Physical Therapy Evaluation and Discharge Note    Patient Name:  Bryson Kellogg   MRN:  [id number]    Recommendations:     Discharge Recommendations: No Therapy Indicated  Discharge Equipment Recommendations: none   Barriers to discharge: None    Assessment:     Bryson Kellogg is a 88 y.o. male admitted with a medical diagnosis of Acute left ankle pain. L ankle /foot and toes edematous. Able to elicit pain at talocrural joint with deep touch.Pt is able to move  ankle in all planes. Strength is WNL. Pt  and spouse educated in RICE and GT with RW for level surfaces and thresholds. Demonstrates good understanding. DC pending. Does not require further acute PT services.   Recent Surgery: * No surgery found *      Plan:     During this hospitalization, patient does not require further acute PT services.  Please re-consult if situation changes.      Subjective     Chief Complaint: pain; "It is already feeling better today"  Patient/Family Comments/goals: return to PLF , pain free  Pain/Comfort:  Pain Rating 1: 1/10  Location - Side 1: Left  Location 1: ankle  Pain Addressed 1: Reposition, Distraction, Cessation of Activity  Pain Rating Post-Intervention 1: 1/10    Patients cultural, spiritual, Advent conflicts given the current situation: no    Living Environment:  Lives w spouse in [facility], threshold to enter. Indep and active PTA without AD  Prior to admission, patients level of function was independent.  Equipment used at home: none.  DME owned (not currently used): rolling walker.  Upon discharge, patient will have assistance from spouse.    Objective:     Communicated with nurse, Lori,  prior to session.  Patient found HOB elevated with   upon PT entry to room.    General Precautions: Standard, fall    Orthopedic Precautions:LLE weight bearing as tolerated   Braces:  (ace wrap)  Respiratory Status: Room air    Exams:  Cognitive Exam:  Patient is oriented to Person, Place, Time, and Situation  Gross Motor " Coordination:  WFL  Sensation:    -       Intact  Skin Integrity/Edema:      -       Edema: Moderate L foot/ankle  RLE ROM: WFL  RLE Strength: WFL  LLE ROM: WFL  LLE Strength: WFL    Functional Mobility:  Bed Mobility:     Supine to Sit: independence  Sit to Supine: independence  Transfers:     Sit to Stand:  modified independence with rolling walker  Gait: amb 25 ft with RW sup and VC's for proximity, ascended and descended threshold with sup and VC's for sequencing of walker/feet  Balance: good    AM-PAC 6 CLICK MOBILITY  Total Score:23       Treatment and Education:  Received grade 1/2 joint mobs for pain inhibition, educated patient and spouse on application of ace wrap, RICE and TE and transfers/gait with RW  Ankle circles left/right, DF/PF x 20 reps  AM-PAC 6 CLICK MOBILITY  Total Score:23     Patient left sitting edge of bed with call button in reach and spouse present.    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    Hx of colonic polyp 11/03/2016    Hyperlipidemia     Personal history of colonic polyps     Colon polyps    Renal stone 11/23/2012    Rosacea     Shingles     Sleep apnea     Squamous cell carcinoma of skin     TIA (transient ischemic attack)        Past Surgical History:   Procedure Laterality Date    BACK SURGERY  05/2020    Lum/Kyle    CHOLECYSTECTOMY      COLONOSCOPY N/A 11/3/2016    Procedure: COLONOSCOPY;  Surgeon: Alex Cuellar MD;  Location: [facility];  Service: Endoscopy;  Laterality: N/A;    EYE SURGERY Bilateral 2014    cataracts    EYE SURGERY Right 10/2016    tear duct surgery    INJECTION OF ANESTHETIC AGENT AROUND LATERAL BRANCH NERVES OF SACROILIAC JOINT Left 3/5/2021    Procedure: left SI joint injection;  Surgeon: Pedro Varela MD;  Location: [facility] OR;   Service: Pain Management;  Laterality: Left;  left SI joint injection     INJECTION OF JOINT Left 11/30/2021    Procedure: Injection, Joint Sacroiliac and GTB;  Surgeon: Pedro Varela MD;  Location: [facility] OR;  Service: Pain Management;  Laterality: Left;    INJECTION OF JOINT Left 11/30/2021    Procedure: INJECTION, JOINT, SHOULDER, HIP, OR KNEE;  Surgeon: Pedro Varela MD;  Location: [facility] OR;  Service: Pain Management;  Laterality: Left;  GTB injestion    JOINT REPLACEMENT Left 2013    knee replacemant      left knee       Time Tracking:     PT Received On: 10/20/23  PT Start Time: 0924     PT Stop Time: 0955  PT Total Time (min): 31 min     Billable Minutes: Evaluation 15, Gait Training 8, and Therapeutic Exercise 7      10/20/2023

## 2023-10-20 NOTE — PLAN OF CARE
[facility]  Initial Discharge Assessment       Primary Care Provider: Kirk Cueto Jr., MD    Admission Diagnosis: Swelling [R60.9]    Admission Date: 10/19/2023  Expected Discharge Date: 10/20/2023     completed discharge assessment with Pt at bedside. Pt AAOx4s. Pt lives with spouse (Sharyn Kellogg (Spouse) [phone] (Mobile)). Demographics, PCP, and insurance verified. No home health. No dialysis. Pt completes ADLs with the assistance of a rolling walker, a shower chair, and grab bars. Pt to discharge home via family transport. Pt has no other needs to be addressed at this time.    Transition of Care Barriers: None    Payor: MEDICARE / Plan: MEDICARE PART A & B / Product Type: Government /     Extended Emergency Contact Information  Primary Emergency Contact: Sharyn Kellogg  Address: [address] [address] [facility]  Home Phone: [phone]  Mobile Phone: [phone]  Relation: Spouse  Preferred language: English   needed? No  Secondary Emergency Contact: Kimberly Kellogg  Mobile Phone: [phone]  Relation: Daughter  Preferred language: English    Discharge Plan A: Home with family  Discharge Plan B: Home with family      [facility], [place] - [address]  [address] [facility] [address]  Phone: [phone] Fax: [phone]      Initial Assessment (most recent)       Adult Discharge Assessment - 10/20/23 1028          Discharge Assessment    Assessment Type Discharge Planning Assessment     Confirmed/corrected address, phone number and insurance Yes     Confirmed Demographics Correct on Facesheet     Source of Information patient     Does patient/caregiver understand observation status Yes     Communicated BIPIN with patient/caregiver Yes     Reason For Admission Acute left ankle pain     People in Home spouse     Facility Arrived From: Home     Do you expect to return to your current living situation? Yes      Do you have help at home or someone to help you manage your care at home? Yes     Who are your caregiver(s) and their phone number(s)? Sharyn Kellogg (Spouse)   [phone] (Mobile)     Prior to hospitilization cognitive status: Alert/Oriented     Current cognitive status: Alert/Oriented     Walking or Climbing Stairs ambulation difficulty, requires equipment;stair climbing difficulty, requires equipment;transferring difficulty, requires equipment     Mobility Management Rolling walker     Dressing/Bathing bathing difficulty, requires equipment;dressing difficulty, requires equipment     Dressing/Bathing Management Shower chair, grab bars     Home Accessibility wheelchair accessible     Home Layout Able to live on 1st floor     Equipment Currently Used at Home walker, rolling     Readmission within 30 days? No     Patient currently being followed by outpatient case management? No     Do you currently have service(s) that help you manage your care at home? No     Do you take prescription medications? Yes     Do you have prescription coverage? Yes     Coverage Payor:  MEDICARE - MEDICARE PART A & B     Do you have any problems affording any of your prescribed medications? No     Is the patient taking medications as prescribed? yes     Who is going to help you get home at discharge? Sharyn Kellogg (Spouse)   [phone] (Mobile)     How do you get to doctors appointments? family or friend will provide;car, drives self     Are you on dialysis? No     Do you take coumadin? No     DME Needed Upon Discharge  none     Discharge Plan discussed with: Patient;Spouse/sig other     Name(s) and Number(s) Sharyn Kellogg (Spouse)   [phone] (Mobile)     Transition of Care Barriers None     Discharge Plan A Home with family     Discharge Plan B Home with family        OTHER    Name(s) of People in Home Sharyn Kellogg (Spouse)   [phone] (Mobile)

## 2023-10-20 NOTE — PHARMACY MED REC
"              .      Admission Medication History     The home medication history was taken by Connie Wilks.    You may go to "Admission" then "Reconcile Home Medications" tabs to review and/or act upon these items.     The home medication list has been updated by the Pharmacy department.   Please read ALL comments highlighted in yellow.   Please address this information as you see fit.    Feel free to contact us if you have any questions or require assistance.      The medications listed below were removed from the home medication list. Please reorder if appropriate:  Patient reports no longer taking the following medication(s):  Metrogel  Meclizine      Medications listed below were obtained from: Patient/family and Analytic software- [employer]  No current facility-administered medications on file prior to encounter.     Current Outpatient Medications on File Prior to Encounter   Medication Sig Dispense Refill    ascorbic acid, vitamin C, (VITAMIN C) 500 MG tablet Take 500 mg by mouth once daily.      aspirin (ECOTRIN) 81 MG EC tablet Take 81 mg by mouth once daily.      atorvastatin (LIPITOR) 20 MG tablet Take 1 tablet (20 mg total) by mouth once daily. 90 tablet 3    butalbital-acetaminophen-caffeine -40 mg (FIORICET, ESGIC) -40 mg per tablet Take 1 tablet by mouth every 6 (six) hours as needed for Pain or Headaches. 20 tablet 0    cholecalciferol, vitamin D3, (VITAMIN D3) 125 mcg (5,000 unit) Tab Take 5,000 Units by mouth once daily.      coenzyme Q10 200 mg capsule Take 200 mg by mouth once daily.      cyanocobalamin, vitamin B-12, 1,000 mcg/15 mL Liqd Take 15 mLs by mouth once daily.      omega-3/dha/epa/ala/vitamin D3 (OMEGA-3-DHA-EPA-ALA-VIT D3) 50 mg (25 mg- 25 mg)-100 unit Chew Take 1 capsule by mouth once daily.      pyridoxine, vitamin B6, (B-6) 100 MG Tab Take 50 mg by mouth once daily.      vit A/vit C/vit E/zinc/copper (PRESERVISION AREDS ORAL) Take 1 tablet by mouth 2 (two) " times daily.      lisinopriL 10 MG tablet Take 0.5 tablets (5 mg total) by mouth once daily. (Patient not taking: Reported on 10/20/2023) 30 tablet 0    [DISCONTINUED] meclizine (ANTIVERT) 25 mg tablet Take 25 mg by mouth 3 (three) times daily as needed.      [DISCONTINUED] metronidazole 1% (METROGEL) 1 % Gel Apply topically once daily. Apply thin film to entire face for rosacea 60 g 3         Connie Wilks  EXT 1923

## 2023-10-20 NOTE — DISCHARGE SUMMARY
[facility]  Discharge Summary      Patient Name: Bryson Kellogg  MRN: [id number]  PAVEL: [id number]  Patient Class: OP- Observation  Admission Date: 10/19/2023  Hospital Length of Stay: 0 days  Discharge Date and Time:  10/20/2023 11:51 AM  Attending Physician: Ari Gee MD   Discharging Provider: Isa Carmona NP  Primary Care Provider: Kirk Cueto Jr., MD    Primary Care Team: Networked reference to record PCT     HPI:   89 y/o male with history of HLD, HTN, and lymphoma presents to the ED for left ankle pain and swelling. Patient states that he fell asleep, for an hour or two, in his recliner earlier today with his legs crossed. 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 When he came in from working he sat down and noticed that his ankle was swollen and he was unable to place full pressure on it. His wife brought him a walker and he was able to get around the house, partial weight bearing, and get dressed to come to the ED. Patient states that his left leg is usually his strong leg and that he has some residual unsteadiness in his gait from a lumbar spine problem that he had many years ago. Patient is amenable to trying to walk on his ankle now and states that he feels like the swelling may have gone down some since he had arrived.     ED: Vitals showed HR 66, /93, RR 20 saturating at 98% on RA. Labs showed CMP, magnesium, ethanol, and phosphorus WNL and a stable CBC. Xray of the tibia, fibula, ankle, and foot were all negative for fracture. CT head showed no acute changes. US of the LE showed no sonographic evidence for left lower extremity deep venous thrombosis.       * No surgery found *      Hospital Course:   The Pt was monitored closely during his stay.  His swelling and pain improved significantly with compression and ice.  Imaging revealed no acute  bony abnormalities.  Pt was feeling much better since admission.  He worked with PT and did well.  He was instructed on safety precautions and use of assistive device which he already has not home.  Discussed the importance of compression which physical therapy educated his wife at length about.  We discussed elevation, continued rest and ice.  We discussed the importance of close follow up with his PCP and should his injury persists, perhaps a referral to Podiatry to further evaluate with additional imaging as well as potential for physical therapy follow-up.  We did discuss the use of NSAIDs sparingly as needed for pain.  Return precautions were provided.  Pt and wife verbalized understanding and agreement with discharge plan.    Pt was seen on day of discharge and deemed appropriate for discharge.       Goals of Care Treatment Preferences:  Code Status: Full Code      Consults:     No new Assessment & Plan notes have been filed under this hospital service since the last note was generated.  Service: Hospital Medicine    Final Active Diagnoses:    Diagnosis Date Noted POA    PRINCIPAL PROBLEM:  Acute left ankle pain [M25.572] 10/20/2023 Yes    Hyperlipidemia [E78.5] 07/10/2012 Yes      Problems Resolved During this Admission:       Discharged Condition: good    Disposition: Home or Self Care    Follow Up:   Follow-up Information     Kirk Cueto Jr., MD Follow up in 1 week(s).    Specialty: Internal Medicine  Why: Per scheduling, no appointments available. Message sent to PCP clinic requesting appointment - clinic to contact you with appt information.     If you do not hear from them by Tuesday, please call clinic for appt information.  Contact information:  [address]  [facility] [id number] [phone]                       Patient Instructions:      Diet Cardiac     Other restrictions (specify):   Order Comments: Weight bearing as tolerated  When resting; elevate the LEFT leg.  Take it easy on   your normal activities until this improves.  Use the walker as needed.     Notify your health care provider if you experience any of the following:  redness, tenderness, or signs of infection (pain, swelling, redness, odor or green/yellow discharge around incision site)     Notify your health care provider if you experience any of the following:  severe uncontrolled pain       Significant Diagnostic Studies: Labs:   CMP   Recent Labs   Lab 10/19/23  2015      K 3.8      CO2 27   GLU 90   BUN 16   CREATININE 1.0   CALCIUM 10.2   PROT 7.0   ALBUMIN 4.1   BILITOT 0.7   ALKPHOS 110   AST 23   ALT 18   ANIONGAP 6*    and CBC   Recent Labs   Lab 10/19/23  2015   WBC 7.74   HGB 12.7*   HCT 37.2*   *     Imaging Results          CT Head Without Contrast (Final result)  Result time 10/19/23 21:33:10    Final result by Pedro Ruiz MD (10/19/23 21:33:10)                 Narrative:      CT head without contrast on 10/19/2023    CLINICAL INDICATION: Numbness    TECHNIQUE:  Multiple axial images are obtained throughout the head without the administration of contrast.  This exam was performed according to our departmental dose-optimization program, which includes automated exposure control, adjustment of the mA and/or kV according to patient size and/or use of iterative reconstruction technique.  Total DLP is 971.1 mGycm.    COMPARISON: 7/31/2023    FINDINGS:  There is generalized cerebral atrophy. There is low density in the periventricular white matter consistent with chronic small vessel ischemic changes. There is no hydrocephalus. There is no hemorrhage. There are no abnormal extra-axial fluid collections. There is no mass, mass effect or midline shift. There is no CT evidence of acute infarct. No bony abnormality is noted.    IMPRESSION: Atrophy and chronic small vessel ischemic changes with no acute intracranial abnormality.    Electronically signed by:  Roland Ruiz MD  10/19/2023 09:33 PM CDT  Workstation: [id number]                             X-Ray Ankle Complete Left (Final result)  Result time 10/19/23 23:01:51    Final result by Devan Holloway MD (10/19/23 23:01:51)                 Narrative:    EXAMINATION: XR FOOT 3 OR MORE VIEWS LEFT, XR ANKLE 3 OR MORE VIEWS LEFT    COMPARISON: None available.    INDICATION: Pain.    ANKLE FINDINGS: AP, oblique and lateral views of the left ankle are performed - 3 views. No acute fracture or dislocation is evident. No ankle effusion is evident. The ankle mortise appears intact. Prominent scattered atherosclerotic calcification is noted. The bones appear osteopenic. No radiopaque foreign body or subcutaneous gas is seen.    FOOT FINDINGS: AP, oblique and lateral views of the left foot are performed - 3 views. 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 No acute fracture or dislocation is evident. A very small Achilles insertion spur appears intact. Atherosclerotic calcification is again noted. The bones appear osteopenic. No radiopaque foreign body or subcutaneous gas is seen.    IMPRESSION:    LEFT ANKLE: No acute findings.    LEFT FOOT: No acute findings.    Electronically signed by:  Devan Holloway MD  10/19/2023 11:01 PM CDT Workstation: [id number]                             X-Ray Foot Complete Left (Final result)  Result time 10/19/23 23:01:51    Final result by Devan Holloway MD (10/19/23 23:01:51)                 Narrative:    EXAMINATION: XR FOOT 3 OR MORE VIEWS LEFT, XR ANKLE 3 OR MORE VIEWS LEFT    COMPARISON: None available.    INDICATION: Pain.    ANKLE FINDINGS: AP, oblique and lateral views of the left ankle are performed - 3 views. No acute fracture or dislocation is evident. No ankle effusion is evident. The ankle mortise appears intact. Prominent scattered atherosclerotic calcification is noted. The bones appear osteopenic. No radiopaque foreign body or subcutaneous gas is seen.    FOOT FINDINGS: AP, oblique and lateral views of  the left foot are performed - 3 views. 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 No acute fracture or dislocation is evident. A very small Achilles insertion spur appears intact. Atherosclerotic calcification is again noted. The bones appear osteopenic. No radiopaque foreign body or subcutaneous gas is seen.    IMPRESSION:    LEFT ANKLE: No acute findings.    LEFT FOOT: No acute findings.    Electronically signed by:  Devan Holloway MD  10/19/2023 11:01 PM CDT Workstation: [id number]                             X-Ray Tibia Fibula 2 View Left (Final result)  Result time 10/19/23 23:12:17    Final result by Ed Lackey MD (10/19/23 23:12:17)                 Narrative:    EXAM:  XR Left Tibia and Fibula, 2 Views  CLINICAL HISTORY:  88 years old, Male;    ; pain  TECHNIQUE:  Frontal and lateral views of the left tibia and fibula.  COMPARISON:  No relevant prior studies available.    FINDINGS:  Bones/joints:  No acute findings. Possible tibiotalar joint effusion.  Soft tissues:  No acute findings.  No radiopaque foreign body.  Vasculature:  Arteriosclerosis.    IMPRESSION:  No apparent acute osseous abnormality.    Electronically signed by:  Ed Lackey MD  10/19/2023 11:12 PM CDT Workstation: [id number]                             US Lower Extremity Veins Left (Final result)  Result time 10/19/23 20:55:00    Final result by Michael Major MD (10/19/23 20:55:00)                 Narrative:    EXAM DESCRIPTION:  Site:  US LOWER EXTREMITY VEINS LEFT  RP:  US LOWER EXTREMITY VEINS LIMITED FOLLOW UP LEFT    CLINICAL HISTORY:  88 years  Male; ; left ankle pain and swelling    TECHNIQUE:  Multiple grayscale sonographic images of the leg were obtained utilizing a high-frequency linear array transducer supplemented with spectral analysis, color Doppler, compression and augmentation  techniques.      COMPARISON: None.    FINDINGS:  Left leg veins:  Common femoral: normal  Greater saphenous:  normal  upper Superficial femoral: normal  mid Superficial femoral: normal  lower Superficial femoral: normal  Popliteal: normal  Posterior tibial: normal    IMPRESSION:    1.  No sonographic evidence for left lower extremity deep venous thrombosis.    Electronically signed by:  Michael Major MD  10/19/2023 08:55 PM CDT Workstation: [id number]                                  Pending Diagnostic Studies:     None         Medications:  Reconciled Home Medications:      Medication List      CONTINUE taking these medications    ascorbic acid (vitamin C) 500 MG tablet  Commonly known as: VITAMIN C  Take 500 mg by mouth once daily.     aspirin 81 MG EC tablet  Commonly known as: ECOTRIN  Take 81 mg by mouth once daily.     atorvastatin 20 MG tablet  Commonly known as: LIPITOR  Take 1 tablet (20 mg total) by mouth once daily.     butalbital-acetaminophen-caffeine -40 mg -40 mg per tablet  Commonly known as: FIORICET, ESGIC  Take 1 tablet by mouth every 6 (six) hours as needed for Pain or Headaches.     coenzyme Q10 200 mg capsule  Take 200 mg by mouth once daily.     cyanocobalamin (vitamin B-12) 1,000 mcg/15 mL Liqd  Take 15 mLs by mouth once daily.     lisinopriL 10 MG tablet  Take 0.5 tablets (5 mg total) by mouth once daily.     omega-3-dha-epa-ala-vit D3 50 mg (25 mg- 25 mg)-100 unit Chew  Take 1 capsule by mouth once daily.     PRESERVISION AREDS ORAL  Take 1 tablet by mouth 2 (two) times daily.     pyridoxine (vitamin B6) 100 MG Tab  Commonly known as: B-6  Take 50 mg by mouth once daily.     VITAMIN D3 125 mcg (5,000 unit) Tab  Generic drug: cholecalciferol (vitamin D3)  Take 5,000 Units by mouth once daily.            Indwelling Lines/Drains at time of discharge:   Lines/Drains/Airways     None                 Time spent on the discharge of patient: 35 minutes         Isa Carmona NP  Department of Hospital Medicine  [facility]

## 2023-10-24 ENCOUNTER — PATIENT OUTREACH (OUTPATIENT)
Dept: ADMINISTRATIVE | Facility: CLINIC | Age: 88
End: 2023-10-24
Payer: MEDICARE

## 2023-10-24 NOTE — PROGRESS NOTES
C3 nurse spoke with Bryson Kellogg and his spouse for a TCC post hospital discharge follow up call. Patient denies any new prescriptions but states he needs a new prescription for his Lisinopril. The patient has a scheduled HOSFU with Kirk Cueto Jr., MD (Internal Medicine) on 10/27/23 at 1440. Message routed to Kirk Cueto Jr., MD.

## 2023-10-26 ENCOUNTER — HOSPITAL ENCOUNTER (OUTPATIENT)
Dept: RADIOLOGY | Facility: HOSPITAL | Age: 88
Discharge: HOME | End: 2023-10-26
Attending: INTERNAL MEDICINE
Payer: MEDICARE

## 2023-10-26 VITALS — WEIGHT: 176 LBS | BODY MASS INDEX: 24.64 KG/M2 | HEIGHT: 71 IN

## 2023-10-26 DIAGNOSIS — C82.61 CUTANEOUS FOLLICLE CENTER LYMPHOMA INVOLVING LYMPH NODES OF HEAD: ICD-10-CM

## 2023-10-26 DIAGNOSIS — Z85.72 HISTORY OF LYMPHOMA: ICD-10-CM

## 2023-10-26 LAB — GLUCOSE SERPL-MCNC: 97 MG/DL (ref 70–110)

## 2023-10-26 PROCEDURE — 78816 PET IMAGE W/CT FULL BODY: CPT | Mod: TC,PS,PO

## 2023-10-26 PROCEDURE — A9552 F18 FDG: HCPCS | Mod: PO

## 2023-10-30 RX ORDER — LISINOPRIL 10 MG/1
5 TABLET ORAL DAILY
Qty: 30 TABLET | Refills: 5 | Status: SHIPPED | OUTPATIENT
Start: 2023-10-30 | End: 2023-11-21 | Stop reason: SDUPTHER

## 2023-10-31 ENCOUNTER — TELEPHONE (OUTPATIENT)
Dept: HEMATOLOGY/ONCOLOGY | Facility: CLINIC | Age: 88
End: 2023-10-31

## 2023-10-31 ENCOUNTER — EXTERNAL CHRONIC CARE MANAGEMENT (OUTPATIENT)
Dept: PRIMARY CARE CLINIC | Facility: CLINIC | Age: 88
End: 2023-10-31
Payer: MEDICARE

## 2023-10-31 PROCEDURE — 99490 PR CHRONIC CARE MGMT, 1ST 20 MIN: ICD-10-PCS | Mod: S$PBB,,, | Performed by: INTERNAL MEDICINE

## 2023-10-31 PROCEDURE — 99490 CHRNC CARE MGMT STAFF 1ST 20: CPT | Mod: S$PBB,,, | Performed by: INTERNAL MEDICINE

## 2023-10-31 PROCEDURE — 99490 CHRNC CARE MGMT STAFF 1ST 20: CPT | Mod: PBBFAC | Performed by: INTERNAL MEDICINE

## 2023-10-31 NOTE — TELEPHONE ENCOUNTER
----- Message from Gayle Cochran sent at 10/31/2023 11:15 AM CDT -----  I see you printed this to discuss with him. She called back asking about it again. I explained that the PET wasn't supposed to be done until February and he was scheduled to f/u in March. I told her we would ask Dr FU to look at the results and will call and let them know if everything looked ok or if they needed to come in sooner.     She also asked about getting another PET in February since that is when he was supposed to have this one.      ----- Message -----  From: Luz Marina Shaw  Sent: 10/27/2023  10:31 AM CDT  To: Kim Harding Staff    Pts wife Sharyn is calling to see if he is needing a f/u appt with Dr. Alvarez for PET scan results or if they just need a phone call     [phone]

## 2023-10-31 NOTE — TELEPHONE ENCOUNTER
Results reviewed with Dr. Alvarez who states scan shows no active disease at this time. Patient should keep scheduled f/u in march at which time Dr. Alvarez will be able to discuss if in need of another PET since this one done prior to expected date. Instructed to call if need anything in the meantime. Verbalized understanding. Apoorva made aware scan done early and I requested that she try to look into why.

## 2023-11-21 ENCOUNTER — OFFICE VISIT (OUTPATIENT)
Dept: CARDIOLOGY | Facility: CLINIC | Age: 88
End: 2023-11-21
Payer: MEDICARE

## 2023-11-21 VITALS
WEIGHT: 182 LBS | SYSTOLIC BLOOD PRESSURE: 130 MMHG | BODY MASS INDEX: 25.48 KG/M2 | HEIGHT: 71 IN | RESPIRATION RATE: 16 BRPM | DIASTOLIC BLOOD PRESSURE: 80 MMHG | OXYGEN SATURATION: 97 % | HEART RATE: 64 BPM

## 2023-11-21 DIAGNOSIS — I10 PRIMARY HYPERTENSION: Primary | ICD-10-CM

## 2023-11-21 DIAGNOSIS — I95.1 ORTHOSTATIC HYPOTENSION: ICD-10-CM

## 2023-11-21 DIAGNOSIS — E78.2 MIXED HYPERLIPIDEMIA: ICD-10-CM

## 2023-11-21 DIAGNOSIS — G47.30 SLEEP APNEA, UNSPECIFIED TYPE: ICD-10-CM

## 2023-11-21 DIAGNOSIS — I70.0 CALCIFICATION OF AORTA: ICD-10-CM

## 2023-11-21 PROCEDURE — 99213 OFFICE O/P EST LOW 20 MIN: CPT | Mod: PBBFAC,PN | Performed by: INTERNAL MEDICINE

## 2023-11-21 PROCEDURE — 99999 PR PBB SHADOW E&M-EST. PATIENT-LVL III: ICD-10-PCS | Mod: PBBFAC,,, | Performed by: INTERNAL MEDICINE

## 2023-11-21 PROCEDURE — 99214 OFFICE O/P EST MOD 30 MIN: CPT | Mod: S$PBB,,, | Performed by: INTERNAL MEDICINE

## 2023-11-21 PROCEDURE — 99999 PR PBB SHADOW E&M-EST. PATIENT-LVL III: CPT | Mod: PBBFAC,,, | Performed by: INTERNAL MEDICINE

## 2023-11-21 PROCEDURE — 99214 PR OFFICE/OUTPT VISIT, EST, LEVL IV, 30-39 MIN: ICD-10-PCS | Mod: S$PBB,,, | Performed by: INTERNAL MEDICINE

## 2023-11-21 RX ORDER — ATORVASTATIN CALCIUM 20 MG/1
20 TABLET, FILM COATED ORAL DAILY
Qty: 90 TABLET | Refills: 3 | Status: SHIPPED | OUTPATIENT
Start: 2023-11-21 | End: 2024-02-29 | Stop reason: SDUPTHER

## 2023-11-21 RX ORDER — LISINOPRIL 10 MG/1
5 TABLET ORAL DAILY
Qty: 15 TABLET | Refills: 11 | Status: SHIPPED | OUTPATIENT
Start: 2023-11-21 | End: 2023-11-21 | Stop reason: DRUGHIGH

## 2023-11-21 RX ORDER — LISINOPRIL 5 MG/1
5 TABLET ORAL DAILY
Qty: 90 TABLET | Refills: 3 | Status: SHIPPED | OUTPATIENT
Start: 2023-11-21 | End: 2024-03-22

## 2023-11-21 NOTE — ASSESSMENT & PLAN NOTE
No further episodes of orthostatic hypertension noted.  No dizziness lightheadedness  Recommend to continue exercise precaution with change of posture

## 2023-11-21 NOTE — ASSESSMENT & PLAN NOTE
No new symptoms of any sleep apnea noted.  Appears relatively stable he is not use his CPAP machine is over 10 years seem to be stay fairly controlled

## 2023-11-21 NOTE — ASSESSMENT & PLAN NOTE
Continue on risk factor modification he is maintaining on aspirin and Lipitor maintain the same at this time.

## 2023-11-21 NOTE — PROGRESS NOTES
Subjective:    Patient ID:  Bryson Kellogg is a 88 y.o. male patient here for evaluation Follow-up      History of Present Illness:     Patient is an 88-year-old gentleman with history of arterial hypertension hyperlipidemia seeking follow-up care.  Denies having any new symptoms of chest pain shortness of breath no dizziness lightheadedness no weakness  Occasional transient lightheadedness noted but not on a regular basis.  No cough or congestion no fevers chills  He has not use CPAP machine over 10 years in the seem to be doing fairly well.        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    History of B-cell lymphoma     Hx of colonic polyp 11/03/2016    Hyperlipidemia     Personal history of colonic polyps     Colon polyps    Renal stone 11/23/2012    Rosacea     Shingles     Sleep apnea     Squamous cell carcinoma of skin     TIA (transient ischemic attack)      Past Surgical History:   Procedure Laterality Date    BACK SURGERY  05/2020    Lum/Kyle    CHOLECYSTECTOMY      COLONOSCOPY N/A 11/3/2016    Procedure: COLONOSCOPY;  Surgeon: Alex Cuellar MD;  Location: [facility];  Service: Endoscopy;  Laterality: N/A;    EYE SURGERY Bilateral 2014    cataracts    EYE SURGERY Right 10/2016    tear duct surgery    INJECTION OF ANESTHETIC AGENT AROUND LATERAL BRANCH NERVES OF SACROILIAC JOINT Left 3/5/2021    Procedure: left SI joint injection;  Surgeon: Pedro Varela MD;  Location: [facility] OR;  Service: Pain Management;  Laterality: Left;  left SI joint injection     INJECTION OF JOINT Left 11/30/2021    Procedure: Injection, Joint Sacroiliac and GTB;  Surgeon: Pedro Varela MD;  Location: [facility] OR;  Service: Pain Management;  Laterality: Left;    INJECTION OF JOINT Left 11/30/2021     Procedure: INJECTION, JOINT, SHOULDER, HIP, OR KNEE;  Surgeon: Pedro Varela MD;  Location: [facility] OR;  Service: Pain Management;  Laterality: Left;  GTB injestion    JOINT REPLACEMENT Left 2013    knee replacemant      left knee     Social History     Tobacco Use    Smoking status: Never    Smokeless tobacco: Never   Substance Use Topics    Alcohol use: No    Drug use: No        Review of Systems:    As noted in HPI in addition      REVIEW OF SYSTEMS  CARDIOVASCULAR: No recent chest pain, palpitations, arm, neck, or jaw pain  RESPIRATORY: No recent fever, cough chills, SOB or congestion  : No blood in the urine  GI: No Nausea, vomiting, constipation, diarrhea, blood, or reflux.  MUSCULOSKELETAL: No myalgias  NEURO: No lightheadedness or dizziness  EYES: No Double vision, blurry, vision or headache              Objective        Vitals:    11/21/23 0839   BP: 130/80   Pulse: 64   Resp: 16       LIPIDS - LAST 2   Lab Results   Component Value Date    CHOL 141 12/05/2022    CHOL 139 06/14/2021    HDL 48 12/05/2022    HDL 55 06/14/2021    LDLCALC 78.2 12/05/2022    LDLCALC 63.8 06/14/2021    TRIG 74 12/05/2022    TRIG 101 06/14/2021    CHOLHDL 34.0 12/05/2022    CHOLHDL 39.6 06/14/2021       CBC - LAST 2  Lab Results   Component Value Date    WBC 7.74 10/19/2023    WBC 4.94 09/06/2023    RBC 3.67 (L) 10/19/2023    RBC 3.77 (L) 09/06/2023    HGB 12.7 (L) 10/19/2023    HGB 12.7 (L) 09/06/2023    HCT 37.2 (L) 10/19/2023    HCT 37.9 (L) 09/06/2023     (H) 10/19/2023     (H) 09/06/2023    MCH 34.6 (H) 10/19/2023    MCH 33.7 (H) 09/06/2023    MCHC 34.1 10/19/2023    MCHC 33.5 09/06/2023    RDW 13.6 10/19/2023    RDW 13.2 09/06/2023     (L) 10/19/2023     09/06/2023    MPV 11.2 10/19/2023    MPV 11.5 09/06/2023    GRAN 5.1 10/19/2023    GRAN 65.2 10/19/2023    LYMPH 1.6 10/19/2023    LYMPH 21.1 10/19/2023    MONO 0.8 10/19/2023    MONO 10.7 10/19/2023    BASO 0.04 10/19/2023    BASO 0.03 09/06/2023     NRBC 0 10/19/2023    NRBC 0 09/06/2023       CHEMISTRY & LIVER FUNCTION - LAST 2  Lab Results   Component Value Date     10/19/2023     09/06/2023    K 3.8 10/19/2023    K 4.9 09/06/2023     10/19/2023     09/06/2023    CO2 27 10/19/2023    CO2 25 09/06/2023    ANIONGAP 6 (L) 10/19/2023    ANIONGAP 9 09/06/2023    BUN 16 10/19/2023    BUN 16 09/06/2023    CREATININE 1.0 10/19/2023    CREATININE 1.0 09/06/2023    GLU 90 10/19/2023    GLU 89 09/06/2023    CALCIUM 10.2 10/19/2023    CALCIUM 10.2 09/06/2023    MG 2.2 10/19/2023    MG 2.1 07/31/2023    ALBUMIN 4.1 10/19/2023    ALBUMIN 3.8 09/06/2023    PROT 7.0 10/19/2023    PROT 6.9 09/06/2023    ALKPHOS 110 10/19/2023    ALKPHOS 94 09/06/2023    ALT 18 10/19/2023    ALT 21 09/06/2023    AST 23 10/19/2023    AST 27 09/06/2023    BILITOT 0.7 10/19/2023    BILITOT 1.1 (H) 09/06/2023        CARDIAC PROFILE - LAST 2  Lab Results   Component Value Date    BNP 41 07/31/2023     (H) 01/31/2021     11/26/2021    TROPONINI <0.030 11/26/2021    TROPONINI <0.030 01/31/2021    TROPONINIHS 5.5 07/31/2023        COAGULATION - LAST 2  Lab Results   Component Value Date    LABPT 14.1 01/31/2021    INR 1.1 01/31/2021    INR 1.03 11/23/2012    APTT 25.9 11/23/2012       ENDOCRINE & PSA - LAST 2  Lab Results   Component Value Date    HGBA1C 5.4 01/03/2018    TSH 1.420 11/19/2021    TSH 1.459 01/03/2018    PSA 0.50 12/05/2022    PSA 0.52 06/14/2021        ECHOCARDIOGRAM RESULTS  Results for orders placed during the hospital encounter of 06/27/22    Echo    Interpretation Summary  · The left ventricle is normal in size with concentric remodeling and normal systolic function.  · The estimated ejection fraction is 65%.  · Normal left ventricular diastolic function.  · Normal right ventricular size with normal right ventricular systolic function.      CURRENT/PREVIOUS VISIT EKG  Results for orders placed or performed during the hospital encounter of  07/31/23   EKG 12-lead    Collection Time: 07/31/23  7:48 AM    Narrative    Test Reason : R07.9,    Vent. Rate : 054 BPM     Atrial Rate : 054 BPM     P-R Int : 198 ms          QRS Dur : 100 ms      QT Int : 426 ms       P-R-T Axes : 052 016 052 degrees     QTc Int : 403 ms    Sinus bradycardia  Otherwise normal ECG    Confirmed by Zofia RAHMAN, Jordyn Montana ([id number]) on 8/5/2023 12:52:07 PM    Referred By: [email or web address]   SELF           Confirmed By:Jordyn Armijo MD     No valid procedures specified.   Results for orders placed during the hospital encounter of 06/27/22    Nuclear Stress - Cardiology Interpreted    Interpretation Summary    Equivocal myocardial perfusion scan.    There is a mild intensity, small sized, equivocal perfusion abnormality that is fixed in the inferobasilar wall(s). This finding is equivocal due to soft tissue shadow.    There are no other significant perfusion abnormalities.    There is a trivial to mild intensity fixed perfusion abnormality in the inferobasilar wall of the left ventricle, secondary to soft tissue attenuation.    The gated perfusion images showed an ejection fraction of 81% post stress. Normal ejection fraction is greater than 47%.    There is normal wall motion at rest and post stress.    LV cavity size is normal at rest and normal at stress.    The EKG portion of this study is negative for ischemia.    The patient reported no chest pain during the stress test.    The test was stopped because the patient experienced arrhythmia.    There were no arrhythmias during stress.    No valid procedures specified.    PHYSICAL EXAM  CONSTITUTIONAL: Well built, well nourished in no apparent distress  NECK: no carotid bruit, no JVD  LUNGS: CTA  CHEST WALL: no tenderness  HEART: regular rate and rhythm, S1, S2 normal, no murmur, click, rub or gallop   ABDOMEN: soft, non-tender; bowel sounds normal; no masses,  no organomegaly  EXTREMITIES: Extremities normal, no edema, no calf tenderness  noted  NEURO: AAO X 3    I HAVE REVIEWED :    The vital signs, nurses notes, and all the pertinent radiology and labs.        Current Outpatient Medications   Medication Instructions    ascorbic acid (vitamin C) (VITAMIN C) 500 mg, Oral, Daily,      aspirin (ECOTRIN) 81 mg, Oral, Daily    atorvastatin (LIPITOR) 20 mg, Oral, Daily    butalbital-acetaminophen-caffeine -40 mg (FIORICET, ESGIC) -40 mg per tablet 1 tablet, Oral, Every 6 hours PRN    cholecalciferol (vitamin D3) (VITAMIN D3) 5,000 Units, Oral, Daily    coenzyme Q10 200 mg, Oral, Daily    cyanocobalamin, vitamin B-12, 1,000 mcg/15 mL Liqd 15 mLs, Oral, Daily    lisinopriL 5 mg, Oral, Daily    omega-3/dha/epa/ala/vitamin D3 (OMEGA-3-DHA-EPA-ALA-VIT D3) 50 mg (25 mg- 25 mg)-100 unit Chew 1 capsule, Oral, Daily    pyridoxine (vitamin B6) (B-6) 50 mg, Oral, Daily    vit A/vit C/vit E/zinc/copper (PRESERVISION AREDS ORAL) 1 tablet, Oral, 2 times daily          Assessment & Plan     Primary hypertension  His blood pressure is stable at 100 30/80 mm Hg continue on lisinopril at 5 mg once a day and maintain on low-salt diet.  Appears relatively stable    Orthostatic hypotension  No further episodes of orthostatic hypertension noted.  No dizziness lightheadedness  Recommend to continue exercise precaution with change of posture    Hyperlipidemia  Continue on Lipitor 20 mg a day maintain low-fat low-cholesterol diet  His non-HDL HDL cholesterol is 93 on his last evaluation continue on omega-3 fish oil capsules as well    Calcification of aorta  Continue on risk factor modification he is maintaining on aspirin and Lipitor maintain the same at this time.    Sleep apnea  No new symptoms of any sleep apnea noted.  Appears relatively stable he is not use his CPAP machine is over 10 years seem to be stay fairly controlled          No follow-ups on file.

## 2023-11-21 NOTE — ASSESSMENT & PLAN NOTE
His blood pressure is stable at 100 30/80 mm Hg continue on lisinopril at 5 mg once a day and maintain on low-salt diet.  Appears relatively stable

## 2023-11-21 NOTE — ASSESSMENT & PLAN NOTE
Continue on Lipitor 20 mg a day maintain low-fat low-cholesterol diet  His non-HDL HDL cholesterol is 93 on his last evaluation continue on omega-3 fish oil capsules as well

## 2023-11-30 ENCOUNTER — EXTERNAL CHRONIC CARE MANAGEMENT (OUTPATIENT)
Dept: PRIMARY CARE CLINIC | Facility: CLINIC | Age: 88
End: 2023-11-30
Payer: MEDICARE

## 2023-11-30 PROCEDURE — 99490 PR CHRONIC CARE MGMT, 1ST 20 MIN: ICD-10-PCS | Mod: S$PBB,,, | Performed by: INTERNAL MEDICINE

## 2023-11-30 PROCEDURE — 99490 CHRNC CARE MGMT STAFF 1ST 20: CPT | Mod: S$PBB,,, | Performed by: INTERNAL MEDICINE

## 2023-11-30 PROCEDURE — 99490 CHRNC CARE MGMT STAFF 1ST 20: CPT | Mod: PBBFAC | Performed by: INTERNAL MEDICINE

## 2023-12-05 ENCOUNTER — ANESTHESIA EVENT (OUTPATIENT)
Dept: ENDOSCOPY | Facility: HOSPITAL | Age: 88
End: 2023-12-05
Payer: MEDICARE

## 2023-12-05 ENCOUNTER — ANESTHESIA (OUTPATIENT)
Dept: ENDOSCOPY | Facility: HOSPITAL | Age: 88
End: 2023-12-05
Payer: MEDICARE

## 2023-12-05 ENCOUNTER — HOSPITAL ENCOUNTER (OUTPATIENT)
Facility: HOSPITAL | Age: 88
Discharge: HOME | End: 2023-12-05
Attending: INTERNAL MEDICINE | Admitting: INTERNAL MEDICINE
Payer: MEDICARE

## 2023-12-05 DIAGNOSIS — K63.5 POLYP OF COLON, UNSPECIFIED PART OF COLON, UNSPECIFIED TYPE: Primary | ICD-10-CM

## 2023-12-05 DIAGNOSIS — K64.8 INTERNAL HEMORRHOIDS: ICD-10-CM

## 2023-12-05 PROCEDURE — 27201089 HC SNARE, DISP (ANY): Performed by: INTERNAL MEDICINE

## 2023-12-05 PROCEDURE — 63600175 PHARM REV CODE 636 W HCPCS: Performed by: NURSE ANESTHETIST, CERTIFIED REGISTERED

## 2023-12-05 PROCEDURE — 25000003 PHARM REV CODE 250: Performed by: NURSE ANESTHETIST, CERTIFIED REGISTERED

## 2023-12-05 PROCEDURE — 45385 PR COLONOSCOPY,REMV LESN,SNARE: ICD-10-PCS | Mod: PT,,, | Performed by: INTERNAL MEDICINE

## 2023-12-05 PROCEDURE — 45385 COLONOSCOPY W/LESION REMOVAL: CPT | Mod: PT,,, | Performed by: INTERNAL MEDICINE

## 2023-12-05 PROCEDURE — 37000009 HC ANESTHESIA EA ADD 15 MINS: Performed by: INTERNAL MEDICINE

## 2023-12-05 PROCEDURE — 45380 COLONOSCOPY AND BIOPSY: CPT | Mod: PT,59,, | Performed by: INTERNAL MEDICINE

## 2023-12-05 PROCEDURE — D9220A PRA ANESTHESIA: Mod: PT,ANES,, | Performed by: ANESTHESIOLOGY

## 2023-12-05 PROCEDURE — 88305 TISSUE EXAM BY PATHOLOGIST: CPT | Mod: TC,59 | Performed by: PATHOLOGY

## 2023-12-05 PROCEDURE — 37000008 HC ANESTHESIA 1ST 15 MINUTES: Performed by: INTERNAL MEDICINE

## 2023-12-05 PROCEDURE — 45385 COLONOSCOPY W/LESION REMOVAL: CPT | Mod: PT | Performed by: INTERNAL MEDICINE

## 2023-12-05 PROCEDURE — D9220A PRA ANESTHESIA: ICD-10-PCS | Mod: PT,ANES,, | Performed by: ANESTHESIOLOGY

## 2023-12-05 PROCEDURE — 45380 COLONOSCOPY AND BIOPSY: CPT | Mod: PT,59 | Performed by: INTERNAL MEDICINE

## 2023-12-05 PROCEDURE — 45380 PR COLONOSCOPY,BIOPSY: ICD-10-PCS | Mod: PT,59,, | Performed by: INTERNAL MEDICINE

## 2023-12-05 PROCEDURE — 27201012 HC FORCEPS, HOT/COLD, DISP: Performed by: INTERNAL MEDICINE

## 2023-12-05 PROCEDURE — D9220A PRA ANESTHESIA: Mod: PT,CRNA,, | Performed by: NURSE ANESTHETIST, CERTIFIED REGISTERED

## 2023-12-05 PROCEDURE — D9220A PRA ANESTHESIA: ICD-10-PCS | Mod: PT,CRNA,, | Performed by: NURSE ANESTHETIST, CERTIFIED REGISTERED

## 2023-12-05 RX ORDER — PROPOFOL 10 MG/ML
VIAL (ML) INTRAVENOUS
Status: DISCONTINUED | OUTPATIENT
Start: 2023-12-05 | End: 2023-12-05

## 2023-12-05 RX ORDER — LIDOCAINE HYDROCHLORIDE 20 MG/ML
INJECTION INTRAVENOUS
Status: DISCONTINUED | OUTPATIENT
Start: 2023-12-05 | End: 2023-12-05

## 2023-12-05 RX ADMIN — PROPOFOL 50 MG: 10 INJECTION, EMULSION INTRAVENOUS at 10:12

## 2023-12-05 RX ADMIN — SODIUM CHLORIDE: 9 INJECTION, SOLUTION INTRAVENOUS at 08:12

## 2023-12-05 RX ADMIN — LIDOCAINE HYDROCHLORIDE 50 MG: 20 INJECTION, SOLUTION INTRAVENOUS at 10:12

## 2023-12-05 RX ADMIN — SODIUM CHLORIDE 1000 ML: 9 INJECTION, SOLUTION INTRAVENOUS at 09:12

## 2023-12-05 RX ADMIN — PROPOFOL 100 MG: 10 INJECTION, EMULSION INTRAVENOUS at 10:12

## 2023-12-05 NOTE — ANESTHESIA PREPROCEDURE EVALUATION
12/05/2023  Byrson Kellogg is a 88 y.o., male.      Pre-op Assessment    I have reviewed the NPO Status.   I have reviewed the Medications.     Review of Systems  Anesthesia Hx:  No problems with previous Anesthesia                Cardiovascular:     Hypertension                                  Hypertension         Pulmonary:        Sleep Apnea     Obstructive Sleep Apnea (KATHY).           Renal/:  Chronic Renal Disease        Kidney Function/Disease             Hepatic/GI:  Bowel Prep.                Musculoskeletal:  Arthritis        Arthritis          Neurological:  TIA,             Arthritis               TIA - Transient Ischemic Attack                   Physical Exam  General: Well nourished        Anesthesia Plan  Type of Anesthesia, risks & benefits discussed:    Anesthesia Type: Gen Natural Airway  Intra-op Monitoring Plan: Standard ASA Monitors  Induction:  IV  Informed Consent: Informed consent signed with the Patient and all parties understand the risks and agree with anesthesia plan.  All questions answered.   ASA Score: 3    Ready For Surgery From Anesthesia Perspective.     .

## 2023-12-05 NOTE — PROVATION PATIENT INSTRUCTIONS
Discharge Summary/Instructions after an Endoscopic Procedure  Patient Name: Bryson Kellogg  Patient MRN: [id number]  Patient YOB: 1935 Tuesday, December 5, 2023  Frankie Correa MD  Dear patient,  As a result of recent federal legislation (The Federal Cures Act), you may   receive lab or pathology results from your procedure in your [facility]   account before your physician is able to contact you. Your physician or   their representative will relay the results to you with their   recommendations at their soonest availability.  Thank you,  RESTRICTIONS:  During your procedure today, you received medications for sedation.  These   medications may affect your judgment, balance and coordination.  Therefore,   for 24 hours, you have the following restrictions:   - DO NOT drive a car, operate machinery, make legal/financial decisions,   sign important papers or drink alcohol.    ACTIVITY:  Today: no heavy lifting, straining or running due to procedural   sedation/anesthesia.  The following day: return to full activity including work.  DIET:  Eat and drink normally unless instructed otherwise.     TREATMENT FOR COMMON SIDE EFFECTS:  - Mild abdominal pain, nausea, belching, bloating or excessive gas:  rest,   eat lightly and use a heating pad.  - Sore Throat: treat with throat lozenges and/or gargle with warm salt   water.  - Because air was used during the procedure, expelling large amounts of air   from your rectum or belching is normal.  - If a bowel prep was taken, you may not have a bowel movement for 1-3 days.    This is normal.  SYMPTOMS TO WATCH FOR AND REPORT TO YOUR PHYSICIAN:  1. Abdominal pain or bloating, other than gas cramps.  2. Chest pain.  3. Back pain.  4. Signs of infection such as: chills or fever occurring within 24 hours   after the procedure.  5. Rectal bleeding, which would show as bright red, maroon, or black stools.   (A tablespoon of blood from the rectum is not serious, especially if    hemorrhoids are present.)  6. Vomiting.  7. Weakness or dizziness.  GO DIRECTLY TO THE NEAREST EMERGENCY ROOM IF YOU HAVE ANY OF THE FOLLOWING:      Difficulty breathing              Chills and/or fever over 101 F   Persistent vomiting and/or vomiting blood   Severe abdominal pain   Severe chest pain   Black, tarry stools   Bleeding- more than one tablespoon   Any other symptom or condition that you feel may need urgent attention  Your doctor recommends these additional instructions:  If any biopsies were taken, your doctors clinic will contact you in 1 to 2   weeks with any results.  - Patient has a contact number available for emergencies.  The signs and   symptoms of potential delayed complications were discussed with the   patient.  Return to normal activities tomorrow.  Written discharge   instructions were provided to the patient.   - High fiber diet.   - Continue present medications.   - Await pathology results.   - No repeat colonoscopy due to age.   - Discharge patient to home (ambulatory).   - Return to GI office PRN.  For questions, problems or results please call your physician - Frankie Correa MD at Work:  [phone].  [facility], EMERGENCY ROOM PHONE NUMBER: [phone]  IF A COMPLICATION OR EMERGENCY SITUATION ARISES AND YOU ARE UNABLE TO REACH   YOUR PHYSICIAN - GO DIRECTLY TO THE EMERGENCY ROOM.  Frankie Correa MD  12/5/2023 10:30:11 AM  This report has been verified and signed electronically.  Dear patient,  As a result of recent federal legislation (The Federal Cures Act), you may   receive lab or pathology results from your procedure in your [facility]   account before your physician is able to contact you. Your physician or   their representative will relay the results to you with their   recommendations at their soonest availability.  Thank you,  PROVATION

## 2023-12-05 NOTE — TRANSFER OF CARE
"Anesthesia Transfer of Care Note    Patient: Bryson Kellogg    Procedure(s) Performed: Procedure(s) (LRB):  COLONOSCOPY (N/A)    Patient location: GI    Anesthesia Type: general    Transport from OR: Transported from OR on room air with adequate spontaneous ventilation    Post pain: adequate analgesia    Post assessment: no apparent anesthetic complications    Post vital signs: stable    Level of consciousness: awake    Nausea/Vomiting: no nausea/vomiting    Complications: none    Transfer of care protocol was followed      Last vitals: Visit Vitals  BP (!) 161/79 (BP Location: Left arm, Patient Position: Lying)   Pulse (!) 58   Temp 36.6 °C (97.9 °F) (Skin)   Resp 18   Ht 5' 11" (1.803 m)   Wt 78.5 kg (173 lb)   SpO2 98%   BMI 24.13 kg/m²     "

## 2023-12-06 VITALS
SYSTOLIC BLOOD PRESSURE: 130 MMHG | DIASTOLIC BLOOD PRESSURE: 66 MMHG | WEIGHT: 173 LBS | OXYGEN SATURATION: 93 % | TEMPERATURE: 97 F | RESPIRATION RATE: 16 BRPM | BODY MASS INDEX: 24.22 KG/M2 | HEART RATE: 54 BPM | HEIGHT: 71 IN

## 2023-12-07 NOTE — ANESTHESIA POSTPROCEDURE EVALUATION
Anesthesia Post Evaluation    Patient: Bryson Kellogg    Procedure(s) Performed: Procedure(s) (LRB):  COLONOSCOPY (N/A)    Final Anesthesia Type: general      Patient location during evaluation: PACU  Patient participation: Yes- Able to Participate  Level of consciousness: awake and alert and oriented  Post-procedure vital signs: reviewed and stable  Pain management: adequate  Airway patency: patent    PONV status at discharge: No PONV  Anesthetic complications: no      Cardiovascular status: blood pressure returned to baseline  Respiratory status: unassisted, spontaneous ventilation and room air  Hydration status: euvolemic  Follow-up not needed.              Vitals Value Taken Time   /66 12/05/23 1039   Temp 36.3 °C (97.3 °F) 12/05/23 1034   Pulse 54 12/05/23 1039   Resp 16 12/05/23 1039   SpO2 93 % 12/05/23 1039         Event Time   Out of Recovery 11:20:00         Pain/Leanne Score: No data recorded

## 2023-12-19 ENCOUNTER — PATIENT MESSAGE (OUTPATIENT)
Dept: GASTROENTEROLOGY | Facility: CLINIC | Age: 88
End: 2023-12-19
Payer: MEDICARE

## 2023-12-31 ENCOUNTER — EXTERNAL CHRONIC CARE MANAGEMENT (OUTPATIENT)
Dept: PRIMARY CARE CLINIC | Facility: CLINIC | Age: 88
End: 2023-12-31
Payer: MEDICARE

## 2023-12-31 PROCEDURE — 99490 CHRNC CARE MGMT STAFF 1ST 20: CPT | Mod: PBBFAC | Performed by: INTERNAL MEDICINE

## 2023-12-31 PROCEDURE — 99490 CHRNC CARE MGMT STAFF 1ST 20: CPT | Mod: S$PBB,,, | Performed by: INTERNAL MEDICINE

## 2024-01-31 ENCOUNTER — EXTERNAL CHRONIC CARE MANAGEMENT (OUTPATIENT)
Dept: PRIMARY CARE CLINIC | Facility: CLINIC | Age: 89
End: 2024-01-31
Payer: MEDICARE

## 2024-01-31 PROCEDURE — 99490 CHRNC CARE MGMT STAFF 1ST 20: CPT | Mod: S$PBB,,, | Performed by: INTERNAL MEDICINE

## 2024-01-31 PROCEDURE — 99490 CHRNC CARE MGMT STAFF 1ST 20: CPT | Mod: PBBFAC | Performed by: INTERNAL MEDICINE

## 2024-02-15 ENCOUNTER — PATIENT MESSAGE (OUTPATIENT)
Dept: INTERNAL MEDICINE | Facility: CLINIC | Age: 89
End: 2024-02-15
Payer: MEDICARE

## 2024-02-15 DIAGNOSIS — R73.03 PREDIABETES: Primary | ICD-10-CM

## 2024-02-23 ENCOUNTER — LAB VISIT (OUTPATIENT)
Dept: LAB | Facility: HOSPITAL | Age: 89
End: 2024-02-23
Attending: INTERNAL MEDICINE
Payer: MEDICARE

## 2024-02-23 DIAGNOSIS — R73.03 PREDIABETES: ICD-10-CM

## 2024-02-23 LAB
ALBUMIN SERPL BCP-MCNC: 3.8 G/DL (ref 3.5–5.2)
ALP SERPL-CCNC: 96 U/L (ref 55–135)
ALT SERPL W/O P-5'-P-CCNC: 20 U/L (ref 10–44)
ANION GAP SERPL CALC-SCNC: 8 MMOL/L (ref 8–16)
AST SERPL-CCNC: 22 U/L (ref 10–40)
BILIRUB SERPL-MCNC: 0.8 MG/DL (ref 0.1–1)
BUN SERPL-MCNC: 16 MG/DL (ref 8–23)
CALCIUM SERPL-MCNC: 10.1 MG/DL (ref 8.7–10.5)
CHLORIDE SERPL-SCNC: 105 MMOL/L (ref 95–110)
CO2 SERPL-SCNC: 24 MMOL/L (ref 23–29)
CREAT SERPL-MCNC: 0.9 MG/DL (ref 0.5–1.4)
EST. GFR  (NO RACE VARIABLE): >60 ML/MIN/1.73 M^2
ESTIMATED AVG GLUCOSE: 114 MG/DL (ref 68–131)
GLUCOSE SERPL-MCNC: 92 MG/DL (ref 70–110)
HBA1C MFR BLD: 5.6 % (ref 4–5.6)
POTASSIUM SERPL-SCNC: 4.4 MMOL/L (ref 3.5–5.1)
PROT SERPL-MCNC: 6.7 G/DL (ref 6–8.4)
SODIUM SERPL-SCNC: 137 MMOL/L (ref 136–145)

## 2024-02-23 PROCEDURE — 36415 COLL VENOUS BLD VENIPUNCTURE: CPT | Mod: PO | Performed by: INTERNAL MEDICINE

## 2024-02-23 PROCEDURE — 80053 COMPREHEN METABOLIC PANEL: CPT | Performed by: INTERNAL MEDICINE

## 2024-02-23 PROCEDURE — 83036 HEMOGLOBIN GLYCOSYLATED A1C: CPT | Performed by: INTERNAL MEDICINE

## 2024-02-27 ENCOUNTER — TELEPHONE (OUTPATIENT)
Dept: HEMATOLOGY/ONCOLOGY | Facility: CLINIC | Age: 89
End: 2024-02-27

## 2024-02-27 NOTE — TELEPHONE ENCOUNTER
I spoke with pt and reminded him to have labs done prior to appt here on 3/6/24 pt verbalized understanding

## 2024-02-29 ENCOUNTER — LAB VISIT (OUTPATIENT)
Dept: LAB | Facility: HOSPITAL | Age: 89
End: 2024-02-29
Payer: MEDICARE

## 2024-02-29 ENCOUNTER — OFFICE VISIT (OUTPATIENT)
Dept: INTERNAL MEDICINE | Facility: CLINIC | Age: 89
End: 2024-02-29
Payer: MEDICARE

## 2024-02-29 ENCOUNTER — EXTERNAL CHRONIC CARE MANAGEMENT (OUTPATIENT)
Dept: PRIMARY CARE CLINIC | Facility: CLINIC | Age: 89
End: 2024-02-29
Payer: MEDICARE

## 2024-02-29 VITALS
SYSTOLIC BLOOD PRESSURE: 114 MMHG | DIASTOLIC BLOOD PRESSURE: 76 MMHG | HEIGHT: 70 IN | BODY MASS INDEX: 26.33 KG/M2 | WEIGHT: 183.88 LBS | OXYGEN SATURATION: 98 % | HEART RATE: 90 BPM

## 2024-02-29 DIAGNOSIS — C82.61 CUTANEOUS FOLLICLE CENTER LYMPHOMA INVOLVING LYMPH NODES OF HEAD: ICD-10-CM

## 2024-02-29 DIAGNOSIS — D53.9 NUTRITIONAL ANEMIA, UNSPECIFIED: ICD-10-CM

## 2024-02-29 DIAGNOSIS — E78.2 MIXED HYPERLIPIDEMIA: ICD-10-CM

## 2024-02-29 DIAGNOSIS — R21 SKIN RASH: ICD-10-CM

## 2024-02-29 DIAGNOSIS — D64.9 ANEMIA, UNSPECIFIED TYPE: ICD-10-CM

## 2024-02-29 DIAGNOSIS — G47.33 OBSTRUCTIVE SLEEP APNEA SYNDROME: Primary | ICD-10-CM

## 2024-02-29 DIAGNOSIS — I70.0 CALCIFICATION OF AORTA: ICD-10-CM

## 2024-02-29 DIAGNOSIS — I10 PRIMARY HYPERTENSION: ICD-10-CM

## 2024-02-29 DIAGNOSIS — M46.1 SACROILIITIS: ICD-10-CM

## 2024-02-29 LAB
ALBUMIN SERPL BCP-MCNC: 3.7 G/DL (ref 3.5–5.2)
ALP SERPL-CCNC: 96 U/L (ref 55–135)
ALT SERPL W/O P-5'-P-CCNC: 41 U/L (ref 10–44)
ANION GAP SERPL CALC-SCNC: 9 MMOL/L (ref 8–16)
AST SERPL-CCNC: 30 U/L (ref 10–40)
BASOPHILS # BLD AUTO: 0.04 K/UL (ref 0–0.2)
BASOPHILS NFR BLD: 0.9 % (ref 0–1.9)
BILIRUB SERPL-MCNC: 0.6 MG/DL (ref 0.1–1)
BUN SERPL-MCNC: 16 MG/DL (ref 8–23)
CALCIUM SERPL-MCNC: 10.1 MG/DL (ref 8.7–10.5)
CHLORIDE SERPL-SCNC: 105 MMOL/L (ref 95–110)
CHOLEST SERPL-MCNC: 155 MG/DL (ref 120–199)
CHOLEST/HDLC SERPL: 2.9 {RATIO} (ref 2–5)
CO2 SERPL-SCNC: 26 MMOL/L (ref 23–29)
CREAT SERPL-MCNC: 1.1 MG/DL (ref 0.5–1.4)
DIFFERENTIAL METHOD BLD: ABNORMAL
EOSINOPHIL # BLD AUTO: 0.1 K/UL (ref 0–0.5)
EOSINOPHIL NFR BLD: 2.3 % (ref 0–8)
ERYTHROCYTE [DISTWIDTH] IN BLOOD BY AUTOMATED COUNT: 13.5 % (ref 11.5–14.5)
EST. GFR  (NO RACE VARIABLE): >60 ML/MIN/1.73 M^2
FERRITIN SERPL-MCNC: 266 NG/ML (ref 20–300)
FOLATE SERPL-MCNC: 13.5 NG/ML (ref 4–24)
GLUCOSE SERPL-MCNC: 75 MG/DL (ref 70–110)
HCT VFR BLD AUTO: 38.9 % (ref 40–54)
HDLC SERPL-MCNC: 53 MG/DL (ref 40–75)
HDLC SERPL: 34.2 % (ref 20–50)
HGB BLD-MCNC: 13.1 G/DL (ref 14–18)
IMM GRANULOCYTES # BLD AUTO: 0.02 K/UL (ref 0–0.04)
IMM GRANULOCYTES NFR BLD AUTO: 0.4 % (ref 0–0.5)
IRON SERPL-MCNC: 122 UG/DL (ref 45–160)
LDLC SERPL CALC-MCNC: 58.8 MG/DL (ref 63–159)
LYMPHOCYTES # BLD AUTO: 1.3 K/UL (ref 1–4.8)
LYMPHOCYTES NFR BLD: 27.7 % (ref 18–48)
MCH RBC QN AUTO: 33.9 PG (ref 27–31)
MCHC RBC AUTO-ENTMCNC: 33.7 G/DL (ref 32–36)
MCV RBC AUTO: 101 FL (ref 82–98)
MONOCYTES # BLD AUTO: 0.7 K/UL (ref 0.3–1)
MONOCYTES NFR BLD: 13.8 % (ref 4–15)
NEUTROPHILS # BLD AUTO: 2.6 K/UL (ref 1.8–7.7)
NEUTROPHILS NFR BLD: 54.9 % (ref 38–73)
NONHDLC SERPL-MCNC: 102 MG/DL
NRBC BLD-RTO: 0 /100 WBC
PLATELET # BLD AUTO: 145 K/UL (ref 150–450)
PMV BLD AUTO: 11.5 FL (ref 9.2–12.9)
POTASSIUM SERPL-SCNC: 4.1 MMOL/L (ref 3.5–5.1)
PROT SERPL-MCNC: 6.7 G/DL (ref 6–8.4)
RBC # BLD AUTO: 3.87 M/UL (ref 4.6–6.2)
SATURATED IRON: 38 % (ref 20–50)
SODIUM SERPL-SCNC: 140 MMOL/L (ref 136–145)
TOTAL IRON BINDING CAPACITY: 320 UG/DL (ref 250–450)
TRANSFERRIN SERPL-MCNC: 216 MG/DL (ref 200–375)
TRIGL SERPL-MCNC: 216 MG/DL (ref 30–150)
VIT B12 SERPL-MCNC: 1014 PG/ML (ref 210–950)
WBC # BLD AUTO: 4.7 K/UL (ref 3.9–12.7)

## 2024-02-29 PROCEDURE — 85025 COMPLETE CBC W/AUTO DIFF WBC: CPT | Performed by: INTERNAL MEDICINE

## 2024-02-29 PROCEDURE — 82746 ASSAY OF FOLIC ACID SERUM: CPT | Performed by: INTERNAL MEDICINE

## 2024-02-29 PROCEDURE — 80061 LIPID PANEL: CPT | Performed by: INTERNAL MEDICINE

## 2024-02-29 PROCEDURE — 99214 OFFICE O/P EST MOD 30 MIN: CPT | Mod: S$PBB,,, | Performed by: INTERNAL MEDICINE

## 2024-02-29 PROCEDURE — 83540 ASSAY OF IRON: CPT | Performed by: INTERNAL MEDICINE

## 2024-02-29 PROCEDURE — 36415 COLL VENOUS BLD VENIPUNCTURE: CPT | Performed by: INTERNAL MEDICINE

## 2024-02-29 PROCEDURE — 80053 COMPREHEN METABOLIC PANEL: CPT | Performed by: INTERNAL MEDICINE

## 2024-02-29 PROCEDURE — 82728 ASSAY OF FERRITIN: CPT | Performed by: INTERNAL MEDICINE

## 2024-02-29 PROCEDURE — G2211 COMPLEX E/M VISIT ADD ON: HCPCS | Mod: S$PBB,,, | Performed by: INTERNAL MEDICINE

## 2024-02-29 PROCEDURE — 99999 PR PBB SHADOW E&M-EST. PATIENT-LVL III: CPT | Mod: PBBFAC,,, | Performed by: INTERNAL MEDICINE

## 2024-02-29 PROCEDURE — 82607 VITAMIN B-12: CPT | Performed by: INTERNAL MEDICINE

## 2024-02-29 PROCEDURE — 99213 OFFICE O/P EST LOW 20 MIN: CPT | Mod: PBBFAC | Performed by: INTERNAL MEDICINE

## 2024-02-29 PROCEDURE — 99490 CHRNC CARE MGMT STAFF 1ST 20: CPT | Mod: S$PBB,,, | Performed by: INTERNAL MEDICINE

## 2024-02-29 PROCEDURE — 99490 CHRNC CARE MGMT STAFF 1ST 20: CPT | Mod: PBBFAC | Performed by: INTERNAL MEDICINE

## 2024-02-29 RX ORDER — ATORVASTATIN CALCIUM 20 MG/1
20 TABLET, FILM COATED ORAL DAILY
Qty: 90 TABLET | Refills: 3 | Status: SHIPPED | OUTPATIENT
Start: 2024-02-29 | End: 2025-02-28

## 2024-02-29 RX ORDER — FLUTICASONE PROPIONATE 50 MCG
1 SPRAY, SUSPENSION (ML) NASAL DAILY
COMMUNITY

## 2024-02-29 RX ORDER — METRONIDAZOLE 10 MG/G
GEL TOPICAL
COMMUNITY
Start: 2023-04-13

## 2024-02-29 NOTE — PROGRESS NOTES
He is a 88  year-old gentleman coming in today to follow up ongoing medical   problems.  No falls.  I last saw him 8/28/2023-      1. He had  Lumbar stenosis.  He use to have some pain down his right leg, but that has resolved for now.    He is doing some PT. Last visit he complained about a shuffling gait and the PT has been helping-  he has topped pt since last visit but may comeback later in the year.  .     He had a operation in April or June 2020 in [place], [place] for lumbar spinal stenosis and that has taken care of the pain.  .    He is avoiding lifting heavy weights.   .  He walks about 3 miles a day.            2. He has a history of colon polyps, which his last colonoscopy was   11/2016. . He had one polyp  And it was hyperplastic and he does not need to follow up.  He has been  Using miralax and it has been helping.  e is still interested in Colonoscopy.  I will refer to GI as recommended for his c-scope          3. Hyperlipidemia. He is on Lipitor 40mg . Recent cholesterol shows total   cholesterol 141 , HDL 48 , triglycerides 74, and LDL 78 ( 12/5/2022)   which is stable   . He is tolerating the Lipitor 20 mg well.     4. He has a BPH, which he says has been dong well. He has 1 episode of nacturia  Around 2-3 am. . NO hesitancy or urgency.     5. 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   :Dr. 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  BCL2 and BCL6 staining patterns are confirmatory He previously saw Dr Lieberman with rad/onc and they completed XRT with 22 total on 9/29/2020.  He had PET scan on 8/24/2020 with no evidence of a systemic lymphoma process. The area was on the Left side of face.  He saw Derm again in Aug 2023 and saw Oncology in  9/6/2023         6. KATHY: results from 4/09 study. SEVERE obstructive sleep apnea syndrome with 289 respiratory obstructions/RDI 47 with snoring moderate intermittent and  "hypersomnia with CPAP. He tried Bipap for about a year.  NO day time fatigue. No witnessed sleep apnea or snoring. feeling well.       7.   Colon polyps-- had c-scope 12/5/2023-  7 polyps removed-  had TA's   c-scope follow up not recommended due to age.      8. Anemia-   being followed   12.7/37.2 in Oct -- has follow up labs next month.      9. Pruritus-  rash- he is seeing derm for this-- Derm stopped his Lamisil.  Heas a follow up with derm next week.  Had a biopsy recently--  Sections show a thin biopsy of skin.  There are patterns of interface   damage associated with numerous dyskeratotic keratinocytes.  There are   underlying perivascular lymphoid infiltrates.     He was on oral Lamisil for his toenails- He has been off this for 2 weeks.  Rash got a little better but did not clear up.           SOCIAL HISTORY: He is retired from [employer]. He is . No   alcohol use. No drug use. Lives in [place]. Lives with wife and they are active and travel a lot.  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      REVIEW OF SYSTEMS:   Gen - no fatigue weight  stable.  He is still walking 3 miles a day         Eyes - no eye pain or visual changes  ENT - no hoarseness or sore throat  CV - no CP or SOB  Pulm - no cough or wheezing  GI - no N/V/D   no dysuria or incontinence  MS - no joint pain or muscle pain  Skin - as above   Neuro - no HA, dizziness  Heme - no abnormal bleeding or bruising  Endo - no polydipsia, or temperature changes  Psych - no anxiety or depression    PHYSICAL EXAMINATION: He is a well-appearing gentleman in no acute   Distress. Walking without any difficulty or without assistance device.          /68 (BP Location: Left arm, Patient Position: Sitting, BP Method: Medium (Manual))   Pulse 71   Ht 5' 10" (1.778 m)   Wt 85.5 kg (188 lb 7.9 oz)   SpO2 97%   BMI 27.05 kg/m²         Ear canals are open. TMs are   clear. Oropharynx is clear. Pupils equal, round, and reactive to light   and " accommodation. He is wearing glasses.   NECK: Supple. Thyroid is not enlarged. No carotid bruits. He has no   cervical, axillary, or inguinal lymphadenopathy detected.   CHEST: Clear without wheeze.   CARDIOVASCULAR: S1, S2, regular rate and rhythm without murmur, gallop,   or rub.   ABDOMEN: Soft, nontender. No hepatosplenomegaly. No guarding or rebound   tenderness. Abdominal aorta is not enlarged.   He has normal biceps, triceps, patellar deep tendon reflexes. Normal   muscle strength, upper and lower extremities.    He has  a macular rash- red-  on his chest, back, neck, and arms - rash spares the  legs and face.  He has onco mycosis.    He is wearing a fitbit on his left wrist.    Walks with mild limb favoring his left leg.          1. History of colon polyps.- last note said he did not need a follow up c-scope--we discussed today -- last polyp in 2016 was hyperplastic.  Discussed.    2. History of hyperlipidemia - better controled.    3. Sacroilitits- doing much better   4 pulmonary nodules-- ct chest reviewed  -- no need for follow up. -- Reviewed his recent PET scan 9/06/2022.     5. Sleep apnea- off CPAP and BIPAP-- says he is sleeping well.  Less snoring since weight loss.    6. cutaneous follicular lymphoma-- following with Hem and derm.   7. New rash for 6 months-- seeing derm and hem /onc.    8. Calcification of aorta-- bp and cholesterol control and monitor       Our office providers are the focal point for all needed health care services that are part of ongoing care related to a patient's single serious condition or the patient's complex conditions.     Will get his labs for hem/onc today

## 2024-03-05 NOTE — PROGRESS NOTES
[facility] Hematology/Oncology  PROGRESS NOTE -   Follow-up Visit      Subjective:       Patient ID:   NAME: Bryson Kellogg : 1935     88 y.o. male    Referring Doc: Osiris Lopez  Other Physicians: Kirk Cueto ([facility]-PCP); Niecy ([facility]); Luisana    Chief Complaint:  cutaneous follicular lymphoma f/u    History of Present Illness:     Patient returns today for a regularly scheduled follow-up visit.  The patient is here today to go over the results of the recently ordered labs, tests and studies. He is here with his wife.     He has been having some form of atypical dermatitis with itching for the past couple months. He has seen Dr Lakeshia Lopez with dermatology. He had skin biopsy couple of weeks ago on . He is seeing Dr Lopez again tomorrow.     Otherwise, no new issues. Breathing ok. No CP, SOB, HA's or N/V.     He saw Dr Cueto, his PCP, last week  on 2024       He previously was followed Dr Lieberman with rad/onc and they completed XRT with 22 total on 2020.      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 He had his vaccinations            ROS:   GEN: normal without any fever, night sweats or weight loss  HEENT: normal with no HA's, sore throat, stiff neck, changes in vision  CV: normal with no CP, SOB, PND, LEMOS or orthopnea  PULM: normal with no SOB, cough, hemoptysis, sputum or pleuritic pain  GI: normal with no abdominal pain, nausea, vomiting, constipation, diarrhea, melanotic stools, BRBPR, or hematemesis  : normal with no hematuria, dysuria  BREAST: normal with no mass, discharge, pain  SKIN: normal with no rash,  erythema, bruising, or swelling    Pain Scale:  0    Allergies:  Review of patient's allergies indicates:   Allergen Reactions    Sulfa (sulfonamide antibiotics) Rash     Childhood allergy       Medications:    Current Outpatient Medications:     ammonium lactate 12 % Crea, Aaa qd after shower prn dry skin, Disp: 385 g, Rfl: 11    ascorbic acid, vitamin C, (VITAMIN C) 500 MG tablet, Take 500 mg by mouth once daily., Disp: , Rfl:     aspirin (ECOTRIN) 81 MG EC tablet, Take 81 mg by mouth once daily., Disp: , Rfl:     atorvastatin (LIPITOR) 20 MG tablet, Take 1 tablet (20 mg total) by mouth once daily., Disp: 90 tablet, Rfl: 3    clotrimazole-betamethasone 1-0.05% (LOTRISONE) cream, Aaa bid x 1-2 wks prn rash.  Tk 1 wk off before repeating, Disp: 45 g, Rfl: 6    coenzyme Q10 200 mg capsule, Take 200 mg by mouth once daily., Disp: , Rfl:     cyanocobalamin, vitamin B-12, 1,000 mcg/15 mL Liqd, Take 15 mLs by mouth once daily., Disp: , Rfl:     doxycycline (VIBRAMYCIN) 100 MG Cap, 1 po bid with food prn breakout, Disp: 14 capsule, Rfl: 6    fluticasone propionate (FLONASE) 50 mcg/actuation nasal spray, 1 spray by Each Nostril route once daily., Disp: , Rfl:     lisinopriL (PRINIVIL,ZESTRIL) 5 MG tablet, Take 1 tablet (5 mg total) by mouth once daily., Disp: 90 tablet, Rfl: 3    metronidazole 1% (METROGEL) 1 % Gel, , Disp: , Rfl:     omega-3/dha/epa/ala/vitamin D3 (OMEGA-3-DHA-EPA-ALA-VIT D3) 50 mg (25 mg- 25 mg)-100 unit Chew, Take 1 capsule by mouth once daily., Disp: , Rfl:     pyridoxine, vitamin B6, (B-6) 100 MG Tab, Take 50 mg by mouth once daily., Disp: , Rfl:     terbinafine HCL (LAMISIL) 250 mg tablet, Take 1 tablet (250 mg total) by mouth once daily., Disp: 90 tablet, Rfl: 0    vit A/vit C/vit E/zinc/copper (PRESERVISION AREDS ORAL), Take 1 tablet by mouth 2 (two) times daily., Disp: , Rfl:     butalbital-acetaminophen-caffeine -40 mg (FIORICET, ESGIC) -40 mg per tablet, Take 1 tablet by mouth every  "6 (six) hours as needed for Pain or Headaches. (Patient not taking: Reported on 10/24/2023), Disp: 20 tablet, Rfl: 0    cholecalciferol, vitamin D3, (VITAMIN D3) 125 mcg (5,000 unit) Tab, Take 5,000 Units by mouth once daily., Disp: , Rfl:     ketoconazole (NIZORAL) 2 % cream, Aaa qd-bid prn heat rash, ok for continuous use (Patient not taking: Reported on 3/6/2024), Disp: 60 g, Rfl: 11    PMHx/PSHx Updates:  See patient's last visit with me on 3/6/2023  See H&P on 8/13/2020        Pathology:   Cancer Staging   No matching staging information was found for the patient.    Skin biopsy 2/20/2024:    RIGHT UPPER ARM:   - INTERFACE AND PERIVASCULAR DERMATITIS WITH PROMINENT DYSKERATOSIS.   - SEE NOTE.     NOTE:  These findings could be seen in subacute lupus.  Changes within the spectrum of erythema multiforme were also considered histologically.       Colonoscopy: 12/5/2023:    1. ASCENDING COLON POLYPS BIOPSIES:   - TUBULAR ADENOMAS.   - NEGATIVE FOR HIGH GRADE DYSPLASIA OR MALIGNANCY.     2. TRANSVERSE COLON POLYPS BIOPSIES:   - TUBULAR ADENOMAS.   - NEGATIVE FOR HIGH GRADE DYSPLASIA OR MALIGNANCY.       Skin biopsy: 2/27/2023:    DIAGNOSIS:   03/01/2023 WPL/jr     1.  RIGHT ADJACENT TO LATERAL CANTHUS, SHAVE:   - ACTINIC KERATOSIS.   - NODULAR SOLAR ELASTOSIS.     2.  LEFT CHEST, PUNCH:   - EPIDERMAL CYST, INFUNDIBULAR TYPE.     3.  LEFT MIDDLE BACK, PUNCH:   - EPIDERMAL CYST, INFUNDIBULAR TYPE.      Objective:     Vitals:  Blood pressure (!) 143/76, pulse 63, temperature 97.3 °F (36.3 °C), resp. rate 16, height 5' 10" (1.778 m), weight 82.4 kg (181 lb 9.6 oz).    Physical Examination:   GEN: no apparent distress, comfortable; AAOx3  HEAD: atraumatic and normocephalic; healed scar on left anterior auricular region  EYES: no pallor, no icterus, PERRLA  ENT: OMM, no pharyngeal erythema, external ears WNL; no nasal discharge; no thrush  NECK: no masses, thyroid normal, trachea midline, no LAD/LN's, supple  CV: RRR with " no murmur; normal pulse; normal S1 and S2; no pedal edema  CHEST: Normal respiratory effort; CTAB; normal breath sounds; no wheeze or crackles  ABDOM: nontender and nondistended; soft; normal bowel sounds; no rebound/guarding  MUSC/Skeletal: ROM normal; no crepitus; joints normal; no deformities or arthropathy  EXTREM: no clubbing, cyanosis, inflammation or swelling  SKIN: no rashes, lesions, ulcers, petechiae or subcutaneous nodules; recent skin biopsies  : no mcgill  NEURO: grossly intact; motor/sensory WNL; AAOx3; no tremors  PSYCH: normal mood, affect and behavior  LYMPH: normal cervical, supraclavicular, axillary and groin LN's            Labs:   CMP  Sodium   Date Value Ref Range Status   02/29/2024 140 136 - 145 mmol/L Final     Potassium   Date Value Ref Range Status   02/29/2024 4.1 3.5 - 5.1 mmol/L Final     Chloride   Date Value Ref Range Status   02/29/2024 105 95 - 110 mmol/L Final     CO2   Date Value Ref Range Status   02/29/2024 26 23 - 29 mmol/L Final     Glucose   Date Value Ref Range Status   02/29/2024 75 70 - 110 mg/dL Final     BUN   Date Value Ref Range Status   02/29/2024 16 8 - 23 mg/dL Final     Creatinine   Date Value Ref Range Status   02/29/2024 1.1 0.5 - 1.4 mg/dL Final   11/23/2012 1.2 0.5 - 1.4 mg/dL Final     Calcium   Date Value Ref Range Status   02/29/2024 10.1 8.7 - 10.5 mg/dL Final   11/23/2012 9.4 8.7 - 10.5 mg/dL Final     Total Protein   Date Value Ref Range Status   02/29/2024 6.7 6.0 - 8.4 g/dL Final     Albumin   Date Value Ref Range Status   02/29/2024 3.7 3.5 - 5.2 g/dL Final     Total Bilirubin   Date Value Ref Range Status   02/29/2024 0.6 0.1 - 1.0 mg/dL Final     Comment:     For infants and newborns, interpretation of results should be based  on gestational age, weight and in agreement with clinical  observations.    Premature Infant recommended reference ranges:  Up to 24 hours.............<8.0 mg/dL  Up to 48 hours............<12.0 mg/dL  3-5  days..................<15.0 mg/dL  6-29 days.................<15.0 mg/dL       Alkaline Phosphatase   Date Value Ref Range Status   02/29/2024 96 55 - 135 U/L Final   11/23/2012 85 55 - 135 U/L Final     AST   Date Value Ref Range Status   02/29/2024 30 10 - 40 U/L Final   11/23/2012 22 10 - 40 U/L Final     ALT   Date Value Ref Range Status   02/29/2024 41 10 - 44 U/L Final     Anion Gap   Date Value Ref Range Status   02/29/2024 9 8 - 16 mmol/L Final   11/23/2012 10 5 - 15 meq/L Final     eGFR if    Date Value Ref Range Status   06/14/2022 >60.0 >60 mL/min/1.73 m^2 Final     eGFR if non    Date Value Ref Range Status   06/14/2022 >60.0 >60 mL/min/1.73 m^2 Final     Comment:     Calculation used to obtain the estimated glomerular filtration  rate (eGFR) is the CKD-EPI equation.            Lab Results   Component Value Date    WBC 4.70 02/29/2024    HGB 13.1 (L) 02/29/2024    HCT 38.9 (L) 02/29/2024     (H) 02/29/2024     (L) 02/29/2024     Gran # (ANC) 1.8 - 7.7 K/uL 2.6     Lymph # 1.0 - 4.8 K/uL 1.3       Radiology/Diagnostic Studies:    PET 10/26/2023:  IMPRESSION:  1. No evidence of active FDG avid lymphoproliferative disease.  2. Additional observations as described.      PET 2/23/2023:    IMPRESSION:  1. No evidence of recurrent FDG avid lymphoproliferative disease.  2. Additional observations as described.         PET 8/22/2022:  IMPRESSION:  No evidence of recurrent or metastatic disease        PET 2/14/2022:    IMPRESSION:  No PET/CT evidence of FDG avid disease.        US axilla 9/15/2021:    FINDINGS: Sonographic assessment targeted to the left axilla was performed in planning for possible biopsy. Recently reported lymph node (PET/CT August 24, 2021) is identified, measuring 2.2 x 1.0 cm. Margins are sharp, and fatty hilum is preserved.      CT Soft Neck  9/9/2021:    IMPRESSION: No enlarged cervical chain lymph nodes or cervical soft tissue mass        PET   8/24/2021:    Impression:     1. Nonspecific FDG uptake in left supraclavicular soft tissues, new, without abnormal CT correlate.  If further imaging evaluation is needed, soft tissue neck CT with IV contrast can be considered.  2. 11 mm soft tissue mass in left axilla which is moderately FDG avid and has surrounding inflammatory fat stranding.  Recurrent lymphoma is a consideration.  Reactive lymph node uptake due to infectious or inflammatory etiology also conceivable.  3. No other evidence of lymphoproliferative disorder.  4. Unchanged pulmonary nodules        PET  2/19/2021:  IMPRESSION: No evidence of active lymphoproliferative disease.          Nm Pet Ct Whole Body    Result Date: 8/24/2020  EXAMINATION: NM PET CT WHOLE BODY CLINICAL HISTORY: Cutaneous follicle center lymphoma, lymph nodes of head, face, and neck, initial staging, lung nodules, C 82.61, R 91.8. TECHNIQUE: Following the injection of 12.8 mCi of F-18 labeled FDG into a left antecubital vein, PET CT was performed from the vertex of the skull through the feet with an integrated full ring PET CT scanner with image fusion. The patient's serum glucose at the time of the exam was 88 mg/dL. COMPARISON: Multiple prior exams including chest CT of 01/05/2017. FINDINGS: There is no abnormal focal FDG hypermetabolism in the head, neck, chest, abdomen, pelvis, or the skeletal system to suggest active lymphoproliferative disease.  There is osseous FDG hypermetabolism associated with cervical and lumbar degenerative facet arthropathy. CT images of the brain show scattered areas of nonspecific white matter hypoattenuation, with no intra-axial mass or abnormal extra-axial fluid.  There is generalized prominence of the cortical sulci and ventricles.  No suspicious sclerotic or lytic osseous abnormalities of the calvarium. CT images of the chest show no new suspicious pulmonary nodules or masses, with stable 5 mm oval noncalcified nodule in the left lower lobe  image 153, with stable oval 5 mm nodular opacity in the right lower lobe along the major fissure image 128.  There is stable apical fibronodular scarring, with no pleural or pericardial effusion.  No new suspicious pulmonary nodules or masses.  There are aortic and coronary arterial vascular calcifications. CT images of the abdomen and pelvis show cholecystectomy clips, few splenic granulomatous calcifications, nonspecific peripancreatic calcifications, hypoattenuating bilateral renal lesions suggestive of cysts, and scattered aortoiliac vascular calcifications, with no ascites. CT images of the lower extremities show no enlarged lymph nodes or soft tissue masses, with diffuse arterial vascular calcifications.  There is advanced arthropathy of the left 1st metatarsophalangeal joint.  There is intervertebral disc space narrowing and facet arthropathy in the spine, with bilateral glenohumeral arthropathic changes.  No suspicious sclerotic or lytic osseous abnormalities by CT.     1. No evidence of active lymphoproliferative disease. 2. Stable subcentimeter noncalcified pulmonary nodules dating back to 01/05/2017, compatible with benign etiology. 3. Additional observations as above. Electronically signed by: Balta Ta MD Date:    08/24/2020 Time:    13:22      I have reviewed all available lab results and radiology reports.    Assessment/Plan:   (1) 88 y.o. 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   - excisional biopsy was performed on 7/28/2020  - CD 20 +l BCL-6 stain is positive; CD30 was negative  - dicussed pathology and went over the latest NCCN guidelines (version 2.2020)  - refer to rad/onc and schedule PET    9/8/2020:  - He saw Dr Lieberman with rad/onc and they have started him on XRT with 22 total. He is in his  2nd week of XRT.   - He had PEt scan on 8/24/2020 with no evidence of a systemic lymphoma process.     10/12/2020:  - He previously saw Dr Lieberman with rad/onc and they completed XRT with 22 total on 9/29/2020.    - He had PEt scan on 8/24/2020 with no evidence of a systemic lymphoma process.   - he will need f/u with derm and rad/onc   - discussed consideration for [facility] evaluation at some point in future if ever needed but he wants to hold off on that right now    3/1/2021:  - He saw saw Dr oLpez recently on Jan 18th 2021 with dermatology and had clear report.  - He had recent PET on 2/19/2021 which was negative for any recurrent lymphoma  - He went to ER in jan 2021 with bout of vertigo.    9/30/2021:  - recent evaluation with derm which was good  - recent PET, CT neck and US axilla with borderline LN in axilla; however, patient had covid vaccination day before he had PET which could be the etiology  - CT Neck was negative  - US really did not elucidate any LN to biopsy  - discussed with patient and his wife and the are in agreement with just getting repeat PET in FEb 2022    3/29/2022:  - he saw Dr lopez recently with derm  - recent PEt on 2/14/2022 negative    9/6/2022:  - he saw Dr Lopez recently in July 2022 with clear report  - he had recent negative PET on 8/22/2022    3/6/2023:  - recent biopsies with Dr Lopez which were negative for cancer  - latest PEt on 2/23/2023 negative    9/6/2023:  - he saw Dr Lopez with derm on 8/18 with just a coupe of spots requiring burning  - look to repeat PEt in Feb 2024    3/6/2024:  - he had repeat biopsy with Dr Lopez on 2/20/2024 - pathology showing dermatitis  - he sees Dr Lopez again tomorrow  - labs are adequate  - PEt scan in Oct 2023 with no evidence of recurrent lymphoma  - refer to Dr Flowers with Allergy/Immunology     (2) Hypercholesterolemia     (3) Hx/of TIA in 1999     (4) BPH     (5) KATHY     (6) Pulmonary nodules - no tobacco history; monitored via  "CT scans with last one in Jan 2017     (7) Hx/of colon polyps     (8) Rosacea     (9) Arthritis and DJD - knee    (10) Mild anemia     9/6/2023:  - hgb a little lower this time around  - he is seeing Dr Esteban next week for evaluation for colonoscopy  - check up to date iron panel and b12/folate    3/6/2024:  - he had scope with Dr Esteban in Dec 2024    VISIT DIAGNOSES:      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 Proceed with f/u with derm tomorrow  2. Rescan with PEt again in Apr 2024  3. Refer to Allergy/immunology; consider referral to Dr West with dermatology at [facility] if need be in future  4. Check up to date labs, incl iron panel and b12/folate every 3-6 months  5. F/u  PCP, card and GI, etc       RTC in 3 months  Fax note to  Nory Lopez; Niecy/Luisana Varela;  Eulalia Floyd    Discussion:     Pathology Discussion:     I reviewed and discussed the pathology report(s) and radiograph reports (if available) in as simple to understand and/or laymen's terms to the best of my ability. I had an indepth conversation with the patient and went over the patient's individual diagnosis based on the information that was currently available. I discussed the TNM staging process with regard to the patient's particular cancer type, and the calculated stage based on the currently available TNM data and literature. I discussed the available prognostic data with regard to the current staging information and how it relates to the prognosis of their particular neoplastic process.          NCCN Guidelines:     I discussed the available treatment option(s) in accordance with the latest literature from the NCCN Clinical Practice Guidelines for the patient's particular type of cancer disorder. The NCCN Guidelines provide a "document evidence-based (and) consensus-driven management" of the care of oncology patients. The treatment recommendations were made not only in accordance to the " NCCN guidelines, but also factored in to account the patient's overall age, condition, performance status and their medical co-morbidities. I went over the risks and benefits of the the treatment options (if any could be made) with regard to their particular cancer type, their cancer stage, their age, and their co-morbidities.      COVID-19 Discussion:     I had long discussion with patient and any applicable family about the COVID-19 coronavirus epidemic and the recommended precautions with regard to cancer and/or hematology patients. I have re-iterated the CDC recommendations for adequate hand washing, use of hand -like products, and coughing into elbow, etc. In addition, especially for our patients who are on chemotherapy and/or our otherwise immunocompromised patients, I have recommended avoidance of crowds, including movie theaters, restaurants, churches, etc. I have recommended avoidance of any sick or symptomatic family members and/or friends. I have also recommended avoidance of any raw and unwashed food products, and general avoidance of food items that have not been prepared by themselves. The patient has been asked to call us immediately with any symptom developments, issues, questions or other general concerns.         I spent over 25 mins of time with the patient. Reviewed results of the recently ordered labs, tests and studies; made directives with regards to the results. Over half of this time was spent couseling and coordinating care.    I have explained all of the above in detail and the patient understands all of the current recommendation(s). I have answered all of their questions to the best of my ability and to their complete satisfaction.   The patient is to continue with the current management plan.            Electronically signed by Mehdi Alvarez MD                    Answers submitted by the patient for this visit:  Review of Systems Questionnaire (Submitted on  2/29/2024)  appetite change : No  unexpected weight change: No  mouth sores: No  visual disturbance: No  cough: No  shortness of breath: No  chest pain: No  abdominal pain: No  diarrhea: No  frequency: No  back pain: No  rash: Yes  headaches: No  adenopathy: No  nervous/ anxious: No

## 2024-03-06 ENCOUNTER — OFFICE VISIT (OUTPATIENT)
Dept: HEMATOLOGY/ONCOLOGY | Facility: CLINIC | Age: 89
End: 2024-03-06
Payer: MEDICARE

## 2024-03-06 VITALS
BODY MASS INDEX: 26 KG/M2 | RESPIRATION RATE: 16 BRPM | HEIGHT: 70 IN | SYSTOLIC BLOOD PRESSURE: 143 MMHG | WEIGHT: 181.63 LBS | HEART RATE: 63 BPM | DIASTOLIC BLOOD PRESSURE: 76 MMHG | TEMPERATURE: 97 F

## 2024-03-06 DIAGNOSIS — C82.61 CUTANEOUS FOLLICLE CENTER LYMPHOMA INVOLVING LYMPH NODES OF HEAD: Primary | ICD-10-CM

## 2024-03-06 DIAGNOSIS — E53.8 DEFICIENCY OF OTHER SPECIFIED B GROUP VITAMINS: ICD-10-CM

## 2024-03-06 PROCEDURE — 99214 OFFICE O/P EST MOD 30 MIN: CPT | Mod: S$GLB,,, | Performed by: INTERNAL MEDICINE

## 2024-03-07 ENCOUNTER — LAB VISIT (OUTPATIENT)
Dept: LAB | Facility: HOSPITAL | Age: 89
End: 2024-03-07
Attending: INTERNAL MEDICINE
Payer: MEDICARE

## 2024-03-07 DIAGNOSIS — C82.61 CUTANEOUS FOLLICLE CENTER LYMPHOMA INVOLVING LYMPH NODES OF HEAD: ICD-10-CM

## 2024-03-07 DIAGNOSIS — E53.8 DEFICIENCY OF OTHER SPECIFIED B GROUP VITAMINS: ICD-10-CM

## 2024-03-07 LAB
ALBUMIN SERPL BCP-MCNC: 3.7 G/DL (ref 3.5–5.2)
ALP SERPL-CCNC: 86 U/L (ref 55–135)
ALT SERPL W/O P-5'-P-CCNC: 16 U/L (ref 10–44)
ANION GAP SERPL CALC-SCNC: 11 MMOL/L (ref 8–16)
AST SERPL-CCNC: 24 U/L (ref 10–40)
BASOPHILS # BLD AUTO: 0.03 K/UL (ref 0–0.2)
BASOPHILS NFR BLD: 0.7 % (ref 0–1.9)
BILIRUB SERPL-MCNC: 0.6 MG/DL (ref 0.1–1)
BUN SERPL-MCNC: 15 MG/DL (ref 8–23)
CALCIUM SERPL-MCNC: 10.3 MG/DL (ref 8.7–10.5)
CHLORIDE SERPL-SCNC: 102 MMOL/L (ref 95–110)
CO2 SERPL-SCNC: 22 MMOL/L (ref 23–29)
CREAT SERPL-MCNC: 0.9 MG/DL (ref 0.5–1.4)
DIFFERENTIAL METHOD BLD: ABNORMAL
EOSINOPHIL # BLD AUTO: 0.1 K/UL (ref 0–0.5)
EOSINOPHIL NFR BLD: 1.5 % (ref 0–8)
ERYTHROCYTE [DISTWIDTH] IN BLOOD BY AUTOMATED COUNT: 13.2 % (ref 11.5–14.5)
EST. GFR  (NO RACE VARIABLE): >60 ML/MIN/1.73 M^2
FERRITIN SERPL-MCNC: 357 NG/ML (ref 20–300)
FOLATE SERPL-MCNC: 14 NG/ML (ref 4–24)
GLUCOSE SERPL-MCNC: 90 MG/DL (ref 70–110)
HCT VFR BLD AUTO: 37.7 % (ref 40–54)
HGB BLD-MCNC: 12.9 G/DL (ref 14–18)
IMM GRANULOCYTES # BLD AUTO: 0.01 K/UL (ref 0–0.04)
IMM GRANULOCYTES NFR BLD AUTO: 0.2 % (ref 0–0.5)
IRON SERPL-MCNC: 128 UG/DL (ref 45–160)
LYMPHOCYTES # BLD AUTO: 1.4 K/UL (ref 1–4.8)
LYMPHOCYTES NFR BLD: 33.8 % (ref 18–48)
MCH RBC QN AUTO: 34.1 PG (ref 27–31)
MCHC RBC AUTO-ENTMCNC: 34.2 G/DL (ref 32–36)
MCV RBC AUTO: 100 FL (ref 82–98)
MONOCYTES # BLD AUTO: 0.5 K/UL (ref 0.3–1)
MONOCYTES NFR BLD: 13 % (ref 4–15)
NEUTROPHILS # BLD AUTO: 2.1 K/UL (ref 1.8–7.7)
NEUTROPHILS NFR BLD: 50.8 % (ref 38–73)
NRBC BLD-RTO: 0 /100 WBC
PLATELET # BLD AUTO: 142 K/UL (ref 150–450)
PMV BLD AUTO: 11.7 FL (ref 9.2–12.9)
POTASSIUM SERPL-SCNC: 4.3 MMOL/L (ref 3.5–5.1)
PROT SERPL-MCNC: 6.7 G/DL (ref 6–8.4)
RBC # BLD AUTO: 3.78 M/UL (ref 4.6–6.2)
SATURATED IRON: 40 % (ref 20–50)
SODIUM SERPL-SCNC: 135 MMOL/L (ref 136–145)
TOTAL IRON BINDING CAPACITY: 317 UG/DL (ref 250–450)
TRANSFERRIN SERPL-MCNC: 214 MG/DL (ref 200–375)
VIT B12 SERPL-MCNC: 1278 PG/ML (ref 210–950)
WBC # BLD AUTO: 4.08 K/UL (ref 3.9–12.7)

## 2024-03-07 PROCEDURE — 80053 COMPREHEN METABOLIC PANEL: CPT | Performed by: INTERNAL MEDICINE

## 2024-03-07 PROCEDURE — 36415 COLL VENOUS BLD VENIPUNCTURE: CPT | Mod: PO | Performed by: INTERNAL MEDICINE

## 2024-03-07 PROCEDURE — 82746 ASSAY OF FOLIC ACID SERUM: CPT | Performed by: INTERNAL MEDICINE

## 2024-03-07 PROCEDURE — 83540 ASSAY OF IRON: CPT | Performed by: INTERNAL MEDICINE

## 2024-03-07 PROCEDURE — 82728 ASSAY OF FERRITIN: CPT | Performed by: INTERNAL MEDICINE

## 2024-03-07 PROCEDURE — 82607 VITAMIN B-12: CPT | Performed by: INTERNAL MEDICINE

## 2024-03-07 PROCEDURE — 85025 COMPLETE CBC W/AUTO DIFF WBC: CPT | Performed by: INTERNAL MEDICINE

## 2024-03-11 ENCOUNTER — PATIENT MESSAGE (OUTPATIENT)
Dept: HEMATOLOGY/ONCOLOGY | Facility: CLINIC | Age: 89
End: 2024-03-11

## 2024-03-11 ENCOUNTER — TELEPHONE (OUTPATIENT)
Dept: CARDIOLOGY | Facility: CLINIC | Age: 89
End: 2024-03-11
Payer: MEDICARE

## 2024-03-11 NOTE — TELEPHONE ENCOUNTER
----- Message from Giselle Cisneros sent at 3/11/2024  9:56 AM CDT -----  Type: Needs Medical Advice  Who Called:  Yesy from Dr. Bianka Flowers office  Symptoms (please be specific):  said she need to speak to the nurse about pt--please call and advise  Best Call Back Number: [phone]  Additional Information: thank you

## 2024-03-11 NOTE — TELEPHONE ENCOUNTER
Spoke to Yesy with Dr Zuleta office. Mr. Kellogg was there for a body rash which was started after adding Lisinopril in November.  Mr Kellogg was also asked to keep a blood pressure log and send it in to our clinic

## 2024-03-19 ENCOUNTER — PATIENT MESSAGE (OUTPATIENT)
Dept: CARDIOLOGY | Facility: CLINIC | Age: 89
End: 2024-03-19
Payer: MEDICARE

## 2024-03-21 ENCOUNTER — PATIENT MESSAGE (OUTPATIENT)
Dept: CARDIOLOGY | Facility: CLINIC | Age: 89
End: 2024-03-21
Payer: MEDICARE

## 2024-03-28 ENCOUNTER — PATIENT MESSAGE (OUTPATIENT)
Dept: CARDIOLOGY | Facility: CLINIC | Age: 89
End: 2024-03-28
Payer: MEDICARE

## 2024-03-31 ENCOUNTER — EXTERNAL CHRONIC CARE MANAGEMENT (OUTPATIENT)
Dept: PRIMARY CARE CLINIC | Facility: CLINIC | Age: 89
End: 2024-03-31
Payer: MEDICARE

## 2024-03-31 PROCEDURE — 99490 CHRNC CARE MGMT STAFF 1ST 20: CPT | Mod: PBBFAC | Performed by: INTERNAL MEDICINE

## 2024-03-31 PROCEDURE — 99490 CHRNC CARE MGMT STAFF 1ST 20: CPT | Mod: S$PBB,,, | Performed by: INTERNAL MEDICINE

## 2024-04-15 ENCOUNTER — HOSPITAL ENCOUNTER (OUTPATIENT)
Dept: RADIOLOGY | Facility: HOSPITAL | Age: 89
Discharge: HOME | End: 2024-04-15
Attending: INTERNAL MEDICINE
Payer: MEDICARE

## 2024-04-15 VITALS — BODY MASS INDEX: 25.05 KG/M2 | WEIGHT: 175 LBS | HEIGHT: 70 IN

## 2024-04-15 DIAGNOSIS — C82.61 CUTANEOUS FOLLICLE CENTER LYMPHOMA INVOLVING LYMPH NODES OF HEAD: ICD-10-CM

## 2024-04-15 LAB — GLUCOSE SERPL-MCNC: 103 MG/DL (ref 70–110)

## 2024-04-15 PROCEDURE — 78816 PET IMAGE W/CT FULL BODY: CPT | Mod: TC,PO

## 2024-04-15 PROCEDURE — 78816 PET IMAGE W/CT FULL BODY: CPT | Mod: 26,PS,, | Performed by: RADIOLOGY

## 2024-04-15 PROCEDURE — A9552 F18 FDG: HCPCS | Mod: PO | Performed by: INTERNAL MEDICINE

## 2024-04-15 RX ORDER — FLUDEOXYGLUCOSE F18 500 MCI/ML
12.8 INJECTION INTRAVENOUS
Status: COMPLETED | OUTPATIENT
Start: 2024-04-15 | End: 2024-04-15

## 2024-04-15 RX ADMIN — FLUDEOXYGLUCOSE F-18 12.8 MILLICURIE: 500 INJECTION INTRAVENOUS at 07:04

## 2024-05-16 ENCOUNTER — TELEPHONE (OUTPATIENT)
Facility: CLINIC | Age: 89
End: 2024-05-16
Payer: MEDICARE

## 2024-05-16 NOTE — TELEPHONE ENCOUNTER
----- Message from Carole Roa NP sent at 5/7/2024  2:16 PM CDT -----  Regarding: RE: PET SCAN RESULTS  No cancer seen   Keep appt next month  ----- Message -----  From: Deysi Arroyo, RN  Sent: 5/6/2024  11:29 AM CDT  To: Carole Roa NP  Subject: FW: PET SCAN RESULTS                             Can you please review PET results and let me know of anything I can tell him concerning results  ----- Message -----  From: Isa Carpio  Sent: 5/6/2024   9:04 AM CDT  To: Deysi Arroyo RN  Subject: PET SCAN RESULTS                                 Pt came into office today, thinking he had an appointment to go over PET scan results. The PET scan was done 3 weeks ago and patient is scheduled next month. Please advise if pt needs to come in sooner after Dr FU reviews the results. Thank you.

## 2024-05-28 DIAGNOSIS — Z00.00 ENCOUNTER FOR MEDICARE ANNUAL WELLNESS EXAM: ICD-10-CM

## 2024-06-04 NOTE — PROGRESS NOTES
[facility] Hematology/Oncology  PROGRESS NOTE -   Follow-up Visit      Subjective:       Patient ID:   NAME: Bryson Kellogg : 1935     88 y.o. male    Referring Doc: Osiris Lopez  Other Physicians: Kirk Cueto ([facility]-PCP); Niecy ([facility]); Luisana    Chief Complaint:  cutaneous follicular lymphoma f/u    History of Present Illness:     Patient returns today for a regularly scheduled follow-up visit.  The patient is here today to go over the results of the recently ordered labs, tests and studies. He is here with his wife.     He had been having some form of atypical dermatitis with itching which has since resolved. He saw Dr Flowers with allergy/immunology and she found that it was the lisinopril which has since been discontinued.      He saw Dr Lakeshia Lopez with dermatology on 3/7/2024    Otherwise, no new issues. Breathing ok. No CP, SOB, HA's or N/V.     He saw Dr Cueto, his PCP, last  on 2024       He previously was followed Dr Lieberman with rad/onc and they completed XRT with 22 total on 2020.      He saw Dr Esteban with GI and had scope on 2023 with several benign polyps    He last saw Dr MIRANDA Floyd in 2023 with no new issues    He last saw Nelli Smith with  in 2023; CT renal was done on 2023    He had recent PEt scan on 4/15/2024 with no evidence of cancer          Discussed covid19 precautions. He had his vaccinations            ROS:   GEN: normal without any fever, night sweats or weight loss  HEENT: normal with no HA's, sore throat, stiff neck, changes in vision  CV: normal with no CP, SOB, PND, LEMOS or orthopnea  PULM: normal with no SOB, cough, hemoptysis, sputum or pleuritic pain  GI: normal with no abdominal pain, nausea, vomiting, constipation, diarrhea, melanotic stools, BRBPR, or hematemesis  : normal with no hematuria, dysuria  BREAST: normal with no mass, discharge, pain  SKIN: normal with no rash, erythema, bruising, or swelling    Pain Scale:   0    Allergies:  Review of patient's allergies indicates:   Allergen Reactions    Lisinopril Rash    Sulfa (sulfonamide antibiotics) Rash     Childhood allergy       Medications:    Current Outpatient Medications:     ammonium lactate 12 % Crea, Aaa qd after shower prn dry skin, Disp: 385 g, Rfl: 11    ascorbic acid, vitamin C, (VITAMIN C) 500 MG tablet, Take 500 mg by mouth once daily., Disp: , Rfl:     aspirin (ECOTRIN) 81 MG EC tablet, Take 81 mg by mouth once daily., Disp: , Rfl:     atorvastatin (LIPITOR) 20 MG tablet, Take 1 tablet (20 mg total) by mouth once daily., Disp: 90 tablet, Rfl: 3    cholecalciferol, vitamin D3, (VITAMIN D3) 125 mcg (5,000 unit) Tab, Take 5,000 Units by mouth once daily., Disp: , Rfl:     clotrimazole-betamethasone 1-0.05% (LOTRISONE) cream, Aaa bid x 1-2 wks prn rash.  Tk 1 wk off before repeating, Disp: 45 g, Rfl: 6    coenzyme Q10 200 mg capsule, Take 200 mg by mouth once daily., Disp: , Rfl:     cyanocobalamin, vitamin B-12, 1,000 mcg/15 mL Liqd, Take 15 mLs by mouth once daily., Disp: , Rfl:     doxycycline (VIBRAMYCIN) 100 MG Cap, 1 po bid with food prn breakout, Disp: 14 capsule, Rfl: 6    fluticasone propionate (FLONASE) 50 mcg/actuation nasal spray, 1 spray by Each Nostril route once daily., Disp: , Rfl:     metronidazole 1% (METROGEL) 1 % Gel, , Disp: , Rfl:     omega-3/dha/epa/ala/vitamin D3 (OMEGA-3-DHA-EPA-ALA-VIT D3) 50 mg (25 mg- 25 mg)-100 unit Chew, Take 1 capsule by mouth once daily., Disp: , Rfl:     pyridoxine, vitamin B6, (B-6) 100 MG Tab, Take 50 mg by mouth once daily., Disp: , Rfl:     triamcinolone acetonide 0.1% (KENALOG) 0.1 % cream, Aaa bid, Disp: 454 g, Rfl: 1    vit A/vit C/vit E/zinc/copper (PRESERVISION AREDS ORAL), Take 1 tablet by mouth 2 (two) times daily., Disp: , Rfl:     butalbital-acetaminophen-caffeine -40 mg (FIORICET, ESGIC) -40 mg per tablet, Take 1 tablet by mouth every 6 (six) hours as needed for Pain or Headaches. (Patient not  "taking: Reported on 10/24/2023), Disp: 20 tablet, Rfl: 0    ketoconazole (NIZORAL) 2 % cream, Aaa qd-bid prn heat rash, ok for continuous use (Patient not taking: Reported on 3/6/2024), Disp: 60 g, Rfl: 11    PMHx/PSHx Updates:  See patient's last visit with me on 3/6/2024  See H&P on 8/13/2020        Pathology:   Cancer Staging   No matching staging information was found for the patient.      Skin biopsy 2/20/2024:    RIGHT UPPER ARM:   - INTERFACE AND PERIVASCULAR DERMATITIS WITH PROMINENT DYSKERATOSIS.   - SEE NOTE.     NOTE:  These findings could be seen in subacute lupus.  Changes within the spectrum of erythema multiforme were also considered histologically.       Colonoscopy: 12/5/2023:    1. ASCENDING COLON POLYPS BIOPSIES:   - TUBULAR ADENOMAS.   - NEGATIVE FOR HIGH GRADE DYSPLASIA OR MALIGNANCY.     2. TRANSVERSE COLON POLYPS BIOPSIES:   - TUBULAR ADENOMAS.   - NEGATIVE FOR HIGH GRADE DYSPLASIA OR MALIGNANCY.       Skin biopsy: 2/27/2023:    DIAGNOSIS:   03/01/2023 WPL/jr     1.  RIGHT ADJACENT TO LATERAL CANTHUS, SHAVE:   - ACTINIC KERATOSIS.   - NODULAR SOLAR ELASTOSIS.     2.  LEFT CHEST, PUNCH:   - EPIDERMAL CYST, INFUNDIBULAR TYPE.     3.  LEFT MIDDLE BACK, PUNCH:   - EPIDERMAL CYST, INFUNDIBULAR TYPE.      Objective:     Vitals:  Blood pressure (!) 155/86, pulse (!) 57, temperature 97.8 °F (36.6 °C), resp. rate 18, height 5' 10" (1.778 m), weight 83.6 kg (184 lb 3.2 oz).    Physical Examination:   GEN: no apparent distress, comfortable; AAOx3  HEAD: atraumatic and normocephalic; healed scar on left anterior auricular region  EYES: no pallor, no icterus, PERRLA  ENT: OMM, no pharyngeal erythema, external ears WNL; no nasal discharge; no thrush  NECK: no masses, thyroid normal, trachea midline, no LAD/LN's, supple  CV: RRR with no murmur; normal pulse; normal S1 and S2; no pedal edema  CHEST: Normal respiratory effort; CTAB; normal breath sounds; no wheeze or crackles  ABDOM: nontender and " nondistended; soft; normal bowel sounds; no rebound/guarding  MUSC/Skeletal: ROM normal; no crepitus; joints normal; no deformities or arthropathy  EXTREM: no clubbing, cyanosis, inflammation or swelling  SKIN: no rashes, lesions, ulcers, petechiae or subcutaneous nodules;no residual rash  : no mcgill  NEURO: grossly intact; motor/sensory WNL; AAOx3; no tremors  PSYCH: normal mood, affect and behavior  LYMPH: normal cervical, supraclavicular, axillary and groin LN's            Labs:   CMP  Sodium   Date Value Ref Range Status   03/07/2024 135 (L) 136 - 145 mmol/L Final     Potassium   Date Value Ref Range Status   03/07/2024 4.3 3.5 - 5.1 mmol/L Final     Chloride   Date Value Ref Range Status   03/07/2024 102 95 - 110 mmol/L Final     CO2   Date Value Ref Range Status   03/07/2024 22 (L) 23 - 29 mmol/L Final     Glucose   Date Value Ref Range Status   03/07/2024 90 70 - 110 mg/dL Final     BUN   Date Value Ref Range Status   03/07/2024 15 8 - 23 mg/dL Final     Creatinine   Date Value Ref Range Status   03/07/2024 0.9 0.5 - 1.4 mg/dL Final   11/23/2012 1.2 0.5 - 1.4 mg/dL Final     Calcium   Date Value Ref Range Status   03/07/2024 10.3 8.7 - 10.5 mg/dL Final   11/23/2012 9.4 8.7 - 10.5 mg/dL Final     Total Protein   Date Value Ref Range Status   03/07/2024 6.7 6.0 - 8.4 g/dL Final     Albumin   Date Value Ref Range Status   03/07/2024 3.7 3.5 - 5.2 g/dL Final     Total Bilirubin   Date Value Ref Range Status   03/07/2024 0.6 0.1 - 1.0 mg/dL Final     Comment:     For infants and newborns, interpretation of results should be based  on gestational age, weight and in agreement with clinical  observations.    Premature Infant recommended reference ranges:  Up to 24 hours.............<8.0 mg/dL  Up to 48 hours............<12.0 mg/dL  3-5 days..................<15.0 mg/dL  6-29 days.................<15.0 mg/dL       Alkaline Phosphatase   Date Value Ref Range Status   03/07/2024 86 55 - 135 U/L Final   11/23/2012 85  55 - 135 U/L Final     AST   Date Value Ref Range Status   03/07/2024 24 10 - 40 U/L Final   11/23/2012 22 10 - 40 U/L Final     ALT   Date Value Ref Range Status   03/07/2024 16 10 - 44 U/L Final     Anion Gap   Date Value Ref Range Status   03/07/2024 11 8 - 16 mmol/L Final   11/23/2012 10 5 - 15 meq/L Final     eGFR if    Date Value Ref Range Status   06/14/2022 >60.0 >60 mL/min/1.73 m^2 Final     eGFR if non    Date Value Ref Range Status   06/14/2022 >60.0 >60 mL/min/1.73 m^2 Final     Comment:     Calculation used to obtain the estimated glomerular filtration  rate (eGFR) is the CKD-EPI equation.            Lab Results   Component Value Date    WBC 4.08 03/07/2024    HGB 12.9 (L) 03/07/2024    HCT 37.7 (L) 03/07/2024     (H) 03/07/2024     (L) 03/07/2024           Radiology/Diagnostic Studies:    PET 4/15/2024:      Impression:     No PET-CT evidence of FDG avid malignancy.     Stable subcentimeter pulmonary nodules.         PET 10/26/2023:  IMPRESSION:  1. No evidence of active FDG avid lymphoproliferative disease.  2. Additional observations as described.      PET 2/23/2023:    IMPRESSION:  1. No evidence of recurrent FDG avid lymphoproliferative disease.  2. Additional observations as described.         PET 8/22/2022:  IMPRESSION:  No evidence of recurrent or metastatic disease        PET 2/14/2022:    IMPRESSION:  No PET/CT evidence of FDG avid disease.        US axilla 9/15/2021:    FINDINGS: Sonographic assessment targeted to the left axilla was performed in planning for possible biopsy. Recently reported lymph node (PET/CT August 24, 2021) is identified, measuring 2.2 x 1.0 cm. Margins are sharp, and fatty hilum is preserved.      CT Soft Neck  9/9/2021:    IMPRESSION: No enlarged cervical chain lymph nodes or cervical soft tissue mass        PET  8/24/2021:    Impression:     1. Nonspecific FDG uptake in left supraclavicular soft tissues, new, without  abnormal CT correlate.  If further imaging evaluation is needed, soft tissue neck CT with IV contrast can be considered.  2. 11 mm soft tissue mass in left axilla which is moderately FDG avid and has surrounding inflammatory fat stranding.  Recurrent lymphoma is a consideration.  Reactive lymph node uptake due to infectious or inflammatory etiology also conceivable.  3. No other evidence of lymphoproliferative disorder.  4. Unchanged pulmonary nodules        PET  2/19/2021:  IMPRESSION: No evidence of active lymphoproliferative disease.          Nm Pet Ct Whole Body    Result Date: 8/24/2020  EXAMINATION: NM PET CT WHOLE BODY CLINICAL HISTORY: Cutaneous follicle center lymphoma, lymph nodes of head, face, and neck, initial staging, lung nodules, C 82.61, R 91.8. TECHNIQUE: Following the injection of 12.8 mCi of F-18 labeled FDG into a left antecubital vein, PET CT was performed from the vertex of the skull through the feet with an integrated full ring PET CT scanner with image fusion. The patient's serum glucose at the time of the exam was 88 mg/dL. COMPARISON: Multiple prior exams including chest CT of 01/05/2017. FINDINGS: There is no abnormal focal FDG hypermetabolism in the head, neck, chest, abdomen, pelvis, or the skeletal system to suggest active lymphoproliferative disease.  There is osseous FDG hypermetabolism associated with cervical and lumbar degenerative facet arthropathy. CT images of the brain show scattered areas of nonspecific white matter hypoattenuation, with no intra-axial mass or abnormal extra-axial fluid.  There is generalized prominence of the cortical sulci and ventricles.  No suspicious sclerotic or lytic osseous abnormalities of the calvarium. CT images of the chest show no new suspicious pulmonary nodules or masses, with stable 5 mm oval noncalcified nodule in the left lower lobe image 153, with stable oval 5 mm nodular opacity in the right lower lobe along the major fissure image 128.   There is stable apical fibronodular scarring, with no pleural or pericardial effusion.  No new suspicious pulmonary nodules or masses.  There are aortic and coronary arterial vascular calcifications. CT images of the abdomen and pelvis show cholecystectomy clips, few splenic granulomatous calcifications, nonspecific peripancreatic calcifications, hypoattenuating bilateral renal lesions suggestive of cysts, and scattered aortoiliac vascular calcifications, with no ascites. CT images of the lower extremities show no enlarged lymph nodes or soft tissue masses, with diffuse arterial vascular calcifications.  There is advanced arthropathy of the left 1st metatarsophalangeal joint.  There is intervertebral disc space narrowing and facet arthropathy in the spine, with bilateral glenohumeral arthropathic changes.  No suspicious sclerotic or lytic osseous abnormalities by CT.     1. No evidence of active lymphoproliferative disease. 2. Stable subcentimeter noncalcified pulmonary nodules dating back to 01/05/2017, compatible with benign etiology. 3. Additional observations as above. Electronically signed by: Balta Ta MD Date:    08/24/2020 Time:    13:22      I have reviewed all available lab results and radiology reports.    Assessment/Plan:   (1) 88 y.o. 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   - excisional biopsy was performed on 7/28/2020  - CD 20 +l BCL-6 stain is positive; CD30 was negative  - dicussed pathology and went over the latest NCCN guidelines (version 2.2020)  - refer to rad/onc and schedule PET    9/8/2020:  - He saw Dr Lieberman with rad/onc and they have started him on XRT with 22 total. He is in his 2nd week of XRT.   - He had PEt scan on 8/24/2020 with no evidence of a systemic lymphoma process.      10/12/2020:  - He previously saw Dr Lieberman with rad/onc and they completed XRT with 22 total on 9/29/2020.    - He had PEt scan on 8/24/2020 with no evidence of a systemic lymphoma process.   - he will need f/u with derm and rad/onc   - discussed consideration for [facility] evaluation at some point in future if ever needed but he wants to hold off on that right now    3/1/2021:  - He saw saw Dr Lopez recently on Jan 18th 2021 with dermatology and had clear report.  - He had recent PET on 2/19/2021 which was negative for any recurrent lymphoma  - He went to ER in jan 2021 with bout of vertigo.    9/30/2021:  - recent evaluation with derm which was good  - recent PET, CT neck and US axilla with borderline LN in axilla; however, patient had covid vaccination day before he had PET which could be the etiology  - CT Neck was negative  - US really did not elucidate any LN to biopsy  - discussed with patient and his wife and the are in agreement with just getting repeat PET in FEb 2022    3/29/2022:  - he saw Dr lopez recently with derm  - recent PEt on 2/14/2022 negative    9/6/2022:  - he saw Dr Lopez recently in July 2022 with clear report  - he had recent negative PET on 8/22/2022    3/6/2023:  - recent biopsies with Dr Lopez which were negative for cancer  - latest PEt on 2/23/2023 negative    9/6/2023:  - he saw Dr Lopez with derm on 8/18 with just a coupe of spots requiring burning  - look to repeat PEt in Feb 2024    3/6/2024:  - he had repeat biopsy with Dr Lopez on 2/20/2024 - pathology showing dermatitis  - he sees Dr Lopez again tomorrow  - labs are adequate  - PEt scan in Oct 2023 with no evidence of recurrent lymphoma  - refer to Dr Flowers with Allergy/Immunology    6/6/2024:  - He had been having some form of atypical dermatitis with itching which has since resolved.   - He saw Dr Flowers with allergy/immunology and she found that it was the lisinopril which has since been discontinued.   -  He saw  "Dr Lakeshia Lopez with dermatology on 3/7/2024  - labs from Apr 2024 adequate  - recent PET in Apr 2024 stable     (2) Hypercholesterolemia     (3) Hx/of TIA in 1999     (4) BPH     (5) KATHY     (6) Pulmonary nodules - no tobacco history; monitored via CT scans with last one in Jan 2017     (7) Hx/of colon polyps     (8) Rosacea     (9) Arthritis and DJD - knee    (10) Mild anemia     9/6/2023:  - hgb a little lower this time around  - he is seeing Dr Esteban next week for evaluation for colonoscopy  - check up to date iron panel and b12/folate    3/6/2024:  - he had scope with Dr Esteban in Dec 2024    VISIT DIAGNOSES:      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  Check up to date labs, incl iron panel and b12/folate every 3-6 months  3. F/u  PCP, card and GI, etc       RTC in 6 months  Fax note to  Nory Lopez; Niecy/Luisana Varela;  Eulalia Floyd    Discussion:     Pathology Discussion:     I reviewed and discussed the pathology report(s) and radiograph reports (if available) in as simple to understand and/or laymen's terms to the best of my ability. I had an indepth conversation with the patient and went over the patient's individual diagnosis based on the information that was currently available. I discussed the TNM staging process with regard to the patient's particular cancer type, and the calculated stage based on the currently available TNM data and literature. I discussed the available prognostic data with regard to the current staging information and how it relates to the prognosis of their particular neoplastic process.          NCCN Guidelines:     I discussed the available treatment option(s) in accordance with the latest literature from the NCCN Clinical Practice Guidelines for the patient's particular type of cancer disorder. The NCCN Guidelines provide a "document evidence-based (and) consensus-driven management" of " the care of oncology patients. The treatment recommendations were made not only in accordance to the NCCN guidelines, but also factored in to account the patient's overall age, condition, performance status and their medical co-morbidities. I went over the risks and benefits of the the treatment options (if any could be made) with regard to their particular cancer type, their cancer stage, their age, and their co-morbidities.      COVID-19 Discussion:     I had long discussion with patient and any applicable family about the COVID-19 coronavirus epidemic and the recommended precautions with regard to cancer and/or hematology patients. I have re-iterated the CDC recommendations for adequate hand washing, use of hand -like products, and coughing into elbow, etc. In addition, especially for our patients who are on chemotherapy and/or our otherwise immunocompromised patients, I have recommended avoidance of crowds, including movie theaters, restaurants, churches, etc. I have recommended avoidance of any sick or symptomatic family members and/or friends. I have also recommended avoidance of any raw and unwashed food products, and general avoidance of food items that have not been prepared by themselves. The patient has been asked to call us immediately with any symptom developments, issues, questions or other general concerns.         I spent over 25 mins of time with the patient. Reviewed results of the recently ordered labs, tests and studies; made directives with regards to the results. Over half of this time was spent couseling and coordinating care.    I have explained all of the above in detail and the patient understands all of the current recommendation(s). I have answered all of their questions to the best of my ability and to their complete satisfaction.   The patient is to continue with the current management plan.            Electronically signed by Mehdi Alvarez MD                          Answers submitted by the patient for this visit:  Review of Systems Questionnaire (Submitted on 6/3/2024)  appetite change : No  unexpected weight change: No  mouth sores: No  visual disturbance: No  cough: No  shortness of breath: No  chest pain: No  abdominal pain: No  diarrhea: No  frequency: No  back pain: No  rash: No  headaches: No  adenopathy: No  nervous/ anxious: No

## 2024-06-06 ENCOUNTER — OFFICE VISIT (OUTPATIENT)
Facility: CLINIC | Age: 89
End: 2024-06-06
Payer: MEDICARE

## 2024-06-06 VITALS
TEMPERATURE: 98 F | WEIGHT: 184.19 LBS | SYSTOLIC BLOOD PRESSURE: 155 MMHG | HEART RATE: 57 BPM | HEIGHT: 70 IN | BODY MASS INDEX: 26.37 KG/M2 | RESPIRATION RATE: 18 BRPM | DIASTOLIC BLOOD PRESSURE: 86 MMHG

## 2024-06-06 DIAGNOSIS — D53.9 NUTRITIONAL ANEMIA, UNSPECIFIED: ICD-10-CM

## 2024-06-06 DIAGNOSIS — C82.61 CUTANEOUS FOLLICLE CENTER LYMPHOMA INVOLVING LYMPH NODES OF HEAD: Primary | ICD-10-CM

## 2024-06-06 PROCEDURE — 99999 PR PBB SHADOW E&M-EST. PATIENT-LVL IV: CPT | Mod: PBBFAC,,, | Performed by: INTERNAL MEDICINE

## 2024-06-06 PROCEDURE — 99214 OFFICE O/P EST MOD 30 MIN: CPT | Mod: PBBFAC,PN | Performed by: INTERNAL MEDICINE

## 2024-06-06 PROCEDURE — 99215 OFFICE O/P EST HI 40 MIN: CPT | Mod: S$PBB,,, | Performed by: INTERNAL MEDICINE

## 2024-06-06 PROCEDURE — G2211 COMPLEX E/M VISIT ADD ON: HCPCS | Mod: S$PBB,,, | Performed by: INTERNAL MEDICINE

## 2024-06-10 ENCOUNTER — PATIENT MESSAGE (OUTPATIENT)
Dept: INTERNAL MEDICINE | Facility: CLINIC | Age: 89
End: 2024-06-10
Payer: MEDICARE

## 2024-07-16 ENCOUNTER — OFFICE VISIT (OUTPATIENT)
Dept: CARDIOLOGY | Facility: CLINIC | Age: 89
End: 2024-07-16
Payer: MEDICARE

## 2024-07-16 VITALS
RESPIRATION RATE: 16 BRPM | HEART RATE: 54 BPM | OXYGEN SATURATION: 98 % | BODY MASS INDEX: 26.05 KG/M2 | HEIGHT: 70 IN | WEIGHT: 182 LBS

## 2024-07-16 DIAGNOSIS — G47.30 SLEEP APNEA, UNSPECIFIED TYPE: ICD-10-CM

## 2024-07-16 DIAGNOSIS — I70.0 CALCIFICATION OF AORTA: ICD-10-CM

## 2024-07-16 DIAGNOSIS — I95.1 ORTHOSTATIC HYPOTENSION: ICD-10-CM

## 2024-07-16 DIAGNOSIS — R94.31 ABNORMAL ELECTROCARDIOGRAM (ECG) (EKG): ICD-10-CM

## 2024-07-16 DIAGNOSIS — I10 PRIMARY HYPERTENSION: Primary | ICD-10-CM

## 2024-07-16 DIAGNOSIS — E78.2 MIXED HYPERLIPIDEMIA: ICD-10-CM

## 2024-07-16 PROCEDURE — 99214 OFFICE O/P EST MOD 30 MIN: CPT | Mod: PBBFAC,PN | Performed by: INTERNAL MEDICINE

## 2024-07-16 PROCEDURE — 99999 PR PBB SHADOW E&M-EST. PATIENT-LVL IV: CPT | Mod: PBBFAC,,, | Performed by: INTERNAL MEDICINE

## 2024-07-16 PROCEDURE — 99214 OFFICE O/P EST MOD 30 MIN: CPT | Mod: S$PBB,,, | Performed by: INTERNAL MEDICINE

## 2024-07-16 NOTE — PROGRESS NOTES
Subjective:    Patient ID:  Bryson Kellogg is a 89 y.o. male     Chief Complaint   Patient presents with    Hyperlipidemia    Hypertension       HPI:  Mr Bryson Kellogg is a 89 y.o. male is here for follow-up.    Patient has been doing well no specific complaints at the present time.  His breathing has been good denies any shortness a breath or difficulty in breathing denies any chest pain or tightness or heaviness denies any dizziness or lightheadedness or loss of consciousness.    Patient has started stopped taking all the additional over-the-counter medication and he is currently on atorvastatin vitamin-C multivitamin including in the PreserVision.      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    History of B-cell lymphoma     Hx of colonic polyp 11/03/2016    Hyperlipidemia     Personal history of colonic polyps     Colon polyps    Renal stone 11/23/2012    Rosacea     Shingles     Sleep apnea     Squamous cell carcinoma of skin     TIA (transient ischemic attack)      Past Surgical History:   Procedure Laterality Date    BACK SURGERY  05/2020    Lum/Kyle    CHOLECYSTECTOMY      COLONOSCOPY N/A 11/3/2016    Procedure: COLONOSCOPY;  Surgeon: Alex Cuellar MD;  Location: [facility] ENDO;  Service: Endoscopy;  Laterality: N/A;    COLONOSCOPY N/A 12/5/2023    Procedure: COLONOSCOPY;  Surgeon: Frankie Esteban MD;  Location: [facility] ENDO;  Service: Endoscopy;  Laterality: N/A;    EYE SURGERY Bilateral 2014    cataracts    EYE SURGERY Right 10/2016    tear duct surgery    INJECTION OF ANESTHETIC AGENT AROUND LATERAL BRANCH NERVES OF SACROILIAC JOINT Left 3/5/2021    Procedure: left SI joint injection;  Surgeon: Pedro Varela MD;  Location: [facility] OR;  Service: Pain  Management;  Laterality: Left;  left SI joint injection     INJECTION OF JOINT Left 11/30/2021    Procedure: Injection, Joint Sacroiliac and GTB;  Surgeon: Pedro Varela MD;  [facility];  Service: Pain Management;  Laterality: Left;    INJECTION OF JOINT Left 11/30/2021    Procedure: INJECTION, JOINT, SHOULDER, HIP, OR KNEE;  Surgeon: Pedro Varela MD;  [facility];  Service: Pain Management;  Laterality: Left;  GTB injestion    JOINT REPLACEMENT Left 2013    knee replacemant      left knee     Social History     Tobacco Use    Smoking status: Never    Smokeless tobacco: Never   Substance Use Topics    Alcohol use: No    Drug use: No     Family History   Problem Relation Name Age of Onset    No Known Problems Mother      Heart disease Father      Heart disease Brother      No Known Problems Sister 1/2     No Known Problems Daughter      No Known Problems Brother 1/2         Review of Systems:   Constitution: Negative for diaphoresis and fever.   HEENT: Negative for nosebleeds.    Cardiovascular: Negative for chest pain       No dyspnea on exertion       No leg swelling        No palpitations  Respiratory: Negative for shortness of breath and wheezing.    Hematologic/Lymphatic: Negative for bleeding problem. Does not bruise/bleed easily.   Skin: Negative for color change and rash.   Musculoskeletal: Negative for falls and myalgias.   Gastrointestinal: Negative for hematemesis and hematochezia.   Genitourinary: Negative for hematuria.   Neurological: Negative for dizziness and light-headedness.   Psychiatric/Behavioral: Negative for altered mental status and memory loss.          Objective:        Vitals:    07/16/24 1028   BP: (P) 130/84   Pulse: (!) 54   Resp: 16       Lab Results   Component Value Date    WBC 4.08 03/07/2024    HGB 12.9 (L) 03/07/2024    HCT 37.7 (L) 03/07/2024     (L) 03/07/2024    CHOL 155 02/29/2024    TRIG 216 (H) 02/29/2024    HDL 53 02/29/2024    ALT 16 03/07/2024    AST 24  03/07/2024     (L) 03/07/2024    K 4.3 03/07/2024     03/07/2024    CREATININE 0.9 03/07/2024    BUN 15 03/07/2024    CO2 22 (L) 03/07/2024    TSH 1.420 11/19/2021    PSA 0.50 12/05/2022    INR 1.1 01/31/2021    HGBA1C 5.6 02/23/2024        ECHOCARDIOGRAM RESULTS  Results for orders placed during the hospital encounter of 06/27/22    Echo    Interpretation Summary  · The left ventricle is normal in size with concentric remodeling and normal systolic function.  · The estimated ejection fraction is 65%.  · Normal left ventricular diastolic function.  · Normal right ventricular size with normal right ventricular systolic function.        CURRENT/PREVIOUS VISIT EKG  Results for orders placed or performed during the hospital encounter of 07/31/23   EKG 12-lead    Collection Time: 07/31/23  7:48 AM    Narrative    Test Reason : R07.9,    Vent. Rate : 054 BPM     Atrial Rate : 054 BPM     P-R Int : 198 ms          QRS Dur : 100 ms      QT Int : 426 ms       P-R-T Axes : 052 016 052 degrees     QTc Int : 403 ms    Sinus bradycardia  Otherwise normal ECG    Confirmed by Zofia RAHMAN, Jordyn Montana ([id number]) on 8/5/2023 12:52:07 PM    Referred By: MANSIH   SELF           Confirmed By:Jordyn Armijo MD     No valid procedures specified.   Results for orders placed during the hospital encounter of 06/27/22    Nuclear Stress - Cardiology Interpreted    Interpretation Summary    Equivocal myocardial perfusion scan.    There is a mild intensity, small sized, equivocal perfusion abnormality that is fixed in the inferobasilar wall(s). This finding is equivocal due to soft tissue shadow.    There are no other significant perfusion abnormalities.    There is a trivial to mild intensity fixed perfusion abnormality in the inferobasilar wall of the left ventricle, secondary to soft tissue attenuation.    The gated perfusion images showed an ejection fraction of 81% post stress. Normal ejection fraction is greater than 47%.     There is normal wall motion at rest and post stress.    LV cavity size is normal at rest and normal at stress.    The EKG portion of this study is negative for ischemia.    The patient reported no chest pain during the stress test.    The test was stopped because the patient experienced arrhythmia.    There were no arrhythmias during stress.      Physical Exam:  CONSTITUTIONAL: No fever, no chills  HEENT: Normocephalic, atraumatic,pupils reactive to light                 NECK:  No JVD no carotid bruit  CVS: S1S2+, RRR, systolic murmurs,   LUNGS: Clear  ABDOMEN: Soft, NT, BS+  EXTREMITIES: No cyanosis, edema  : No mcgill catheter  NEURO: AAO X 3  PSY: Normal affect      Medication List with Changes/Refills   Current Medications    AMMONIUM LACTATE 12 % CREA    Aaa qd after shower prn dry skin    ASCORBIC ACID, VITAMIN C, (VITAMIN C) 500 MG TABLET    Take 500 mg by mouth once daily.    ASPIRIN (ECOTRIN) 81 MG EC TABLET    Take 81 mg by mouth once daily.    ATORVASTATIN (LIPITOR) 20 MG TABLET    Take 1 tablet (20 mg total) by mouth once daily.    BUTALBITAL-ACETAMINOPHEN-CAFFEINE -40 MG (FIORICET, ESGIC) -40 MG PER TABLET    Take 1 tablet by mouth every 6 (six) hours as needed for Pain or Headaches.    CHOLECALCIFEROL, VITAMIN D3, (VITAMIN D3) 125 MCG (5,000 UNIT) TAB    Take 5,000 Units by mouth once daily.    CLOTRIMAZOLE-BETAMETHASONE 1-0.05% (LOTRISONE) CREAM    Aaa bid x 1-2 wks prn rash.  Tk 1 wk off before repeating    COENZYME Q10 200 MG CAPSULE    Take 200 mg by mouth once daily.    CYANOCOBALAMIN, VITAMIN B-12, 1,000 MCG/15 ML LIQD    Take 15 mLs by mouth once daily.    DOXYCYCLINE (VIBRAMYCIN) 100 MG CAP    1 po bid with food prn breakout    FLUTICASONE PROPIONATE (FLONASE) 50 MCG/ACTUATION NASAL SPRAY    1 spray by Each Nostril route once daily.    KETOCONAZOLE (NIZORAL) 2 % CREAM    Aaa qd-bid prn heat rash, ok for continuous use    METRONIDAZOLE 1% (METROGEL) 1 % GEL         OMEGA-3/DHA/EPA/ALA/VITAMIN D3 (OMEGA-3-DHA-EPA-ALA-VIT D3) 50 MG (25 MG- 25 MG)-100 UNIT CHEW    Take 1 capsule by mouth once daily.    PYRIDOXINE, VITAMIN B6, (B-6) 100 MG TAB    Take 50 mg by mouth once daily.    TRIAMCINOLONE ACETONIDE 0.1% (KENALOG) 0.1 % CREAM    Aaa bid    VIT A/VIT C/VIT E/ZINC/COPPER (PRESERVISION AREDS ORAL)    Take 1 tablet by mouth 2 (two) times daily.             Assessment:       1. Primary hypertension    2. Mixed hyperlipidemia    3. Orthostatic hypotension    4. Calcification of aorta    5. Sleep apnea, unspecified type    6. Abnormal electrocardiogram (ECG) (EKG)         Plan:   1. Primary hypertension   Patient is doing well at the present time his blood pressure is adequately controlled is 130/84 and currently not on any specific blood pressure medications.  2. Mixed hyperlipidemia   Patient is is currently on atorvastatin 20 mg p.o. daily continue the same.  Discussed with patient that taking Co Q10 would certainly help.  At least twice a week would help.    On his last blood work his total cholesterol is 155 HDL is 53 LDL is 58 and triglycerides were 216.  And the LDL is within target range.    3. Orthostatic hypotension   Patient at the present time is doing well.  Patient to continue to wear compression stockings as that would prevent the orthostatic hypotension and also to pump his legs up when he is standing for periods of time.  And also keep himself adequately hydrated.  4. Calcification of aorta   Patient is stiff at the present time.  Patient would benefit from taking aspirin at least every other day.    5. EKG   Reviewed his EKG independently patient is in sinus bradycardia with a heart rate of 54 beats per minute normal intervals no acute ST T-wave changes essentially normal EKG.  6. Patient to continue current management and I will see him back in the office in 6 months time.              Problem List Items Addressed This Visit          Cardiac/Vascular     Hyperlipidemia    Calcification of aorta    Orthostatic hypotension    Primary hypertension - Primary       Other    Sleep apnea     Other Visit Diagnoses       Abnormal electrocardiogram (ECG) (EKG)        Relevant Orders    IN OFFICE EKG 12-LEAD (to [place])            No follow-ups on file.

## 2024-08-09 ENCOUNTER — PATIENT MESSAGE (OUTPATIENT)
Dept: INTERNAL MEDICINE | Facility: CLINIC | Age: 89
End: 2024-08-09
Payer: MEDICARE

## 2024-08-09 DIAGNOSIS — Z85.72 HISTORY OF LYMPHOMA: Primary | ICD-10-CM

## 2024-08-12 ENCOUNTER — TELEPHONE (OUTPATIENT)
Dept: INTERNAL MEDICINE | Facility: CLINIC | Age: 89
End: 2024-08-12
Payer: MEDICARE

## 2024-08-12 ENCOUNTER — PATIENT MESSAGE (OUTPATIENT)
Dept: INTERNAL MEDICINE | Facility: CLINIC | Age: 89
End: 2024-08-12
Payer: MEDICARE

## 2024-08-13 ENCOUNTER — TELEPHONE (OUTPATIENT)
Dept: INTERNAL MEDICINE | Facility: CLINIC | Age: 89
End: 2024-08-13
Payer: MEDICARE

## 2024-08-13 NOTE — TELEPHONE ENCOUNTER
Pt called requesting lab orders. I have expressed to pt both verbally on the phone and in portal message that Dr. Cueto have not put any orders in at the moment also there weren't any orders in previously. Also, explained to pt that I can not schedule lab appt if there aren't any lab orders in the system. He then told me if he doesn't have a lab appt then he doesn't need to see the doctor. I informed him that I will notify Dr. Cueto, pt stated ok and I told him to have a great day.     Aleah WEINER

## 2024-08-13 NOTE — TELEPHONE ENCOUNTER
----- Message from Laith Amaya sent at 8/13/2024 11:08 AM CDT -----  Regarding: Nurse Bernie - Pt Call  Contact: Pt [phone]  [email or web address]     Patient is calling for Medical Advice regarding: Patient nurse called to call patient to verify that pt has no orders. Please call back asap to discuss.     How long has patient had these symptoms:    Pharmacy name and phone#:    Patient wants a call back or thru [facility]: Call    Comments:

## 2024-08-15 ENCOUNTER — LAB VISIT (OUTPATIENT)
Dept: LAB | Facility: HOSPITAL | Age: 89
End: 2024-08-15
Attending: INTERNAL MEDICINE
Payer: MEDICARE

## 2024-08-15 DIAGNOSIS — Z85.72 HISTORY OF LYMPHOMA: ICD-10-CM

## 2024-08-15 LAB
ALBUMIN SERPL BCP-MCNC: 3.7 G/DL (ref 3.5–5.2)
ALP SERPL-CCNC: 104 U/L (ref 55–135)
ALT SERPL W/O P-5'-P-CCNC: 14 U/L (ref 10–44)
ANION GAP SERPL CALC-SCNC: 9 MMOL/L (ref 8–16)
AST SERPL-CCNC: 20 U/L (ref 10–40)
BASOPHILS # BLD AUTO: 0.04 K/UL (ref 0–0.2)
BASOPHILS NFR BLD: 0.8 % (ref 0–1.9)
BILIRUB SERPL-MCNC: 0.9 MG/DL (ref 0.1–1)
BUN SERPL-MCNC: 14 MG/DL (ref 8–23)
CALCIUM SERPL-MCNC: 10.2 MG/DL (ref 8.7–10.5)
CHLORIDE SERPL-SCNC: 107 MMOL/L (ref 95–110)
CO2 SERPL-SCNC: 24 MMOL/L (ref 23–29)
CREAT SERPL-MCNC: 0.9 MG/DL (ref 0.5–1.4)
DIFFERENTIAL METHOD BLD: ABNORMAL
EOSINOPHIL # BLD AUTO: 0.1 K/UL (ref 0–0.5)
EOSINOPHIL NFR BLD: 2.8 % (ref 0–8)
ERYTHROCYTE [DISTWIDTH] IN BLOOD BY AUTOMATED COUNT: 13.6 % (ref 11.5–14.5)
EST. GFR  (NO RACE VARIABLE): >60 ML/MIN/1.73 M^2
GLUCOSE SERPL-MCNC: 96 MG/DL (ref 70–110)
HCT VFR BLD AUTO: 39 % (ref 40–54)
HGB BLD-MCNC: 13.1 G/DL (ref 14–18)
IMM GRANULOCYTES # BLD AUTO: 0.01 K/UL (ref 0–0.04)
IMM GRANULOCYTES NFR BLD AUTO: 0.2 % (ref 0–0.5)
LYMPHOCYTES # BLD AUTO: 1.3 K/UL (ref 1–4.8)
LYMPHOCYTES NFR BLD: 25 % (ref 18–48)
MCH RBC QN AUTO: 33.8 PG (ref 27–31)
MCHC RBC AUTO-ENTMCNC: 33.6 G/DL (ref 32–36)
MCV RBC AUTO: 101 FL (ref 82–98)
MONOCYTES # BLD AUTO: 0.6 K/UL (ref 0.3–1)
MONOCYTES NFR BLD: 12.6 % (ref 4–15)
NEUTROPHILS # BLD AUTO: 3 K/UL (ref 1.8–7.7)
NEUTROPHILS NFR BLD: 58.6 % (ref 38–73)
NRBC BLD-RTO: 0 /100 WBC
PLATELET # BLD AUTO: 136 K/UL (ref 150–450)
PMV BLD AUTO: 12.2 FL (ref 9.2–12.9)
POTASSIUM SERPL-SCNC: 4 MMOL/L (ref 3.5–5.1)
PROT SERPL-MCNC: 6.8 G/DL (ref 6–8.4)
RBC # BLD AUTO: 3.88 M/UL (ref 4.6–6.2)
SODIUM SERPL-SCNC: 140 MMOL/L (ref 136–145)
WBC # BLD AUTO: 5.09 K/UL (ref 3.9–12.7)

## 2024-08-15 PROCEDURE — 85025 COMPLETE CBC W/AUTO DIFF WBC: CPT | Performed by: INTERNAL MEDICINE

## 2024-08-15 PROCEDURE — 80053 COMPREHEN METABOLIC PANEL: CPT | Performed by: INTERNAL MEDICINE

## 2024-08-15 PROCEDURE — 36415 COLL VENOUS BLD VENIPUNCTURE: CPT | Mod: PO | Performed by: INTERNAL MEDICINE

## 2024-08-22 ENCOUNTER — OFFICE VISIT (OUTPATIENT)
Dept: INTERNAL MEDICINE | Facility: CLINIC | Age: 89
End: 2024-08-22
Payer: MEDICARE

## 2024-08-22 VITALS
WEIGHT: 181 LBS | RESPIRATION RATE: 18 BRPM | SYSTOLIC BLOOD PRESSURE: 130 MMHG | BODY MASS INDEX: 25.91 KG/M2 | OXYGEN SATURATION: 100 % | HEART RATE: 57 BPM | HEIGHT: 70 IN | DIASTOLIC BLOOD PRESSURE: 82 MMHG

## 2024-08-22 DIAGNOSIS — D69.6 THROMBOCYTOPENIA, UNSPECIFIED: Primary | ICD-10-CM

## 2024-08-22 DIAGNOSIS — Z85.72 HISTORY OF LYMPHOMA: ICD-10-CM

## 2024-08-22 DIAGNOSIS — D64.9 ANEMIA, UNSPECIFIED TYPE: ICD-10-CM

## 2024-08-22 DIAGNOSIS — I70.0 CALCIFICATION OF AORTA: ICD-10-CM

## 2024-08-22 DIAGNOSIS — E78.2 MIXED HYPERLIPIDEMIA: ICD-10-CM

## 2024-08-22 PROBLEM — R42 DIZZINESS: Status: RESOLVED | Noted: 2023-08-09 | Resolved: 2024-08-22

## 2024-08-22 PROBLEM — M25.572 ACUTE LEFT ANKLE PAIN: Status: RESOLVED | Noted: 2023-10-20 | Resolved: 2024-08-22

## 2024-08-22 PROCEDURE — 99999 PR PBB SHADOW E&M-EST. PATIENT-LVL IV: CPT | Mod: PBBFAC,,, | Performed by: INTERNAL MEDICINE

## 2024-08-22 PROCEDURE — 99214 OFFICE O/P EST MOD 30 MIN: CPT | Mod: PBBFAC | Performed by: INTERNAL MEDICINE

## 2024-08-22 PROCEDURE — 99214 OFFICE O/P EST MOD 30 MIN: CPT | Mod: S$PBB,,, | Performed by: INTERNAL MEDICINE

## 2024-08-22 NOTE — PROGRESS NOTES
He is a 89   year-old gentleman coming in today to follow up ongoing medical   problems.  No falls.  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        1. He had  Lumbar stenosis.  He use to have some pain down his right leg, but that has resolved for now.    He is doing some PT. He does have a  shuffling gait and the PT has been helping-out since Dec.     He had a operation in April or June 2020 in [place], [place] for lumbar spinal stenosis and that has taken care of the pain.  .    He is avoiding lifting heavy weights.   .  He walks about 3 miles a day.  goes ot gym 2 x week.            2. He has a history of colon polyps, which his last colonoscopy was   12/2023. . He had Seven  polyps  He is due back in 5 years- per him-- CRS recommended no follow up due to age.          3. Hyperlipidemia. He is on Lipitor 40mg . Recent cholesterol shows total   cholesterol 155, HDL 53 , triglycerides 216 , and LDL 53  ( 2/29/20242)   which is stable   . He is tolerating the Lipitor 20 mg well.     4. He has a BPH, which he says has been dong well. He has 1 episode of nacturia  Around 2-3 am. . NO hesitancy or urgency.     5. 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   :Dr. 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  BCL2 and BCL6 staining patterns are confirmatory He previously saw Dr Lieberman with rad/onc and they completed XRT with 22 total on 9/29/2020.  He had PET scan on 3/5/2024  with no evidence of a systemic lymphoma process. The area was on the Left side of face.  He saw Derm again in Aug 2023 and saw Oncology in  6/6/2024         6. KATHY: results from 4/09 study. SEVERE obstructive sleep apnea syndrome with 289 respiratory obstructions/RDI 47 with snoring moderate intermittent and hypersomnia with CPAP. He tried Bipap for about a year.  NO day time fatigue. No witnessed sleep apnea or snoring.  "feeling well.       7.   Colon polyps-- had c-scope 12/5/2023-  7 polyps removed-  had TA's   c-scope follow up not recommended due to age.       8. Anemia-   being followed   13.1/39.0- in August-   Plt count- 136k-- so mild thrombocytopenia.         9. Pruritus-  rash- he is seeing derm for this-- Derm stopped his Lamisil.  Heas a follow up with derm next week.  Had a biopsy recently--  Sections show a thin biopsy of skin.  There are patterns of interface   damage associated with numerous dyskeratotic keratinocytes.  There are   underlying perivascular lymphoid infiltrates.      He was on oral Lamisil for his toenails- He has been off this for 2 weeks.  Rash got a little better but did not clear up.           SOCIAL HISTORY: He is retired from [employer]. He is . No   alcohol use. No drug use. Lives in [place]. Lives with wife and they are active and travel a lot.  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      REVIEW OF SYSTEMS:   Gen - no fatigue weight  stable.  He is still walking 3 miles a day         Eyes - no eye pain or visual changes  ENT - no hoarseness or sore throat  CV - no CP or SOB  Pulm - no cough or wheezing  GI - no N/V/D   no dysuria or incontinence  MS - no joint pain or muscle pain  Skin - as above   Neuro - no HA, dizziness  Heme - no abnormal bleeding or bruising  Endo - no polydipsia, or temperature changes  Psych - no anxiety or depression    PHYSICAL EXAMINATION: He is a well-appearing gentleman in no acute   Distress. Walking without any difficulty or without assistance device.         /82 (BP Location: Left arm, Patient Position: Sitting, BP Method: Small (Manual))   Pulse (!) 57   Resp 18   Ht 5' 10" (1.778 m)   Wt 82.1 kg (181 lb)   SpO2 100%   BMI 25.97 kg/m²        Ear canals are open. TMs are   clear. Oropharynx is clear. Pupils equal, round, and reactive to light   and accommodation. He is wearing glasses.   NECK: Supple. Thyroid is not enlarged. No carotid " bruits. He has no   cervical, axillary, or inguinal lymphadenopathy detected.   CHEST: Clear without wheeze.   CARDIOVASCULAR: S1, S2, regular rate and rhythm without murmur, gallop,   or rub.   ABDOMEN: Soft, nontender. No hepatosplenomegaly. No guarding or rebound   tenderness. Abdominal aorta is not enlarged.   He has normal biceps, triceps, patellar deep tendon reflexes. Normal   muscle strength, upper and lower extremities.    He has  a macular rash- red-  on his chest, back, neck, and arms - rash spares the  legs and face.  He has onco mycosis.    He is wearing a fitbit on his left wrist.    Walks with mild limb favoring his left leg.          1. History of colon polyps.- lalast c-scope 2023.    2. History of hyperlipidemia - better controled.    3. Sacroilitits- doing much better   4 pulmonary nodules-- ct chest reviewed  -- no need for follow up. -- Reviewed his recent PET scan 3/06/2024     5. Sleep apnea- off CPAP and BIPAP-- says he is sleeping well.  Less snoring since weight loss.    6. cutaneous follicular lymphoma-- following with Hem and derm.   7.  Calcification of aorta-- bp and cholesterol control and monitor      discussed flu and covid      Our office providers are the focal point for all needed health care services that are part of ongoing care related to a patient's single serious condition or the patient's complex conditions.

## 2024-09-04 RX ORDER — ATORVASTATIN CALCIUM 20 MG/1
20 TABLET, FILM COATED ORAL DAILY
Qty: 90 TABLET | Refills: 1 | Status: SHIPPED | OUTPATIENT
Start: 2024-09-04 | End: 2025-03-03

## 2024-09-04 NOTE — TELEPHONE ENCOUNTER
No care due was identified.  [facility] Embedded Care Due Messages. Reference number: [id number].   9/04/2024 10:18:57 AM CDT

## 2024-09-04 NOTE — TELEPHONE ENCOUNTER
Refill Decision Note   Bryson Kellogg  is requesting a refill authorization.  Brief Assessment and Rationale for Refill:  Approve     Medication Therapy Plan:         Comments:     Note composed:3:34 PM 09/04/2024

## 2024-10-31 ENCOUNTER — EXTERNAL CHRONIC CARE MANAGEMENT (OUTPATIENT)
Dept: PRIMARY CARE CLINIC | Facility: CLINIC | Age: 89
End: 2024-10-31
Payer: MEDICARE

## 2024-10-31 PROCEDURE — 99439 CHRNC CARE MGMT STAF EA ADDL: CPT | Mod: S$PBB,,, | Performed by: INTERNAL MEDICINE

## 2024-10-31 PROCEDURE — 99439 CHRNC CARE MGMT STAF EA ADDL: CPT | Mod: PBBFAC | Performed by: INTERNAL MEDICINE

## 2024-10-31 PROCEDURE — 99490 CHRNC CARE MGMT STAFF 1ST 20: CPT | Mod: S$PBB,,, | Performed by: INTERNAL MEDICINE

## 2024-10-31 PROCEDURE — 99490 CHRNC CARE MGMT STAFF 1ST 20: CPT | Mod: PBBFAC | Performed by: INTERNAL MEDICINE

## 2024-11-06 ENCOUNTER — PATIENT MESSAGE (OUTPATIENT)
Dept: ADMINISTRATIVE | Facility: HOSPITAL | Age: 89
End: 2024-11-06
Payer: MEDICARE

## 2024-11-11 ENCOUNTER — HOSPITAL ENCOUNTER (EMERGENCY)
Facility: HOSPITAL | Age: 89
Discharge: HOME | End: 2024-11-11
Attending: EMERGENCY MEDICINE
Payer: MEDICARE

## 2024-11-11 VITALS
WEIGHT: 175 LBS | HEIGHT: 70 IN | BODY MASS INDEX: 25.05 KG/M2 | SYSTOLIC BLOOD PRESSURE: 158 MMHG | OXYGEN SATURATION: 97 % | TEMPERATURE: 98 F | DIASTOLIC BLOOD PRESSURE: 81 MMHG | HEART RATE: 56 BPM | RESPIRATION RATE: 16 BRPM

## 2024-11-11 DIAGNOSIS — M54.2 NECK PAIN: ICD-10-CM

## 2024-11-11 DIAGNOSIS — R42 POSTURAL DIZZINESS: Primary | ICD-10-CM

## 2024-11-11 DIAGNOSIS — R26.81 UNSTEADY: ICD-10-CM

## 2024-11-11 LAB
ALBUMIN SERPL BCP-MCNC: 3.7 G/DL (ref 3.5–5.2)
ALP SERPL-CCNC: 85 U/L (ref 55–135)
ALT SERPL W/O P-5'-P-CCNC: 11 U/L (ref 10–44)
ANION GAP SERPL CALC-SCNC: 4 MMOL/L (ref 8–16)
AST SERPL-CCNC: 16 U/L (ref 10–40)
BASOPHILS # BLD AUTO: 0.03 K/UL (ref 0–0.2)
BASOPHILS NFR BLD: 0.6 % (ref 0–1.9)
BILIRUB SERPL-MCNC: 0.7 MG/DL (ref 0.1–1)
BUN SERPL-MCNC: 14 MG/DL (ref 8–23)
CALCIUM SERPL-MCNC: 9.5 MG/DL (ref 8.7–10.5)
CHLORIDE SERPL-SCNC: 106 MMOL/L (ref 95–110)
CO2 SERPL-SCNC: 26 MMOL/L (ref 23–29)
CREAT SERPL-MCNC: 0.9 MG/DL (ref 0.5–1.4)
DIFFERENTIAL METHOD BLD: ABNORMAL
EOSINOPHIL # BLD AUTO: 0.1 K/UL (ref 0–0.5)
EOSINOPHIL NFR BLD: 1.8 % (ref 0–8)
ERYTHROCYTE [DISTWIDTH] IN BLOOD BY AUTOMATED COUNT: 13.3 % (ref 11.5–14.5)
EST. GFR  (NO RACE VARIABLE): >60 ML/MIN/1.73 M^2
GLUCOSE SERPL-MCNC: 103 MG/DL (ref 70–110)
HCT VFR BLD AUTO: 34.6 % (ref 40–54)
HGB BLD-MCNC: 11.8 G/DL (ref 14–18)
IMM GRANULOCYTES # BLD AUTO: 0.01 K/UL (ref 0–0.04)
IMM GRANULOCYTES NFR BLD AUTO: 0.2 % (ref 0–0.5)
LYMPHOCYTES # BLD AUTO: 1.6 K/UL (ref 1–4.8)
LYMPHOCYTES NFR BLD: 31 % (ref 18–48)
MCH RBC QN AUTO: 33.4 PG (ref 27–31)
MCHC RBC AUTO-ENTMCNC: 34.1 G/DL (ref 32–36)
MCV RBC AUTO: 98 FL (ref 82–98)
MONOCYTES # BLD AUTO: 0.6 K/UL (ref 0.3–1)
MONOCYTES NFR BLD: 12.4 % (ref 4–15)
NEUTROPHILS # BLD AUTO: 2.8 K/UL (ref 1.8–7.7)
NEUTROPHILS NFR BLD: 54 % (ref 38–73)
NRBC BLD-RTO: 0 /100 WBC
OHS QRS DURATION: 82 MS
OHS QTC CALCULATION: 405 MS
PLATELET # BLD AUTO: 140 K/UL (ref 150–450)
PMV BLD AUTO: 10.7 FL (ref 9.2–12.9)
POTASSIUM SERPL-SCNC: 3.8 MMOL/L (ref 3.5–5.1)
PROT SERPL-MCNC: 6.2 G/DL (ref 6–8.4)
RBC # BLD AUTO: 3.53 M/UL (ref 4.6–6.2)
SODIUM SERPL-SCNC: 136 MMOL/L (ref 136–145)
WBC # BLD AUTO: 5.1 K/UL (ref 3.9–12.7)

## 2024-11-11 PROCEDURE — 85025 COMPLETE CBC W/AUTO DIFF WBC: CPT | Performed by: EMERGENCY MEDICINE

## 2024-11-11 PROCEDURE — 99900031 HC PATIENT EDUCATION (STAT)

## 2024-11-11 PROCEDURE — 93010 ELECTROCARDIOGRAM REPORT: CPT | Mod: ,,, | Performed by: GENERAL PRACTICE

## 2024-11-11 PROCEDURE — 93005 ELECTROCARDIOGRAM TRACING: CPT | Performed by: GENERAL PRACTICE

## 2024-11-11 PROCEDURE — 99285 EMERGENCY DEPT VISIT HI MDM: CPT | Mod: 25

## 2024-11-11 PROCEDURE — 94761 N-INVAS EAR/PLS OXIMETRY MLT: CPT

## 2024-11-11 PROCEDURE — 80053 COMPREHEN METABOLIC PANEL: CPT | Performed by: EMERGENCY MEDICINE

## 2024-11-11 NOTE — CARE UPDATE
11/11/24 1241   Patient Assessment/Suction   Level of Consciousness (AVPU) alert   PRE-TX-O2   Device (Oxygen Therapy) room air   SpO2 97 %   Pulse Oximetry Type Continuous   $ Pulse Oximetry - Multiple Charge Pulse Oximetry - Multiple   Pulse (!) 59   Resp 17   Education   $ Education Other (see comment);15 min  (sats)

## 2024-11-11 NOTE — ED PROVIDER NOTES
"Encounter Date: 11/11/2024       History     Chief Complaint   Patient presents with    Dizziness     Pt c/o neck pain and unsteady that's been going a for a couple of days to weeks. Dr. Costa in triage assessing pt     89-year-old male history of bradycardia, lymphoma presents to the ER feeling slightly unsteady when he stands for the last week.  He is able to ambulate without difficulty but he states after standing for little while he is usually "looking for a place to sit down." No head spinning no difficulty speaking or swallowing no diplopia no extremity numbness or weakness.  No chest pain no shortness of breath no abdominal pain no bloody stools.  Also reports a month of left-sided neck pain that worsens movement.  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    History of B-cell lymphoma     Hx of colonic polyp 11/03/2016    Hyperlipidemia     Personal history of colonic polyps     Colon polyps    Renal stone 11/23/2012    Rosacea     Shingles     Sleep apnea     Squamous cell carcinoma of skin     TIA (transient ischemic attack)      Past Surgical History:   Procedure Laterality Date    BACK SURGERY  05/2020    Lum/Kyle    CHOLECYSTECTOMY      COLONOSCOPY N/A 11/3/2016    Procedure: COLONOSCOPY;  Surgeon: Alex Cuellar MD;  [facility];  Service: Endoscopy;  Laterality: N/A;    COLONOSCOPY N/A 12/5/2023    Procedure: COLONOSCOPY;  Surgeon: Frankie Esteban MD;  Location: [facility];  Service: Endoscopy;  Laterality: N/A;    EYE SURGERY Bilateral 2014    cataracts    EYE SURGERY Right 10/2016    tear duct surgery    INJECTION OF ANESTHETIC AGENT AROUND LATERAL BRANCH NERVES OF SACROILIAC JOINT Left " 3/5/2021    Procedure: left SI joint injection;  Surgeon: Pedro Varela MD;  [facility];  Service: Pain Management;  Laterality: Left;  left SI joint injection     INJECTION OF JOINT Left 11/30/2021    Procedure: Injection, Joint Sacroiliac and GTB;  Surgeon: Pedro Varela MD;  [facility];  Service: Pain Management;  Laterality: Left;    INJECTION OF JOINT Left 11/30/2021    Procedure: INJECTION, JOINT, SHOULDER, HIP, OR KNEE;  Surgeon: Pedro Varela MD;  [facility];  Service: Pain Management;  Laterality: Left;  GTB injestion    JOINT REPLACEMENT Left 2013    knee replacemant      left knee     Family History   Problem Relation Name Age of Onset    No Known Problems Mother      Heart disease Father      Heart disease Brother      No Known Problems Sister 1/2     No Known Problems Daughter      No Known Problems Brother 1/2      Social History     Tobacco Use    Smoking status: Never    Smokeless tobacco: Never   Substance Use Topics    Alcohol use: No    Drug use: No     Review of Systems   Constitutional:  Negative for fever.   HENT:  Negative for sore throat.    Respiratory:  Negative for shortness of breath.    Cardiovascular:  Negative for chest pain.   Gastrointestinal:  Negative for nausea.   Genitourinary:  Negative for dysuria.   Musculoskeletal:  Positive for neck pain. Negative for back pain.   Skin:  Negative for rash.   Neurological:  Negative for dizziness, weakness, light-headedness, numbness and headaches.        Feels unsteady after standing up and after standing for prolonged periods       Physical Exam     Initial Vitals [11/11/24 1134]   BP Pulse Resp Temp SpO2   (!) 149/87 84 16 97.9 °F (36.6 °C) 97 %      MAP       --         Physical Exam    Nursing note and vitals reviewed.  Constitutional: He appears well-developed and well-nourished. He is not diaphoretic.  Non-toxic appearance. He does not have a sickly appearance. He does not appear ill. No distress.   HENT:   Head:  Normocephalic and atraumatic.   Eyes: EOM are normal.   Neck: Neck supple.   Normal range of motion.  Cardiovascular:  Normal rate, regular rhythm and normal heart sounds.     Exam reveals no gallop and no friction rub.       No murmur heard.  Pulmonary/Chest: Breath sounds normal. No respiratory distress. He has no wheezes. He has no rhonchi. He has no rales.   Abdominal: Abdomen is soft. He exhibits no distension. There is no abdominal tenderness. There is no rebound and no guarding.   Musculoskeletal:         General: Normal range of motion.      Cervical back: Normal range of motion and neck supple. No rigidity. Normal range of motion.     Neurological: He is alert and oriented to person, place, and time. He has normal strength.   Normal finger to nose. No nystagmus. No truncal ataxia. No past pointing. Normal gait.       Skin: Skin is warm and dry. No rash noted.   Psychiatric: He has a normal mood and affect. His behavior is normal. Judgment and thought content normal.         ED Course   Procedures  Labs Reviewed   CBC W/ AUTO DIFFERENTIAL - Abnormal       Result Value    WBC 5.10      RBC 3.53 (*)     Hemoglobin 11.8 (*)     Hematocrit 34.6 (*)     MCV 98      MCH 33.4 (*)     MCHC 34.1      RDW 13.3      Platelets 140 (*)     MPV 10.7      Immature Granulocytes 0.2      Gran # (ANC) 2.8      Immature Grans (Abs) 0.01      Lymph # 1.6      Mono # 0.6      Eos # 0.1      Baso # 0.03      nRBC 0      Gran % 54.0      Lymph % 31.0      Mono % 12.4      Eosinophil % 1.8      Basophil % 0.6      Differential Method Automated     COMPREHENSIVE METABOLIC PANEL - Abnormal    Sodium 136      Potassium 3.8      Chloride 106      CO2 26      Glucose 103      BUN 14      Creatinine 0.9      Calcium 9.5      Total Protein 6.2      Albumin 3.7      Total Bilirubin 0.7      Alkaline Phosphatase 85      AST 16      ALT 11      eGFR >60.0      Anion Gap 4 (*)         ECG Results              EKG 12-lead (In process)         Collection Time Result Time QRS Duration OHS QTC Calculation    11/11/24 12:12:17 11/11/24 13:10:39 82 405                     In process by Interface, Lab In [facility] (11/11/24 13:10:46)                   Narrative:    Test Reason : R26.81,    Vent. Rate : 063 BPM     Atrial Rate : 063 BPM     P-R Int : 202 ms          QRS Dur : 082 ms      QT Int : 396 ms       P-R-T Axes : 034 008 006 degrees     QTc Int : 405 ms    Sinus rhythm with Premature supraventricular complexes  Otherwise normal ECG  When compared with ECG of 16-JUL-2024 10:21,  Premature supraventricular complexes are now Present    Referred By: [email or web address]   SELF           Confirmed By:                                   Imaging Results              CT Cervical Spine Without Contrast (Final result)  Result time 11/11/24 14:14:56      Final result by Luis E Henry MD (11/11/24 14:14:56)                   Impression:      No acute fracture or traumatic malalignment in the cervical spine.      Electronically signed by: Luis E Henyr  Date:    11/11/2024  Time:    14:14               Narrative:    CLINICAL HISTORY:  ([id number] y/o  (1935) M    Cervical radiculopathy, known malignancy;    TECHNIQUE:  (A#[id number], exam time 11/11/2024 14:05)    CT CERVICAL SPINE WITHOUT CONTRAST [id number]    Contiguous thin section axial images were obtained of the cervical spine. Sagittal and coronal reformatted images were generated. Note that this exam is suboptimal for evaluation of disc disease (better evaluated on MRI) and does not assess for ligamentous injury or stability.    CMS MANDATED QUALITY DATA - CT RADIATION - 436    All CT scans at this facility utilize dose modulation, iterative reconstruction, and/or weight based dosing when appropriate to reduce radiation dose to as low as reasonably achievable.    COMPARISON:  None available.    FINDINGS:  No acute fracture of the cervical spine. No traumatic malalignment of the cervical spine. No acute  intraspinal abnormality. No perched, jumped or locked facets seen.    Vertebral body heights are maintained.  There is advanced degenerative change throughout the cervical spine.  Grade 1 anterolisthesis is present at C4-C5 (4 mm) with mild retrolisthesis of C5 on C6 (3 mm).  There is severe disc height loss at C4-C5, C5-C6 and C6-C7.  There is severe facet arthropathy at C2-C3, C3-C4, and C4-C5.  With severe facet arthropathy at C7-T1, and grade 1 anterolisthesis at this level (4 mm).  Additional degenerative changes are present throughout the cervical spine (for which this study is not tailored to assess).  There are calcified plaques at the level of the carotid bulbs and proximal internal carotid arteries with no in.  There is diffusely decreased osseous mineralization (suggesting osteoporosis/osteopenia).    Visualized brain is unremarkable. Visualized prevertebral tissues are unremarkable.                                       CT Head Without Contrast (Final result)  Result time 11/11/24 14:11:28      Final result by Balta Ta MD (11/11/24 14:11:28)                   Impression:      No acute intracranial abnormality.      Electronically signed by: Balta Ta  Date:    11/11/2024  Time:    14:11               Narrative:    EXAMINATION:  CT HEAD WITHOUT CONTRAST    CLINICAL HISTORY:  Dizziness, persistent/recurrent, cardiac or vascular cause suspected;    TECHNIQUE:  Thin axial noncontrast imaging through the brain was performed.    FINDINGS:  Comparison to prior exams including 10/19/2023. There is no acute intracranial hemorrhage, mass effect, or abnormal extra-axial fluid.  Gray-white differentiation is maintained, with mild scattered nonspecific hypoattenuation in the white matter.  There is generalized prominence of the cortical sulci and ventricles.    The cerebellum and brainstem are unremarkable.  There are dense carotid siphon vascular calcifications.  The visualized paranasal sinuses and  mastoid air cells are clear.  There are no acute fractures or destructive osseous lesions.                                       Medications - No data to display  Medical Decision Making  1518 89-year-old male presents to the ER feeling a little unsteady for over a week on standing and after he has been standing.  Exam in the ER is reassuring he has no cerebellar findings.  CT of the head CT of the neck labs unremarkable for any acute finding.  He reports 1 month of left-sided neck pain.  Wife states he looks down a lot at phone computer etc. etc..  I see no sign of any serious cause of his neck pain likely musculoskeletal.  Follow up primary care.  I see no reason for any further studies or tests. [EF]      Amount and/or Complexity of Data Reviewed  Independent Historian: spouse  External Data Reviewed: labs, radiology, ECG and notes.  Labs: ordered. Decision-making details documented in ED Course.  Radiology: ordered. Decision-making details documented in ED Course.  ECG/medicine tests: ordered and independent interpretation performed. Decision-making details documented in ED Course.               ED Course as of 11/11/24 1757   Mon Nov 11, 2024   1137 Seen in triage by raquel millan for a week at least, no sign acute cva [EF]   1218 Sinus rhythm 63 beats per minute normal axis no ST elevation or depression or T-wave inversion independently interpreted [EF]   1234 WBC: 5.10 [EF]   1234 Hemoglobin(!): 11.8 [EF]   1234 Platelet Count(!): 140 [EF]   1450 CT Cervical Spine Without Contrast [EF]   1451 CT Head Without Contrast [EF]   1518 89-year-old male presents to the ER feeling a little unsteady for over a week on standing and after he has been standing.  Exam in the ER is reassuring he has no cerebellar findings.  CT of the head CT of the neck labs unremarkable for any acute finding.  He reports 1 month of left-sided neck pain.  Wife states he looks down a lot at phone computer etc. etc..  I see no sign of any serious  cause of his neck pain likely musculoskeletal.  Follow up primary care.  I see no reason for any further studies or tests. [EF]      ED Course User Index  [EF] Owen Stephen MD                           Clinical Impression:  Final diagnoses:  [R26.81] Unsteady  [R42] Postural dizziness (Primary)  [M54.2] Neck pain          ED Disposition Condition    Discharge Stable          ED Prescriptions    None       Follow-up Information       Follow up With Specialties Details Why Contact Info Additional Information    primary care near Erica Louis MD Family Medicine Schedule an appointment as soon as possible for a visit   [address] [address] [phone]       [facility] - Emergency Dept Emergency Medicine  As needed [address] [address] [phone] 1st floor             Owen Stephen MD  11/11/24 9657

## 2024-11-21 ENCOUNTER — HOSPITAL ENCOUNTER (EMERGENCY)
Facility: HOSPITAL | Age: 89
Discharge: HOME | End: 2024-11-22
Attending: EMERGENCY MEDICINE
Payer: MEDICARE

## 2024-11-21 DIAGNOSIS — M54.2 NECK PAIN: ICD-10-CM

## 2024-11-21 DIAGNOSIS — G24.3 SPASMODIC TORTICOLLIS: Primary | ICD-10-CM

## 2024-11-21 LAB
CREAT SERPL-MCNC: 1.3 MG/DL (ref 0.5–1.4)
SAMPLE: NORMAL

## 2024-11-21 PROCEDURE — 99284 EMERGENCY DEPT VISIT MOD MDM: CPT | Mod: 25

## 2024-11-21 PROCEDURE — 85025 COMPLETE CBC W/AUTO DIFF WBC: CPT | Performed by: EMERGENCY MEDICINE

## 2024-11-21 PROCEDURE — 80053 COMPREHEN METABOLIC PANEL: CPT | Performed by: EMERGENCY MEDICINE

## 2024-11-21 PROCEDURE — 84484 ASSAY OF TROPONIN QUANT: CPT | Performed by: EMERGENCY MEDICINE

## 2024-11-21 PROCEDURE — 93005 ELECTROCARDIOGRAM TRACING: CPT | Performed by: INTERNAL MEDICINE

## 2024-11-21 PROCEDURE — 82565 ASSAY OF CREATININE: CPT

## 2024-11-21 PROCEDURE — 93010 ELECTROCARDIOGRAM REPORT: CPT | Mod: ,,, | Performed by: INTERNAL MEDICINE

## 2024-11-21 PROCEDURE — 83735 ASSAY OF MAGNESIUM: CPT | Performed by: EMERGENCY MEDICINE

## 2024-11-22 VITALS
HEART RATE: 72 BPM | WEIGHT: 175 LBS | RESPIRATION RATE: 20 BRPM | TEMPERATURE: 98 F | DIASTOLIC BLOOD PRESSURE: 87 MMHG | HEIGHT: 70 IN | SYSTOLIC BLOOD PRESSURE: 160 MMHG | BODY MASS INDEX: 25.05 KG/M2 | OXYGEN SATURATION: 97 %

## 2024-11-22 LAB
ALBUMIN SERPL BCP-MCNC: 4.4 G/DL (ref 3.5–5.2)
ALP SERPL-CCNC: 113 U/L (ref 55–135)
ALT SERPL W/O P-5'-P-CCNC: 14 U/L (ref 10–44)
ANION GAP SERPL CALC-SCNC: 6 MMOL/L (ref 8–16)
AST SERPL-CCNC: 18 U/L (ref 10–40)
BASOPHILS # BLD AUTO: 0.03 K/UL (ref 0–0.2)
BASOPHILS NFR BLD: 0.3 % (ref 0–1.9)
BILIRUB SERPL-MCNC: 0.6 MG/DL (ref 0.1–1)
BUN SERPL-MCNC: 15 MG/DL (ref 8–23)
CALCIUM SERPL-MCNC: 9.9 MG/DL (ref 8.7–10.5)
CHLORIDE SERPL-SCNC: 105 MMOL/L (ref 95–110)
CO2 SERPL-SCNC: 29 MMOL/L (ref 23–29)
CREAT SERPL-MCNC: 1.2 MG/DL (ref 0.5–1.4)
DIFFERENTIAL METHOD BLD: ABNORMAL
EOSINOPHIL # BLD AUTO: 0.1 K/UL (ref 0–0.5)
EOSINOPHIL NFR BLD: 1.1 % (ref 0–8)
ERYTHROCYTE [DISTWIDTH] IN BLOOD BY AUTOMATED COUNT: 13.3 % (ref 11.5–14.5)
EST. GFR  (NO RACE VARIABLE): 58.2 ML/MIN/1.73 M^2
GLUCOSE SERPL-MCNC: 137 MG/DL (ref 70–110)
HCT VFR BLD AUTO: 39.6 % (ref 40–54)
HGB BLD-MCNC: 13.4 G/DL (ref 14–18)
IMM GRANULOCYTES # BLD AUTO: 0.04 K/UL (ref 0–0.04)
IMM GRANULOCYTES NFR BLD AUTO: 0.4 % (ref 0–0.5)
LYMPHOCYTES # BLD AUTO: 0.9 K/UL (ref 1–4.8)
LYMPHOCYTES NFR BLD: 10.2 % (ref 18–48)
MAGNESIUM SERPL-MCNC: 2 MG/DL (ref 1.6–2.6)
MCH RBC QN AUTO: 33.6 PG (ref 27–31)
MCHC RBC AUTO-ENTMCNC: 33.8 G/DL (ref 32–36)
MCV RBC AUTO: 99 FL (ref 82–98)
MONOCYTES # BLD AUTO: 0.8 K/UL (ref 0.3–1)
MONOCYTES NFR BLD: 9 % (ref 4–15)
NEUTROPHILS # BLD AUTO: 7.1 K/UL (ref 1.8–7.7)
NEUTROPHILS NFR BLD: 79 % (ref 38–73)
NRBC BLD-RTO: 0 /100 WBC
PLATELET # BLD AUTO: 146 K/UL (ref 150–450)
PMV BLD AUTO: 11.3 FL (ref 9.2–12.9)
POTASSIUM SERPL-SCNC: 3.7 MMOL/L (ref 3.5–5.1)
PROT SERPL-MCNC: 7 G/DL (ref 6–8.4)
RBC # BLD AUTO: 3.99 M/UL (ref 4.6–6.2)
SODIUM SERPL-SCNC: 140 MMOL/L (ref 136–145)
TROPONIN I SERPL HS-MCNC: 6.4 PG/ML (ref 0–14.9)
WBC # BLD AUTO: 8.98 K/UL (ref 3.9–12.7)

## 2024-11-22 PROCEDURE — 63600175 PHARM REV CODE 636 W HCPCS: Performed by: EMERGENCY MEDICINE

## 2024-11-22 PROCEDURE — 96374 THER/PROPH/DIAG INJ IV PUSH: CPT

## 2024-11-22 PROCEDURE — 25000003 PHARM REV CODE 250: Performed by: EMERGENCY MEDICINE

## 2024-11-22 RX ORDER — OXYCODONE AND ACETAMINOPHEN 5; 325 MG/1; MG/1
1 TABLET ORAL EVERY 6 HOURS PRN
Qty: 12 TABLET | Refills: 0 | Status: SHIPPED | OUTPATIENT
Start: 2024-11-22

## 2024-11-22 RX ORDER — METHOCARBAMOL 750 MG/1
750 TABLET, FILM COATED ORAL
Status: COMPLETED | OUTPATIENT
Start: 2024-11-22 | End: 2024-11-22

## 2024-11-22 RX ORDER — HYDROMORPHONE HYDROCHLORIDE 1 MG/ML
1 INJECTION, SOLUTION INTRAMUSCULAR; INTRAVENOUS; SUBCUTANEOUS
Status: COMPLETED | OUTPATIENT
Start: 2024-11-22 | End: 2024-11-22

## 2024-11-22 RX ORDER — METHOCARBAMOL 750 MG/1
750 TABLET, FILM COATED ORAL 4 TIMES DAILY
Qty: 30 TABLET | Refills: 0 | Status: SHIPPED | OUTPATIENT
Start: 2024-11-22 | End: 2024-11-27

## 2024-11-22 RX ADMIN — METHOCARBAMOL 750 MG: 750 TABLET ORAL at 02:11

## 2024-11-22 RX ADMIN — HYDROMORPHONE HYDROCHLORIDE 1 MG: 1 INJECTION, SOLUTION INTRAMUSCULAR; INTRAVENOUS; SUBCUTANEOUS at 02:11

## 2024-11-22 NOTE — DISCHARGE INSTRUCTIONS
Rest.  No bending or stooping or heavy weightlifting.  Return to emergency department for worsening symptoms or any problems

## 2024-11-22 NOTE — ED PROVIDER NOTES
Encounter Date: 11/21/2024       History     Chief Complaint   Patient presents with    Neck Pain     Patient c/o left sided neck pain x 2 weeks. States he was checked out approx 10 days ago but pain has gotten worse.      89-year-old male with left-sided neck pain for the past week.  Patient woke up with that pain and since then patient has pain persistent in the left side of the neck in the left sternocleidomastoid region and pain is worse with turning the head to the left more than when he turns the head to the right.  Patient denies any midline neck pain.  Denies any chest pain or shortness of breath or abdominal pain or weakness or numbness.  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    History of B-cell lymphoma     Hx of colonic polyp 11/03/2016    Hyperlipidemia     Personal history of colonic polyps     Colon polyps    Renal stone 11/23/2012    Rosacea     Shingles     Sleep apnea     Squamous cell carcinoma of skin     TIA (transient ischemic attack)      Past Surgical History:   Procedure Laterality Date    BACK SURGERY  05/2020    Lum/Kyle    CHOLECYSTECTOMY      COLONOSCOPY N/A 11/3/2016    Procedure: COLONOSCOPY;  Surgeon: Alex Cuellar MD;  Location: [facility];  Service: Endoscopy;  Laterality: N/A;    COLONOSCOPY N/A 12/5/2023    Procedure: COLONOSCOPY;  Surgeon: Frankie Esteban MD;  Location: [facility];  Service: Endoscopy;  Laterality: N/A;    EYE SURGERY Bilateral 2014    cataracts    EYE SURGERY Right 10/2016    tear duct surgery    INJECTION OF ANESTHETIC AGENT AROUND LATERAL BRANCH NERVES OF SACROILIAC JOINT Left 3/5/2021    Procedure: left SI joint injection;  Surgeon: Pedro Varela MD;  Location: [facility] OR;   Service: Pain Management;  Laterality: Left;  left SI joint injection     INJECTION OF JOINT Left 11/30/2021    Procedure: Injection, Joint Sacroiliac and GTB;  Surgeon: Pedro Varela MD;  Location: [facility] OR;  Service: Pain Management;  Laterality: Left;    INJECTION OF JOINT Left 11/30/2021    Procedure: INJECTION, JOINT, SHOULDER, HIP, OR KNEE;  Surgeon: Pedro Varela MD;  Location: [facility] OR;  Service: Pain Management;  Laterality: Left;  GTB injestion    JOINT REPLACEMENT Left 2013    knee replacemant      left knee     Family History   Problem Relation Name Age of Onset    No Known Problems Mother      Heart disease Father      Heart disease Brother      No Known Problems Sister 1/2     No Known Problems Daughter      No Known Problems Brother 1/2      Social History     Tobacco Use    Smoking status: Never    Smokeless tobacco: Never   Substance Use Topics    Alcohol use: No    Drug use: No     Review of Systems   Constitutional: Negative.    HENT: Negative.     Eyes: Negative.    Respiratory: Negative.     Cardiovascular: Negative.    Gastrointestinal: Negative.    Endocrine: Negative.    Genitourinary: Negative.    Musculoskeletal:  Positive for neck pain.   Skin: Negative.    Allergic/Immunologic: Negative.    Neurological: Negative.    Hematological: Negative.    Psychiatric/Behavioral: Negative.     All other systems reviewed and are negative.      Physical Exam     Initial Vitals [11/21/24 2155]   BP Pulse Resp Temp SpO2   (!) 191/84 74 15 98.1 °F (36.7 °C) 98 %      MAP       --         Physical Exam    Nursing note and vitals reviewed.  Constitutional: He appears well-developed and well-nourished.   HENT:   Head: Normocephalic and atraumatic.   Nose: Nose normal.   Eyes: Conjunctivae and EOM are normal.   Neck: No tracheal deviation present.   Normal range of motion.  Cardiovascular:  Normal rate, regular rhythm, normal heart sounds and intact distal pulses.     Exam reveals no friction rub.       No murmur  heard.  Pulmonary/Chest: Breath sounds normal. No respiratory distress. He has no wheezes. He has no rales.   Abdominal: Abdomen is soft. He exhibits no distension. There is no abdominal tenderness.   Musculoskeletal:         General: Tenderness present. Normal range of motion.      Cervical back: Normal range of motion.      Comments: Tenderness in the left sternocleidomastoid muscle.  Pain reproduced with movement of head to the left.  No midline C-spine tenderness.     Neurological: He is alert and oriented to person, place, and time. He has normal strength.   Skin: Skin is warm and dry. Capillary refill takes less than 2 seconds.   Psychiatric: He has a normal mood and affect. Thought content normal.         ED Course   Procedures  Labs Reviewed   COMPREHENSIVE METABOLIC PANEL - Abnormal       Result Value    Sodium 140      Potassium 3.7      Chloride 105      CO2 29      Glucose 137 (*)     BUN 15      Creatinine 1.2      Calcium 9.9      Total Protein 7.0      Albumin 4.4      Total Bilirubin 0.6      Alkaline Phosphatase 113      AST 18      ALT 14      eGFR 58.2 (*)     Anion Gap 6 (*)    CBC W/ AUTO DIFFERENTIAL - Abnormal    WBC 8.98      RBC 3.99 (*)     Hemoglobin 13.4 (*)     Hematocrit 39.6 (*)     MCV 99 (*)     MCH 33.6 (*)     MCHC 33.8      RDW 13.3      Platelets 146 (*)     MPV 11.3      Immature Granulocytes 0.4      Gran # (ANC) 7.1      Immature Grans (Abs) 0.04      Lymph # 0.9 (*)     Mono # 0.8      Eos # 0.1      Baso # 0.03      nRBC 0      Gran % 79.0 (*)     Lymph % 10.2 (*)     Mono % 9.0      Eosinophil % 1.1      Basophil % 0.3      Differential Method Automated     TROPONIN I HIGH SENSITIVITY    Troponin I High Sensitivity 6.4     MAGNESIUM    Magnesium 2.0     ISTAT CREATININE    POC Creatinine 1.3      Sample VENOUS     POCT CREATININE     EKG Readings: (Independently Interpreted)   Initial Reading: No STEMI. Rhythm: Normal Sinus Rhythm. Ectopy: No Ectopy. Conduction: Normal.        Imaging Results    None          Medications   HYDROmorphone injection 1 mg (has no administration in time range)   methocarbamoL tablet 750 mg (has no administration in time range)     Medical Decision Making  89-year-old male with neck pain consistent with torticollis.  Patient neurologically intact.  Screening cardiac workup done from triage which is unremarkable.  Patient's pain improved with treatment in the emergency department.  Patient has spasmodic torticollis.  Treated and discharged with return precautions and instructions and follow-up with primary care provider.    Amount and/or Complexity of Data Reviewed  Labs: ordered. Decision-making details documented in ED Course.  ECG/medicine tests: independent interpretation performed. Decision-making details documented in ED Course.    Risk  Prescription drug management.                                      Clinical Impression:  Final diagnoses:  [M54.2] Neck pain  [G24.3] Spasmodic torticollis (Primary)          ED Disposition Condition    Discharge Stable          ED Prescriptions       Medication Sig Dispense Start Date End Date Auth. Provider    oxyCODONE-acetaminophen (PERCOCET) 5-325 mg per tablet Take 1 tablet by mouth every 6 (six) hours as needed. 12 tablet 11/22/2024 -- Darrion Salmeron MD    methocarbamoL (ROBAXIN) 750 MG Tab Take 1 tablet (750 mg total) by mouth 4 (four) times daily. for 5 days 30 tablet 11/22/2024 11/27/2024 Darrion Salmeron MD          Follow-up Information       Follow up With Specialties Details Why Contact Info    Kirk Cueto Jr., MD Internal Medicine In 2 days  [address]  [place] [address]  [phone]               Darrion Salmeron MD  11/22/24 3291

## 2024-11-23 ENCOUNTER — HOSPITAL ENCOUNTER (EMERGENCY)
Facility: HOSPITAL | Age: 89
Discharge: HOME | End: 2024-11-23
Attending: EMERGENCY MEDICINE
Payer: MEDICARE

## 2024-11-23 VITALS
HEIGHT: 70 IN | SYSTOLIC BLOOD PRESSURE: 160 MMHG | RESPIRATION RATE: 16 BRPM | WEIGHT: 175 LBS | DIASTOLIC BLOOD PRESSURE: 72 MMHG | TEMPERATURE: 98 F | HEART RATE: 64 BPM | OXYGEN SATURATION: 99 % | BODY MASS INDEX: 25.05 KG/M2

## 2024-11-23 DIAGNOSIS — M54.2 NECK PAIN: Primary | ICD-10-CM

## 2024-11-23 LAB
OHS QRS DURATION: 86 MS
OHS QTC CALCULATION: 411 MS

## 2024-11-23 PROCEDURE — 96372 THER/PROPH/DIAG INJ SC/IM: CPT | Performed by: NURSE PRACTITIONER

## 2024-11-23 PROCEDURE — 99284 EMERGENCY DEPT VISIT MOD MDM: CPT | Mod: 25

## 2024-11-23 PROCEDURE — 63600175 PHARM REV CODE 636 W HCPCS: Performed by: NURSE PRACTITIONER

## 2024-11-23 PROCEDURE — 25000003 PHARM REV CODE 250: Performed by: NURSE PRACTITIONER

## 2024-11-23 RX ORDER — PREDNISONE 20 MG/1
40 TABLET ORAL DAILY
Qty: 8 TABLET | Refills: 0 | Status: SHIPPED | OUTPATIENT
Start: 2024-11-25 | End: 2024-11-29

## 2024-11-23 RX ORDER — DICLOFENAC SODIUM 10 MG/G
2 GEL TOPICAL 3 TIMES DAILY
Qty: 200 G | Refills: 1 | Status: SHIPPED | OUTPATIENT
Start: 2024-11-23

## 2024-11-23 RX ORDER — LIDOCAINE 50 MG/G
1 PATCH TOPICAL DAILY
Qty: 15 PATCH | Refills: 0 | Status: SHIPPED | OUTPATIENT
Start: 2024-11-23

## 2024-11-23 RX ORDER — DEXAMETHASONE SODIUM PHOSPHATE 4 MG/ML
8 INJECTION, SOLUTION INTRA-ARTICULAR; INTRALESIONAL; INTRAMUSCULAR; INTRAVENOUS; SOFT TISSUE
Status: COMPLETED | OUTPATIENT
Start: 2024-11-23 | End: 2024-11-23

## 2024-11-23 RX ORDER — LIDOCAINE 50 MG/G
1 PATCH TOPICAL
Status: DISCONTINUED | OUTPATIENT
Start: 2024-11-23 | End: 2024-11-23 | Stop reason: HOSPADM

## 2024-11-23 RX ADMIN — DEXAMETHASONE SODIUM PHOSPHATE 8 MG: 4 INJECTION INTRA-ARTICULAR; INTRALESIONAL; INTRAMUSCULAR; INTRAVENOUS; SOFT TISSUE at 04:11

## 2024-11-23 RX ADMIN — LIDOCAINE 5% 1 PATCH: 700 PATCH TOPICAL at 03:11

## 2024-11-23 NOTE — ED PROVIDER NOTES
Encounter Date: 11/23/2024       History     Chief Complaint   Patient presents with    Neck Pain     RT SIDE     Bryson Kellgog is an 89 year old male with pmh bradycardia, lymphoma, hyperlipidemia, TIA presenting to the ED with c/o right sided neck pain. He states that he began having left sided neck pain approximately two weeks ago without injury or trauma. He was seen in the ED at that time and had CT which showed no acute findings. He returned to the ED two days ago and was given IM Dilaudid and prescribed percocet and robaxin. He states that he has had relief of the left sided neck pain but now is experiencing right sided neck pain that is similar. He denies upper extremity weakness/numbness, chest pain, SOB. He took the percocet and robaxin prior to arrival. Denies new injury/trauma to the neck. Pain increases with movement of the neck and right upper extremity. Denies fever.       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    History of B-cell lymphoma     Hx of colonic polyp 11/03/2016    Hyperlipidemia     Personal history of colonic polyps     Colon polyps    Renal stone 11/23/2012    Rosacea     Shingles     Sleep apnea     Squamous cell carcinoma of skin     TIA (transient ischemic attack)      Past Surgical History:   Procedure Laterality Date    BACK SURGERY  05/2020    Lum/Kyle    CHOLECYSTECTOMY      COLONOSCOPY N/A 11/3/2016    Procedure: COLONOSCOPY;  Surgeon: Alex Cuellar MD;  Location: [facility];  Service: Endoscopy;  Laterality: N/A;    COLONOSCOPY N/A 12/5/2023    Procedure: COLONOSCOPY;  Surgeon: Frankie Esteban MD;  Location: [facility];  Service: Endoscopy;  Laterality: N/A;    EYE SURGERY Bilateral 2014    cataracts    EYE SURGERY  Right 10/2016    tear duct surgery    INJECTION OF ANESTHETIC AGENT AROUND LATERAL BRANCH NERVES OF SACROILIAC JOINT Left 3/5/2021    Procedure: left SI joint injection;  Surgeon: Pedro Varela MD;  Location: [facility];  Service: Pain Management;  Laterality: Left;  left SI joint injection     INJECTION OF JOINT Left 11/30/2021    Procedure: Injection, Joint Sacroiliac and GTB;  Surgeon: Pedro Varela MD;  Location: [facility] OR;  Service: Pain Management;  Laterality: Left;    INJECTION OF JOINT Left 11/30/2021    Procedure: INJECTION, JOINT, SHOULDER, HIP, OR KNEE;  Surgeon: Pedro Varela MD;  Location: [facility];  Service: Pain Management;  Laterality: Left;  GTB injestion    JOINT REPLACEMENT Left 2013    knee replacemant      left knee     Family History   Problem Relation Name Age of Onset    No Known Problems Mother      Heart disease Father      Heart disease Brother      No Known Problems Sister 1/2     No Known Problems Daughter      No Known Problems Brother 1/2      Social History     Tobacco Use    Smoking status: Never    Smokeless tobacco: Never   Substance Use Topics    Alcohol use: No    Drug use: No     Review of Systems   Constitutional:  Negative for fever.   HENT:  Negative for sore throat.    Respiratory:  Negative for shortness of breath.    Cardiovascular:  Negative for chest pain.   Gastrointestinal:  Negative for nausea.   Genitourinary:  Negative for dysuria.   Musculoskeletal:  Positive for neck pain. Negative for back pain.   Skin:  Negative for rash.   Neurological:  Negative for weakness.   Hematological:  Does not bruise/bleed easily.       Physical Exam     Initial Vitals [11/23/24 1519]   BP Pulse Resp Temp SpO2   (!) 143/74 70 16 98.1 °F (36.7 °C) 98 %      MAP       --         Physical Exam    Nursing note and vitals reviewed.  Constitutional: He appears well-developed and well-nourished. He is not diaphoretic. No distress.   HENT:   Head: Normocephalic and atraumatic. Mouth/Throat: Oropharynx  is clear and moist.   Eyes: Conjunctivae are normal.   Neck: Neck supple.       Cardiovascular:  Normal rate, regular rhythm, normal heart sounds and intact distal pulses.     Exam reveals no gallop and no friction rub.       No murmur heard.  Pulmonary/Chest: Breath sounds normal. He has no wheezes. He has no rhonchi. He has no rales.   Abdominal: Abdomen is soft. There is no abdominal tenderness.   Musculoskeletal:      Cervical back: Neck supple. Muscular tenderness present. No spinous process tenderness. Decreased range of motion (due to pain).     Neurological: He is alert and oriented to person, place, and time.   Skin: No rash noted. No erythema.         ED Course   Procedures  Labs Reviewed - No data to display       Imaging Results    None          Medications   dexAMETHasone injection 8 mg (8 mg Intramuscular Given 11/23/24 1612)     Medical Decision Making  This is an urgent evaluation of an 89 year old male presenting to the ED with c/o right sided neck pain. There has been no injury or trauma. He has had recent imaging of the cervical spine and head on 11-11-24. Radiology report at that time as follows: No acute fracture of the cervical spine. No traumatic malalignment of the cervical spine. No acute intraspinal abnormality. No perched, jumped or locked facets seen.     Vertebral body heights are maintained.  There is advanced degenerative change throughout the cervical spine.  Grade 1 anterolisthesis is present at C4-C5 (4 mm) with mild retrolisthesis of C5 on C6 (3 mm).  There is severe disc height loss at C4-C5, C5-C6 and C6-C7.  There is severe facet arthropathy at C2-C3, C3-C4, and C4-C5.  With severe facet arthropathy at C7-T1, and grade 1 anterolisthesis at this level (4 mm).  Additional degenerative changes are present throughout the cervical spine (for which this study is not tailored to assess).  There are calcified plaques at the level of the carotid bulbs and proximal internal carotid  arteries with no in.  There is diffusely decreased osseous mineralization (suggesting osteoporosis/osteopenia).     Visualized brain is unremarkable. Visualized prevertebral tissues are unremarkable.     He is neurovascularly intact with no fever. Do not suspect meningitis, spinal epidural abscess, hemorrhage, fracture. He is already prescribed percocet and robaxin. Will add lidoderm patch and diclofenac gel as well as prednisone. He has an orthopedist and was instructed to see this provider as well as his PCP. Do not think repeat imaging is necessary at this time. Strict ED return precautions were discussed and he verbalized understanding. Based on my clinical evaluation, I do not appreciate any immediate, emergent, or life threatening condition or etiology that warrants additional workup today and feel that the patient can be discharged with close follow up care.        Risk  Prescription drug management.              Attending Attestation:     Physician Attestation Statement for NP/PA:       Other NP/PA Attestation Additions:    History of Present Illness: 89-year-old male presents for neck pain.    Medical Decision Making: This patient was seen by a mid-level provider in the department.  I was available in the department for consultation but did not examine the patient.  I was not consulted about the patient's care while they were in the department.                                    Clinical Impression:  Final diagnoses:  [M54.2] Neck pain (Primary)          ED Disposition Condition    Discharge Stable          ED Prescriptions       Medication Sig Dispense Start Date End Date Auth. Provider    LIDOcaine (LIDODERM) 5 % Place 1 patch onto the skin once daily. Remove & Discard patch within 12 hours. Do not reapply for 12 hours. 15 patch 11/23/2024 -- Maricarmen Guo NP    diclofenac sodium (VOLTAREN) 1 % Gel Apply 2 g topically 3 (three) times daily. 200 g 11/23/2024 -- Maricarmen Guo NP    predniSONE  (DELTASONE) 20 MG tablet Take 2 tablets (40 mg total) by mouth once daily. for 4 days 8 tablet 11/25/2024 11/29/2024 Maricarmen Guo NP          Follow-up Information       Follow up With Specialties Details Why Contact Info Additional Information    [facility] - Emergency Dept Emergency Medicine  As needed, If symptoms worsen [address] [facility] [id number] [phone] 1st floor    Kirk Cueto Jr., MD Internal Medicine Schedule an appointment as soon as possible for a visit   [address]  [place] [address]  [phone]                Maricarmen Guo NP  11/23/24 [id number]       Karlo Sepulveda MD  11/24/24 3732

## 2024-11-30 ENCOUNTER — EXTERNAL CHRONIC CARE MANAGEMENT (OUTPATIENT)
Dept: PRIMARY CARE CLINIC | Facility: CLINIC | Age: 89
End: 2024-11-30
Payer: MEDICARE

## 2024-11-30 PROCEDURE — 99439 CHRNC CARE MGMT STAF EA ADDL: CPT | Mod: PBBFAC | Performed by: INTERNAL MEDICINE

## 2024-11-30 PROCEDURE — 99490 CHRNC CARE MGMT STAFF 1ST 20: CPT | Mod: PBBFAC | Performed by: INTERNAL MEDICINE

## 2024-11-30 PROCEDURE — 99490 CHRNC CARE MGMT STAFF 1ST 20: CPT | Mod: S$PBB,,, | Performed by: INTERNAL MEDICINE

## 2024-11-30 PROCEDURE — 99439 CHRNC CARE MGMT STAF EA ADDL: CPT | Mod: S$PBB,,, | Performed by: INTERNAL MEDICINE

## 2024-12-02 ENCOUNTER — LAB VISIT (OUTPATIENT)
Dept: LAB | Facility: HOSPITAL | Age: 89
End: 2024-12-02
Attending: INTERNAL MEDICINE
Payer: MEDICARE

## 2024-12-02 DIAGNOSIS — D53.9 NUTRITIONAL ANEMIA, UNSPECIFIED: ICD-10-CM

## 2024-12-02 DIAGNOSIS — E53.8 DEFICIENCY OF OTHER SPECIFIED B GROUP VITAMINS: ICD-10-CM

## 2024-12-02 DIAGNOSIS — C82.61 CUTANEOUS FOLLICLE CENTER LYMPHOMA INVOLVING LYMPH NODES OF HEAD: ICD-10-CM

## 2024-12-02 LAB
ALBUMIN SERPL BCP-MCNC: 3.6 G/DL (ref 3.5–5.2)
ALBUMIN SERPL BCP-MCNC: 3.6 G/DL (ref 3.5–5.2)
ALP SERPL-CCNC: 107 U/L (ref 40–150)
ALP SERPL-CCNC: 107 U/L (ref 40–150)
ALT SERPL W/O P-5'-P-CCNC: 24 U/L (ref 10–44)
ALT SERPL W/O P-5'-P-CCNC: 24 U/L (ref 10–44)
ANION GAP SERPL CALC-SCNC: 7 MMOL/L (ref 8–16)
ANION GAP SERPL CALC-SCNC: 7 MMOL/L (ref 8–16)
AST SERPL-CCNC: 19 U/L (ref 10–40)
AST SERPL-CCNC: 19 U/L (ref 10–40)
BASOPHILS # BLD AUTO: 0.03 K/UL (ref 0–0.2)
BASOPHILS # BLD AUTO: 0.03 K/UL (ref 0–0.2)
BASOPHILS NFR BLD: 0.4 % (ref 0–1.9)
BASOPHILS NFR BLD: 0.4 % (ref 0–1.9)
BILIRUB SERPL-MCNC: 1.1 MG/DL (ref 0.1–1)
BILIRUB SERPL-MCNC: 1.1 MG/DL (ref 0.1–1)
BUN SERPL-MCNC: 17 MG/DL (ref 8–23)
BUN SERPL-MCNC: 17 MG/DL (ref 8–23)
CALCIUM SERPL-MCNC: 10 MG/DL (ref 8.7–10.5)
CALCIUM SERPL-MCNC: 10 MG/DL (ref 8.7–10.5)
CHLORIDE SERPL-SCNC: 105 MMOL/L (ref 95–110)
CHLORIDE SERPL-SCNC: 105 MMOL/L (ref 95–110)
CO2 SERPL-SCNC: 25 MMOL/L (ref 23–29)
CO2 SERPL-SCNC: 25 MMOL/L (ref 23–29)
CREAT SERPL-MCNC: 1 MG/DL (ref 0.5–1.4)
CREAT SERPL-MCNC: 1 MG/DL (ref 0.5–1.4)
DIFFERENTIAL METHOD BLD: ABNORMAL
DIFFERENTIAL METHOD BLD: ABNORMAL
EOSINOPHIL # BLD AUTO: 0.1 K/UL (ref 0–0.5)
EOSINOPHIL # BLD AUTO: 0.1 K/UL (ref 0–0.5)
EOSINOPHIL NFR BLD: 1.3 % (ref 0–8)
EOSINOPHIL NFR BLD: 1.3 % (ref 0–8)
ERYTHROCYTE [DISTWIDTH] IN BLOOD BY AUTOMATED COUNT: 13.3 % (ref 11.5–14.5)
ERYTHROCYTE [DISTWIDTH] IN BLOOD BY AUTOMATED COUNT: 13.3 % (ref 11.5–14.5)
EST. GFR  (NO RACE VARIABLE): >60 ML/MIN/1.73 M^2
EST. GFR  (NO RACE VARIABLE): >60 ML/MIN/1.73 M^2
FERRITIN SERPL-MCNC: 398 NG/ML (ref 20–300)
FERRITIN SERPL-MCNC: 398 NG/ML (ref 20–300)
FOLATE SERPL-MCNC: 12.3 NG/ML (ref 4–24)
FOLATE SERPL-MCNC: 12.3 NG/ML (ref 4–24)
GLUCOSE SERPL-MCNC: 92 MG/DL (ref 70–110)
GLUCOSE SERPL-MCNC: 92 MG/DL (ref 70–110)
HCT VFR BLD AUTO: 41 % (ref 40–54)
HCT VFR BLD AUTO: 41 % (ref 40–54)
HGB BLD-MCNC: 14 G/DL (ref 14–18)
HGB BLD-MCNC: 14 G/DL (ref 14–18)
IMM GRANULOCYTES # BLD AUTO: 0.07 K/UL (ref 0–0.04)
IMM GRANULOCYTES # BLD AUTO: 0.07 K/UL (ref 0–0.04)
IMM GRANULOCYTES NFR BLD AUTO: 1 % (ref 0–0.5)
IMM GRANULOCYTES NFR BLD AUTO: 1 % (ref 0–0.5)
IRON SERPL-MCNC: 142 UG/DL (ref 45–160)
IRON SERPL-MCNC: 142 UG/DL (ref 45–160)
LYMPHOCYTES # BLD AUTO: 1.6 K/UL (ref 1–4.8)
LYMPHOCYTES # BLD AUTO: 1.6 K/UL (ref 1–4.8)
LYMPHOCYTES NFR BLD: 23.5 % (ref 18–48)
LYMPHOCYTES NFR BLD: 23.5 % (ref 18–48)
MCH RBC QN AUTO: 34.6 PG (ref 27–31)
MCH RBC QN AUTO: 34.6 PG (ref 27–31)
MCHC RBC AUTO-ENTMCNC: 34.1 G/DL (ref 32–36)
MCHC RBC AUTO-ENTMCNC: 34.1 G/DL (ref 32–36)
MCV RBC AUTO: 101 FL (ref 82–98)
MCV RBC AUTO: 101 FL (ref 82–98)
MONOCYTES # BLD AUTO: 0.8 K/UL (ref 0.3–1)
MONOCYTES # BLD AUTO: 0.8 K/UL (ref 0.3–1)
MONOCYTES NFR BLD: 10.9 % (ref 4–15)
MONOCYTES NFR BLD: 10.9 % (ref 4–15)
NEUTROPHILS # BLD AUTO: 4.4 K/UL (ref 1.8–7.7)
NEUTROPHILS # BLD AUTO: 4.4 K/UL (ref 1.8–7.7)
NEUTROPHILS NFR BLD: 62.9 % (ref 38–73)
NEUTROPHILS NFR BLD: 62.9 % (ref 38–73)
NRBC BLD-RTO: 0 /100 WBC
NRBC BLD-RTO: 0 /100 WBC
PLATELET # BLD AUTO: 177 K/UL (ref 150–450)
PLATELET # BLD AUTO: 177 K/UL (ref 150–450)
PMV BLD AUTO: 11.4 FL (ref 9.2–12.9)
PMV BLD AUTO: 11.4 FL (ref 9.2–12.9)
POTASSIUM SERPL-SCNC: 4.6 MMOL/L (ref 3.5–5.1)
POTASSIUM SERPL-SCNC: 4.6 MMOL/L (ref 3.5–5.1)
PROT SERPL-MCNC: 6.9 G/DL (ref 6–8.4)
PROT SERPL-MCNC: 6.9 G/DL (ref 6–8.4)
RBC # BLD AUTO: 4.05 M/UL (ref 4.6–6.2)
RBC # BLD AUTO: 4.05 M/UL (ref 4.6–6.2)
SATURATED IRON: 45 % (ref 20–50)
SATURATED IRON: 45 % (ref 20–50)
SODIUM SERPL-SCNC: 137 MMOL/L (ref 136–145)
SODIUM SERPL-SCNC: 137 MMOL/L (ref 136–145)
TOTAL IRON BINDING CAPACITY: 318 UG/DL (ref 250–450)
TOTAL IRON BINDING CAPACITY: 318 UG/DL (ref 250–450)
TRANSFERRIN SERPL-MCNC: 215 MG/DL (ref 200–375)
TRANSFERRIN SERPL-MCNC: 215 MG/DL (ref 200–375)
VIT B12 SERPL-MCNC: 653 PG/ML (ref 210–950)
VIT B12 SERPL-MCNC: 653 PG/ML (ref 210–950)
WBC # BLD AUTO: 6.97 K/UL (ref 3.9–12.7)
WBC # BLD AUTO: 6.97 K/UL (ref 3.9–12.7)

## 2024-12-02 PROCEDURE — 80053 COMPREHEN METABOLIC PANEL: CPT | Performed by: INTERNAL MEDICINE

## 2024-12-02 PROCEDURE — 82746 ASSAY OF FOLIC ACID SERUM: CPT | Performed by: INTERNAL MEDICINE

## 2024-12-02 PROCEDURE — 85025 COMPLETE CBC W/AUTO DIFF WBC: CPT | Performed by: INTERNAL MEDICINE

## 2024-12-02 PROCEDURE — 36415 COLL VENOUS BLD VENIPUNCTURE: CPT | Mod: PO | Performed by: INTERNAL MEDICINE

## 2024-12-02 PROCEDURE — 82607 VITAMIN B-12: CPT | Performed by: INTERNAL MEDICINE

## 2024-12-02 PROCEDURE — 83540 ASSAY OF IRON: CPT | Performed by: INTERNAL MEDICINE

## 2024-12-02 PROCEDURE — 82728 ASSAY OF FERRITIN: CPT | Performed by: INTERNAL MEDICINE

## 2024-12-04 NOTE — PROGRESS NOTES
[facility] Hematology/Oncology  PROGRESS NOTE -   Follow-up Visit      Subjective:       Patient ID:   NAME: Bryson Kellogg : 1935     89 y.o. male    Referring Doc: Osiris Lopez  Other Physicians: Kirk Cueto ([facility]); Niecy ([facility]); Luisana    Chief Complaint:  cutaneous follicular lymphoma f/u    History of Present Illness:     Patient returns today for a regularly scheduled follow-up visit.  The patient is here today to go over the results of the recently ordered labs, tests and studies. He is here with his wife.     He switched primary to Sasha Rose NP.     No current atypical dermatitis issues     He saw Dr Lakeshia Lopez with dermatology on 2024    Otherwise, no new issues. Breathing ok. No CP, SOB, HA's or N/V.     He previously was followed Dr Lieberman with rad/onc and they completed XRT with 22 total on 2020.      He had some neck issues and had CT c-spine and head on 2024    He saw Dr Esteban with GI and had scope on 2023 with several benign polyps    He last saw Dr MIRANDA Floyd in 2024     He had recent PEt scan on 4/15/2024 with no evidence of cancer          Discussed covid19 precautions. He had his vaccinations            ROS:   GEN: normal without any fever, night sweats or weight loss  HEENT: normal with no HA's, sore throat, stiff neck, changes in vision  CV: normal with no CP, SOB, PND, LEMOS or orthopnea  PULM: normal with no SOB, cough, hemoptysis, sputum or pleuritic pain  GI: normal with no abdominal pain, nausea, vomiting, constipation, diarrhea, melanotic stools, BRBPR, or hematemesis  : normal with no hematuria, dysuria  BREAST: normal with no mass, discharge, pain  SKIN: normal with no rash, erythema, bruising, or swelling    Pain Scale:  0    Allergies:  Review of patient's allergies indicates:   Allergen Reactions    Lisinopril Rash    Sulfa (sulfonamide antibiotics) Rash     Childhood allergy       Medications:    Current Outpatient Medications:      ascorbic acid, vitamin C, (VITAMIN C) 500 MG tablet, Take 500 mg by mouth once daily., Disp: , Rfl:     atorvastatin (LIPITOR) 20 MG tablet, Take 1 tablet (20 mg total) by mouth once daily., Disp: 90 tablet, Rfl: 1    co-enzyme Q-10 30 mg capsule, Take 30 mg by mouth 3 (three) times daily., Disp: , Rfl:     diclofenac sodium (VOLTAREN) 1 % Gel, Apply 2 g topically 3 (three) times daily., Disp: 200 g, Rfl: 1    LIDOcaine (LIDODERM) 5 %, Place 1 patch onto the skin once daily. Remove & Discard patch within 12 hours. Do not reapply for 12 hours., Disp: 15 patch, Rfl: 0    multivitamin (THERAGRAN) per tablet, Take 1 tablet by mouth once daily., Disp: , Rfl:     ammonium lactate 12 % Crea, Aaa qd after shower prn dry skin (Patient not taking: Reported on 12/5/2024), Disp: 385 g, Rfl: 11    aspirin (ECOTRIN) 81 MG EC tablet, Take 81 mg by mouth once daily. (Patient not taking: Reported on 10/11/2024), Disp: , Rfl:     cholecalciferol, vitamin D3, (VITAMIN D3) 125 mcg (5,000 unit) Tab, Take 5,000 Units by mouth once daily. (Patient not taking: Reported on 10/11/2024), Disp: , Rfl:     clotrimazole-betamethasone 1-0.05% (LOTRISONE) cream, Aaa bid x 1-2 wks prn rash.  Tk 1 wk off before repeating (Patient not taking: Reported on 12/5/2024), Disp: 45 g, Rfl: 6    cyanocobalamin, vitamin B-12, 1,000 mcg/15 mL Liqd, Take 15 mLs by mouth once daily. (Patient not taking: Reported on 10/11/2024), Disp: , Rfl:     ketoconazole (NIZORAL) 2 % cream, Aaa qd-bid prn heat rash, ok for continuous use (Patient not taking: Reported on 12/5/2024), Disp: 60 g, Rfl: 11    metronidazole 1% (METROGEL) 1 % Gel, , Disp: , Rfl:     omega-3/dha/epa/ala/vitamin D3 (OMEGA-3-DHA-EPA-ALA-VIT D3) 50 mg (25 mg- 25 mg)-100 unit Chew, Take 1 capsule by mouth once daily. (Patient not taking: Reported on 10/11/2024), Disp: , Rfl:     oxyCODONE-acetaminophen (PERCOCET) 5-325 mg per tablet, Take 1 tablet by mouth every 6 (six) hours as needed. (Patient not  "taking: Reported on 12/5/2024), Disp: 12 tablet, Rfl: 0    pyridoxine, vitamin B6, (B-6) 100 MG Tab, Take 50 mg by mouth once daily. (Patient not taking: Reported on 10/11/2024), Disp: , Rfl:     triamcinolone acetonide 0.1% (KENALOG) 0.1 % cream, Aaa bid (Patient not taking: Reported on 12/5/2024), Disp: 454 g, Rfl: 1    vit A/vit C/vit E/zinc/copper (PRESERVISION AREDS ORAL), Take 1 tablet by mouth 2 (two) times daily. (Patient not taking: Reported on 10/11/2024), Disp: , Rfl:     PMHx/PSHx Updates:  See patient's last visit with me on 6/6/2024  See H&P on 8/13/2020        Pathology:   Cancer Staging   No matching staging information was found for the patient.      Skin biopsy 2/20/2024:    RIGHT UPPER ARM:   - INTERFACE AND PERIVASCULAR DERMATITIS WITH PROMINENT DYSKERATOSIS.   - SEE NOTE.     NOTE:  These findings could be seen in subacute lupus.  Changes within the spectrum of erythema multiforme were also considered histologically.       Colonoscopy: 12/5/2023:    1. ASCENDING COLON POLYPS BIOPSIES:   - TUBULAR ADENOMAS.   - NEGATIVE FOR HIGH GRADE DYSPLASIA OR MALIGNANCY.     2. TRANSVERSE COLON POLYPS BIOPSIES:   - TUBULAR ADENOMAS.   - NEGATIVE FOR HIGH GRADE DYSPLASIA OR MALIGNANCY.       Skin biopsy: 2/27/2023:    DIAGNOSIS:   03/01/2023 WPL/jr     1.  RIGHT ADJACENT TO LATERAL CANTHUS, SHAVE:   - ACTINIC KERATOSIS.   - NODULAR SOLAR ELASTOSIS.     2.  LEFT CHEST, PUNCH:   - EPIDERMAL CYST, INFUNDIBULAR TYPE.     3.  LEFT MIDDLE BACK, PUNCH:   - EPIDERMAL CYST, INFUNDIBULAR TYPE.      Objective:     Vitals:  Blood pressure 131/86, pulse 69, temperature 97.9 °F (36.6 °C), temperature source Temporal, resp. rate 16, height 5' 10" (1.778 m), weight 80.3 kg (177 lb).    Physical Examination:   GEN: no apparent distress, comfortable; AAOx3  HEAD: atraumatic and normocephalic; healed scar on left anterior auricular region  EYES: no pallor, no icterus, PERRLA  ENT: OMM, no pharyngeal erythema, external ears WNL; " no nasal discharge; no thrush  NECK: no masses, thyroid normal, trachea midline, no LAD/LN's, supple  CV: RRR with no murmur; normal pulse; normal S1 and S2; no pedal edema  CHEST: Normal respiratory effort; CTAB; normal breath sounds; no wheeze or crackles  ABDOM: nontender and nondistended; soft; normal bowel sounds; no rebound/guarding  MUSC/Skeletal: ROM normal; no crepitus; joints normal; no deformities or arthropathy  EXTREM: no clubbing, cyanosis, inflammation or swelling  SKIN: no rashes, lesions, ulcers, petechiae or subcutaneous nodules;no residual rash  : no mcgill  NEURO: grossly intact; motor/sensory WNL; AAOx3; no tremors  PSYCH: normal mood, affect and behavior  LYMPH: normal cervical, supraclavicular, axillary and groin LN's            Labs:   CMP  Sodium   Date Value Ref Range Status   12/02/2024 137 136 - 145 mmol/L Final   12/02/2024 137 136 - 145 mmol/L Final     Potassium   Date Value Ref Range Status   12/02/2024 4.6 3.5 - 5.1 mmol/L Final   12/02/2024 4.6 3.5 - 5.1 mmol/L Final     Chloride   Date Value Ref Range Status   12/02/2024 105 95 - 110 mmol/L Final   12/02/2024 105 95 - 110 mmol/L Final     CO2   Date Value Ref Range Status   12/02/2024 25 23 - 29 mmol/L Final   12/02/2024 25 23 - 29 mmol/L Final     Glucose   Date Value Ref Range Status   12/02/2024 92 70 - 110 mg/dL Final   12/02/2024 92 70 - 110 mg/dL Final     BUN   Date Value Ref Range Status   12/02/2024 17 8 - 23 mg/dL Final   12/02/2024 17 8 - 23 mg/dL Final     Creatinine   Date Value Ref Range Status   12/02/2024 1.0 0.5 - 1.4 mg/dL Final   12/02/2024 1.0 0.5 - 1.4 mg/dL Final   11/23/2012 1.2 0.5 - 1.4 mg/dL Final     Calcium   Date Value Ref Range Status   12/02/2024 10.0 8.7 - 10.5 mg/dL Final   12/02/2024 10.0 8.7 - 10.5 mg/dL Final   11/23/2012 9.4 8.7 - 10.5 mg/dL Final     Total Protein   Date Value Ref Range Status   12/02/2024 6.9 6.0 - 8.4 g/dL Final   12/02/2024 6.9 6.0 - 8.4 g/dL Final     Albumin   Date Value  Ref Range Status   12/02/2024 3.6 3.5 - 5.2 g/dL Final   12/02/2024 3.6 3.5 - 5.2 g/dL Final     Total Bilirubin   Date Value Ref Range Status   12/02/2024 1.1 (H) 0.1 - 1.0 mg/dL Final     Comment:     For infants and newborns, interpretation of results should be based  on gestational age, weight and in agreement with clinical  observations.    Premature Infant recommended reference ranges:  Up to 24 hours.............<8.0 mg/dL  Up to 48 hours............<12.0 mg/dL  3-5 days..................<15.0 mg/dL  6-29 days.................<15.0 mg/dL     12/02/2024 1.1 (H) 0.1 - 1.0 mg/dL Final     Comment:     For infants and newborns, interpretation of results should be based  on gestational age, weight and in agreement with clinical  observations.    Premature Infant recommended reference ranges:  Up to 24 hours.............<8.0 mg/dL  Up to 48 hours............<12.0 mg/dL  3-5 days..................<15.0 mg/dL  6-29 days.................<15.0 mg/dL       Alkaline Phosphatase   Date Value Ref Range Status   12/02/2024 107 40 - 150 U/L Final   12/02/2024 107 40 - 150 U/L Final   11/23/2012 85 55 - 135 U/L Final     AST   Date Value Ref Range Status   12/02/2024 19 10 - 40 U/L Final   12/02/2024 19 10 - 40 U/L Final   11/23/2012 22 10 - 40 U/L Final     ALT   Date Value Ref Range Status   12/02/2024 24 10 - 44 U/L Final   12/02/2024 24 10 - 44 U/L Final     Anion Gap   Date Value Ref Range Status   12/02/2024 7 (L) 8 - 16 mmol/L Final   12/02/2024 7 (L) 8 - 16 mmol/L Final   11/23/2012 10 5 - 15 meq/L Final     eGFR if    Date Value Ref Range Status   06/14/2022 >60.0 >60 mL/min/1.73 m^2 Final     eGFR if non    Date Value Ref Range Status   06/14/2022 >60.0 >60 mL/min/1.73 m^2 Final     Comment:     Calculation used to obtain the estimated glomerular filtration  rate (eGFR) is the CKD-EPI equation.            Lab Results   Component Value Date    WBC 6.97 12/02/2024    WBC 6.97 12/02/2024     HGB 14.0 12/02/2024    HGB 14.0 12/02/2024    HCT 41.0 12/02/2024    HCT 41.0 12/02/2024     (H) 12/02/2024     (H) 12/02/2024     12/02/2024     12/02/2024           Radiology/Diagnostic Studies:    CT cervical neck  11/11/2024:  Impression:     No acute fracture or traumatic malalignment in the cervical spine.     Ct Head 11/11/2024:    Impression:     No acute intracranial abnormality.           PET 4/15/2024:      Impression:     No PET-CT evidence of FDG avid malignancy.     Stable subcentimeter pulmonary nodules.         PET 10/26/2023:  IMPRESSION:  1. No evidence of active FDG avid lymphoproliferative disease.  2. Additional observations as described.      PET 2/23/2023:    IMPRESSION:  1. No evidence of recurrent FDG avid lymphoproliferative disease.  2. Additional observations as described.         PET 8/22/2022:  IMPRESSION:  No evidence of recurrent or metastatic disease        PET 2/14/2022:    IMPRESSION:  No PET/CT evidence of FDG avid disease.        US axilla 9/15/2021:    FINDINGS: Sonographic assessment targeted to the left axilla was performed in planning for possible biopsy. Recently reported lymph node (PET/CT August 24, 2021) is identified, measuring 2.2 x 1.0 cm. Margins are sharp, and fatty hilum is preserved.      CT Soft Neck  9/9/2021:    IMPRESSION: No enlarged cervical chain lymph nodes or cervical soft tissue mass        PET  8/24/2021:    Impression:     1. Nonspecific FDG uptake in left supraclavicular soft tissues, new, without abnormal CT correlate.  If further imaging evaluation is needed, soft tissue neck CT with IV contrast can be considered.  2. 11 mm soft tissue mass in left axilla which is moderately FDG avid and has surrounding inflammatory fat stranding.  Recurrent lymphoma is a consideration.  Reactive lymph node uptake due to infectious or inflammatory etiology also conceivable.  3. No other evidence of lymphoproliferative disorder.  4.  Unchanged pulmonary nodules        PET  2/19/2021:  IMPRESSION: No evidence of active lymphoproliferative disease.          Nm Pet Ct Whole Body    Result Date: 8/24/2020  EXAMINATION: NM PET CT WHOLE BODY CLINICAL HISTORY: Cutaneous follicle center lymphoma, lymph nodes of head, face, and neck, initial staging, lung nodules, C 82.61, R 91.8. TECHNIQUE: Following the injection of 12.8 mCi of F-18 labeled FDG into a left antecubital vein, PET CT was performed from the vertex of the skull through the feet with an integrated full ring PET CT scanner with image fusion. The patient's serum glucose at the time of the exam was 88 mg/dL. COMPARISON: Multiple prior exams including chest CT of 01/05/2017. FINDINGS: There is no abnormal focal FDG hypermetabolism in the head, neck, chest, abdomen, pelvis, or the skeletal system to suggest active lymphoproliferative disease.  There is osseous FDG hypermetabolism associated with cervical and lumbar degenerative facet arthropathy. CT images of the brain show scattered areas of nonspecific white matter hypoattenuation, with no intra-axial mass or abnormal extra-axial fluid.  There is generalized prominence of the cortical sulci and ventricles.  No suspicious sclerotic or lytic osseous abnormalities of the calvarium. CT images of the chest show no new suspicious pulmonary nodules or masses, with stable 5 mm oval noncalcified nodule in the left lower lobe image 153, with stable oval 5 mm nodular opacity in the right lower lobe along the major fissure image 128.  There is stable apical fibronodular scarring, with no pleural or pericardial effusion.  No new suspicious pulmonary nodules or masses.  There are aortic and coronary arterial vascular calcifications. CT images of the abdomen and pelvis show cholecystectomy clips, few splenic granulomatous calcifications, nonspecific peripancreatic calcifications, hypoattenuating bilateral renal lesions suggestive of cysts, and scattered  aortoiliac vascular calcifications, with no ascites. CT images of the lower extremities show no enlarged lymph nodes or soft tissue masses, with diffuse arterial vascular calcifications.  There is advanced arthropathy of the left 1st metatarsophalangeal joint.  There is intervertebral disc space narrowing and facet arthropathy in the spine, with bilateral glenohumeral arthropathic changes.  No suspicious sclerotic or lytic osseous abnormalities by CT.     1. No evidence of active lymphoproliferative disease. 2. Stable subcentimeter noncalcified pulmonary nodules dating back to 01/05/2017, compatible with benign etiology. 3. Additional observations as above. Electronically signed by: Balta Ta MD Date:    08/24/2020 Time:    13:22      I have reviewed all available lab results and radiology reports.    Assessment/Plan:   (1) 89 y.o. 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   - excisional biopsy was performed on 7/28/2020  - CD 20 +l BCL-6 stain is positive; CD30 was negative  - dicussed pathology and went over the latest NCCN guidelines (version 2.2020)  - refer to rad/onc and schedule PET    9/8/2020:  - He saw Dr Lieberman with rad/onc and they have started him on XRT with 22 total. He is in his 2nd week of XRT.   - He had PEt scan on 8/24/2020 with no evidence of a systemic lymphoma process.     10/12/2020:  - He previously saw Dr Lieberman with rad/onc and they completed XRT with 22 total on 9/29/2020.    - He had PEt scan on 8/24/2020 with no evidence of a systemic lymphoma process.   - he will need f/u with derm and rad/onc   - discussed consideration for [facility] evaluation at some point in future if ever needed but he wants to hold off on that right now    3/1/2021:  - He saw saw Dr Lopez recently on Jan 18th 2021  with dermatology and had clear report.  - He had recent PET on 2/19/2021 which was negative for any recurrent lymphoma  - He went to ER in jan 2021 with bout of vertigo.    9/30/2021:  - recent evaluation with derm which was good  - recent PET, CT neck and US axilla with borderline LN in axilla; however, patient had covid vaccination day before he had PET which could be the etiology  - CT Neck was negative  - US really did not elucidate any LN to biopsy  - discussed with patient and his wife and the are in agreement with just getting repeat PET in FEb 2022    3/29/2022:  - he saw Dr lopez recently with derm  - recent PEt on 2/14/2022 negative    9/6/2022:  - he saw Dr Lopez recently in July 2022 with clear report  - he had recent negative PET on 8/22/2022    3/6/2023:  - recent biopsies with Dr Lopez which were negative for cancer  - latest PEt on 2/23/2023 negative    9/6/2023:  - he saw Dr Lopez with derm on 8/18 with just a coupe of spots requiring burning  - look to repeat PEt in Feb 2024    3/6/2024:  - he had repeat biopsy with Dr Lopez on 2/20/2024 - pathology showing dermatitis  - he sees Dr Lopez again tomorrow  - labs are adequate  - PEt scan in Oct 2023 with no evidence of recurrent lymphoma  - refer to Dr Flowers with Allergy/Immunology    6/6/2024:  - He had been having some form of atypical dermatitis with itching which has since resolved.   - He saw Dr Flowers with allergy/immunology and she found that it was the lisinopril which has since been discontinued.   -  He saw Dr Lakeshia Lopez with dermatology on 3/7/2024  - labs from Apr 2024 adequate  - recent PET in Apr 2024 stable    12/5/2024:  - no new issues  - he saw Dr Lopez recently   - labs relatively adequate and stable  - set up repeat PET for Apr 2025     (2) Hypercholesterolemia     (3) Hx/of TIA in 1999     (4) BPH     (5) KATHY     (6) Pulmonary nodules - no tobacco history; monitored via CT scans with last one in Jan 2017     (7) Hx/of  "colon polyps     (8) Rosacea     (9) Arthritis and DJD - knee    (10) Mild anemia     9/6/2023:  - hgb a little lower this time around  - he is seeing Dr Esteban next week for evaluation for colonoscopy  - check up to date iron panel and b12/folate  3/6/2024:  - he had scope with Dr Esteban in Dec 2024  12/5/2024:  - latest labs adeqaute      VISIT DIAGNOSES:      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  Check up to date labs, incl iron panel and b12/folate every 6 months  3.  Repeat PET in Apr 2025  4. F/u  PCP, card and GI, etc       RTC in 6 months (Apr/may 2025)  Fax note to  Nory Lopez; Niecy/Luisana Varela;  Eulalia Floyd    Discussion:     Pathology Discussion:     I reviewed and discussed the pathology report(s) and radiograph reports (if available) in as simple to understand and/or laymen's terms to the best of my ability. I had an indepth conversation with the patient and went over the patient's individual diagnosis based on the information that was currently available. I discussed the TNM staging process with regard to the patient's particular cancer type, and the calculated stage based on the currently available TNM data and literature. I discussed the available prognostic data with regard to the current staging information and how it relates to the prognosis of their particular neoplastic process.          NCCN Guidelines:     I discussed the available treatment option(s) in accordance with the latest literature from the NCCN Clinical Practice Guidelines for the patient's particular type of cancer disorder. The NCCN Guidelines provide a "document evidence-based (and) consensus-driven management" of the care of oncology patients. The treatment recommendations were made not only in accordance to the NCCN guidelines, but also factored in to account the patient's " overall age, condition, performance status and their medical co-morbidities. I went over the risks and benefits of the the treatment options (if any could be made) with regard to their particular cancer type, their cancer stage, their age, and their co-morbidities.      COVID-19 Discussion:     I had long discussion with patient and any applicable family about the COVID-19 coronavirus epidemic and the recommended precautions with regard to cancer and/or hematology patients. I have re-iterated the CDC recommendations for adequate hand washing, use of hand -like products, and coughing into elbow, etc. In addition, especially for our patients who are on chemotherapy and/or our otherwise immunocompromised patients, I have recommended avoidance of crowds, including movie theaters, restaurants, churches, etc. I have recommended avoidance of any sick or symptomatic family members and/or friends. I have also recommended avoidance of any raw and unwashed food products, and general avoidance of food items that have not been prepared by themselves. The patient has been asked to call us immediately with any symptom developments, issues, questions or other general concerns.         I spent over 25 mins of time with the patient. Reviewed results of the recently ordered labs, tests and studies; made directives with regards to the results. Over half of this time was spent couseling and coordinating care.    I have explained all of the above in detail and the patient understands all of the current recommendation(s). I have answered all of their questions to the best of my ability and to their complete satisfaction.   The patient is to continue with the current management plan.            Electronically signed by Mehdi Alvarez MD                         Answers submitted by the patient for this visit:  Review of Systems Questionnaire (Submitted on 12/2/2024)  appetite change : No  unexpected weight change: No  mouth  sores: No  visual disturbance: No  cough: No  shortness of breath: No  chest pain: No  abdominal pain: No  diarrhea: No  frequency: No  back pain: No  rash: No  headaches: No  adenopathy: No  nervous/ anxious: No

## 2024-12-05 ENCOUNTER — OFFICE VISIT (OUTPATIENT)
Facility: CLINIC | Age: 89
End: 2024-12-05
Payer: MEDICARE

## 2024-12-05 VITALS
WEIGHT: 177 LBS | RESPIRATION RATE: 16 BRPM | HEART RATE: 69 BPM | DIASTOLIC BLOOD PRESSURE: 86 MMHG | HEIGHT: 70 IN | TEMPERATURE: 98 F | BODY MASS INDEX: 25.34 KG/M2 | SYSTOLIC BLOOD PRESSURE: 131 MMHG

## 2024-12-05 DIAGNOSIS — D69.6 THROMBOCYTOPENIA, UNSPECIFIED: ICD-10-CM

## 2024-12-05 DIAGNOSIS — C82.61 CUTANEOUS FOLLICLE CENTER LYMPHOMA INVOLVING LYMPH NODES OF HEAD: Primary | ICD-10-CM

## 2024-12-05 DIAGNOSIS — Z85.72 HISTORY OF LYMPHOMA: ICD-10-CM

## 2024-12-05 PROCEDURE — 99999 PR PBB SHADOW E&M-EST. PATIENT-LVL IV: CPT | Mod: PBBFAC,,, | Performed by: INTERNAL MEDICINE

## 2024-12-05 PROCEDURE — 99214 OFFICE O/P EST MOD 30 MIN: CPT | Mod: PBBFAC,PN | Performed by: INTERNAL MEDICINE

## 2024-12-09 RX ORDER — ATORVASTATIN CALCIUM 20 MG/1
20 TABLET, FILM COATED ORAL DAILY
Qty: 90 TABLET | Refills: 3 | Status: SHIPPED | OUTPATIENT
Start: 2024-12-09

## 2024-12-09 NOTE — TELEPHONE ENCOUNTER
Care Due:                  Date            Visit Type   Department     Provider  --------------------------------------------------------------------------------                                EP -                              PRIMARY      [facility] INTERNAL  Last Visit: 08-      CARE ([facility])   POLI Cueto                              EP -                              PRIMARY      [facility] INTERNAL  Next Visit: 02-      CARE ([facility])   [facility]       Kirk Cueto                                                            Last  Test          Frequency    Reason                     Performed    Due Date  --------------------------------------------------------------------------------    Lipid Panel.  12 months..  atorvastatin.............  02- 02-    [facility] Embedded Care Due Messages. Reference number: [id number].   12/09/2024 9:05:41 AM CST

## 2024-12-31 ENCOUNTER — EXTERNAL CHRONIC CARE MANAGEMENT (OUTPATIENT)
Dept: PRIMARY CARE CLINIC | Facility: CLINIC | Age: 89
End: 2024-12-31
Payer: MEDICARE

## 2024-12-31 PROCEDURE — 99490 CHRNC CARE MGMT STAFF 1ST 20: CPT | Mod: PBBFAC | Performed by: INTERNAL MEDICINE

## 2025-01-09 ENCOUNTER — PATIENT MESSAGE (OUTPATIENT)
Dept: CARDIOLOGY | Facility: CLINIC | Age: 89
End: 2025-01-09
Payer: MEDICARE

## 2025-01-14 ENCOUNTER — OFFICE VISIT (OUTPATIENT)
Dept: CARDIOLOGY | Facility: CLINIC | Age: 89
End: 2025-01-14
Payer: MEDICARE

## 2025-01-14 ENCOUNTER — PATIENT MESSAGE (OUTPATIENT)
Dept: CARDIOLOGY | Facility: CLINIC | Age: 89
End: 2025-01-14

## 2025-01-14 VITALS
HEART RATE: 73 BPM | OXYGEN SATURATION: 98 % | SYSTOLIC BLOOD PRESSURE: 126 MMHG | HEIGHT: 70 IN | BODY MASS INDEX: 25.8 KG/M2 | DIASTOLIC BLOOD PRESSURE: 76 MMHG | WEIGHT: 180.19 LBS

## 2025-01-14 DIAGNOSIS — E78.2 MIXED HYPERLIPIDEMIA: Primary | ICD-10-CM

## 2025-01-14 DIAGNOSIS — R53.83 TIREDNESS: ICD-10-CM

## 2025-01-14 DIAGNOSIS — R53.83 FATIGUE, UNSPECIFIED TYPE: ICD-10-CM

## 2025-01-14 DIAGNOSIS — I70.0 CALCIFICATION OF AORTA: ICD-10-CM

## 2025-01-14 DIAGNOSIS — I10 PRIMARY HYPERTENSION: ICD-10-CM

## 2025-01-14 PROCEDURE — 99214 OFFICE O/P EST MOD 30 MIN: CPT | Mod: S$PBB,,, | Performed by: NURSE PRACTITIONER

## 2025-01-14 PROCEDURE — 99213 OFFICE O/P EST LOW 20 MIN: CPT | Mod: PBBFAC,PN | Performed by: NURSE PRACTITIONER

## 2025-01-14 PROCEDURE — 99999 PR PBB SHADOW E&M-EST. PATIENT-LVL III: CPT | Mod: PBBFAC,,, | Performed by: NURSE PRACTITIONER

## 2025-01-14 RX ORDER — ATORVASTATIN CALCIUM 20 MG/1
20 TABLET, FILM COATED ORAL DAILY
Qty: 90 TABLET | Refills: 3 | Status: SHIPPED | OUTPATIENT
Start: 2025-01-14

## 2025-01-14 NOTE — PROGRESS NOTES
Subjective:    Patient ID:  Bryson Kellogg is a 89 y.o. male who presents for 6 month follow-up  Chief Complaint   Patient presents with    Follow-up     6 month f/u     Fatigue     Ongoing        HPI:  Brsyon Kellogg is here for follow-up visit. Denies chest pain or shortness of breath or palpitations. Denies recent fever cough chills or congestion. Denies blood in the urine or blood in the stool.  Denies myalgias. Denies orthopnea or peripheral edema. Denies nausea vomiting or dyspepsia. No recent arm neck or jaw pain. No lightheadedness or dizziness.     He is awaiting more cervical spinal injections, which he reports are somewhat relieving his neck pain.    He reports increased sleeping throughout the day, which he states is related to being bored at home.  His wife notes he snores frequently at night. He previously (2009?) was tested for sleep apnea and recommended to wear a CPAP at that time. He lost over 50 lbs and no longer required to wear the mask.       CT cervical spine 11/11/2024:  There is advanced degenerative change throughout the cervical spine. Grade 1 anterolisthesis is present at C4-C5 (4 mm) with mild retrolisthesis of C5 on C6 (3 mm). There is severe disc height loss at C4-C5, C5-C6 and C6-C7. There is severe facet arthropathy at C2-C3, C3-C4, and C4-C5. With severe facet arthropathy at C7-T1, and grade 1 anterolisthesis at this level (4 mm). Additional degenerative changes are present throughout the cervical spine (for which this study is not tailored to assess). There are calcified plaques at the level of the carotid bulbs and proximal internal carotid arteries with no in. There is diffusely decreased osseous mineralization (suggesting osteoporosis/osteopenia).     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    History of B-cell lymphoma     Hx of colonic polyp 11/03/2016    Hyperlipidemia     Personal history of colonic polyps     Colon polyps    Renal stone 11/23/2012    Rosacea     Shingles     Sleep apnea     Squamous cell carcinoma of skin     TIA (transient ischemic attack)      Past Surgical History:   Procedure Laterality Date    BACK SURGERY  05/2020    Lum/Kyle    CHOLECYSTECTOMY      COLONOSCOPY N/A 11/3/2016    Procedure: COLONOSCOPY;  Surgeon: Alex Cuellar MD;  Location: [facility] ENDO;  Service: Endoscopy;  Laterality: N/A;    COLONOSCOPY N/A 12/5/2023    Procedure: COLONOSCOPY;  Surgeon: Frankie Esteban MD;  Location: [facility] ENDO;  Service: Endoscopy;  Laterality: N/A;    EYE SURGERY Bilateral 2014    cataracts    EYE SURGERY Right 10/2016    tear duct surgery    INJECTION OF ANESTHETIC AGENT AROUND LATERAL BRANCH NERVES OF SACROILIAC JOINT Left 3/5/2021    Procedure: left SI joint injection;  Surgeon: Pedro Varela MD;  Location: [facility] OR;  Service: Pain Management;  Laterality: Left;  left SI joint injection     INJECTION OF JOINT Left 11/30/2021    Procedure: Injection, Joint Sacroiliac and GTB;  Surgeon: Pedro Varela MD;  Location: [facility] OR;  Service: Pain Management;  Laterality: Left;    INJECTION OF JOINT Left 11/30/2021    Procedure: INJECTION, JOINT, SHOULDER, HIP, OR KNEE;  Surgeon: Pedro Varela MD;  Location: [facility] OR;  Service: Pain Management;  Laterality: Left;  GTB injestion    JOINT REPLACEMENT Left 2013    knee replacemant      left knee     Social History     Tobacco Use    Smoking status: Never    Smokeless tobacco: Never   Substance Use Topics    Alcohol use: No    Drug use: No     Family History   Problem Relation Name Age of Onset    No Known Problems Mother      Heart disease Father      Heart disease Brother      No Known Problems Sister 1/2     No Known Problems Daughter      No Known Problems Brother 1/2          Review of Systems:   See HPI         Objective:        Vitals:    01/14/25 1321   BP: 126/76   Pulse: 73       Lab Results   Component Value Date    WBC 6.97 12/02/2024    WBC 6.97 12/02/2024    HGB 14.0 12/02/2024    HGB 14.0 12/02/2024    HCT 41.0 12/02/2024    HCT 41.0 12/02/2024     12/02/2024     12/02/2024    CHOL 155 02/29/2024    TRIG 216 (H) 02/29/2024    HDL 53 02/29/2024    ALT 24 12/02/2024    ALT 24 12/02/2024    AST 19 12/02/2024    AST 19 12/02/2024     12/02/2024     12/02/2024    K 4.6 12/02/2024    K 4.6 12/02/2024     12/02/2024     12/02/2024    CREATININE 1.0 12/02/2024    CREATININE 1.0 12/02/2024    BUN 17 12/02/2024    BUN 17 12/02/2024    CO2 25 12/02/2024    CO2 25 12/02/2024    TSH 1.420 11/19/2021    PSA 0.50 12/05/2022    INR 1.1 01/31/2021    HGBA1C 5.6 02/23/2024        ECHOCARDIOGRAM RESULTS  Results for orders placed during the hospital encounter of 06/27/22    Echo    Interpretation Summary  · The left ventricle is normal in size with concentric remodeling and normal systolic function.  · The estimated ejection fraction is 65%.  · Normal left ventricular diastolic function.  · Normal right ventricular size with normal right ventricular systolic function.        CURRENT/PREVIOUS VISIT EKG  Results for orders placed or performed during the hospital encounter of 11/21/24   EKG 12-lead    Collection Time: 11/21/24 11:21 PM   Result Value Ref Range    QRS Duration 86 ms    [facility] QTC Calculation 411 ms    Narrative    Test Reason : M54.2,    Vent. Rate :  65 BPM     Atrial Rate :  65 BPM     P-R Int : 196 ms          QRS Dur :  86 ms      QT Int : 396 ms       P-R-T Axes :  57   8  70 degrees    QTcB Int : 411 ms    Normal sinus rhythm  Nonspecific T wave abnormality  Abnormal ECG  When compared with ECG of 11-Nov-2024 12:12,  Premature supraventricular complexes are no longer Present  Nonspecific T wave abnormality, worse in Lateral  leads  Confirmed by Vamshi Floyd ([id number]) on 11/23/2024 3:06:44 PM    Referred By: AAAREFERRAL SELF           Confirmed By: Vamshi Floyd       Results for orders placed during the hospital encounter of 06/27/22    Nuclear Stress - Cardiology Interpreted    Interpretation Summary    Equivocal myocardial perfusion scan.    There is a mild intensity, small sized, equivocal perfusion abnormality that is fixed in the inferobasilar wall(s). This finding is equivocal due to soft tissue shadow.    There are no other significant perfusion abnormalities.    There is a trivial to mild intensity fixed perfusion abnormality in the inferobasilar wall of the left ventricle, secondary to soft tissue attenuation.    The gated perfusion images showed an ejection fraction of 81% post stress. Normal ejection fraction is greater than 47%.    There is normal wall motion at rest and post stress.    LV cavity size is normal at rest and normal at stress.    The EKG portion of this study is negative for ischemia.    The patient reported no chest pain during the stress test.    The test was stopped because the patient experienced arrhythmia.    There were no arrhythmias during stress.      Physical Exam:  CONSTITUTIONAL: No fever, no chills  HEENT: Normocephalic, atraumatic             NECK:  No JVD, no carotid bruit  CVS: S1S2+, RRR, 1-2/6 systolic murmurs  LUNGS: Clear  ABDOMEN: Soft, NT, BS+, no bruit  EXTREMITIES: No cyanosis, trace lower extremity edema  NEURO: AAO X 3  PSY: Normal affect      Medication List with Changes/Refills   Current Medications    ASCORBIC ACID, VITAMIN C, (VITAMIN C) 500 MG TABLET    Take 500 mg by mouth once daily.    CO-ENZYME Q-10 30 MG CAPSULE    Take 30 mg by mouth 3 (three) times daily.    DICLOFENAC SODIUM (VOLTAREN) 1 % GEL    Apply 2 g topically 3 (three) times daily.    LIDOCAINE (LIDODERM) 5 %    Place 1 patch onto the skin once daily. Remove & Discard patch within 12 hours. Do not reapply for 12  hours.    MULTIVITAMIN (THERAGRAN) PER TABLET    Take 1 tablet by mouth once daily.   Changed and/or Refilled Medications    Modified Medication Previous Medication    ATORVASTATIN (LIPITOR) 20 MG TABLET atorvastatin (LIPITOR) 20 MG tablet       Take 1 tablet (20 mg total) by mouth once daily.    Take 1 tablet (20 mg total) by mouth once daily.   Discontinued Medications    ASPIRIN (ECOTRIN) 81 MG EC TABLET    Take 81 mg by mouth once daily.    CHOLECALCIFEROL, VITAMIN D3, (VITAMIN D3) 125 MCG (5,000 UNIT) TAB    Take 5,000 Units by mouth once daily.    METRONIDAZOLE 1% (METROGEL) 1 % GEL        OMEGA-3/DHA/EPA/ALA/VITAMIN D3 (OMEGA-3-DHA-EPA-ALA-VIT D3) 50 MG (25 MG- 25 MG)-100 UNIT CHEW    Take 1 capsule by mouth once daily.             Assessment:       1. Mixed hyperlipidemia    2. Fatigue, unspecified type    3. Calcification of aorta    4. Primary hypertension    5. Tiredness         Plan:     Problem List Items Addressed This Visit          Cardiac/Vascular    Hyperlipidemia - Primary    Current Assessment & Plan     Continue Lipitor 20 mg daily, low fat diet, and exercise         Relevant Medications    atorvastatin (LIPITOR) 20 MG tablet    Calcification of aorta    Current Assessment & Plan     Continue risk factor modifications  Echocardiogram ordered         Primary hypertension    Current Assessment & Plan     BP stable in office  Not on antihypertensives due to intermittent hypotension previously  Continue low sodium diet and exercise            Other    Tiredness    Current Assessment & Plan     Increased tiredness during the daytime, see HPI  Echocardiogram and TSH ordered         Relevant Orders    TSH     Other Visit Diagnoses       Fatigue, unspecified type        Relevant Orders    Echo            Follow up in about 1 month (around 2/14/2025).

## 2025-01-14 NOTE — ASSESSMENT & PLAN NOTE
BP stable in office  Not on antihypertensives due to intermittent hypotension previously  Continue low sodium diet and exercise

## 2025-01-20 ENCOUNTER — PATIENT MESSAGE (OUTPATIENT)
Dept: CARDIOLOGY | Facility: CLINIC | Age: 89
End: 2025-01-20
Payer: MEDICARE

## 2025-01-29 ENCOUNTER — TELEPHONE (OUTPATIENT)
Dept: CARDIOLOGY | Facility: CLINIC | Age: 89
End: 2025-01-29
Payer: MEDICARE

## 2025-01-29 ENCOUNTER — HOSPITAL ENCOUNTER (OUTPATIENT)
Dept: CARDIOLOGY | Facility: HOSPITAL | Age: 89
Discharge: HOME | End: 2025-01-29
Payer: MEDICARE

## 2025-01-29 VITALS — BODY MASS INDEX: 25.77 KG/M2 | WEIGHT: 180 LBS | HEIGHT: 70 IN

## 2025-01-29 DIAGNOSIS — R53.83 FATIGUE, UNSPECIFIED TYPE: ICD-10-CM

## 2025-01-29 PROCEDURE — 93306 TTE W/DOPPLER COMPLETE: CPT

## 2025-01-29 PROCEDURE — 93306 TTE W/DOPPLER COMPLETE: CPT | Mod: 26,,, | Performed by: INTERNAL MEDICINE

## 2025-01-29 NOTE — TELEPHONE ENCOUNTER
----- Message from Tobin sent at 1/29/2025  3:01 PM CST -----  Regarding: appt  Type:  Sooner Apoointment Request      Name of Caller:pt    When is the first available appointment?dept booked     Symptoms:6m fu       Best Call Back Number:[phone]      Additional Information: pt wants to get schedule with  used to see his brother.  please call to discuss.

## 2025-01-31 ENCOUNTER — EXTERNAL CHRONIC CARE MANAGEMENT (OUTPATIENT)
Dept: PRIMARY CARE CLINIC | Facility: CLINIC | Age: 89
End: 2025-01-31
Payer: MEDICARE

## 2025-01-31 LAB
AORTIC ROOT ANNULUS: 3.3 CM
AORTIC VALVE CUSP SEPERATION: 2.1 CM
APICAL FOUR CHAMBER EJECTION FRACTION: 71 %
APICAL TWO CHAMBER EJECTION FRACTION: 59 %
AV INDEX (PROSTH): 0.82
AV MEAN GRADIENT: 2 MMHG
AV PEAK GRADIENT: 4 MMHG
AV REGURGITATION PRESSURE HALF TIME: 1552 MS
AV VALVE AREA BY VELOCITY RATIO: 2.5 CM²
AV VALVE AREA: 2.6 CM²
AV VELOCITY RATIO: 0.8
BSA FOR ECHO PROCEDURE: 2.01 M2
CV ECHO LV RWT: 0.59 CM
DOP CALC AO PEAK VEL: 1 M/S
DOP CALC AO VTI: 20.3 CM
DOP CALC LVOT AREA: 3.1 CM2
DOP CALC LVOT DIAMETER: 2 CM
DOP CALC LVOT PEAK VEL: 0.8 M/S
DOP CALC LVOT STROKE VOLUME: 52.4 CM3
DOP CALCLVOT PEAK VEL VTI: 16.7 CM
E WAVE DECELERATION TIME: 292 MSEC
E/A RATIO: 0.36
E/E' RATIO: 5 M/S
ECHO LV POSTERIOR WALL: 1.1 CM (ref 0.6–1.1)
FRACTIONAL SHORTENING: 37.8 % (ref 28–44)
INTERVENTRICULAR SEPTUM: 1 CM (ref 0.6–1.1)
IVRT: 49 MSEC
LEFT INTERNAL DIMENSION IN SYSTOLE: 2.3 CM (ref 2.1–4)
LEFT VENTRICLE DIASTOLIC VOLUME INDEX: 29.05 ML/M2
LEFT VENTRICLE DIASTOLIC VOLUME: 58.1 ML
LEFT VENTRICLE END DIASTOLIC VOLUME APICAL 2 CHAMBER: 21.6 ML
LEFT VENTRICLE END DIASTOLIC VOLUME APICAL 4 CHAMBER: 37.6 ML
LEFT VENTRICLE MASS INDEX: 60.4 G/M2
LEFT VENTRICLE SYSTOLIC VOLUME INDEX: 9.1 ML/M2
LEFT VENTRICLE SYSTOLIC VOLUME: 18.1 ML
LEFT VENTRICULAR INTERNAL DIMENSION IN DIASTOLE: 3.7 CM (ref 3.5–6)
LEFT VENTRICULAR MASS: 120.8 G
LV LATERAL E/E' RATIO: 4 M/S
LV SEPTAL E/E' RATIO: 5.6 M/S
LVED V (TEICH): 58.1 ML
LVES V (TEICH): 18.1 ML
LVOT MG: 1 MMHG
LVOT MV: 0.51 CM/S
MV PEAK A VEL: 0.77 M/S
MV PEAK E VEL: 0.28 M/S
MV STENOSIS PRESSURE HALF TIME: 59 MS
MV VALVE AREA P 1/2 METHOD: 3.73 CM2
OHS CV RV/LV RATIO: 0.51 CM
OHS LV EJECTION FRACTION SIMPSONS BIPLANE MOD: 65 %
PISA AR MAX VEL: 2.26 M/S
PISA TR MAX VEL: 2.4 M/S
RA PRESSURE ESTIMATED: 3 MMHG
RIGHT VENTRICLE DIASTOLIC BASEL DIMENSION: 1.9 CM
RIGHT VENTRICULAR END-DIASTOLIC DIMENSION: 1.9 CM
RV TB RVSP: 5 MMHG
TDI LATERAL: 0.07 M/S
TDI SEPTAL: 0.05 M/S
TDI: 0.06 M/S
TR MAX PG: 23 MMHG
TV REST PULMONARY ARTERY PRESSURE: 26 MMHG
Z-SCORE OF LEFT VENTRICULAR DIMENSION IN END DIASTOLE: -4.55
Z-SCORE OF LEFT VENTRICULAR DIMENSION IN END SYSTOLE: -3.5

## 2025-01-31 PROCEDURE — 99439 CHRNC CARE MGMT STAF EA ADDL: CPT | Mod: S$PBB,,, | Performed by: INTERNAL MEDICINE

## 2025-01-31 PROCEDURE — 99439 CHRNC CARE MGMT STAF EA ADDL: CPT | Mod: PBBFAC | Performed by: INTERNAL MEDICINE

## 2025-01-31 PROCEDURE — 99490 CHRNC CARE MGMT STAFF 1ST 20: CPT | Mod: PBBFAC | Performed by: INTERNAL MEDICINE

## 2025-01-31 PROCEDURE — 99490 CHRNC CARE MGMT STAFF 1ST 20: CPT | Mod: S$PBB,,, | Performed by: INTERNAL MEDICINE

## 2025-02-05 ENCOUNTER — TELEPHONE (OUTPATIENT)
Dept: CARDIOLOGY | Facility: CLINIC | Age: 89
End: 2025-02-05
Payer: MEDICARE

## 2025-02-05 NOTE — TELEPHONE ENCOUNTER
----- Message from Tobin sent at 2/5/2025 12:14 PM CST -----  Regarding: rt call  Type:  Patient Returning Call    Who Called:wife lucila     Who Left Message for Patient:unknown     Does the patient know what this is regarding?:yes    Best Call Back Number:[phone]      Additional Information: wife st that pt is supposed to schedule on 02/11. She st at their last visit on 01/14 both of them got schedule on 11th for 3:30 pm  & 4:00 pm. She wants a call back to discuss. Pt appt in not showing up.  She got the appt cards as well she st.

## 2025-02-13 ENCOUNTER — OFFICE VISIT (OUTPATIENT)
Dept: CARDIOLOGY | Facility: CLINIC | Age: 89
End: 2025-02-13
Payer: MEDICARE

## 2025-02-13 VITALS
HEIGHT: 70 IN | RESPIRATION RATE: 17 BRPM | BODY MASS INDEX: 25.62 KG/M2 | SYSTOLIC BLOOD PRESSURE: 128 MMHG | WEIGHT: 179 LBS | HEART RATE: 67 BPM | OXYGEN SATURATION: 96 % | DIASTOLIC BLOOD PRESSURE: 64 MMHG

## 2025-02-13 DIAGNOSIS — I70.0 CALCIFICATION OF AORTA: ICD-10-CM

## 2025-02-13 DIAGNOSIS — R53.83 TIREDNESS: ICD-10-CM

## 2025-02-13 DIAGNOSIS — E78.2 MIXED HYPERLIPIDEMIA: ICD-10-CM

## 2025-02-13 DIAGNOSIS — I95.1 ORTHOSTATIC HYPOTENSION: Primary | ICD-10-CM

## 2025-02-13 PROCEDURE — 99999 PR PBB SHADOW E&M-EST. PATIENT-LVL III: CPT | Mod: PBBFAC,,,

## 2025-02-13 PROCEDURE — 99213 OFFICE O/P EST LOW 20 MIN: CPT | Mod: PBBFAC,PN

## 2025-02-13 NOTE — PROGRESS NOTES
Subjective:    Patient ID:  Bryson Kellogg is a 89 y.o. male who presents for follow-up of echocardiogram.      HPI:  Bryson Kellogg is here for follow-up visit. Denies chest pain or shortness of breath. Denies recent fever cough chills or congestion. Denies blood in the urine or blood in the stool.  Denies myalgias. Denies orthopnea or peripheral edema. Denies nausea vomiting or dyspepsia. No recent arm neck or jaw pain. No lightheadedness or dizziness.     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 ENDO;  Service: Endoscopy;  Laterality: N/A;    COLONOSCOPY N/A 12/5/2023    Procedure: COLONOSCOPY;  Surgeon: Frankie Esteban MD;  Location: [facility];  Service: Endoscopy;  Laterality: N/A;    EYE SURGERY Bilateral 2014    cataracts    EYE SURGERY Right 10/2016    tear duct surgery    INJECTION OF ANESTHETIC AGENT AROUND LATERAL BRANCH NERVES OF SACROILIAC JOINT Left 3/5/2021    Procedure: left SI joint injection;  Surgeon: Pedro Varela MD;  Location: [facility] OR;  Service: Pain Management;  Laterality: Left;  left SI joint injection     INJECTION OF JOINT Left 11/30/2021    Procedure: Injection, Joint Sacroiliac and GTB;  Surgeon: Pedro Varela  MD;  Location: [facility] OR;  Service: Pain Management;  Laterality: Left;    INJECTION OF JOINT Left 11/30/2021    Procedure: INJECTION, JOINT, SHOULDER, HIP, OR KNEE;  Surgeon: Pedro Varela MD;  Location: [facility] OR;  Service: Pain Management;  Laterality: Left;  GTB injestion    JOINT REPLACEMENT Left 2013    knee replacemant      left knee     Social History     Tobacco Use    Smoking status: Never    Smokeless tobacco: Never   Substance Use Topics    Alcohol use: No    Drug use: No     Family History   Problem Relation Name Age of Onset    No Known Problems Mother      Heart disease Father      Heart disease Brother      No Known Problems Sister 1/2     No Known Problems Daughter      No Known Problems Brother 1/2         Review of Systems:   See HPI       Objective:        Vitals:    02/13/25 1312   BP: 128/64   Pulse: 67   Resp: 17       Lab Results   Component Value Date    WBC 6.97 12/02/2024    WBC 6.97 12/02/2024    HGB 14.0 12/02/2024    HGB 14.0 12/02/2024    HCT 41.0 12/02/2024    HCT 41.0 12/02/2024     12/02/2024     12/02/2024    CHOL 155 02/29/2024    TRIG 216 (H) 02/29/2024    HDL 53 02/29/2024    ALT 24 12/02/2024    ALT 24 12/02/2024    AST 19 12/02/2024    AST 19 12/02/2024     12/02/2024     12/02/2024    K 4.6 12/02/2024    K 4.6 12/02/2024     12/02/2024     12/02/2024    CREATININE 1.0 12/02/2024    CREATININE 1.0 12/02/2024    BUN 17 12/02/2024    BUN 17 12/02/2024    CO2 25 12/02/2024    CO2 25 12/02/2024    TSH 1.420 11/19/2021    PSA 0.50 12/05/2022    INR 1.1 01/31/2021    HGBA1C 5.6 02/23/2024        ECHOCARDIOGRAM RESULTS  Results for orders placed during the hospital encounter of 01/29/25    Echo    Interpretation Summary    Left Ventricle: The left ventricle is normal in size. Normal wall thickness. There is normal systolic function with a visually estimated ejection fraction of 60 - 65%. There is normal diastolic function.    Right Ventricle: Normal  right ventricular cavity size. Wall thickness is normal. Systolic function is normal.    Aortic Valve: The aortic valve is a trileaflet valve. There is mild aortic valve sclerosis.    Tricuspid Valve: The tricuspid valve is structurally normal. There is mild regurgitation.    Pulmonary Artery: The estimated pulmonary artery systolic pressure is 26 mmHg.    IVC/SVC: Normal venous pressure at 3 mmHg.        CURRENT/PREVIOUS VISIT EKG  Results for orders placed or performed during the hospital encounter of 11/21/24   EKG 12-lead    Collection Time: 11/21/24 11:21 PM   Result Value Ref Range    QRS Duration 86 ms    [facility] QTC Calculation 411 ms    Narrative    Test Reason : M54.2,    Vent. Rate :  65 BPM     Atrial Rate :  65 BPM     P-R Int : 196 ms          QRS Dur :  86 ms      QT Int : 396 ms       P-R-T Axes :  57   8  70 degrees    QTcB Int : 411 ms    Normal sinus rhythm  Nonspecific T wave abnormality  Abnormal ECG  When compared with ECG of 11-Nov-2024 12:12,  Premature supraventricular complexes are no longer Present  Nonspecific T wave abnormality, worse in Lateral leads  Confirmed by Vamshi Floyd ([id number]) on 11/23/2024 3:06:44 PM    Referred By: AAAREFERRAL SELF           Confirmed By: Vamshi Floyd     No valid procedures specified.   Results for orders placed during the hospital encounter of 06/27/22    Nuclear Stress - Cardiology Interpreted    Interpretation Summary    Equivocal myocardial perfusion scan.    There is a mild intensity, small sized, equivocal perfusion abnormality that is fixed in the inferobasilar wall(s). This finding is equivocal due to soft tissue shadow.    There are no other significant perfusion abnormalities.    There is a trivial to mild intensity fixed perfusion abnormality in the inferobasilar wall of the left ventricle, secondary to soft tissue attenuation.    The gated perfusion images showed an ejection fraction of 81% post stress. Normal ejection fraction is greater than  47%.    There is normal wall motion at rest and post stress.    LV cavity size is normal at rest and normal at stress.    The EKG portion of this study is negative for ischemia.    The patient reported no chest pain during the stress test.    The test was stopped because the patient experienced arrhythmia.    There were no arrhythmias during stress.      Physical Exam:  CONSTITUTIONAL: No fever, no chills  HEENT: Normocephalic, atraumatic, pupils reactive to light                 NECK:  No JVD, no carotid bruit  CVS: S1S2+, RRR, no murmurs   LUNGS: Clear  ABDOMEN: Soft, NT, BS+. No bruit  EXTREMITIES: No cyanosis, edema. Pulses intact  : No mcgill catheter  NEURO: AAO X 3  PSY: Normal affect      Medication List with Changes/Refills   Current Medications    ASCORBIC ACID, VITAMIN C, (VITAMIN C) 500 MG TABLET    Take 500 mg by mouth once daily.    ATORVASTATIN (LIPITOR) 20 MG TABLET    Take 1 tablet (20 mg total) by mouth once daily.    CO-ENZYME Q-10 30 MG CAPSULE    Take 30 mg by mouth 3 (three) times daily.    DICLOFENAC SODIUM (VOLTAREN) 1 % GEL    Apply 2 g topically 3 (three) times daily.    LIDOCAINE (LIDODERM) 5 %    Place 1 patch onto the skin once daily. Remove & Discard patch within 12 hours. Do not reapply for 12 hours.    MULTIVITAMIN (THERAGRAN) PER TABLET    Take 1 tablet by mouth once daily.             Assessment:       1. Orthostatic hypotension    2. Mixed hyperlipidemia    3. Calcification of aorta    4. Tiredness         Plan:     Problem List Items Addressed This Visit          Cardiac/Vascular    Hyperlipidemia    Current Assessment & Plan     Continue Lipitor 20 mg daily, low fat diet, and exercise         Calcification of aorta    Current Assessment & Plan     Continue Lipitor and lifestyle modifications.  Echo reviewed with patient. Shows mild aortic sclerosis, no regurgitation.  Routine monitoring         Orthostatic hypotension - Primary    Current Assessment & Plan     NO dizziness.  BP  stable.  Encourage ongoing use of compression socks.  Recommend to continue exercise precaution with change of posture .              Other    Tiredness    Current Assessment & Plan     Denies any excessive tiredness.  Echo with normal function.            Follow up in about 6 months (around 8/13/2025).      Murray Omer, AGACNP-[facility]  02/13/2025

## 2025-02-13 NOTE — ASSESSMENT & PLAN NOTE
NO dizziness.  BP stable.  Encourage ongoing use of compression socks.  Recommend to continue exercise precaution with change of posture .

## 2025-02-13 NOTE — ASSESSMENT & PLAN NOTE
Continue Lipitor and lifestyle modifications.  Echo reviewed with patient. Shows mild aortic sclerosis, no regurgitation.  Routine monitoring

## 2025-02-19 ENCOUNTER — LAB VISIT (OUTPATIENT)
Dept: LAB | Facility: HOSPITAL | Age: 89
End: 2025-02-19
Attending: INTERNAL MEDICINE
Payer: MEDICARE

## 2025-02-19 DIAGNOSIS — C82.61 CUTANEOUS FOLLICLE CENTER LYMPHOMA INVOLVING LYMPH NODES OF HEAD: ICD-10-CM

## 2025-02-19 DIAGNOSIS — D53.9 NUTRITIONAL ANEMIA, UNSPECIFIED: ICD-10-CM

## 2025-02-19 LAB
ALBUMIN SERPL BCP-MCNC: 3.6 G/DL (ref 3.5–5.2)
ALP SERPL-CCNC: 119 U/L (ref 40–150)
ALT SERPL W/O P-5'-P-CCNC: 14 U/L (ref 10–44)
ANION GAP SERPL CALC-SCNC: 9 MMOL/L (ref 8–16)
AST SERPL-CCNC: 27 U/L (ref 10–40)
BASOPHILS # BLD AUTO: 0.02 K/UL (ref 0–0.2)
BASOPHILS NFR BLD: 0.4 % (ref 0–1.9)
BILIRUB SERPL-MCNC: 1.3 MG/DL (ref 0.1–1)
BUN SERPL-MCNC: 14 MG/DL (ref 8–23)
CALCIUM SERPL-MCNC: 10.2 MG/DL (ref 8.7–10.5)
CHLORIDE SERPL-SCNC: 106 MMOL/L (ref 95–110)
CO2 SERPL-SCNC: 24 MMOL/L (ref 23–29)
CREAT SERPL-MCNC: 0.9 MG/DL (ref 0.5–1.4)
DIFFERENTIAL METHOD BLD: ABNORMAL
EOSINOPHIL # BLD AUTO: 0.1 K/UL (ref 0–0.5)
EOSINOPHIL NFR BLD: 2.6 % (ref 0–8)
ERYTHROCYTE [DISTWIDTH] IN BLOOD BY AUTOMATED COUNT: 13.6 % (ref 11.5–14.5)
EST. GFR  (NO RACE VARIABLE): >60 ML/MIN/1.73 M^2
FERRITIN SERPL-MCNC: 414 NG/ML (ref 20–300)
FOLATE SERPL-MCNC: >40 NG/ML (ref 4–24)
GLUCOSE SERPL-MCNC: 92 MG/DL (ref 70–110)
HCT VFR BLD AUTO: 39.4 % (ref 40–54)
HGB BLD-MCNC: 12.8 G/DL (ref 14–18)
IMM GRANULOCYTES # BLD AUTO: 0.01 K/UL (ref 0–0.04)
IMM GRANULOCYTES NFR BLD AUTO: 0.2 % (ref 0–0.5)
IRON SERPL-MCNC: 138 UG/DL (ref 45–160)
LYMPHOCYTES # BLD AUTO: 1.2 K/UL (ref 1–4.8)
LYMPHOCYTES NFR BLD: 24.4 % (ref 18–48)
MCH RBC QN AUTO: 32.7 PG (ref 27–31)
MCHC RBC AUTO-ENTMCNC: 32.5 G/DL (ref 32–36)
MCV RBC AUTO: 101 FL (ref 82–98)
MONOCYTES # BLD AUTO: 0.6 K/UL (ref 0.3–1)
MONOCYTES NFR BLD: 12.2 % (ref 4–15)
NEUTROPHILS # BLD AUTO: 3 K/UL (ref 1.8–7.7)
NEUTROPHILS NFR BLD: 60.2 % (ref 38–73)
NRBC BLD-RTO: 0 /100 WBC
PLATELET # BLD AUTO: 175 K/UL (ref 150–450)
PMV BLD AUTO: 11.8 FL (ref 9.2–12.9)
POTASSIUM SERPL-SCNC: 4.9 MMOL/L (ref 3.5–5.1)
PROT SERPL-MCNC: 6.8 G/DL (ref 6–8.4)
RBC # BLD AUTO: 3.91 M/UL (ref 4.6–6.2)
SATURATED IRON: 43 % (ref 20–50)
SODIUM SERPL-SCNC: 139 MMOL/L (ref 136–145)
TOTAL IRON BINDING CAPACITY: 323 UG/DL (ref 250–450)
TRANSFERRIN SERPL-MCNC: 218 MG/DL (ref 200–375)
VIT B12 SERPL-MCNC: 350 PG/ML (ref 210–950)
WBC # BLD AUTO: 4.99 K/UL (ref 3.9–12.7)

## 2025-02-19 PROCEDURE — 85025 COMPLETE CBC W/AUTO DIFF WBC: CPT | Performed by: INTERNAL MEDICINE

## 2025-02-19 PROCEDURE — 84466 ASSAY OF TRANSFERRIN: CPT | Performed by: INTERNAL MEDICINE

## 2025-02-19 PROCEDURE — 36415 COLL VENOUS BLD VENIPUNCTURE: CPT | Mod: PO | Performed by: INTERNAL MEDICINE

## 2025-02-19 PROCEDURE — 82728 ASSAY OF FERRITIN: CPT | Performed by: INTERNAL MEDICINE

## 2025-02-19 PROCEDURE — 82746 ASSAY OF FOLIC ACID SERUM: CPT | Performed by: INTERNAL MEDICINE

## 2025-02-19 PROCEDURE — 82607 VITAMIN B-12: CPT | Performed by: INTERNAL MEDICINE

## 2025-02-19 PROCEDURE — 80053 COMPREHEN METABOLIC PANEL: CPT | Performed by: INTERNAL MEDICINE

## 2025-02-22 DIAGNOSIS — Z00.00 ENCOUNTER FOR MEDICARE ANNUAL WELLNESS EXAM: ICD-10-CM

## 2025-02-24 ENCOUNTER — OFFICE VISIT (OUTPATIENT)
Dept: INTERNAL MEDICINE | Facility: CLINIC | Age: 89
End: 2025-02-24
Payer: MEDICARE

## 2025-02-24 VITALS
DIASTOLIC BLOOD PRESSURE: 68 MMHG | HEART RATE: 78 BPM | WEIGHT: 181 LBS | BODY MASS INDEX: 25.91 KG/M2 | SYSTOLIC BLOOD PRESSURE: 118 MMHG | HEIGHT: 70 IN

## 2025-02-24 DIAGNOSIS — C82.61 CUTANEOUS FOLLICLE CENTER LYMPHOMA INVOLVING LYMPH NODES OF HEAD: ICD-10-CM

## 2025-02-24 DIAGNOSIS — G47.33 OBSTRUCTIVE SLEEP APNEA SYNDROME: ICD-10-CM

## 2025-02-24 DIAGNOSIS — D69.6 THROMBOCYTOPENIA, UNSPECIFIED: Primary | ICD-10-CM

## 2025-02-24 DIAGNOSIS — E78.2 MIXED HYPERLIPIDEMIA: ICD-10-CM

## 2025-02-24 PROBLEM — R53.83 TIREDNESS: Status: RESOLVED | Noted: 2025-01-14 | Resolved: 2025-02-24

## 2025-02-24 PROBLEM — I95.1 ORTHOSTATIC HYPOTENSION: Status: RESOLVED | Noted: 2023-08-09 | Resolved: 2025-02-24

## 2025-02-24 PROBLEM — I10 PRIMARY HYPERTENSION: Status: RESOLVED | Noted: 2023-08-09 | Resolved: 2025-02-24

## 2025-02-24 PROBLEM — Z85.72 HISTORY OF LYMPHOMA: Status: RESOLVED | Noted: 2023-05-17 | Resolved: 2025-02-24

## 2025-02-24 PROCEDURE — G2211 COMPLEX E/M VISIT ADD ON: HCPCS | Mod: S$PBB,,, | Performed by: INTERNAL MEDICINE

## 2025-02-24 PROCEDURE — 99213 OFFICE O/P EST LOW 20 MIN: CPT | Mod: PBBFAC | Performed by: INTERNAL MEDICINE

## 2025-02-24 PROCEDURE — 99214 OFFICE O/P EST MOD 30 MIN: CPT | Mod: S$PBB,,, | Performed by: INTERNAL MEDICINE

## 2025-02-24 PROCEDURE — 99999 PR PBB SHADOW E&M-EST. PATIENT-LVL III: CPT | Mod: PBBFAC,,, | Performed by: INTERNAL MEDICINE

## 2025-02-24 NOTE — PROGRESS NOTES
He is a 89   year-old gentleman coming in today to follow up ongoing medical   problems.  No falls.  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        1. He had  Lumbar stenosis.  He use to have some pain down his right leg, but that has resolved for now.    He is doing some PT. He does have a  shuffling gait and the PT has been helping-out since Dec.     He had a operation in April or June 2020 in [place], [place] for lumbar spinal stenosis and that has taken care of the pain.  .    He is avoiding lifting heavy weights.        He has having some neck pian- since November 2024-  [facility]  He is getting PT and dry needling.  He has also had an injection in his neck.  I don't have access to those records.    His walking as slowed cait, but he wants to get back to this.            2. He has a history of colon polyps, which his last colonoscopy was   12/2023. . He had Seven  polyps  He is due back in 5 years- per him-- CRS recommended no follow up due to age.          3. Hyperlipidemia. He is on Lipitor 40mg . Recent cholesterol shows total   cholesterol 155, HDL 53 , triglycerides 216 , and LDL 53  ( 2/29/20242)   which is stable   . He is tolerating the Lipitor 20 mg well.     4. He has a BPH, which he says has been dong well. He has 1 episode of nacturia  Around 2-3 am. . NO hesitancy or urgency.     5. 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   :Dr. 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  BCL2 and BCL6 staining patterns are confirmatory He previously saw Dr Lieberman with rad/onc and they completed XRT with 22 total on 9/29/2020.  He had PET scan on 3/5/2024  with no evidence of a systemic lymphoma process. The area was on the Left side of face.  He saw Derm again in 11/21/2024  and saw Oncology in  12/05/2024         6. KATHY: results from 4/09 study. SEVERE  "obstructive sleep apnea syndrome with 289 respiratory obstructions/RDI 47 with snoring moderate intermittent and hypersomnia with CPAP. He tried Bipap for about a year  He is not on this now. .  NO day time fatigue. No witnessed sleep apnea or snoring. feeling well.       7. Colon polyps-- had c-scope 12/5/2023-  7 polyps removed-  had TA's   c-scope follow up not recommended due to age.       8. Anemia-   being followed   12.8/39.4 - in August-   Plt count- 175k-- in the past he has had a mild thrombocytopenia.         9. Pruritus-  has resolved-- felt to be due to liniopril                 SOCIAL HISTORY: He is retired from [employer]. He is . No   alcohol use. No drug use. Lives in [place]. Lives with wife and they are active and travel a lot.        REVIEW OF SYSTEMS:   Gen - no fatigue weight  stable.  He is still walking 1-1.5  miles a day         Eyes - no eye pain or visual changes  ENT - no hoarseness or sore throat  CV - no CP or SOB  Pulm - no cough or wheezing  GI - no N/V/D   no dysuria or incontinence  MS - no joint pain or muscle pain  Skin - as above   Neuro - no HA, dizziness  Heme - no abnormal bleeding or bruising  Endo - no polydipsia, or temperature changes  Psych - no anxiety or depression    PHYSICAL EXAMINATION: He is a well-appearing gentleman in no acute   Distress. Walking without any difficulty or without assistance device.        /68 (BP Location: Right arm, Patient Position: Sitting)   Pulse 78   Ht 5' 10" (1.778 m)   Wt 82.1 kg (181 lb)   PF 98 L/min   BMI 25.97 kg/m²              Ear canals are open. TMs are   clear. Oropharynx is clear. Pupils equal, round, and reactive to light   and accommodation. He is wearing glasses.   NECK: Supple. Thyroid is not enlarged. No carotid bruits. He has no   cervical, axillary, or inguinal lymphadenopathy detected.   CHEST: Clear without wheeze.   CARDIOVASCULAR: S1, S2, regular rate and rhythm without murmur, gallop, "   or rub.   ABDOMEN: Soft, nontender. No hepatosplenomegaly. No guarding or rebound   tenderness. Abdominal aorta is not enlarged.   He has normal biceps, triceps, patellar deep tendon reflexes. Normal   muscle strength, upper and lower extremities.    NO rash   He has onco mycosis.    He is wearing a fitbit on his left wrist.    Walks with mild limb favoring his left leg.          1. History of colon polyps.- llast c-scope 2023.  - Bowels doing well on miralax.    2. History of hyperlipidemia - better controled.    3. Sacroilitits- doing much better   4 pulmonary nodules-- ct chest reviewed  -- no need for follow up. -- Reviewed his recent PET scan 3/06/2024     5. Sleep apnea- off CPAP and BIPAP-- says he is sleeping well.  He does some  snoring -- discussed-  CPAP  or surgery is an option.    6. cutaneous follicular lymphoma-- following with Hem and derm.   7.  Calcification of aorta-- bp and cholesterol control and monitor       Will check In 6 months          Our office providers are the focal point for all needed health care services that are part of ongoing care related to a patient's single serious condition or the patient's complex conditions.

## 2025-02-28 ENCOUNTER — EXTERNAL CHRONIC CARE MANAGEMENT (OUTPATIENT)
Dept: PRIMARY CARE CLINIC | Facility: CLINIC | Age: 89
End: 2025-02-28
Payer: MEDICARE

## 2025-02-28 PROCEDURE — 99490 CHRNC CARE MGMT STAFF 1ST 20: CPT | Mod: S$PBB,,, | Performed by: INTERNAL MEDICINE

## 2025-02-28 PROCEDURE — 99490 CHRNC CARE MGMT STAFF 1ST 20: CPT | Mod: PBBFAC | Performed by: INTERNAL MEDICINE

## 2025-03-24 ENCOUNTER — PATIENT MESSAGE (OUTPATIENT)
Dept: INTERNAL MEDICINE | Facility: CLINIC | Age: 89
End: 2025-03-24
Payer: MEDICARE

## 2025-03-24 DIAGNOSIS — R26.89 LIMPING: ICD-10-CM

## 2025-03-24 DIAGNOSIS — M46.1 SACROILIITIS: Primary | ICD-10-CM

## 2025-03-31 ENCOUNTER — EXTERNAL CHRONIC CARE MANAGEMENT (OUTPATIENT)
Dept: PRIMARY CARE CLINIC | Facility: CLINIC | Age: 89
End: 2025-03-31
Payer: MEDICARE

## 2025-03-31 PROCEDURE — 99490 CHRNC CARE MGMT STAFF 1ST 20: CPT | Mod: S$PBB,,, | Performed by: INTERNAL MEDICINE

## 2025-03-31 PROCEDURE — 99490 CHRNC CARE MGMT STAFF 1ST 20: CPT | Mod: PBBFAC | Performed by: INTERNAL MEDICINE

## 2025-04-03 ENCOUNTER — RESULTS FOLLOW-UP (OUTPATIENT)
Dept: HEMATOLOGY/ONCOLOGY | Facility: HOSPITAL | Age: 89
End: 2025-04-03

## 2025-04-03 ENCOUNTER — HOSPITAL ENCOUNTER (OUTPATIENT)
Dept: RADIOLOGY | Facility: HOSPITAL | Age: 89
Discharge: HOME | End: 2025-04-03
Attending: INTERNAL MEDICINE
Payer: MEDICARE

## 2025-04-03 ENCOUNTER — TELEPHONE (OUTPATIENT)
Facility: CLINIC | Age: 89
End: 2025-04-03
Payer: MEDICARE

## 2025-04-03 VITALS — BODY MASS INDEX: 24.34 KG/M2 | HEIGHT: 70 IN | WEIGHT: 170 LBS

## 2025-04-03 DIAGNOSIS — C82.61 CUTANEOUS FOLLICLE CENTER LYMPHOMA INVOLVING LYMPH NODES OF HEAD: ICD-10-CM

## 2025-04-03 DIAGNOSIS — D69.6 THROMBOCYTOPENIA, UNSPECIFIED: ICD-10-CM

## 2025-04-03 DIAGNOSIS — Z85.72 HISTORY OF LYMPHOMA: ICD-10-CM

## 2025-04-03 LAB — POCT GLUCOSE: 104 MG/DL (ref 70–110)

## 2025-04-03 PROCEDURE — A9552 F18 FDG: HCPCS | Mod: PO | Performed by: INTERNAL MEDICINE

## 2025-04-03 PROCEDURE — 78816 PET IMAGE W/CT FULL BODY: CPT | Mod: 26,PS,, | Performed by: RADIOLOGY

## 2025-04-03 PROCEDURE — 78816 PET IMAGE W/CT FULL BODY: CPT | Mod: TC,PO

## 2025-04-03 RX ORDER — FLUDEOXYGLUCOSE F18 500 MCI/ML
12.5 INJECTION INTRAVENOUS
Status: COMPLETED | OUTPATIENT
Start: 2025-04-03 | End: 2025-04-03

## 2025-04-03 RX ADMIN — FLUDEOXYGLUCOSE F-18 12.5 MILLICURIE: 500 INJECTION INTRAVENOUS at 09:04

## 2025-04-03 NOTE — TELEPHONE ENCOUNTER
----- Message from Mehdi Alvarez MD sent at 4/3/2025 12:56 PM CDT -----  Please let him know about the stable report   ----- Message -----  From: Interface, Rad Results In  Sent: 4/3/2025  11:48 AM CDT  To: Mehdi Alvarez MD

## 2025-04-23 NOTE — PROGRESS NOTES
[facility] Hematology/Oncology  PROGRESS NOTE -   Follow-up Visit      Subjective:       Patient ID:   NAME: Bryson Kellogg : 1935     89 y.o. male    Referring Doc: Osiris Lopez  Other Physicians: Kirk Cueto ([facility]); Niecy ([facility]); Luisana    Chief Complaint:  cutaneous follicular lymphoma f/u    History of Present Illness:     Patient returns today for a regularly scheduled follow-up visit.  The patient is here today to go over the results of the recently ordered labs, tests and studies. He is here with his wife.     He saw Dr Osiris Lopez with dermatology on  and had a biopsy on his left forearm;    he had PEt scan on 4/3/2025 which was stable;   He previously was followed Dr Lieberman with rad/onc and they completed XRT with 22 total on 2020    He saw Sasha Rose NP on 3/18/2025 and Dr Cueto in 2025; [facility]    Otherwise, no new issues. Breathing ok. No CP, SOB, HA's or N/V.             ROS:   GEN: normal without any fever, night sweats or weight loss  HEENT: normal with no HA's, sore throat, stiff neck, changes in vision  CV: normal with no CP, SOB, PND, LEMOS or orthopnea  PULM: normal with no SOB, cough, hemoptysis, sputum or pleuritic pain  GI: normal with no abdominal pain, nausea, vomiting, constipation, diarrhea, melanotic stools, BRBPR, or hematemesis  : normal with no hematuria, dysuria  BREAST: normal with no mass, discharge, pain  SKIN: normal with no rash, erythema, bruising, or swelling    Pain Scale:  0    Allergies:  Review of patient's allergies indicates:   Allergen Reactions    Lisinopril Rash    Sulfa (sulfonamide antibiotics) Rash     Childhood allergy       Medications:    Current Outpatient Medications:     ascorbic acid, vitamin C, (VITAMIN C) 500 MG tablet, Take 500 mg by mouth once daily., Disp: , Rfl:     aspirin (ECOTRIN) 81 MG EC tablet, Take 81 mg by mouth., Disp: , Rfl:     co-enzyme Q-10 30 mg capsule, Take 30  "mg by mouth 3 (three) times daily., Disp: , Rfl:     multivitamin (THERAGRAN) per tablet, Take 1 tablet by mouth once daily., Disp: , Rfl:     polyethylene glycol (GLYCOLAX) 17 gram PwPk, Take 17 g by mouth., Disp: , Rfl:     atorvastatin (LIPITOR) 20 MG tablet, Take 1 tablet (20 mg total) by mouth once daily. (Patient not taking: Reported on 4/24/2025), Disp: 90 tablet, Rfl: 3    PMHx/PSHx Updates:  See patient's last visit with me on 6/6/2024  See H&P on 8/13/2020        Pathology:   Cancer Staging   No matching staging information was found for the patient.      Skin biopsy 2/20/2024:    RIGHT UPPER ARM:   - INTERFACE AND PERIVASCULAR DERMATITIS WITH PROMINENT DYSKERATOSIS.   - SEE NOTE.     NOTE:  These findings could be seen in subacute lupus.  Changes within the spectrum of erythema multiforme were also considered histologically.       Colonoscopy: 12/5/2023:    1. ASCENDING COLON POLYPS BIOPSIES:   - TUBULAR ADENOMAS.   - NEGATIVE FOR HIGH GRADE DYSPLASIA OR MALIGNANCY.     2. TRANSVERSE COLON POLYPS BIOPSIES:   - TUBULAR ADENOMAS.   - NEGATIVE FOR HIGH GRADE DYSPLASIA OR MALIGNANCY.       Skin biopsy: 2/27/2023:    DIAGNOSIS:   03/01/2023 WPL/jr     1.  RIGHT ADJACENT TO LATERAL CANTHUS, SHAVE:   - ACTINIC KERATOSIS.   - NODULAR SOLAR ELASTOSIS.     2.  LEFT CHEST, PUNCH:   - EPIDERMAL CYST, INFUNDIBULAR TYPE.     3.  LEFT MIDDLE BACK, PUNCH:   - EPIDERMAL CYST, INFUNDIBULAR TYPE.      Objective:     Vitals:  Blood pressure 139/79, pulse 60, temperature 97.7 °F (36.5 °C), temperature source Temporal, resp. rate 18, height 5' 10" (1.778 m), weight 78.9 kg (173 lb 14.4 oz), SpO2 98%.    Physical Examination:   GEN: no apparent distress, comfortable; AAOx3  HEAD: atraumatic and normocephalic; healed scar on left anterior auricular region  EYES: no pallor, no icterus, PERRLA  ENT: OMM, no pharyngeal erythema, external ears WNL; no nasal discharge; no thrush  NECK: no masses, thyroid normal, trachea midline, no " LAD/LN's, supple  CV: RRR with no murmur; normal pulse; normal S1 and S2; no pedal edema  CHEST: Normal respiratory effort; CTAB; normal breath sounds; no wheeze or crackles  ABDOM: nontender and nondistended; soft; normal bowel sounds; no rebound/guarding  MUSC/Skeletal: ROM normal; no crepitus; joints normal; no deformities or arthropathy  EXTREM: no clubbing, cyanosis, inflammation or swelling  SKIN: no rashes, lesions, ulcers, petechiae or subcutaneous nodules;no residual rash; s/p recent biopsy on left forearm  : no mcgill  NEURO: grossly intact; motor/sensory WNL; AAOx3; no tremors  PSYCH: normal mood, affect and behavior  LYMPH: normal cervical, supraclavicular, axillary and groin LN's            Labs:   CMP  Sodium   Date Value Ref Range Status   02/19/2025 139 136 - 145 mmol/L Final     Potassium   Date Value Ref Range Status   02/19/2025 4.9 3.5 - 5.1 mmol/L Final     Chloride   Date Value Ref Range Status   02/19/2025 106 95 - 110 mmol/L Final     CO2   Date Value Ref Range Status   02/19/2025 24 23 - 29 mmol/L Final     Glucose   Date Value Ref Range Status   02/19/2025 92 70 - 110 mg/dL Final     BUN   Date Value Ref Range Status   02/19/2025 14 8 - 23 mg/dL Final     Creatinine   Date Value Ref Range Status   02/19/2025 0.9 0.5 - 1.4 mg/dL Final   11/23/2012 1.2 0.5 - 1.4 mg/dL Final     Calcium   Date Value Ref Range Status   02/19/2025 10.2 8.7 - 10.5 mg/dL Final   11/23/2012 9.4 8.7 - 10.5 mg/dL Final     Total Protein   Date Value Ref Range Status   02/19/2025 6.8 6.0 - 8.4 g/dL Final     Albumin   Date Value Ref Range Status   02/19/2025 3.6 3.5 - 5.2 g/dL Final     Total Bilirubin   Date Value Ref Range Status   02/19/2025 1.3 (H) 0.1 - 1.0 mg/dL Final     Comment:     For infants and newborns, interpretation of results should be based  on gestational age, weight and in agreement with clinical  observations.    Premature Infant recommended reference ranges:  Up to 24 hours.............<8.0  mg/dL  Up to 48 hours............<12.0 mg/dL  3-5 days..................<15.0 mg/dL  6-29 days.................<15.0 mg/dL       Alkaline Phosphatase   Date Value Ref Range Status   02/19/2025 119 40 - 150 U/L Final   11/23/2012 85 55 - 135 U/L Final     AST   Date Value Ref Range Status   02/19/2025 27 10 - 40 U/L Final   11/23/2012 22 10 - 40 U/L Final     ALT   Date Value Ref Range Status   02/19/2025 14 10 - 44 U/L Final     Anion Gap   Date Value Ref Range Status   02/19/2025 9 8 - 16 mmol/L Final   11/23/2012 10 5 - 15 meq/L Final     eGFR if    Date Value Ref Range Status   06/14/2022 >60.0 >60 mL/min/1.73 m^2 Final     eGFR if non    Date Value Ref Range Status   06/14/2022 >60.0 >60 mL/min/1.73 m^2 Final     Comment:     Calculation used to obtain the estimated glomerular filtration  rate (eGFR) is the CKD-EPI equation.            Lab Results   Component Value Date    WBC 4.99 02/19/2025    HGB 12.8 (L) 02/19/2025    HCT 39.4 (L) 02/19/2025     (H) 02/19/2025     02/19/2025           Radiology/Diagnostic Studies:    PET whole body 4/3/2025;    Impression:     1. Negative for FDG avid lymphoproliferative disease.  2. FDG uptake affecting the superior cervical spine and left foot as described, felt to be inflammatory/degenerative in nature.  3. Unchanged tiny pulmonary nodules.         CT cervical neck  11/11/2024:  Impression:     No acute fracture or traumatic malalignment in the cervical spine.     Ct Head 11/11/2024:    Impression:     No acute intracranial abnormality.           PET 4/15/2024:      Impression:     No PET-CT evidence of FDG avid malignancy.     Stable subcentimeter pulmonary nodules.         PET 10/26/2023:  IMPRESSION:  1. No evidence of active FDG avid lymphoproliferative disease.  2. Additional observations as described.      PET 2/23/2023:    IMPRESSION:  1. No evidence of recurrent FDG avid lymphoproliferative disease.  2. Additional  observations as described.         PET 8/22/2022:  IMPRESSION:  No evidence of recurrent or metastatic disease        PET 2/14/2022:    IMPRESSION:  No PET/CT evidence of FDG avid disease.        US axilla 9/15/2021:    FINDINGS: Sonographic assessment targeted to the left axilla was performed in planning for possible biopsy. Recently reported lymph node (PET/CT August 24, 2021) is identified, measuring 2.2 x 1.0 cm. Margins are sharp, and fatty hilum is preserved.      CT Soft Neck  9/9/2021:    IMPRESSION: No enlarged cervical chain lymph nodes or cervical soft tissue mass        PET  8/24/2021:    Impression:     1. Nonspecific FDG uptake in left supraclavicular soft tissues, new, without abnormal CT correlate.  If further imaging evaluation is needed, soft tissue neck CT with IV contrast can be considered.  2. 11 mm soft tissue mass in left axilla which is moderately FDG avid and has surrounding inflammatory fat stranding.  Recurrent lymphoma is a consideration.  Reactive lymph node uptake due to infectious or inflammatory etiology also conceivable.  3. No other evidence of lymphoproliferative disorder.  4. Unchanged pulmonary nodules        PET  2/19/2021:  IMPRESSION: No evidence of active lymphoproliferative disease.          Nm Pet Ct Whole Body    Result Date: 8/24/2020  EXAMINATION: NM PET CT WHOLE BODY CLINICAL HISTORY: Cutaneous follicle center lymphoma, lymph nodes of head, face, and neck, initial staging, lung nodules, C 82.61, R 91.8. TECHNIQUE: Following the injection of 12.8 mCi of F-18 labeled FDG into a left antecubital vein, PET CT was performed from the vertex of the skull through the feet with an integrated full ring PET CT scanner with image fusion. The patient's serum glucose at the time of the exam was 88 mg/dL. COMPARISON: Multiple prior exams including chest CT of 01/05/2017. FINDINGS: There is no abnormal focal FDG hypermetabolism in the head, neck, chest, abdomen, pelvis, or the skeletal  system to suggest active lymphoproliferative disease.  There is osseous FDG hypermetabolism associated with cervical and lumbar degenerative facet arthropathy. CT images of the brain show scattered areas of nonspecific white matter hypoattenuation, with no intra-axial mass or abnormal extra-axial fluid.  There is generalized prominence of the cortical sulci and ventricles.  No suspicious sclerotic or lytic osseous abnormalities of the calvarium. CT images of the chest show no new suspicious pulmonary nodules or masses, with stable 5 mm oval noncalcified nodule in the left lower lobe image 153, with stable oval 5 mm nodular opacity in the right lower lobe along the major fissure image 128.  There is stable apical fibronodular scarring, with no pleural or pericardial effusion.  No new suspicious pulmonary nodules or masses.  There are aortic and coronary arterial vascular calcifications. CT images of the abdomen and pelvis show cholecystectomy clips, few splenic granulomatous calcifications, nonspecific peripancreatic calcifications, hypoattenuating bilateral renal lesions suggestive of cysts, and scattered aortoiliac vascular calcifications, with no ascites. CT images of the lower extremities show no enlarged lymph nodes or soft tissue masses, with diffuse arterial vascular calcifications.  There is advanced arthropathy of the left 1st metatarsophalangeal joint.  There is intervertebral disc space narrowing and facet arthropathy in the spine, with bilateral glenohumeral arthropathic changes.  No suspicious sclerotic or lytic osseous abnormalities by CT.     1. No evidence of active lymphoproliferative disease. 2. Stable subcentimeter noncalcified pulmonary nodules dating back to 01/05/2017, compatible with benign etiology. 3. Additional observations as above. Electronically signed by: Balta Ta MD Date:    08/24/2020 Time:    13:22      I have reviewed all available lab results and radiology  reports.    Assessment/Plan:   (1) 89 y.o. 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   - excisional biopsy was performed on 7/28/2020  - CD 20 +l BCL-6 stain is positive; CD30 was negative  - dicussed pathology and went over the latest NCCN guidelines (version 2.2020)  - refer to rad/onc and schedule PET    9/8/2020:  - He saw Dr Lieberman with rad/onc and they have started him on XRT with 22 total. He is in his 2nd week of XRT.   - He had PEt scan on 8/24/2020 with no evidence of a systemic lymphoma process.     10/12/2020:  - He previously saw Dr Lieberman with rad/onc and they completed XRT with 22 total on 9/29/2020.    - He had PEt scan on 8/24/2020 with no evidence of a systemic lymphoma process.   - he will need f/u with derm and rad/onc   - discussed consideration for [facility] evaluation at some point in future if ever needed but he wants to hold off on that right now    3/1/2021:  - He saw saw Dr Lopez recently on Jan 18th 2021 with dermatology and had clear report.  - He had recent PET on 2/19/2021 which was negative for any recurrent lymphoma  - He went to ER in jan 2021 with bout of vertigo.    9/30/2021:  - recent evaluation with derm which was good  - recent PET, CT neck and US axilla with borderline LN in axilla; however, patient had covid vaccination day before he had PET which could be the etiology  - CT Neck was negative  - US really did not elucidate any LN to biopsy  - discussed with patient and his wife and the are in agreement with just getting repeat PET in FEb 2022    3/29/2022:  - he saw Dr lopze recently with derm  - recent PEt on 2/14/2022 negative    9/6/2022:  - he saw Dr Lopez recently in July 2022 with clear report  - he had recent negative PET on 8/22/2022    3/6/2023:  - recent biopsies with  Dr Lopez which were negative for cancer  - latest PEt on 2/23/2023 negative    9/6/2023:  - he saw Dr Lopez with derm on 8/18 with just a coupe of spots requiring burning  - look to repeat PEt in Feb 2024    3/6/2024:  - he had repeat biopsy with Dr Lopez on 2/20/2024 - pathology showing dermatitis  - he sees Dr Lopez again tomorrow  - labs are adequate  - PEt scan in Oct 2023 with no evidence of recurrent lymphoma  - refer to Dr Flowers with Allergy/Immunology    6/6/2024:  - He had been having some form of atypical dermatitis with itching which has since resolved.   - He saw Dr Flowers with allergy/immunology and she found that it was the lisinopril which has since been discontinued.   -  He saw Dr Lakeshia Lopez with dermatology on 3/7/2024  - labs from Apr 2024 adequate  - recent PET in Apr 2024 stable    12/5/2024:  - no new issues  - he saw Dr Lopez recently   - labs relatively adequate and stable  - set up repeat PET for Apr 2025 4/24/2025:  - He saw Dr Osiris Lopez with dermatology on 4/22 and had a biopsy on his left forearm;    -  he had PEt scan on 4/3/2025 which was stable;   - He previously was followed Dr Lieberman with rad/onc and they completed XRT with 22 total on 9/29/2020       (2) Hypercholesterolemia     (3) Hx/of TIA in 1999     (4) BPH     (5) KATHY     (6) Pulmonary nodules - no tobacco history; monitored via CT scans with last one in Jan 2017     (7) Hx/of colon polyps     (8) Rosacea     (9) Arthritis and DJD - knee    (10) Mild anemia     9/6/2023:  - hgb a little lower this time around  - he is seeing Dr Esteban next week for evaluation for colonoscopy  - check up to date iron panel and b12/folate    3/6/2024:  - he had scope with Dr Esteban in Dec 2024    12/5/2024:  - latest labs adeqaute    4/24/2025:  - latest hgb at 12.8  - iron panel adequate      VISIT DIAGNOSES:      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  Check up to date labs, incl iron panel and b12/folate every 6 months  3.  Repeat PET in Apr 2026  4. F/u  PCP, card and GI, etc       RTC in 6 months   Fax note to  Nory Lopez; Niecy/Luisana Varela;  Eulalia Floyd    Discussion:     Pathology Discussion:     I reviewed and discussed the pathology report(s) and radiograph reports (if available) in as simple to understand and/or laymen's terms to the best of my ability. I had an indepth conversation with the patient and went over the patient's individual diagnosis based on the information that was currently available. I discussed the TNM staging process with regard to the patient's particular cancer type, and the calculated stage based on the currently available TNM data and literature. I discussed the available prognostic data with regard to the current staging information and how it relates to the prognosis of their particular neoplastic process.          NCCN Guidelines:     I discussed the available treatment option(s) in accordance with the latest literature from the NCCN Clinical Practice Guidelines for the patient's particular type of cancer disorder. The NCCN Guidelines provide a "document evidence-based (and) consensus-driven management" of the care of oncology patients. The treatment recommendations were made not only in accordance to the NCCN guidelines, but also factored in to account the patient's overall age, condition, performance status and their medical co-morbidities. I went over the risks and benefits of the the treatment options (if any could be made) with regard to their particular cancer type, their cancer stage, their age, and their co-morbidities.      COVID-19 Discussion:     I had long discussion with patient and any applicable family about the COVID-19 coronavirus epidemic and the recommended precautions with regard to cancer and/or " hematology patients. I have re-iterated the CDC recommendations for adequate hand washing, use of hand -like products, and coughing into elbow, etc. In addition, especially for our patients who are on chemotherapy and/or our otherwise immunocompromised patients, I have recommended avoidance of crowds, including movie theaters, restaurants, churches, etc. I have recommended avoidance of any sick or symptomatic family members and/or friends. I have also recommended avoidance of any raw and unwashed food products, and general avoidance of food items that have not been prepared by themselves. The patient has been asked to call us immediately with any symptom developments, issues, questions or other general concerns.         I spent over 25 mins of time with the patient. Reviewed results of the recently ordered labs, tests and studies; made directives with regards to the results. Over half of this time was spent couseling and coordinating care.    I have explained all of the above in detail and the patient understands all of the current recommendation(s). I have answered all of their questions to the best of my ability and to their complete satisfaction.   The patient is to continue with the current management plan.            Electronically signed by Mehdi Alvarez MD                       Answers submitted by the patient for this visit:  Review of Systems Questionnaire (Submitted on 4/17/2025)  appetite change : No  unexpected weight change: No  mouth sores: No  visual disturbance: No  cough: No  shortness of breath: No  chest pain: No  abdominal pain: No  diarrhea: No  frequency: No  back pain: No  rash: No  headaches: No  adenopathy: No  nervous/ anxious: No

## 2025-04-24 ENCOUNTER — RESULTS FOLLOW-UP (OUTPATIENT)
Dept: HEMATOLOGY/ONCOLOGY | Facility: HOSPITAL | Age: 89
End: 2025-04-24

## 2025-04-24 ENCOUNTER — LAB VISIT (OUTPATIENT)
Dept: LAB | Facility: HOSPITAL | Age: 89
End: 2025-04-24
Attending: INTERNAL MEDICINE
Payer: MEDICARE

## 2025-04-24 ENCOUNTER — OFFICE VISIT (OUTPATIENT)
Facility: CLINIC | Age: 89
End: 2025-04-24
Payer: MEDICARE

## 2025-04-24 VITALS
TEMPERATURE: 98 F | HEIGHT: 70 IN | OXYGEN SATURATION: 98 % | SYSTOLIC BLOOD PRESSURE: 139 MMHG | BODY MASS INDEX: 24.89 KG/M2 | DIASTOLIC BLOOD PRESSURE: 79 MMHG | RESPIRATION RATE: 18 BRPM | HEART RATE: 60 BPM | WEIGHT: 173.88 LBS

## 2025-04-24 DIAGNOSIS — D69.6 THROMBOCYTOPENIA, UNSPECIFIED: ICD-10-CM

## 2025-04-24 DIAGNOSIS — D64.9 NORMOCHROMIC NORMOCYTIC ANEMIA: ICD-10-CM

## 2025-04-24 DIAGNOSIS — D53.9 NUTRITIONAL ANEMIA, UNSPECIFIED: ICD-10-CM

## 2025-04-24 DIAGNOSIS — Z12.5 ENCOUNTER FOR SCREENING FOR MALIGNANT NEOPLASM OF PROSTATE: ICD-10-CM

## 2025-04-24 DIAGNOSIS — R79.9 ABNORMAL FINDING OF BLOOD CHEMISTRY, UNSPECIFIED: ICD-10-CM

## 2025-04-24 DIAGNOSIS — C82.61 CUTANEOUS FOLLICLE CENTER LYMPHOMA INVOLVING LYMPH NODES OF HEAD: ICD-10-CM

## 2025-04-24 DIAGNOSIS — C82.61 CUTANEOUS FOLLICLE CENTER LYMPHOMA INVOLVING LYMPH NODES OF HEAD: Primary | ICD-10-CM

## 2025-04-24 LAB
ABSOLUTE EOSINOPHIL (SMH): 0.04 K/UL
ABSOLUTE MONOCYTE (SMH): 0.53 K/UL (ref 0.3–1)
ABSOLUTE NEUTROPHIL COUNT (SMH): 3.8 K/UL (ref 1.8–7.7)
ALBUMIN SERPL-MCNC: 4.3 G/DL (ref 3.5–5.2)
ALP SERPL-CCNC: 110 UNIT/L (ref 55–135)
ALT SERPL-CCNC: 16 UNIT/L (ref 10–44)
ANION GAP (SMH): 9 MMOL/L (ref 8–16)
AST SERPL-CCNC: 22 UNIT/L (ref 10–40)
BASOPHILS # BLD AUTO: 0.02 K/UL
BASOPHILS NFR BLD AUTO: 0.4 %
BILIRUB SERPL-MCNC: 1 MG/DL (ref 0.1–1)
BUN SERPL-MCNC: 16 MG/DL (ref 8–23)
CALCIUM SERPL-MCNC: 10.1 MG/DL (ref 8.7–10.5)
CHLORIDE SERPL-SCNC: 106 MMOL/L (ref 95–110)
CHOLEST SERPL-MCNC: 234 MG/DL (ref 120–199)
CHOLEST/HDLC SERPL: 3.4 {RATIO} (ref 2–5)
CO2 SERPL-SCNC: 27 MMOL/L (ref 23–29)
CREAT SERPL-MCNC: 1 MG/DL (ref 0.5–1.4)
EAG (SMH): 120 MG/DL (ref 68–131)
ERYTHROCYTE [DISTWIDTH] IN BLOOD BY AUTOMATED COUNT: 14.4 % (ref 11.5–14.5)
FERRITIN SERPL-MCNC: 281 NG/ML (ref 20–300)
FOLATE SERPL-MCNC: >22.3 NG/ML (ref 4–24)
GFR SERPLBLD CREATININE-BSD FMLA CKD-EPI: >60 ML/MIN/1.73/M2
GLUCOSE SERPL-MCNC: 95 MG/DL (ref 70–110)
HBA1C MFR BLD: 5.8 % (ref 4.5–6.2)
HCT VFR BLD AUTO: 42 % (ref 40–54)
HDLC SERPL-MCNC: 69 MG/DL (ref 40–75)
HDLC SERPL: 29.5 % (ref 20–50)
HGB BLD-MCNC: 14.2 GM/DL (ref 14–18)
IMM GRANULOCYTES # BLD AUTO: 0.02 K/UL (ref 0–0.04)
IMM GRANULOCYTES NFR BLD AUTO: 0.4 % (ref 0–0.5)
IRON SATN MFR SERPL: 43 % (ref 20–50)
IRON SERPL-MCNC: 148 UG/DL (ref 45–160)
LDLC SERPL CALC-MCNC: 139.6 MG/DL (ref 63–159)
LYMPHOCYTES # BLD AUTO: 1.23 K/UL (ref 1–4.8)
MCH RBC QN AUTO: 33.3 PG (ref 27–31)
MCHC RBC AUTO-ENTMCNC: 33.8 G/DL (ref 32–36)
MCV RBC AUTO: 99 FL (ref 82–98)
NONHDLC SERPL-MCNC: 165 MG/DL
NUCLEATED RBC (/100WBC) (SMH): 0 /100 WBC
PLATELET # BLD AUTO: 162 K/UL (ref 150–450)
PMV BLD AUTO: 10.1 FL (ref 9.2–12.9)
POTASSIUM SERPL-SCNC: 3.9 MMOL/L (ref 3.5–5.1)
PROT SERPL-MCNC: 7.2 GM/DL (ref 6–8.4)
PSA SERPL-MCNC: 0.58 NG/ML (ref ?–4)
RBC # BLD AUTO: 4.26 M/UL (ref 4.6–6.2)
RELATIVE EOSINOPHIL (SMH): 0.7 % (ref 0–8)
RELATIVE LYMPHOCYTE (SMH): 21.8 % (ref 18–48)
RELATIVE MONOCYTE (SMH): 9.4 % (ref 4–15)
RELATIVE NEUTROPHIL (SMH): 67.3 % (ref 38–73)
SODIUM SERPL-SCNC: 142 MMOL/L (ref 136–145)
TIBC SERPL-MCNC: 344 UG/DL (ref 250–450)
TRANSFERRIN SERPL-MCNC: 246 MG/DL (ref 200–375)
TRIGL SERPL-MCNC: 127 MG/DL (ref 30–150)
VIT B12 SERPL-MCNC: 236 PG/ML (ref 210–950)
WBC # BLD AUTO: 5.63 K/UL (ref 3.9–12.7)

## 2025-04-24 PROCEDURE — 99999 PR PBB SHADOW E&M-EST. PATIENT-LVL III: CPT | Mod: PBBFAC,,, | Performed by: INTERNAL MEDICINE

## 2025-04-24 PROCEDURE — 83036 HEMOGLOBIN GLYCOSYLATED A1C: CPT

## 2025-04-24 PROCEDURE — 84466 ASSAY OF TRANSFERRIN: CPT

## 2025-04-24 PROCEDURE — 84153 ASSAY OF PSA TOTAL: CPT

## 2025-04-24 PROCEDURE — 80061 LIPID PANEL: CPT

## 2025-04-24 PROCEDURE — 80053 COMPREHEN METABOLIC PANEL: CPT

## 2025-04-24 PROCEDURE — 82728 ASSAY OF FERRITIN: CPT

## 2025-04-24 PROCEDURE — 85025 COMPLETE CBC W/AUTO DIFF WBC: CPT

## 2025-04-24 PROCEDURE — 82746 ASSAY OF FOLIC ACID SERUM: CPT

## 2025-04-24 PROCEDURE — 82607 VITAMIN B-12: CPT

## 2025-04-24 PROCEDURE — 99213 OFFICE O/P EST LOW 20 MIN: CPT | Mod: PBBFAC,PN | Performed by: INTERNAL MEDICINE

## 2025-04-24 PROCEDURE — 36415 COLL VENOUS BLD VENIPUNCTURE: CPT

## 2025-04-25 ENCOUNTER — TELEPHONE (OUTPATIENT)
Facility: CLINIC | Age: 89
End: 2025-04-25
Payer: MEDICARE

## 2025-04-25 RX ORDER — CYANOCOBALAMIN 1000 UG/ML
1000 INJECTION, SOLUTION INTRAMUSCULAR; SUBCUTANEOUS
OUTPATIENT
Start: 2025-04-25

## 2025-04-25 NOTE — PROGRESS NOTES
Patient made aware of need to start once monthly B12 injections per Dr Alvarez's directions. Verbalized understanding and states he would like to have injections done here. Informed that orders will be placed today and once auth received scheduling will call to schedule. Verbalized understanding. Orders placed.

## 2025-04-25 NOTE — TELEPHONE ENCOUNTER
----- Message from Mehdi Alvarez MD sent at 4/24/2025  8:31 PM CDT -----  Recommend B12 injections monthly   ----- Message -----  From: Lab, Background User  Sent: 4/24/2025  10:07 AM CDT  To: Mehdi Alvarez MD

## 2025-04-25 NOTE — TELEPHONE ENCOUNTER
Attempted to call patient to make aware of Dr Alvarez's recommendations to start B12 once monthly. No answer, LVM to return my call to discuss.

## 2025-04-28 DIAGNOSIS — E53.8 B12 DEFICIENCY: Primary | ICD-10-CM

## 2025-04-29 ENCOUNTER — PATIENT MESSAGE (OUTPATIENT)
Dept: INTERNAL MEDICINE | Facility: CLINIC | Age: 89
End: 2025-04-29
Payer: MEDICARE

## 2025-04-29 DIAGNOSIS — E78.2 MIXED HYPERLIPIDEMIA: ICD-10-CM

## 2025-04-29 RX ORDER — ATORVASTATIN CALCIUM 20 MG/1
20 TABLET, FILM COATED ORAL DAILY
Qty: 90 TABLET | Refills: 3 | Status: SHIPPED | OUTPATIENT
Start: 2025-04-29

## 2025-04-29 NOTE — TELEPHONE ENCOUNTER
No care due was identified.  [facility] Embedded Care Due Messages. Reference number: [id number].   4/29/2025 11:12:01 AM CDT

## 2025-04-30 ENCOUNTER — EXTERNAL CHRONIC CARE MANAGEMENT (OUTPATIENT)
Dept: PRIMARY CARE CLINIC | Facility: CLINIC | Age: 89
End: 2025-04-30
Payer: MEDICARE

## 2025-04-30 PROCEDURE — 99439 CHRNC CARE MGMT STAF EA ADDL: CPT | Mod: PBBFAC | Performed by: INTERNAL MEDICINE

## 2025-04-30 PROCEDURE — 99490 CHRNC CARE MGMT STAFF 1ST 20: CPT | Mod: PBBFAC | Performed by: INTERNAL MEDICINE

## 2025-05-31 ENCOUNTER — EXTERNAL CHRONIC CARE MANAGEMENT (OUTPATIENT)
Dept: PRIMARY CARE CLINIC | Facility: CLINIC | Age: 89
End: 2025-05-31
Payer: MEDICARE

## 2025-05-31 PROCEDURE — 99490 CHRNC CARE MGMT STAFF 1ST 20: CPT | Mod: S$PBB,,, | Performed by: INTERNAL MEDICINE

## 2025-05-31 PROCEDURE — 99490 CHRNC CARE MGMT STAFF 1ST 20: CPT | Mod: PBBFAC | Performed by: INTERNAL MEDICINE

## 2025-06-04 ENCOUNTER — INFUSION (OUTPATIENT)
Dept: INFUSION THERAPY | Facility: HOSPITAL | Age: 89
End: 2025-06-04
Attending: INTERNAL MEDICINE
Payer: MEDICARE

## 2025-06-04 VITALS
WEIGHT: 171.31 LBS | TEMPERATURE: 98 F | HEART RATE: 75 BPM | OXYGEN SATURATION: 97 % | SYSTOLIC BLOOD PRESSURE: 124 MMHG | DIASTOLIC BLOOD PRESSURE: 81 MMHG | BODY MASS INDEX: 24.58 KG/M2

## 2025-06-04 DIAGNOSIS — E53.8 B12 DEFICIENCY: Primary | ICD-10-CM

## 2025-06-04 PROCEDURE — 96372 THER/PROPH/DIAG INJ SC/IM: CPT

## 2025-06-04 PROCEDURE — 63600175 PHARM REV CODE 636 W HCPCS: Performed by: INTERNAL MEDICINE

## 2025-06-04 RX ORDER — CYANOCOBALAMIN 1000 UG/ML
1000 INJECTION, SOLUTION INTRAMUSCULAR; SUBCUTANEOUS
OUTPATIENT
Start: 2025-06-04

## 2025-06-04 RX ORDER — CYANOCOBALAMIN 1000 UG/ML
1000 INJECTION, SOLUTION INTRAMUSCULAR; SUBCUTANEOUS
Status: DISCONTINUED | OUTPATIENT
Start: 2025-06-04 | End: 2025-06-04 | Stop reason: HOSPADM

## 2025-06-04 RX ADMIN — CYANOCOBALAMIN 1000 MCG: 1000 INJECTION, SOLUTION INTRAMUSCULAR; SUBCUTANEOUS at 02:06

## 2025-06-10 ENCOUNTER — PATIENT MESSAGE (OUTPATIENT)
Dept: INTERNAL MEDICINE | Facility: CLINIC | Age: 89
End: 2025-06-10
Payer: MEDICARE

## 2025-06-12 ENCOUNTER — OFFICE VISIT (OUTPATIENT)
Dept: INTERNAL MEDICINE | Facility: CLINIC | Age: 89
End: 2025-06-12
Payer: MEDICARE

## 2025-06-12 VITALS
HEART RATE: 91 BPM | HEIGHT: 70 IN | DIASTOLIC BLOOD PRESSURE: 78 MMHG | BODY MASS INDEX: 24.49 KG/M2 | WEIGHT: 171.06 LBS | SYSTOLIC BLOOD PRESSURE: 116 MMHG | OXYGEN SATURATION: 100 %

## 2025-06-12 DIAGNOSIS — M54.10 RADICULOPATHY WITH LOWER EXTREMITY SYMPTOMS: ICD-10-CM

## 2025-06-12 DIAGNOSIS — R29.898 RIGHT LEG WEAKNESS: ICD-10-CM

## 2025-06-12 DIAGNOSIS — M46.1 SACROILIITIS: Primary | ICD-10-CM

## 2025-06-12 PROCEDURE — 99999 PR PBB SHADOW E&M-EST. PATIENT-LVL IV: CPT | Mod: PBBFAC,,, | Performed by: INTERNAL MEDICINE

## 2025-06-12 PROCEDURE — 99214 OFFICE O/P EST MOD 30 MIN: CPT | Mod: S$PBB,,, | Performed by: INTERNAL MEDICINE

## 2025-06-12 PROCEDURE — 99214 OFFICE O/P EST MOD 30 MIN: CPT | Mod: PBBFAC | Performed by: INTERNAL MEDICINE

## 2025-06-12 NOTE — PROGRESS NOTES
He has has a mobility limitation that significantly impairs her ability to participate in one or more mobility related activities of daily living (MRADL's) such as toileting, feeding, dressing, grooming, and bathing in customary locations in the home. The mobility limitation cannot be sufficiently resolved by the use of a cane however I think he would greatly benefit from a Rollator walker.  The use of a Rollator walker will significantly improve the patient's ability to participate in MRADLS and the patient will use it on regular basis in the home.  The patient has expressed her willingness to use a Rollator walker in the home. Patient's upper body strength is sufficient for using a Rollator walker. He/She also has a caregiver who is available, willing, and able to provide assistance with the Rollator walker when needed     He is a 89-year-old gentleman coming in today with some right leg problems for years.  Has a history of a left knee replacement.  He is also being falls at [facility] for his left leg they have also done physical therapy family's worried that he has spent too much time in his recliner and would like to get him set up for some physical therapy for his right leg.  His leg has always been weaker however with his recent bone and joint problems he has become less mobile and they are worried about him falling.  He has not had any falls yet      S medical history includes thrombocytopenia, sacroiliitis, sacral joint pain, hyperlipidemia, degenerative joint disease of the knee, cutaneous follicular center lymphoma, and B12 deficiency.  He also has BPH.    Review of systems  ROS : Gen - no fatigue or significant weight change  Eyes - no eye pain or visual changes--overall eyes are doing okay.  ENT - no hoarseness or sore throat  CV - No chest pain or SOB.  NO palpitations.  Pulm - no cough or wheezing--  GI - no N/V/D--appetite has been stable.   no dysuria or incontinence  MS - his left  "knee is still stiff but otherwise doing okay.    Skin - no rash, or c/o of skin lesions  Neuro - no HA, dizziness--- memory is doing well.   Heme - no abnormal bleeding or bruising  Endo - no polydipsia, or temperature changes  Psych - no anxiety or depression       /78 (BP Location: Right arm, Patient Position: Sitting)   Pulse 91   Ht 5' 10" (1.778 m)   Wt 77.6 kg (171 lb 1.2 oz)   SpO2 100%   BMI 24.55 kg/m²   He is a elderly gentleman in no acute distress.  He is sitting in a chair.  When he stands he does wobble little bit.  He does walk with a considerable limp favoring his left leg.  Also when he turns around quickly as he does on his observed walking, he does lose his balance and throws his arms out to catch himself.  Neck is supple.  No JVD.  Thyroid is not enlarged.  Chest is clear without wheeze.  Cardiovascular shows S1, S2 without a murmur.  No gallop.  No rub.  Abdomen is soft.  Lower extremities has trace edema bilaterally.  Legs are about the same size I do not notice any muscular atrophy.        Assessment and plan:    Sacroiliitis  -     WALKER FOR HOME USE  -     Ambulatory Referral/Consult to Physical Therapy    Right leg weakness  -     WALKER FOR HOME USE  -     Ambulatory Referral/Consult to Physical Therapy    Radiculopathy with lower extremity symptoms  -     WALKER FOR HOME USE  -     Ambulatory Referral/Consult to Physical Therapy        He is going to continue being treated at [facility].  I am going to write a prescription for a walker for his home use and physical therapy.    He has has a mobility limitation that significantly impairs her ability to participate in one or more mobility related activities of daily living (MRADL's) such as toileting, feeding, dressing, grooming, and bathing in customary locations in the home. The mobility limitation cannot be sufficiently resolved by the use of a cane however I think he would greatly benefit from a Rollator walker.  " The use of a Rollator walker will significantly improve the patient's ability to participate in MRADLS and the patient will use it on regular basis in the home.  The patient has expressed her willingness to use a Rollator walker in the home. Patient's upper body strength is sufficient for using a Rollator walker. He/She also has a caregiver who is available, willing, and able to provide assistance with the Rollator walker when needed      Continue follow-up as already scheduled otherwise.  We discussed fall prevention today which is very important in this almost 89-year-old gentleman

## 2025-06-17 ENCOUNTER — OFFICE VISIT (OUTPATIENT)
Dept: CARDIOLOGY | Facility: CLINIC | Age: 89
End: 2025-06-17
Payer: MEDICARE

## 2025-06-17 VITALS
DIASTOLIC BLOOD PRESSURE: 78 MMHG | BODY MASS INDEX: 24.48 KG/M2 | HEART RATE: 63 BPM | WEIGHT: 170.63 LBS | SYSTOLIC BLOOD PRESSURE: 138 MMHG | OXYGEN SATURATION: 98 %

## 2025-06-17 DIAGNOSIS — T46.6X5A STATIN MYOPATHY: Primary | ICD-10-CM

## 2025-06-17 DIAGNOSIS — R94.31 ABNORMAL ELECTROCARDIOGRAM (ECG) (EKG): ICD-10-CM

## 2025-06-17 DIAGNOSIS — G72.0 STATIN MYOPATHY: Primary | ICD-10-CM

## 2025-06-17 DIAGNOSIS — I70.0 CALCIFICATION OF AORTA: ICD-10-CM

## 2025-06-17 DIAGNOSIS — E78.2 MIXED HYPERLIPIDEMIA: ICD-10-CM

## 2025-06-17 DIAGNOSIS — G47.33 OBSTRUCTIVE SLEEP APNEA SYNDROME: ICD-10-CM

## 2025-06-17 PROCEDURE — 99214 OFFICE O/P EST MOD 30 MIN: CPT | Mod: S$PBB,,, | Performed by: INTERNAL MEDICINE

## 2025-06-17 PROCEDURE — 99999 PR PBB SHADOW E&M-EST. PATIENT-LVL III: CPT | Mod: PBBFAC,,, | Performed by: INTERNAL MEDICINE

## 2025-06-17 PROCEDURE — 99213 OFFICE O/P EST LOW 20 MIN: CPT | Mod: PBBFAC,PN | Performed by: INTERNAL MEDICINE

## 2025-06-17 NOTE — ASSESSMENT & PLAN NOTE
Patient reports progressive myopathy associated Lipitor.  Recommend to stop this has completely maintain on low-fat low-cholesterol diet and continue on physical therapy for his right lower extremity.

## 2025-06-17 NOTE — PROGRESS NOTES
Subjective:    Patient ID:  Bryson Kellogg is a 89 y.o. male patient here for evaluation Follow-up      History of Present Illness:     Patient is 89-year-old gentleman who has history of TIA and has footdrop on the right noted to have increased weakness in his right lower extremity currently receiving physical therapy he has also developed pain in his neck and left shoulder apparently had radiofrequency therapy with not much relief.  He is concerned about statin therapies making his symptoms worse  Denies having episodes of angina shortness of breath or palpitations.        Review of patient's allergies indicates:   Allergen Reactions    Lisinopril Rash    Sulfa (sulfonamide antibiotics) Rash     Childhood allergy       Past Medical History:   Diagnosis Date    Allergy     B12 deficiency 04/28/2025    Bradycardia 01/04/2017    Cutaneous follicle center lymphoma involving lymph nodes of head 08/12/2020    History of B-cell lymphoma     Hx of colonic polyp 11/03/2016    Hyperlipidemia     Personal history of colonic polyps     Colon polyps    Renal stone 11/23/2012    Rosacea     Shingles     Sleep apnea     Squamous cell carcinoma of skin     TIA (transient ischemic attack)      Past Surgical History:   Procedure Laterality Date    BACK SURGERY  05/2020    Lum/Kyle    CHOLECYSTECTOMY      COLONOSCOPY N/A 11/3/2016    Procedure: COLONOSCOPY;  Surgeon: Alex Cuellar MD;  Location: [facility];  Service: Endoscopy;  Laterality: N/A;    COLONOSCOPY N/A 12/5/2023    Procedure: COLONOSCOPY;  Surgeon: Frankie Esteban MD;  Location: [facility];  Service: Endoscopy;  Laterality: N/A;    EYE SURGERY Bilateral 2014    cataracts    EYE SURGERY Right 10/2016    tear duct surgery    INJECTION OF ANESTHETIC AGENT AROUND LATERAL BRANCH NERVES OF SACROILIAC JOINT Left 3/5/2021    Procedure: left SI joint injection;  Surgeon: Pedro Varela MD;  Location: [facility];  Service: Pain Management;  Laterality: Left;  left SI joint injection      INJECTION OF JOINT Left 11/30/2021    Procedure: Injection, Joint Sacroiliac and GTB;  Surgeon: Pedro Varela MD;  Location: [facility];  Service: Pain Management;  Laterality: Left;    INJECTION OF JOINT Left 11/30/2021    Procedure: INJECTION, JOINT, SHOULDER, HIP, OR KNEE;  Surgeon: Pedro Varela MD;  Location: [facility];  Service: Pain Management;  Laterality: Left;  GTB injestion    JOINT REPLACEMENT Left 2013    knee replacemant      left knee     Social History[1]     Review of Systems:    As noted in HPI in addition      REVIEW OF SYSTEMS  CARDIOVASCULAR: No recent chest pain, palpitations, arm, neck, or jaw pain  RESPIRATORY: No recent fever, cough chills, SOB or congestion  : No blood in the urine  GI: No Nausea, vomiting, constipation, diarrhea, blood, or reflux.  MUSCULOSKELETAL:  Positive for myalgias in his left shoulder left neck region as well as right lower extremity.  NEURO: No lightheadedness or dizziness  EYES: No Double vision, blurry, vision or headache              Objective        Vitals:    06/17/25 0953   BP: 138/78   Pulse: 63       LIPIDS - LAST 2   Lab Results   Component Value Date    CHOL 234 (H) 04/24/2025    CHOL 155 02/29/2024    HDL 69 04/24/2025    HDL 53 02/29/2024    LDLCALC 139.6 04/24/2025    LDLCALC 58.8 (L) 02/29/2024    TRIG 127 04/24/2025    TRIG 216 (H) 02/29/2024    CHOLHDL 29.5 04/24/2025    CHOLHDL 34.2 02/29/2024       CBC - LAST 2  Lab Results   Component Value Date    WBC 5.63 04/24/2025    WBC 4.99 02/19/2025    RBC 4.26 (L) 04/24/2025    RBC 3.91 (L) 02/19/2025    HGB 14.2 04/24/2025    HGB 12.8 (L) 02/19/2025    HCT 42.0 04/24/2025    HCT 39.4 (L) 02/19/2025    MCV 99 (H) 04/24/2025     (H) 02/19/2025    MCH 33.3 (H) 04/24/2025    MCH 32.7 (H) 02/19/2025    MCHC 33.8 04/24/2025    MCHC 32.5 02/19/2025    RDW 14.4 04/24/2025    RDW 13.6 02/19/2025     04/24/2025     02/19/2025    MPV 10.1 04/24/2025    MPV 11.8 02/19/2025    GRAN 3.0 02/19/2025     GRAN 60.2 02/19/2025    LYMPH 1.2 02/19/2025    LYMPH 24.4 02/19/2025    MONO 0.6 02/19/2025    MONO 12.2 02/19/2025    BASO 0.02 02/19/2025    BASO 0.03 12/02/2024    BASO 0.03 12/02/2024    NRBC 0 04/24/2025    NRBC 0 02/19/2025       CHEMISTRY & LIVER FUNCTION - LAST 2  Lab Results   Component Value Date     04/24/2025     02/19/2025    K 3.9 04/24/2025    K 4.9 02/19/2025     04/24/2025     02/19/2025    CO2 27 04/24/2025    CO2 24 02/19/2025    ANIONGAP 9 04/24/2025    ANIONGAP 9 02/19/2025    BUN 16 04/24/2025    BUN 14 02/19/2025    CREATININE 1.0 04/24/2025    CREATININE 0.9 02/19/2025    GLU 95 04/24/2025    GLU 92 02/19/2025    CALCIUM 10.1 04/24/2025    CALCIUM 10.2 02/19/2025    MG 2.0 11/21/2024    MG 2.2 10/19/2023    ALBUMIN 4.3 04/24/2025    ALBUMIN 3.6 02/19/2025    PROT 7.2 04/24/2025    PROT 6.8 02/19/2025    ALKPHOS 110 04/24/2025    ALKPHOS 119 02/19/2025    ALT 16 04/24/2025    ALT 14 02/19/2025    AST 22 04/24/2025    AST 27 02/19/2025    BILITOT 1.0 04/24/2025    BILITOT 1.3 (H) 02/19/2025        CARDIAC PROFILE - LAST 2  Lab Results   Component Value Date    BNP 41 07/31/2023     (H) 01/31/2021     11/26/2021    TROPONINI <0.030 11/26/2021    TROPONINI <0.030 01/31/2021    TROPONINIHS 6.4 11/21/2024    TROPONINIHS 5.5 07/31/2023        COAGULATION - LAST 2  Lab Results   Component Value Date    LABPT 14.1 01/31/2021    INR 1.1 01/31/2021    INR 1.03 11/23/2012    APTT 25.9 11/23/2012       ENDOCRINE & PSA - LAST 2  Lab Results   Component Value Date    HGBA1C 5.8 04/24/2025    HGBA1C 5.6 02/23/2024    TSH 1.420 11/19/2021    TSH 1.459 01/03/2018    PSA 0.58 04/24/2025    PSA 0.50 12/05/2022        ECHOCARDIOGRAM RESULTS  Results for orders placed during the hospital encounter of 01/29/25    Echo    Interpretation Summary    Left Ventricle: The left ventricle is normal in size. Normal wall thickness. There is normal systolic function with a visually  estimated ejection fraction of 60 - 65%. There is normal diastolic function.    Right Ventricle: Normal right ventricular cavity size. Wall thickness is normal. Systolic function is normal.    Aortic Valve: The aortic valve is a trileaflet valve. There is mild aortic valve sclerosis.    Tricuspid Valve: The tricuspid valve is structurally normal. There is mild regurgitation.    Pulmonary Artery: The estimated pulmonary artery systolic pressure is 26 mmHg.    IVC/SVC: Normal venous pressure at 3 mmHg.      CURRENT/PREVIOUS VISIT EKG  Results for orders placed or performed during the hospital encounter of 11/21/24   EKG 12-lead    Collection Time: 11/21/24 11:21 PM   Result Value Ref Range    QRS Duration 86 ms    OHS QTC Calculation 411 ms    Narrative    Test Reason : M54.2,    Vent. Rate :  65 BPM     Atrial Rate :  65 BPM     P-R Int : 196 ms          QRS Dur :  86 ms      QT Int : 396 ms       P-R-T Axes :  57   8  70 degrees    QTcB Int : 411 ms    Normal sinus rhythm  Nonspecific T wave abnormality  Abnormal ECG  When compared with ECG of 11-Nov-2024 12:12,  Premature supraventricular complexes are no longer Present  Nonspecific T wave abnormality, worse in Lateral leads  Confirmed by Vamshi Floyd ([id number]) on 11/23/2024 3:06:44 PM    Referred By: AAAREFERRAL SELF           Confirmed By: Vamshi Floyd     No valid procedures specified.   Results for orders placed during the hospital encounter of 06/27/22    Nuclear Stress - Cardiology Interpreted    Interpretation Summary    Equivocal myocardial perfusion scan.    There is a mild intensity, small sized, equivocal perfusion abnormality that is fixed in the inferobasilar wall(s). This finding is equivocal due to soft tissue shadow.    There are no other significant perfusion abnormalities.    There is a trivial to mild intensity fixed perfusion abnormality in the inferobasilar wall of the left ventricle, secondary to soft tissue attenuation.    The gated  perfusion images showed an ejection fraction of 81% post stress. Normal ejection fraction is greater than 47%.    There is normal wall motion at rest and post stress.    LV cavity size is normal at rest and normal at stress.    The EKG portion of this study is negative for ischemia.    The patient reported no chest pain during the stress test.    The test was stopped because the patient experienced arrhythmia.    There were no arrhythmias during stress.    No valid procedures specified.    PHYSICAL EXAM  CONSTITUTIONAL: Well built, well nourished in no apparent distress  NECK: no carotid bruit, no JVD  LUNGS: CTA  CHEST WALL: no tenderness  HEART: regular rate and rhythm, S1, S2 normal, no murmur, click, rub or gallop   ABDOMEN: soft, non-tender; bowel sounds normal; no masses,  no organomegaly  EXTREMITIES: Extremities normal, no edema, no calf tenderness noted  NEURO: AAO X 3    I HAVE REVIEWED :    The vital signs, nurses notes, and all the pertinent radiology and labs.    06/17/2025 EKG shows sinus bradycardia rate of 58 beats per minute poor R-wave progression with no acute ST changes noted.  Clinically stable    Current Outpatient Medications   Medication Instructions    ascorbic acid (vitamin C) (VITAMIN C) 500 mg, Daily    aspirin (ECOTRIN) 81 mg    atorvastatin (LIPITOR) 20 mg, Oral, Daily    co-enzyme Q-10 30 mg, 3 times daily    multivitamin (THERAGRAN) per tablet 1 tablet, Daily    polyethylene glycol (GLYCOLAX) 17 g          Assessment & Plan     1. Statin myopathy  Assessment & Plan:  Patient reports progressive myopathy associated Lipitor.  Recommend to stop this has completely maintain on low-fat low-cholesterol diet and continue on physical therapy for his right lower extremity.      2. Calcification of aorta  Assessment & Plan:  Continue on risk factor modification maintain on aspirin therapy he was off for awhile.  And will stop the Lipitor because of statin myopathy and see how he does he is at  risk for repeat events      3. Mixed hyperlipidemia  Assessment & Plan:  Maintain on diet control alone at this time and is fair amount of myopathic symptoms will stay off statins for the time being continue on physical therapy      4. Obstructive sleep apnea syndrome  Assessment & Plan:  This has not been using any sleep assist devices.        Blood pressure is slightly higher than in his home recordings attributes this to chronic neck pain  EKG has sinus bradycardia with no acute ST changes    Follow up in about 6 months (around 12/17/2025).            [1]   Social History  Tobacco Use    Smoking status: Never    Smokeless tobacco: Never   Substance Use Topics    Alcohol use: No    Drug use: No

## 2025-06-17 NOTE — ASSESSMENT & PLAN NOTE
Maintain on diet control alone at this time and is fair amount of myopathic symptoms will stay off statins for the time being continue on physical therapy

## 2025-06-17 NOTE — ASSESSMENT & PLAN NOTE
Continue on risk factor modification maintain on aspirin therapy he was off for awhile.  And will stop the Lipitor because of statin myopathy and see how he does he is at risk for repeat events

## 2025-06-18 ENCOUNTER — TELEPHONE (OUTPATIENT)
Dept: CARDIOLOGY | Facility: CLINIC | Age: 89
End: 2025-06-18
Payer: MEDICARE

## 2025-06-18 DIAGNOSIS — R94.31 ABNORMAL ELECTROCARDIOGRAM (ECG) (EKG): Primary | ICD-10-CM

## 2025-06-18 NOTE — TELEPHONE ENCOUNTER
----- Message from JEYSON Penaloza sent at 2025  4:21 PM CDT -----  Regardin/17 EKG Order  The EKG performed on 2025 is missing an order.  Please place an order so that the EKG can be read in [place].    Thank you,  Ca Vega RN  [facility]   [facility] [place]  3rd Floor Cardiology Clinic  [phone]/ [id number]

## 2025-06-21 LAB
OHS QRS DURATION: 98 MS
OHS QTC CALCULATION: 410 MS

## 2025-06-23 ENCOUNTER — PATIENT MESSAGE (OUTPATIENT)
Dept: INTERNAL MEDICINE | Facility: CLINIC | Age: 89
End: 2025-06-23
Payer: MEDICARE

## 2025-06-23 NOTE — TELEPHONE ENCOUNTER
I ordered a Rollator or this 89-year-old gentleman almost 89 at his visit.  Can you call DME and seeing what the freaking hold up is?  I am frustrated by ordering DME and then finding a month later the patient did not get it

## 2025-06-23 NOTE — TELEPHONE ENCOUNTER
Okay.  Note is in and I put that is silly  saying in it twice so hopefully that will help.  Please see the note they sent me.  They have a different phone number that may be more beneficial to get in touch with him.  Thank you

## 2025-06-30 ENCOUNTER — EXTERNAL CHRONIC CARE MANAGEMENT (OUTPATIENT)
Dept: PRIMARY CARE CLINIC | Facility: CLINIC | Age: 89
End: 2025-06-30
Payer: MEDICARE

## 2025-06-30 PROCEDURE — 99490 CHRNC CARE MGMT STAFF 1ST 20: CPT | Mod: PBBFAC | Performed by: INTERNAL MEDICINE

## 2025-06-30 PROCEDURE — 99490 CHRNC CARE MGMT STAFF 1ST 20: CPT | Mod: S$PBB,,, | Performed by: INTERNAL MEDICINE

## 2025-07-02 ENCOUNTER — INFUSION (OUTPATIENT)
Dept: INFUSION THERAPY | Facility: HOSPITAL | Age: 89
End: 2025-07-02
Attending: INTERNAL MEDICINE
Payer: MEDICARE

## 2025-07-02 VITALS
HEART RATE: 63 BPM | BODY MASS INDEX: 25.01 KG/M2 | OXYGEN SATURATION: 97 % | HEIGHT: 70 IN | DIASTOLIC BLOOD PRESSURE: 66 MMHG | SYSTOLIC BLOOD PRESSURE: 113 MMHG | RESPIRATION RATE: 16 BRPM | TEMPERATURE: 98 F | WEIGHT: 174.69 LBS

## 2025-07-02 DIAGNOSIS — E53.8 B12 DEFICIENCY: Primary | ICD-10-CM

## 2025-07-02 PROCEDURE — 96372 THER/PROPH/DIAG INJ SC/IM: CPT

## 2025-07-02 PROCEDURE — 63600175 PHARM REV CODE 636 W HCPCS: Performed by: INTERNAL MEDICINE

## 2025-07-02 RX ORDER — CYANOCOBALAMIN 1000 UG/ML
1000 INJECTION, SOLUTION INTRAMUSCULAR; SUBCUTANEOUS
OUTPATIENT
Start: 2025-07-02

## 2025-07-02 RX ORDER — CYANOCOBALAMIN 1000 UG/ML
1000 INJECTION, SOLUTION INTRAMUSCULAR; SUBCUTANEOUS
Status: DISCONTINUED | OUTPATIENT
Start: 2025-07-02 | End: 2025-07-02 | Stop reason: HOSPADM

## 2025-07-02 RX ADMIN — CYANOCOBALAMIN 1000 MCG: 1000 INJECTION, SOLUTION INTRAMUSCULAR; SUBCUTANEOUS at 02:07

## 2025-07-02 NOTE — PLAN OF CARE
Problem: Adult Inpatient Plan of Care  Goal: Optimal Comfort and Wellbeing  Outcome: Progressing  Intervention: Provide Person-Centered Care  Flowsheets (Taken 7/2/2025 0557)  Trust Relationship/Rapport:   care explained   choices provided   emotional support provided   empathic listening provided   questions answered   reassurance provided   thoughts/feelings acknowledged   questions encouraged

## 2025-07-31 ENCOUNTER — EXTERNAL CHRONIC CARE MANAGEMENT (OUTPATIENT)
Dept: PRIMARY CARE CLINIC | Facility: CLINIC | Age: 89
End: 2025-07-31
Payer: MEDICARE

## 2025-07-31 PROCEDURE — 99490 CHRNC CARE MGMT STAFF 1ST 20: CPT | Mod: S$PBB,,, | Performed by: INTERNAL MEDICINE

## 2025-07-31 PROCEDURE — 99490 CHRNC CARE MGMT STAFF 1ST 20: CPT | Mod: PBBFAC | Performed by: INTERNAL MEDICINE

## 2025-08-06 ENCOUNTER — INFUSION (OUTPATIENT)
Dept: INFUSION THERAPY | Facility: HOSPITAL | Age: 89
End: 2025-08-06
Attending: INTERNAL MEDICINE
Payer: MEDICARE

## 2025-08-06 VITALS
DIASTOLIC BLOOD PRESSURE: 65 MMHG | HEIGHT: 70 IN | WEIGHT: 177 LBS | RESPIRATION RATE: 16 BRPM | BODY MASS INDEX: 25.34 KG/M2 | SYSTOLIC BLOOD PRESSURE: 119 MMHG | HEART RATE: 63 BPM | OXYGEN SATURATION: 98 % | TEMPERATURE: 98 F

## 2025-08-06 DIAGNOSIS — E53.8 B12 DEFICIENCY: Primary | ICD-10-CM

## 2025-08-06 PROCEDURE — 96372 THER/PROPH/DIAG INJ SC/IM: CPT

## 2025-08-06 PROCEDURE — 63600175 PHARM REV CODE 636 W HCPCS: Performed by: INTERNAL MEDICINE

## 2025-08-06 RX ORDER — CYANOCOBALAMIN 1000 UG/ML
1000 INJECTION, SOLUTION INTRAMUSCULAR; SUBCUTANEOUS
Status: DISCONTINUED | OUTPATIENT
Start: 2025-08-06 | End: 2025-08-06 | Stop reason: HOSPADM

## 2025-08-06 RX ORDER — CYANOCOBALAMIN 1000 UG/ML
1000 INJECTION, SOLUTION INTRAMUSCULAR; SUBCUTANEOUS
OUTPATIENT
Start: 2025-08-06

## 2025-08-06 RX ADMIN — CYANOCOBALAMIN 1000 MCG: 1000 INJECTION, SOLUTION INTRAMUSCULAR; SUBCUTANEOUS at 03:08

## 2025-08-06 NOTE — PLAN OF CARE
Problem: Adult Inpatient Plan of Care  Goal: Optimal Comfort and Wellbeing  Outcome: Progressing  Intervention: Monitor Pain and Promote Comfort  Flowsheets (Taken 8/6/2025 1449)  Pain Management Interventions:   relaxation techniques promoted   quiet environment facilitated  Intervention: Provide Person-Centered Care  Flowsheets (Taken 8/6/2025 1449)  Trust Relationship/Rapport:   care explained   reassurance provided   choices provided   thoughts/feelings acknowledged   emotional support provided   questions answered   empathic listening provided   questions encouraged

## 2025-08-06 NOTE — PLAN OF CARE
1450 Pt here for infusion tx, placed to chair, vss.  1502 Therapy plan administered. Tolerated well. D/c instructions given including s/s of reaction and to notify administering MD or go to ER. D/c'd to home with lillian.

## 2025-08-18 ENCOUNTER — HOSPITAL ENCOUNTER (EMERGENCY)
Facility: HOSPITAL | Age: 89
Discharge: HOME | End: 2025-08-18
Attending: EMERGENCY MEDICINE
Payer: MEDICARE

## 2025-08-18 VITALS
BODY MASS INDEX: 24.05 KG/M2 | DIASTOLIC BLOOD PRESSURE: 78 MMHG | HEART RATE: 57 BPM | SYSTOLIC BLOOD PRESSURE: 138 MMHG | RESPIRATION RATE: 18 BRPM | OXYGEN SATURATION: 99 % | TEMPERATURE: 98 F | HEIGHT: 70 IN | WEIGHT: 168 LBS

## 2025-08-18 DIAGNOSIS — R51.9 NONINTRACTABLE HEADACHE, UNSPECIFIED CHRONICITY PATTERN, UNSPECIFIED HEADACHE TYPE: Primary | ICD-10-CM

## 2025-08-18 DIAGNOSIS — G89.29 CHRONIC PAIN OF BOTH SHOULDERS: ICD-10-CM

## 2025-08-18 DIAGNOSIS — M25.512 CHRONIC PAIN OF BOTH SHOULDERS: ICD-10-CM

## 2025-08-18 DIAGNOSIS — M25.511 CHRONIC PAIN OF BOTH SHOULDERS: ICD-10-CM

## 2025-08-18 LAB
ABSOLUTE EOSINOPHIL (SMH): 0.14 K/UL
ABSOLUTE MONOCYTE (SMH): 0.55 K/UL (ref 0.3–1)
ABSOLUTE NEUTROPHIL COUNT (SMH): 4.1 K/UL (ref 1.8–7.7)
ALBUMIN SERPL-MCNC: 4.1 G/DL (ref 3.5–5.2)
ALP SERPL-CCNC: 126 UNIT/L (ref 55–135)
ALT SERPL-CCNC: 13 UNIT/L (ref 10–44)
ANION GAP (SMH): 6 MMOL/L (ref 8–16)
AST SERPL-CCNC: 19 UNIT/L (ref 10–40)
BASOPHILS # BLD AUTO: 0.04 K/UL
BASOPHILS NFR BLD AUTO: 0.6 %
BILIRUB SERPL-MCNC: 0.8 MG/DL (ref 0.1–1)
BNP SERPL-MCNC: 49 PG/ML
BUN SERPL-MCNC: 19 MG/DL (ref 8–23)
CALCIUM SERPL-MCNC: 10.2 MG/DL (ref 8.7–10.5)
CHLORIDE SERPL-SCNC: 106 MMOL/L (ref 95–110)
CO2 SERPL-SCNC: 28 MMOL/L (ref 23–29)
CREAT SERPL-MCNC: 0.8 MG/DL (ref 0.5–1.4)
ERYTHROCYTE [DISTWIDTH] IN BLOOD BY AUTOMATED COUNT: 13.2 % (ref 11.5–14.5)
GFR SERPLBLD CREATININE-BSD FMLA CKD-EPI: >60 ML/MIN/1.73/M2
GLUCOSE SERPL-MCNC: 96 MG/DL (ref 70–110)
HCT VFR BLD AUTO: 39.7 % (ref 40–54)
HGB BLD-MCNC: 13.3 GM/DL (ref 14–18)
IMM GRANULOCYTES # BLD AUTO: 0.03 K/UL (ref 0–0.04)
IMM GRANULOCYTES NFR BLD AUTO: 0.5 % (ref 0–0.5)
LYMPHOCYTES # BLD AUTO: 1.47 K/UL (ref 1–4.8)
MAGNESIUM SERPL-MCNC: 2.2 MG/DL (ref 1.6–2.6)
MCH RBC QN AUTO: 33.8 PG (ref 27–31)
MCHC RBC AUTO-ENTMCNC: 33.5 G/DL (ref 32–36)
MCV RBC AUTO: 101 FL (ref 82–98)
NUCLEATED RBC (/100WBC) (SMH): 0 /100 WBC
OHS QRS DURATION: 78 MS
OHS QTC CALCULATION: 404 MS
PLATELET # BLD AUTO: 150 K/UL (ref 150–450)
PMV BLD AUTO: 11.3 FL (ref 9.2–12.9)
POCT GLUCOSE: 118 MG/DL (ref 70–110)
POTASSIUM SERPL-SCNC: 4.1 MMOL/L (ref 3.5–5.1)
PROT SERPL-MCNC: 6.9 GM/DL (ref 6–8.4)
RBC # BLD AUTO: 3.94 M/UL (ref 4.6–6.2)
RELATIVE EOSINOPHIL (SMH): 2.2 % (ref 0–8)
RELATIVE LYMPHOCYTE (SMH): 23.2 % (ref 18–48)
RELATIVE MONOCYTE (SMH): 8.7 % (ref 4–15)
RELATIVE NEUTROPHIL (SMH): 64.8 % (ref 38–73)
SODIUM SERPL-SCNC: 140 MMOL/L (ref 136–145)
TROPONIN HIGH SENSITIVE (SMH): 5.6 PG/ML
TROPONIN HIGH SENSITIVE (SMH): 6 PG/ML
WBC # BLD AUTO: 6.33 K/UL (ref 3.9–12.7)

## 2025-08-18 PROCEDURE — 84484 ASSAY OF TROPONIN QUANT: CPT | Performed by: EMERGENCY MEDICINE

## 2025-08-18 PROCEDURE — 80053 COMPREHEN METABOLIC PANEL: CPT | Performed by: PHYSICIAN ASSISTANT

## 2025-08-18 PROCEDURE — 83880 ASSAY OF NATRIURETIC PEPTIDE: CPT | Performed by: PHYSICIAN ASSISTANT

## 2025-08-18 PROCEDURE — 84484 ASSAY OF TROPONIN QUANT: CPT | Performed by: PHYSICIAN ASSISTANT

## 2025-08-18 PROCEDURE — 82962 GLUCOSE BLOOD TEST: CPT

## 2025-08-18 PROCEDURE — 99285 EMERGENCY DEPT VISIT HI MDM: CPT | Mod: 25

## 2025-08-18 PROCEDURE — 93010 ELECTROCARDIOGRAM REPORT: CPT | Mod: ,,, | Performed by: INTERNAL MEDICINE

## 2025-08-18 PROCEDURE — 93005 ELECTROCARDIOGRAM TRACING: CPT | Performed by: INTERNAL MEDICINE

## 2025-08-18 PROCEDURE — 85025 COMPLETE CBC W/AUTO DIFF WBC: CPT | Performed by: PHYSICIAN ASSISTANT

## 2025-08-18 PROCEDURE — 83735 ASSAY OF MAGNESIUM: CPT | Performed by: PHYSICIAN ASSISTANT

## 2025-08-19 ENCOUNTER — LAB VISIT (OUTPATIENT)
Dept: LAB | Facility: HOSPITAL | Age: 89
End: 2025-08-19
Attending: INTERNAL MEDICINE
Payer: MEDICARE

## 2025-08-19 DIAGNOSIS — E78.2 MIXED HYPERLIPIDEMIA: ICD-10-CM

## 2025-08-19 LAB
CHOLEST SERPL-MCNC: 115 MG/DL (ref 120–199)
CHOLEST/HDLC SERPL: 2.4 {RATIO} (ref 2–5)
HDLC SERPL-MCNC: 47 MG/DL (ref 40–75)
HDLC SERPL: 40.9 % (ref 20–50)
LDLC SERPL CALC-MCNC: 53.4 MG/DL (ref 63–159)
NONHDLC SERPL-MCNC: 68 MG/DL
TRIGL SERPL-MCNC: 73 MG/DL (ref 30–150)

## 2025-08-19 PROCEDURE — 83718 ASSAY OF LIPOPROTEIN: CPT

## 2025-08-19 PROCEDURE — 36415 COLL VENOUS BLD VENIPUNCTURE: CPT | Mod: PO

## 2025-08-22 ENCOUNTER — PATIENT MESSAGE (OUTPATIENT)
Dept: ADMINISTRATIVE | Facility: CLINIC | Age: 89
End: 2025-08-22
Payer: MEDICARE

## 2025-08-26 ENCOUNTER — OFFICE VISIT (OUTPATIENT)
Dept: INTERNAL MEDICINE | Facility: CLINIC | Age: 89
End: 2025-08-26
Payer: MEDICARE

## 2025-08-26 ENCOUNTER — IMMUNIZATION (OUTPATIENT)
Dept: INTERNAL MEDICINE | Facility: CLINIC | Age: 89
End: 2025-08-26
Payer: MEDICARE

## 2025-08-26 VITALS
OXYGEN SATURATION: 99 % | BODY MASS INDEX: 25.15 KG/M2 | HEIGHT: 70 IN | WEIGHT: 175.69 LBS | HEART RATE: 70 BPM | DIASTOLIC BLOOD PRESSURE: 68 MMHG | SYSTOLIC BLOOD PRESSURE: 120 MMHG

## 2025-08-26 DIAGNOSIS — E55.9 VITAMIN D DEFICIENCY: ICD-10-CM

## 2025-08-26 DIAGNOSIS — E78.2 MIXED HYPERLIPIDEMIA: ICD-10-CM

## 2025-08-26 DIAGNOSIS — R91.1 PULMONARY NODULE: ICD-10-CM

## 2025-08-26 DIAGNOSIS — D69.6 THROMBOCYTOPENIA, UNSPECIFIED: ICD-10-CM

## 2025-08-26 DIAGNOSIS — Z23 NEED FOR VACCINATION: Primary | ICD-10-CM

## 2025-08-26 DIAGNOSIS — I70.0 CALCIFICATION OF AORTA: Primary | ICD-10-CM

## 2025-08-26 DIAGNOSIS — G47.33 OBSTRUCTIVE SLEEP APNEA SYNDROME: ICD-10-CM

## 2025-08-26 DIAGNOSIS — R79.9 ABNORMAL FINDING OF BLOOD CHEMISTRY, UNSPECIFIED: ICD-10-CM

## 2025-08-26 PROCEDURE — 99214 OFFICE O/P EST MOD 30 MIN: CPT | Mod: S$PBB,,, | Performed by: INTERNAL MEDICINE

## 2025-08-26 PROCEDURE — 99213 OFFICE O/P EST LOW 20 MIN: CPT | Mod: PBBFAC | Performed by: INTERNAL MEDICINE

## 2025-08-26 PROCEDURE — G0008 ADMIN INFLUENZA VIRUS VAC: HCPCS | Mod: PBBFAC

## 2025-08-26 PROCEDURE — 99999 PR PBB SHADOW E&M-EST. PATIENT-LVL III: CPT | Mod: PBBFAC,,, | Performed by: INTERNAL MEDICINE

## 2025-08-26 PROCEDURE — 99999PBSHW FLU VACCINE - ADJUVANTED: Mod: PBBFAC,,,

## 2025-08-26 PROCEDURE — G2211 COMPLEX E/M VISIT ADD ON: HCPCS | Mod: ,,, | Performed by: INTERNAL MEDICINE

## (undated) DEVICE — TUBING MINIBORE EXTENSION

## (undated) DEVICE — NDL HYPODERMIC BLUNT 18G 1.5IN

## (undated) DEVICE — SYS LABEL CORRECT MED

## (undated) DEVICE — GLOVE SURGEONS ULTRA TOUCH 6.5

## (undated) DEVICE — SYR DISP LL 5CC

## (undated) DEVICE — CHLORAPREP 10.5 ML APPLICATOR

## (undated) DEVICE — NDL SPINAL SPINOCAN 22GX3.5

## (undated) DEVICE — GLOVE SURG ULTRA TOUCH 7.5

## (undated) DEVICE — NDL SAFETY 25G X 1.5 ECLIPSE

## (undated) DEVICE — GLOVE SURG ULTRA TOUCH 6